# Patient Record
Sex: MALE | Race: BLACK OR AFRICAN AMERICAN | Employment: OTHER | URBAN - METROPOLITAN AREA
[De-identification: names, ages, dates, MRNs, and addresses within clinical notes are randomized per-mention and may not be internally consistent; named-entity substitution may affect disease eponyms.]

---

## 2017-01-19 ENCOUNTER — TELEPHONE (OUTPATIENT)
Dept: ORTHOPEDIC SURGERY | Facility: CLINIC | Age: 77
End: 2017-01-19

## 2017-01-19 NOTE — TELEPHONE ENCOUNTER
Pt last seen june 14,2016: declined to schedule f/u:    Pt requesting letter stating he can not fulfill his duties due to back issues. Pt is a public worker for trash collection.    Please call pt at [phone]

## 2017-07-17 ENCOUNTER — HOSPITAL ENCOUNTER (OUTPATIENT)
Dept: ULTRASOUND IMAGING | Age: 77
Discharge: HOME | End: 2017-07-17
Attending: UROLOGY
Payer: COMMERCIAL

## 2017-07-17 DIAGNOSIS — R31.29 MICROHEMATURIA: ICD-10-CM

## 2017-07-17 DIAGNOSIS — N40.0 BENIGN PROSTATIC HYPERPLASIA, PRESENCE OF LOWER URINARY TRACT SYMPTOMS UNSPECIFIED, UNSPECIFIED MORPHOLOGY: ICD-10-CM

## 2017-07-17 DIAGNOSIS — Z87.442 PERSONAL HISTORY OF KIDNEY STONES: ICD-10-CM

## 2017-07-17 PROCEDURE — 76770 US EXAM ABDO BACK WALL COMP: CPT

## 2017-08-25 ENCOUNTER — HOSPITAL ENCOUNTER (EMERGENCY)
Age: 77
Discharge: HOME | End: 2017-08-25
Attending: EMERGENCY MEDICINE
Payer: COMMERCIAL

## 2017-08-25 ENCOUNTER — APPOINTMENT (OUTPATIENT)
Dept: GENERAL RADIOLOGY | Age: 77
End: 2017-08-25
Attending: PHYSICIAN ASSISTANT
Payer: COMMERCIAL

## 2017-08-25 VITALS
SYSTOLIC BLOOD PRESSURE: 128 MMHG | BODY MASS INDEX: 32.2 KG/M2 | DIASTOLIC BLOOD PRESSURE: 66 MMHG | RESPIRATION RATE: 16 BRPM | TEMPERATURE: 98.2 F | WEIGHT: 230 LBS | HEIGHT: 71 IN | OXYGEN SATURATION: 96 % | HEART RATE: 50 BPM

## 2017-08-25 DIAGNOSIS — S39.012A BACK STRAIN, INITIAL ENCOUNTER: ICD-10-CM
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
## 2017-08-25 PROCEDURE — 72050 X-RAY EXAM NECK SPINE 4/5VWS: CPT

## 2017-08-25 PROCEDURE — 72072 X-RAY EXAM THORAC SPINE 3VWS: CPT

## 2017-08-25 PROCEDURE — 72110 X-RAY EXAM L-2 SPINE 4/>VWS: CPT

## 2017-08-25 PROCEDURE — 74011250637 HC RX REV CODE- 250/637: Performed by: PHYSICIAN ASSISTANT

## 2017-08-25 PROCEDURE — 99282 EMERGENCY DEPT VISIT SF MDM: CPT

## 2017-08-25 RX ORDER — ACETAMINOPHEN AND CODEINE PHOSPHATE 300; 30 MG/1; MG/1
1 TABLET ORAL
Status: COMPLETED | OUTPATIENT
Start: 2017-08-25 | End: 2017-08-25

## 2017-08-25 RX ORDER — GLUCOSAMINE SULFATE 1500 MG
1000 POWDER IN PACKET (EA) ORAL DAILY
COMMUNITY
End: 2018-04-02

## 2017-08-25 RX ADMIN — ACETAMINOPHEN AND CODEINE PHOSPHATE 1 TABLET: 300; 30 TABLET ORAL at 16:09
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
## 2017-08-25 NOTE — ED NOTES
The documentation for this period is being entered following the guidelines as defined in the [facility] policy by UZMA Lino.

## 2017-09-15 ENCOUNTER — HOSPITAL ENCOUNTER (OUTPATIENT)
Dept: PHYSICAL THERAPY | Age: 77
Discharge: HOME | End: 2017-09-15
Payer: COMMERCIAL

## 2017-09-15 PROCEDURE — 97110 THERAPEUTIC EXERCISES: CPT

## 2017-09-15 PROCEDURE — 97162 PT EVAL MOD COMPLEX 30 MIN: CPT

## 2017-09-15 PROCEDURE — 97530 THERAPEUTIC ACTIVITIES: CPT
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
## 2017-09-19 ENCOUNTER — HOSPITAL ENCOUNTER (OUTPATIENT)
Dept: PHYSICAL THERAPY | Age: 77
Discharge: HOME | End: 2017-09-19
Payer: COMMERCIAL

## 2017-09-19 PROCEDURE — 97110 THERAPEUTIC EXERCISES: CPT

## 2017-09-19 PROCEDURE — 97140 MANUAL THERAPY 1/> REGIONS: CPT

## 2017-09-19 NOTE — PROGRESS NOTES
PT DAILY TREATMENT NOTE - [facility]     Patient Name: Ruth Garza Sr.  Date:2017  : 1940  [x]  Patient  Verified  Payor: Jacques Dangelo / Plan: Estefania Ruvalcaba / Product Type: Commerical /    In time:3:33  Out time: 4:13  Total Treatment Time (min): 40  Visit #: 2 of 8    Treatment Area: Sprain of ligaments of cervical spine, initial encounter [S13. 4XXA]  Sprain of ligaments of lumbar spine, initial encounter [S33. 5XXA]    SUBJECTIVE  Pain Level (0-10 scale): 7/10  Any medication changes, allergies to medications, adverse drug reactions, diagnosis change, or new procedure performed?: [x] No    [] Yes (see summary sheet for update)  Subjective functional status/changes:   [] No changes reported  Pt reported get good relief after doing HEP    OBJECTIVE    Modality rationale: decrease pain and increase tissue extensibility to improve the patients ability to tolerate ADLs   Min Type Additional Details    [] Estim:  []Unatt       []IFC  []Premod                        []Other:  []w/ice   []w/heat  Position:  Location:    [] Estim: []Att    []TENS instruct  []NMES                    []Other:  []w/US   []w/ice   []w/heat  Position:  Location:    []  Traction: [] Cervical       []Lumbar                       [] Prone          []Supine                       []Intermittent   []Continuous Lbs:  [] before manual  [] after manual    []  Ultrasound: []Continuous   [] Pulsed                           []1MHz   []3MHz W/cm2:  Location:    []  Iontophoresis with dexamethasone         Location: [] Take home patch   [] In clinic   10 []  Ice     [x]  heat  []  Ice massage  []  Laser   []  Anodyne Position: seated  Location: back    []  Laser with stim  []  Other:  Position:  Location:    []  Vasopneumatic Device Pressure:       [] lo [] med [] hi   Temperature: [] lo [] med [] hi   [] Skin assessment post-treatment:  []intact []redness- no adverse reaction    []redness - adverse reaction:     22 min Therapeutic Exercise:  [x] See flow sheet :   Rationale: increase ROM and increase strength to improve the patients ability to amb with ease    8 min Manual Therapy:  shortgun and KT applied for QL inhibition B t/s   Rationale: decrease pain, increase ROM and increase postural awareness to improve pt's tolerance to ADLs        With   [] TE   [] TA   [] neuro   [] other: Patient Education: [x] Review HEP    [] Progressed/Changed HEP based on:   [] positioning   [] body mechanics   [] transfers   [] heat/ice application    [] other:      Other Objective/Functional Measures: Mod/max challenged with maintaining TA during Jesus   Mod difficulty with all standing Jesus due to B LEs weakness   Mod decreased mobility of t/s and l/s   Cont to be hypersensitive to light touch/palpation along t/s and l/s paraspinal musculature     Pain Level (0-10 scale) post treatment: 6/10    ASSESSMENT/Changes in Function: initiated treatment as POC, pt demonstrated good effort. KT trial for B QL inhibition, please follow up with pt next visit. Edu pt on precaution/skin reaction with KT, he demonstrated good understanding for KT removing as needed. Patient will continue to benefit from skilled PT services to modify and progress therapeutic interventions, address functional mobility deficits, address ROM deficits, address strength deficits, analyze and address soft tissue restrictions, analyze and cue movement patterns, analyze and modify body mechanics/ergonomics, assess and modify postural abnormalities, address imbalance/dizziness and instruct in home and community integration to attain remaining goals. [x]  See Plan of Care  []  See progress note/recertification  []  See Discharge Summary         Progress towards goals / Updated goals:  Short term goals: To be accomplished within 1 week                        1. Pt will be independent with HEP to maintain progression. Met 9-  Long term goals:  To be accomplished within 4 weeks 1. Pt will improve FOTO score by 7 points to  show improvement with functional mobility performance. 2. Pt will report no more than 5/10 pain during with ADLs/amb to improve his QOL. 3. Pt will report being able to drive for more than 15 min with no pain so he can navigate community safely. 4. Pt will be able to perform single knee ext and flex (machine) with at least 15lb resistance for 15x to show improve knees strength for prolonged amb.      PLAN  []  Upgrade activities as tolerated     [x]  Continue plan of care  []  Update interventions per flow sheet       []  Discharge due to:_  []  Other:_      Antoinette Mueller, PT 9/19/2017  8:01 AM    Future Appointments  Date Time Provider Nakia Maunela   9/19/2017 3:30 PM Thienphuc Qiuspe Gone [id number] [facility]   9/22/2017 4:00 PM Zahida Garcia PT [id number] [facility]   9/27/2017 4:30 PM Thienphuc Quispe Gone [id number] [facility]   9/29/2017 3:30 PM Thienphdeacon Milton LGLCYIE [facility]   10/2/2017 2:00 PM Breanna Bales  [address]   10/3/2017 2:00 PM Thienphuc Quispe Gone LLSUAXL [facility]   10/5/2017 3:00 PM Zahida Garcia PT [id number] [facility]   10/10/2017 2:00 PM Thienphuc Quispe Gone MMCPTPB [facility]   10/12/2017 3:00 PM Zahida Garcia PT [id number] [facility]   1/19/2018 1:45 PM Kelly Mane MD [id number] [facility]

## 2017-09-22 ENCOUNTER — HOSPITAL ENCOUNTER (OUTPATIENT)
Dept: PHYSICAL THERAPY | Age: 77
Discharge: HOME | End: 2017-09-22
Payer: COMMERCIAL

## 2017-09-22 PROCEDURE — 97110 THERAPEUTIC EXERCISES: CPT

## 2017-09-22 NOTE — PROGRESS NOTES
PT DAILY TREATMENT NOTE - [facility] 3-16    Patient Name: Tigist Dimas Sr.  Date:2017  : 1940  [x]  Patient  Verified  Payor: Saulo Bangura / Plan: Jemal Ackerman / Product Type: Commerical /    In time: 4:00  Out time: 4:44  Total Treatment Time (min):  44  Total Timed Codes (min):  34     Visit #: 3 of 8    Treatment Area: Sprain of ligaments of cervical spine, initial encounter [S13. 4XXA]  Sprain of ligaments of lumbar spine, initial encounter [S33. 5XXA]    SUBJECTIVE  Pain Level (0-10 scale): 6-7/10  Any medication changes, allergies to medications, adverse drug reactions, diagnosis change, or new procedure performed?: [x] No    [] Yes (see summary sheet for update)  Subjective functional status/changes:   [] No changes reported  Pt. Reports he is doing a little better. He reports the tape may have helped some but he wants to see how he does without it today.      OBJECTIVE    Modality rationale: decrease pain and increase tissue extensibility to improve the patients ability to increase ease of ADLs   Min Type Additional Details    [] Estim:  []Unatt       []IFC  []Premod                        []Other:  []w/ice   []w/heat  Position:  Location:    [] Estim: []Att    []TENS instruct  []NMES                    []Other:  []w/US   []w/ice   []w/heat  Position:  Location:    []  Traction: [] Cervical       []Lumbar                       [] Prone          []Supine                       []Intermittent   []Continuous Lbs:  [] before manual  [] after manual    []  Ultrasound: []Continuous   [] Pulsed                           []1MHz   []3MHz Location:  W/cm2:    []  Iontophoresis with dexamethasone         Location: [] Take home patch   [] In clinic   10 []  Ice     [x]  heat  []  Ice massage  []  Laser   []  Anodyne Position: seated  Location: low back    []  Laser with stim  []  Other: Position:  Location:    []  Vasopneumatic Device Pressure:       [] lo [] med [] hi   Temperature: [] lo [] med [] hi [x] Skin assessment post-treatment:  [x]intact []redness- no adverse reaction    []redness - adverse reaction:     34 min Therapeutic Exercise:  [x] See flow sheet :   Rationale: increase ROM and increase strength to improve the patients ability to increase ease of ADLs          With   [x] TE   [] TA   [] neuro   [] other: Patient Education: [x] Review HEP    [] Progressed/Changed HEP based on:   [] positioning   [] body mechanics   [] transfers   [] heat/ice application    [] other:      Other Objective/Functional Measures:   Pt. Requires multiple cues to perform TA holds correctly  Pt. Had no difficulty with [place]  Pt. Required cues to perform squats correctly      Pain Level (0-10 scale) post treatment: 6/10    ASSESSMENT/Changes in Function:  Pt. Is making slow progress towards goals. He continues to have significant back and hip pain    Patient will continue to benefit from skilled PT services to modify and progress therapeutic interventions, address functional mobility deficits, address ROM deficits, address strength deficits, analyze and address soft tissue restrictions, analyze and cue movement patterns, analyze and modify body mechanics/ergonomics and assess and modify postural abnormalities to attain remaining goals. Progress towards goals / Updated goals:  Short term goals: To be accomplished within 1 week                        1. Pt will be independent with HEP to maintain progression. Met 9-    Long term goals: To be accomplished within 4 weeks                        1. Pt will improve FOTO score by 7 points to  show improvement with functional mobility performance.                        1. Pt will report no more than 5/10 pain during with ADLs/amb to improve his QOL.   Not met: 7/10 (9/22/17)                        3. Pt will report being able to drive for more than 15 min with no pain so he can navigate community safely.                        4. Pt will be able to perform single knee ext and flex (machine) with at least 15lb resistance for 15x to show improve knees strength for prolonged amb.      PLAN  []  Upgrade activities as tolerated     [x]  Continue plan of care  []  Update interventions per flow sheet       []  Discharge due to:_  []  Other:_      Nargis Adorno, PT 9/22/2017  4:03 PM

## 2017-09-27 ENCOUNTER — HOSPITAL ENCOUNTER (OUTPATIENT)
Dept: PHYSICAL THERAPY | Age: 77
Discharge: HOME | End: 2017-09-27
Payer: COMMERCIAL

## 2017-09-27 PROCEDURE — 97110 THERAPEUTIC EXERCISES: CPT

## 2017-09-27 NOTE — PROGRESS NOTES
PT DAILY TREATMENT NOTE     Patient Name: Dena Morley Sr.  Date:2017  : 1940  [x]  Patient  Verified  Payor: Halina Garcia / Plan: Marge Nova / Product Type: Commerical /    In time: 4:05  Out time: 5:20  Total Treatment Time (min): 45  Visit #: 4 of 8    Treatment Area: Sprain of ligaments of cervical spine, initial encounter [S13. 4XXA]  Sprain of ligaments of lumbar spine, initial encounter [S33. 5XXA]    SUBJECTIVE  Pain Level (0-10 scale): 6/10  Any medication changes, allergies to medications, adverse drug reactions, diagnosis change, or new procedure performed?: [x] No    [] Yes (see summary sheet for update)  Subjective functional status/changes:   [] No changes reported  Pt reported feeling better gradually    OBJECTIVE  Modality rationale: decrease pain and increase tissue extensibility to improve the patients ability to increase ease of ADLs   Min Type Additional Details     [] Estim:  []Unatt       []IFC  []Premod                        []Other:  []w/ice   []w/heat  Position:  Location:     [] Estim: []Att    []TENS instruct  []NMES                    []Other:  []w/US   []w/ice   []w/heat  Position:  Location:     []  Traction: [] Cervical       []Lumbar                       [] Prone          []Supine                       []Intermittent   []Continuous Lbs:  [] before manual  [] after manual     []  Ultrasound: []Continuous   [] Pulsed                           []1MHz   []3MHz Location:  W/cm2:     []  Iontophoresis with dexamethasone         Location: [] Take home patch   [] In clinic   10 []  Ice     [x]  heat  []  Ice massage  []  Laser   []  Anodyne Position: seated  Location: low back     []  Laser with stim  []  Other: Position:  Location:     []  Vasopneumatic Device Pressure:       [] lo [] med [] hi   Temperature: [] lo [] med [] hi   [x] Skin assessment post-treatment:  [x]intact []redness- no adverse reaction    []redness - adverse reaction:      35 min Therapeutic Exercise:  [x] See flow sheet :   Rationale: increase ROM and increase strength to improve the patients ability to increase ease of ADLs          With   [] TE   [] TA   [] neuro   [] other: Patient Education: [x] Review HEP    [] Progressed/Changed HEP based on:   [] positioning   [] body mechanics   [] transfers   [] heat/ice application    [] other:      Other Objective/Functional Measures:    Increased rep with Jesus per flow sheet    Mod pain with AROM of trunk    Cont to demonstrate poor posture with ant pelvic tilt and mod difficulty with TA draw    Pain Level (0-10 scale) post treatment: 4/10    ASSESSMENT/Changes in Function: pt making steady progress with PT today, reported less back pain and min improvement with activities. Will progress with Jesus for core and LEs strength. Patient will continue to benefit from skilled PT services to modify and progress therapeutic interventions, address functional mobility deficits, address ROM deficits, address strength deficits, analyze and address soft tissue restrictions, analyze and cue movement patterns, analyze and modify body mechanics/ergonomics and assess and modify postural abnormalities to attain remaining goals.      Progress towards goals / Updated goals:  Short term goals: To be accomplished within 1 week                        1. Pt will be independent with HEP to maintain progression. Met 9-     Long term goals: To be accomplished within 4 weeks                        9. Pt will improve FOTO score by 7 points to  show improvement with functional mobility performance.                        6. Pt will report no more than 5/10 pain during with ADLs/amb to improve his QOL.   Not met: 7/10 (9/22/17) progressing 6-7/10 for the last 2 visits 9-27-17                        3. Pt will report being able to drive for more than 15 min with no pain so he can navigate community safely.                        4. Pt will be able to perform single knee ext and flex (machine) with at least 15lb resistance for 15x to show improve knees strength for prolonged amb.      PLAN  []  Upgrade activities as tolerated     [x]  Continue plan of care  []  Update interventions per flow sheet       []  Discharge due to:_  []  Other:_    Mirza Leija, PT 9/27/2017  8:00 AM    Future Appointments  Date Time Provider Nakia Manuela   9/27/2017 4:30 PM Thienphuc Roxana Scale HFEFEIA [facility]   9/29/2017 3:30 PM Thienphuc Roxana Scale [id number] [facility]   10/2/2017 2:00  [address]  [address]   10/3/2017 2:00 PM Thienphuc Roxana Scale [id number] [facility]   10/5/2017 3:00 PM Lyudmila Mendoza, PT MMCPTPB [facility]   10/10/2017 2:00 PM Thienphuc Roxana Scale OBEZZWF [facility]   10/12/2017 3:00 PM Lyudmila Mendoza PT [id number] [facility]   1/19/2018 1:45 PM Cheri Orona MD [address] [facility]

## 2017-09-29 ENCOUNTER — HOSPITAL ENCOUNTER (OUTPATIENT)
Dept: PHYSICAL THERAPY | Age: 77
Discharge: HOME | End: 2017-09-29
Payer: COMMERCIAL

## 2017-09-29 PROCEDURE — 97110 THERAPEUTIC EXERCISES: CPT

## 2017-09-29 NOTE — PROGRESS NOTES
PT DAILY TREATMENT NOTE     Patient Name: Karen Baer.  Date:2017  : 1940  [x]  Patient  Verified  Payor: Harish Colette / Plan: [id number] / Product Type: PPO /    In time: 3:30  Out time: 4:20  Total Treatment Time (min): 50  Visit #: 5 of 8    Treatment Area: Sprain of ligaments of cervical spine, initial encounter [S13. 4XXA]  Sprain of ligaments of lumbar spine, initial encounter [S33. 5XXA]    SUBJECTIVE  Pain Level (0-10 scale): 610  Any medication changes, allergies to medications, adverse drug reactions, diagnosis change, or new procedure performed?: [x] No    [] Yes (see summary sheet for update)  Subjective functional status/changes:   [] No changes reported  \"It's easing down little bit\"    OBJECTIVE        Modality rationale: decrease pain and increase tissue extensibility to improve the patients ability to increase ease of ADLs   Min Type Additional Details      [] Estim:  []Unatt       []IFC  []Premod                        []Other:  []w/ice   []w/heat  Position:  Location:      [] Estim: []Att    []TENS instruct  []NMES                    []Other:  []w/US   []w/ice   []w/heat  Position:  Location:      []  Traction: [] Cervical       []Lumbar                       [] Prone          []Supine                       []Intermittent   []Continuous Lbs:  [] before manual  [] after manual      []  Ultrasound: []Continuous   [] Pulsed                           []1MHz   []3MHz Location:  W/cm2:      []  Iontophoresis with dexamethasone         Location: [] Take home patch   [] In clinic   10 []  Ice     [x]  heat  []  Ice massage  []  Laser   []  Anodyne Position: seated  Location: low back      []  Laser with stim  []  Other: Position:  Location:      []  Vasopneumatic Device Pressure:       [] lo [] med [] hi   Temperature: [] lo [] med [] hi   [x] Skin assessment post-treatment:  [x]intact []redness- no adverse reaction    []redness - adverse reaction:       40 min Therapeutic Exercise:  [x] See flow sheet :   Rationale: increase ROM and increase strength to improve the patients ability to increase ease of ADLs          With   [] TE   [] TA   [] neuro   [] other: Patient Education: [x] Review HEP    [] Progressed/Changed HEP based on:   [] positioning   [] body mechanics   [] transfers   [] heat/ice application    [] other:      Other Objective/Functional Measures: Added weight for Jesus, pt tolerated well, will increased resistance/weigth next visit   Added new Jesus for core and upper back strength   Mod-max difficulty with PPT in standing    Pain Level (0-10 scale) post treatment: 4/10    ASSESSMENT/Changes in Function: pt progressing slowly towards goals; reported min improvements with all activities. Will cont with POC to maximize his functional mobility level. Patient will continue to benefit from skilled PT services to modify and progress therapeutic interventions, address functional mobility deficits, address ROM deficits, address strength deficits, analyze and address soft tissue restrictions, analyze and cue movement patterns, analyze and modify body mechanics/ergonomics and assess and modify postural abnormalities to attain remaining goals.      Progress towards goals / Updated goals:  Short term goals: To be accomplished within 1 week                        1. Pt will be independent with HEP to maintain progression. Met 9-      Long term goals: To be accomplished within 4 weeks                        1. Pt will improve FOTO score by 7 points to  show improvement with functional mobility performance.                        3. Pt will report no more than 5/10 pain during with ADLs/amb to improve his QOL.   Not met: 7/10 (9/22/17) progressing 6-7/10 for the last 2 visits 9-27-17                        3. Pt will report being able to drive for more than 15 min with no pain so he can navigate community safely.                        4. Pt will be able to perform single knee ext and flex (machine) with at least 15lb resistance for 15x to show improve knees strength for prolonged amb.       PLAN  []  Upgrade activities as tolerated     [x]  Continue plan of care  []  Update interventions per flow sheet       []  Discharge due to:_  []  Other:_    Amari Sung, BLAYNE 9/29/2017  7:57 AM    Future Appointments  Date Time Provider Nakia Roy   9/29/2017 3:30 PM Pedro Yates [id number] [facility]   10/2/2017 2:00 PM Don Villalobos MD [facility]   10/3/2017 2:00 PM Pedro Yates OAMWMWS [facility]   10/5/2017 3:00 PM Amisha Zhou, PT [id number] [facility]   10/10/2017 2:00 PM Pedro Yates [id number] [facility]   10/12/2017 3:00 PM Amisha Zhou, PT MMCPTPB [facility]   1/19/2018 1:45 PM Sabra Hernandez MD [address] [facility]

## 2017-10-02 ENCOUNTER — OFFICE VISIT (OUTPATIENT)
Dept: ORTHOPEDIC SURGERY | Age: 77
End: 2017-10-02

## 2017-10-02 VITALS
HEART RATE: 62 BPM | BODY MASS INDEX: 31.92 KG/M2 | RESPIRATION RATE: 16 BRPM | DIASTOLIC BLOOD PRESSURE: 76 MMHG | SYSTOLIC BLOOD PRESSURE: 165 MMHG | HEIGHT: 71 IN | WEIGHT: 228 LBS | TEMPERATURE: 97.4 F

## 2017-10-02 DIAGNOSIS — M43.16 SPONDYLOLISTHESIS AT L4-L5 LEVEL: ICD-10-CM

## 2017-10-02 DIAGNOSIS — S39.012A STRAIN OF LUMBAR REGION, INITIAL ENCOUNTER: Primary | ICD-10-CM

## 2017-10-02 RX ORDER — RANITIDINE 150 MG/1
150 TABLET, FILM COATED ORAL 2 TIMES DAILY
COMMUNITY
Start: 2017-08-02

## 2017-10-02 RX ORDER — ACETAMINOPHEN AND CODEINE PHOSPHATE 300; 30 MG/1; MG/1
1 TABLET ORAL
COMMUNITY
Start: 2017-08-26 | End: 2018-04-02

## 2017-10-02 RX ORDER — IBUPROFEN 600 MG/1
600 TABLET ORAL
COMMUNITY
Start: 2017-09-07 | End: 2019-09-30

## 2017-10-02 NOTE — MR AVS SNAPSHOT
Visit Information Date & Time Provider Department Dept. Phone Encounter #  
 10/2/2017  2:00  [address],  [facility], [address] [facility] [phone] [id number] Follow-up Instructions Return in about 1 month (around 11/2/2017). Your Appointments 1/19/2018  1:45 PM  
PROCEDURE with Manpreet Santo MD  
[facility] ([address]) Appt Note: 6 mon poss [address], [facility] [address] [facility] [address]  
[address] [facility] [address] [place] [address] Upcoming Health Maintenance Date Due HEMOGLOBIN A1C Q6M 1938 LIPID PANEL Q1 1938 FOOT EXAM Q1 4/3/1948 MICROALBUMIN Q1 4/3/1948 EYE EXAM RETINAL OR DILATED Q1 4/3/1948 DTaP/Tdap/Td series (1 - Tdap) 4/3/1959 ZOSTER VACCINE AGE 60> 2/3/1998 GLAUCOMA SCREENING Q2Y 4/3/2003 Pneumococcal 65+ Low/Medium Risk (1 of 2 - PCV13) 4/3/2003 MEDICARE YEARLY EXAM 4/3/2003 INFLUENZA AGE 9 TO ADULT 8/1/2017 Allergies as of 10/2/2017  Review Complete On: 10/2/2017 By: [address], MD  
 No Known Allergies Current Immunizations  Reviewed on 8/8/2013 Name Date Influenza Vaccine 11/2/2012 Not reviewed this visit You Were Diagnosed With   
  
 Codes Comments Strain of lumbar region, initial encounter    -  Primary ICD-10-CM: B79.045T ICD-9-CM: 847.2 Spondylolisthesis at L4-L5 level     ICD-10-CM: M43.16 
ICD-9-CM: 756.12 Vitals BP Pulse Temp Resp Height(growth percentile) Weight(growth percentile) 165/76 62 97.4 °F (36.3 °C) (Oral) 16 5' 11\" (1.803 m) 228 lb (103.4 kg) BMI Smoking Status 31.8 kg/m2 Never Smoker Vitals History BMI and BSA Data Body Mass Index Body Surface Area  
 31.8 kg/m 2 2.28 m 2 Preferred Pharmacy Pharmacy Name Phone [facility] [address] [phone] Your Updated Medication List  
  
   
This list is accurate as of: 10/2/17  2:36 PM.  Always use your most recent med list.  
  
  
  
  
 acetaminophen-codeine 300-30 mg per tablet Commonly known as:  TYLENOL #3 Take 1 Tab by mouth every six (6) hours as needed. CENTRUM SILVER Tab tablet Generic drug:  multivitamins-minerals-lutein Take 1 Tab by mouth daily. ibuprofen 600 mg tablet Commonly known as:  MOTRIN Take 600 mg by mouth every eight (8) hours as needed. lisinopril 30 mg tablet Commonly known as:  Thersia Kubas Take 30 mg by mouth daily. metFORMIN 500 mg tablet Commonly known as:  GLUCOPHAGE Take 500 mg by mouth two (2) times daily (with meals). metoprolol succinate 100 mg tablet Commonly known as:  TOPROL-XL  
TAKE ONE TABLET BY MOUTH ONCE DAILY  
  
 oxybutynin chloride XL 10 mg CR tablet Commonly known as:  DITROPAN XL Take 1 Tab by mouth daily. pravastatin 40 mg tablet Commonly known as:  PRAVACHOL Take 40 mg by mouth nightly. raNITIdine 150 mg tablet Commonly known as:  ZANTAC Take 150 mg by mouth two (2) times a day. VITAMIN D3 1,000 unit Cap Generic drug:  cholecalciferol Take 1,000 Units by mouth daily. Follow-up Instructions Return in about 1 month (around 11/2/2017). To-Do List   
 10/03/2017 2:00 PM  
  Appointment with Mary Jane Bravo at [address] [place] [address] ([phone]) 10/05/2017 3:00 PM  
  Appointment with Diane Ortiz PT at [facility] [address] [facility] ([phone]) 10/10/2017 2:00 PM  
  Appointment with Mary Jane Bravo at [address] [place] [address] ([phone]) 10/12/2017 3:00 PM  
  Appointment with Diane Ortiz PT at [facility] [address] [facility] ([phone]) Patient Instructions MyChart Activation Thank you for requesting access to [employer].  Please follow the instructions below to securely access and download your online medical record. [employer] allows you to send messages to your doctor, view your test results, renew your prescriptions, schedule appointments, and more. How Do I Sign Up? 1. In your internet browser, go to [email or web address] 
2. Click on the First Time User? Click Here link in the Sign In box. You will be redirect to the New Member Sign Up page. 3. Enter your [employer] Access Code exactly as it appears below. You will not need to use this code after youve completed the sign-up process. If you do not sign up before the expiration date, you must request a new code. [employer] Access Code: [email or web address] Expires: 10/12/2017  6:17 PM (This is the date your [employer] access code will ) 4. Enter the last four digits of your Social Security Number (xxxx) and Date of Birth (mm/dd/yyyy) as indicated and click Submit. You will be taken to the next sign-up page. 5. Create a [employer] ID. This will be your [employer] login ID and cannot be changed, so think of one that is secure and easy to remember. 6. Create a [employer] password. You can change your password at any time. 7. Enter your Password Reset Question and Answer. This can be used at a later time if you forget your password. 8. Enter your e-mail address. You will receive e-mail notification when new information is available in [address] 9. Click Sign Up. You can now view and download portions of your medical record. 10. Click the Download Summary menu link to download a portable copy of your medical information. Additional Information If you have questions, please visit the Frequently Asked Questions section of the [employer] website at [email or web address] Remember, [employer] is NOT to be used for urgent needs. For medical emergencies, dial 911. Back Strain: Care Instructions Your Care Instructions Back strain happens when you overstretch, or pull, a muscle in your back. You may hurt your back in an accident or when you exercise or lift something. Most back pain will get better with rest and time. You can take care of yourself at home to help your back heal. 
Follow-up care is a key part of your treatment and safety. Be sure to make and go to all appointments, and call your doctor if you are having problems. It's also a good idea to know your test results and keep a list of the medicines you take. How can you care for yourself at home? · Try to stay as active as you can, but stop or reduce any activity that causes pain. · Put ice or a cold pack on the sore muscle for 10 to 20 minutes at a time to stop swelling. Try this every 1 to 2 hours for 3 days (when you are awake) or until the swelling goes down. Put a thin cloth between the ice pack and your skin. · After 2 or 3 days, apply a heating pad on low or a warm cloth to your back. Some doctors suggest that you go back and forth between hot and cold treatments. · Take pain medicines exactly as directed. ¨ If the doctor gave you a prescription medicine for pain, take it as prescribed. ¨ If you are not taking a prescription pain medicine, ask your doctor if you can take an over-the-counter medicine. · Try sleeping on your side with a pillow between your legs. Or put a pillow under your knees when you lie on your back. These measures can ease pain in your lower back. · Return to your usual level of activity slowly. When should you call for help? Call 911 anytime you think you may need emergency care. For example, call if: 
· You are unable to move a leg at all. Call your doctor now or seek immediate medical care if: 
· You have new or worse symptoms in your legs, belly, or buttocks. Symptoms may include: ¨ Numbness or tingling. ¨ Weakness. ¨ Pain. · You lose bladder or bowel control. Watch closely for changes in your health, and be sure to contact your doctor if you are not getting better as expected. Where can you learn more? Go to http://anitha-paxton.info/. Enter I921 in the search box to learn more about \"Back Strain: Care Instructions. \" Current as of: March 21, 2017 Content Version: 11.3 © 5121-7729 Sendmebox. Care instructions adapted under license by [employer] (which disclaims liability or warranty for this information). If you have questions about a medical condition or this instruction, always ask your healthcare professional. [place] [address] any warranty or liability for your use of this information. Back Care and Preventing Injuries: Care Instructions Your Care Instructions You can hurt your back doing many everyday activities: lifting a heavy box, bending down to garden, exercising at the gym, and even getting out of bed. But you can keep your back strong and healthy by doing some exercises. You also can follow a few tips for sitting, sleeping, and lifting to avoid hurting your back again. Talk to your doctor before you start an exercise program. Ask for help if you want to learn more about keeping your back healthy. Follow-up care is a key part of your treatment and safety. Be sure to make and go to all appointments, and call your doctor if you are having problems. It's also a good idea to know your test results and keep a list of the medicines you take. How can you care for yourself at home? · Stay at a healthy weight to avoid strain on your lower back. · Do not smoke. Smoking increases the risk of osteoporosis, which weakens the spine. If you need help quitting, talk to your doctor about stop-smoking programs and medicines. These can increase your chances of quitting for good.  
· Make sure you sleep in a position that maintains your back's normal curves and on a mattress that feels comfortable. Sleep on your side with a pillow between your knees, or sleep on your back with a pillow under your knees. These positions can reduce strain on your back. · When you get out of bed, lie on your side and bend both knees. Drop your feet over the edge of the bed as you push up with both arms. Scoot to the edge of the bed. Make sure your feet are in line with your rear end (buttocks), and then stand up. · If you must stand for a long time, put one foot on a stool, ledge, or box. Exercise to strengthen your back and other muscles · Get at least 30 minutes of exercise on most days of the week. Walking is a good choice. You also may want to do other activities, such as running, swimming, cycling, or playing tennis or team sports. · Stretch your back muscles. Here are few exercises to try: ¨ Lie on your back with your knees bent and your feet flat on the floor. Gently pull one bent knee to your chest. Put that foot back on the floor, and then pull the other knee to your chest. Hold for 15 to 30 seconds. Repeat 2 to 4 times. ¨ Do pelvic tilts. Lie on your back with your knees bent. Tighten your stomach muscles. Pull your belly button (navel) in and up toward your ribs. You should feel like your back is pressing to the floor and your hips and pelvis are slightly lifting off the floor. Hold for 6 seconds while breathing smoothly. · Keep your core muscles strong. The muscles of your back, belly (abdomen), and buttocks support your spine. ¨ Pull in your belly, and imagine pulling your navel toward your spine. Hold this for 6 seconds, then relax. Remember to keep breathing normally as you tense your muscles. ¨ Do curl-ups. Always do them with your knees bent. Keep your low back on the floor, and curl your shoulders toward your knees using a smooth, slow motion.  Keep your arms folded across your chest. If this bothers your neck, try putting your hands behind your neck (not your head), with your elbows spread apart. ¨ Lie on your back with your knees bent and your feet flat on the floor. Tighten your belly muscles, and then push with your feet and raise your buttocks up a few inches. Hold this position 6 seconds as you continue to breathe normally, then lower yourself slowly to the floor. Repeat 8 to 12 times. ¨ If you like group exercise, try Pilates or yoga. These classes have poses that strengthen the core muscles. Protect your back when you sit · Place a small pillow, a rolled-up towel, or a lumbar roll in the curve of your back if you need extra support. · Sit in a chair that is low enough to let you place both feet flat on the floor with both knees nearly level with your hips. If your chair or desk is too high, use a foot rest to raise your knees. · When driving, keep your knees nearly level with your hips. Sit straight, and drive with both hands on the steering wheel. Your arms should be in a slightly bent position. · Try a kneeling chair, which helps tilt your hips forward. This takes pressure off your lower back. · Try sitting on an exercise ball. It can rock from side to side, which helps keep your back loose. Lift properly · Squat down, bending at the hips and knees only. If you need to, put one knee to the floor and extend your other knee in front of you, bent at a right angle (half kneeling). · Press your chest straight forward. This helps keep your upper back straight while keeping a slight arch in your low back. · Hold the load as close to your body as possible, at the level of your navel. · Use your feet to change direction, taking small steps. · Lead with your hips as you change direction. Keep your shoulders in line with your hips as you move. Do not twist your body. · Set down your load carefully, squatting with your knees and hips only. When should you call for help? Watch closely for changes in your health, and be sure to contact your doctor if: 
· You want more exercises to make your back and other core muscles stronger. Where can you learn more? Go to http://anitha-paxton.info/. Enter S810 in the search box to learn more about \"Back Care and Preventing Injuries: Care Instructions. \" Current as of: March 21, 2017 Content Version: 11.3 © 9980-9412 IQ Logic. Care instructions adapted under license by [employer] (which disclaims liability or warranty for this information). If you have questions about a medical condition or this instruction, always ask your healthcare professional. [address] any warranty or liability for your use of this information. Introducing [facility]! Uyen Vergara introduces [employer] patient portal. Now you can access parts of your medical record, email your doctor's office, and request medication refills online. 1. In your internet browser, go to [email or web address] 2. Click on the First Time User? Click Here link in the Sign In box. You will see the New Member Sign Up page. 3. Enter your [employer] Access Code exactly as it appears below. You will not need to use this code after youve completed the sign-up process. If you do not sign up before the expiration date, you must request a new code. · [employer] Access Code: [email or web address] Expires: 10/12/2017  6:17 PM 
 
4. Enter the last four digits of your Social Security Number (xxxx) and Date of Birth (mm/dd/yyyy) as indicated and click Submit. You will be taken to the next sign-up page. 5. Create a [employer] ID. This will be your [employer] login ID and cannot be changed, so think of one that is secure and easy to remember. 6. Create a [employer] password. You can change your password at any time. 7. Enter your Password Reset Question and Answer.  This can be used at a later time if you forget your password. 8. Enter your e-mail address. You will receive e-mail notification when new information is available in [address] 9. Click Sign Up. You can now view and download portions of your medical record. 10. Click the Download Summary menu link to download a portable copy of your medical information. If you have questions, please visit the Frequently Asked Questions section of the [employer] website. Remember, [employer] is NOT to be used for urgent needs. For medical emergencies, dial 911. Now available from your iPhone and Android! Please provide this summary of care documentation to your next provider. Your primary care clinician is listed as Nafisa. If you have any questions after today's visit, please call [phone].
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
## 2017-10-03 ENCOUNTER — HOSPITAL ENCOUNTER (OUTPATIENT)
Dept: PHYSICAL THERAPY | Age: 77
Discharge: HOME | End: 2017-10-03
Payer: COMMERCIAL

## 2017-10-03 PROCEDURE — 97110 THERAPEUTIC EXERCISES: CPT

## 2017-10-03 NOTE — PROGRESS NOTES
[facility] - Veronique Rivera  [address] [facility]  [phone] fax    Physical Therapy Progress Note  Patient name: Randal Asencio Start of Care: 9/15/2017   Referral source: Parish Su MD : 1940                         Medical Diagnosis: Sprain of ligaments of cervical spine, initial encounter [S13. 4XXA]  Sprain of ligaments of lumbar spine, initial encounter [S33. 5XXA] Onset Date: 2017                         Treatment Diagnosis: LBP, upper back pain, L>R, L hip pain, knees weakness   Prior Hospitalization: see medical history Provider#: [id number]   Medications: Verified on Patient summary List    Comorbidities: hypertensive cariovascular disease, hyperlipidemia, DM, bradycardia, HTN, L THR ~3/4 years ago   Prior Level of Function: mod I with mobility, amb with SPC since L THR, less back pain with ADLs/walking/physical activities. Visits from Start of Care: 6    Missed Visits: 0    Established Goals:         Excellent           Good         Limited           None  [] Increased ROM   []  []  []  []  [x] Increased Strength  []  [x]  []  []  [x] Increased Mobility  []  []  [x]  []   [x] Decreased Pain   []  []  [x]  []  [] Decreased Swelling  []  []  []  []    Key Functional Changes: improved pain level min, improve B LEs strength, improve posture awareness, improved functional mobility with min improved l/s FOTO score    Updated Goals: to be achieved in 4 weeks:  Long term goals: To be accomplished within 4 weeks   1. Pt will improve FOTO score by 7 points to  show improvement with functional mobility performance. 2. Pt will report being able to drive for more than 15 min with no pain so he can navigate community safely. 3. Pt will report no more than 5/10 pain during with ADLs/amb to improve his QOL.    4. Pt will be able to perform single knee ext and flex (machine) with at least 15lb resistance for 15x to show improve knees strength for prolonged amb. ASSESSMENT/RECOMMENDATIONS:  pt making slow but steady progress with PT; pain level gradually decreased 1-2 levels. But pt cont to demonstrate poor core control/strength, decreased tolerance to physical therapy and decreased B LEs strength. [x]Continue therapy per initial plan/protocol at a frequency of  2 x per week for 4 weeks  []Continue therapy with the following recommended changes:_____________________      _____________________________________________________________________  []Discontinue therapy progressing towards or have reached established goals  []Discontinue therapy due to lack of appreciable progress towards goals  []Discontinue therapy due to lack of attendance or compliance  []Await Physician's recommendations/decisions regarding therapy  []Other:________________________________________________________________    Thank you for this referral.   Lizabeth Hughes, PT 10/3/2017 2:55 PM    NOTE TO PHYSICIAN:  PLEASE COMPLETE THE ORDERS BELOW AND   FAX TO [facility]: [phone]  If you are unable to process this request in 24 hours please contact our office: [phone] I have read the above report and request that my patient continue as recommended. ? I have read the above report and request that my patient continue therapy with the following changes/special instructions:____________________________________  ? I have read the above report and request that my patient be discharged from therapy.     Physicians signature: ______________________________Date: ______Time:______

## 2017-10-03 NOTE — PROGRESS NOTES
PT DAILY TREATMENT NOTE - [facility]     Patient Name: Wilson Hamilton Sr.  Date:10/3/2017  : 1940  [x]  Patient  Verified  Payor: Larisa Zambrano / Plan: Juliette Perez / Product Type: Commerical /    In time: 2:05  Out time:2:48  Total Treatment Time (min): 43  Total Timed Codes (min): 33  1:1 Treatment Time ( [address] only): 33   Visit #: 6 of 8    Treatment Area: Sprain of ligaments of cervical spine, initial encounter [S13. 4XXA]  Sprain of ligaments of lumbar spine, initial encounter [S33. 5XXA]    SUBJECTIVE  Pain Level (0-10 scale): 5-6/10  Any medication changes, allergies to medications, adverse drug reactions, diagnosis change, or new procedure performed?: [x] No    [] Yes (see summary sheet for update)  Subjective functional status/changes:   [] No changes reported  Pt stated \"I'm doing better. \"    OBJECTIVE          Modality rationale: decrease pain and increase tissue extensibility to improve the patients ability to increase ease of ADLs   Min Type Additional Details      [] Estim:  []Unatt       []IFC  []Premod                        []Other:  []w/ice   []w/heat  Position:  Location:      [] Estim: []Att    []TENS instruct  []NMES                    []Other:  []w/US   []w/ice   []w/heat  Position:  Location:      []  Traction: [] Cervical       []Lumbar                       [] Prone          []Supine                       []Intermittent   []Continuous Lbs:  [] before manual  [] after manual      []  Ultrasound: []Continuous   [] Pulsed                           []1MHz   []3MHz Location:  W/cm2:      []  Iontophoresis with dexamethasone         Location: [] Take home patch   [] In clinic   10 []  Ice     [x]  heat  []  Ice massage  []  Laser   []  Anodyne Position: seated  Location: low back      []  Laser with stim  []  Other: Position:  Location:      []  Vasopneumatic Device Pressure:       [] lo [] med [] hi   Temperature: [] lo [] med [] hi   [x] Skin assessment post-treatment:  [x]intact []redness- no adverse reaction    []redness - adverse reaction:       33 min Therapeutic Exercise:  [x] See flow sheet :   Rationale: increase ROM and increase strength to improve the patients ability to increase ease of ADLs                                                                                                                     With   [] TE   [] TA   [] neuro   [] other: Patient Education: [x] Review HEP    [] Progressed/Changed HEP based on:   [] positioning   [] body mechanics   [] transfers   [] heat/ice application    [] other:      Other Objective/Functional Measures:    Cont to demonstrate max challenged with PPT/TA hold   Min fatigued with increased resistance for B LEs     FOTO: 51 neck (decreased 1 point), 50 l/s (increased 3 points)    Pain Level (0-10 scale) post treatment: 5/10    ASSESSMENT/Changes in Function: see progress note    Patient will continue to benefit from skilled PT services to modify and progress therapeutic interventions, address functional mobility deficits, address ROM deficits, address strength deficits, analyze and address soft tissue restrictions, analyze and cue movement patterns, analyze and modify body mechanics/ergonomics and assess and modify postural abnormalities to attain remaining goals.      Progress towards goals / Updated goals:  Short term goals: To be accomplished within 1 week                        1. Pt will be independent with HEP to maintain progression. Met 9-      Long term goals: To be accomplished within 4 weeks                        1. Pt will improve FOTO score by 7 points to  show improvement with functional mobility performance. Progressing with l/s but decreased with c/s 10-3-17                        2. Pt will report no more than 5/10 pain during with ADLs/amb to improve his QOL.   Not met: 7/10 (9/22/17) progressing 6-7/10 for the last 2 visits 9-27-17                        3. Pt will report being able to drive for more than 15 min with no pain so he can navigate community safely.  Not met 10-3-17                        4. Pt will be able to perform single knee ext and flex (machine) with at least 15lb resistance for 15x to show improve knees strength for prolonged amb. progressing, mod difficulty/fatigued with 5 pound weight 10-3-17      PLAN  []  Upgrade activities as tolerated     [x]  Continue plan of care  []  Update interventions per flow sheet       []  Discharge due to:_  []  Other:_    Neisha Mead, PT 10/3/2017  7:58 AM    Future Appointments  Date Time Provider Nakia Roy   10/3/2017 2:00 PM Pedro Alvarez [id number] [facility]   10/5/2017 3:00 PM Moses Jewell, PT [id number] [facility]   10/10/2017 2:00 PM Pedro Alvarez [id number] [facility]   10/12/2017 3:00 PM Moses Jewell PT [id number]   11/2/2017 3:10 PM Pepper Nicole MD [facility]   1/19/2018 1:45 PM Karlie Fatima MD [address] [place] [address]

## 2017-10-05 ENCOUNTER — HOSPITAL ENCOUNTER (OUTPATIENT)
Dept: PHYSICAL THERAPY | Age: 77
Discharge: HOME | End: 2017-10-05
Payer: COMMERCIAL

## 2017-10-05 PROCEDURE — 97110 THERAPEUTIC EXERCISES: CPT

## 2017-10-05 NOTE — PROGRESS NOTES
PT DAILY TREATMENT NOTE - [facility] 3-16    Patient Name: Giovanna Lux Sr.  Date:10/5/2017  : 1940  [x]  Patient  Verified  Payor: Alesia Sears / Plan: [id number] / Product Type: PPO /    In time: 3:00  Out time: 3:44  Total Treatment Time (min):  44  Total Timed Codes (min):  34     Visit #: 7 of 8    Treatment Area: Sprain of ligaments of cervical spine, initial encounter [S13. 4XXA]  Sprain of ligaments of lumbar spine, initial encounter [S33. 5XXA]    SUBJECTIVE  Pain Level (0-10 scale): 5-6/10  Any medication changes, allergies to medications, adverse drug reactions, diagnosis change, or new procedure performed?: [x] No    [] Yes (see summary sheet for update)  Subjective functional status/changes:   [] No changes reported  Pt. Reports he is feeling about the same today. OBJECTIVE    Modality rationale:  To decrease pain to improve the patients ability to increase ease of ADLs   Min Type Additional Details    [] Estim:  []Unatt       []IFC  []Premod                        []Other:  []w/ice   []w/heat  Position:  Location:    [] Estim: []Att    []TENS instruct  []NMES                    []Other:  []w/US   []w/ice   []w/heat  Position:  Location:    []  Traction: [] Cervical       []Lumbar                       [] Prone          []Supine                       []Intermittent   []Continuous Lbs:  [] before manual  [] after manual    []  Ultrasound: []Continuous   [] Pulsed                           []1MHz   []3MHz Location:  W/cm2:    []  Iontophoresis with dexamethasone         Location: [] Take home patch   [] In clinic   10 []  Ice     [x]  heat  []  Ice massage  []  Laser   []  Anodyne Position: seated  Location: low back    []  Laser with stim  []  Other: Position:  Location:    []  Vasopneumatic Device Pressure:       [] lo [] med [] hi   Temperature: [] lo [] med [] hi   [x] Skin assessment post-treatment:  [x]intact []redness- no adverse reaction    []redness - adverse reaction: 34 min Therapeutic Exercise:  [x] See flow sheet :   Rationale: increase ROM and increase strength to improve the patients ability to increase ease of ADLs          With   [x] TE   [] TA   [] neuro   [] other: Patient Education: [x] Review HEP    [] Progressed/Changed HEP based on:   [] positioning   [] body mechanics   [] transfers   [] heat/ice application    [] other:      Other Objective/Functional Measures:   Pt. Tolerated PT well with no reports of increased pain  He performed SL knee extension with 10#  Pt. Has decreased mobility with SBx3     Pain Level (0-10 scale) post treatment: 5/10    ASSESSMENT/Changes in Function:  Pt. Is progressing slowly towards goals. He continues to have decreased core stability and decreased LE strength. Patient will continue to benefit from skilled PT services to modify and progress therapeutic interventions, address functional mobility deficits, address ROM deficits, address strength deficits, analyze and address soft tissue restrictions, analyze and cue movement patterns and analyze and modify body mechanics/ergonomics to attain remaining goals. Progress towards goals / Updated goals:  Long term goals: To be accomplished within 4 weeks                        1. Pt will improve FOTO score by 7 points to  show improvement with functional mobility performance.                         2. Pt will report being able to drive for more than 15 min with no pain so he can navigate community safely. 3. Pt will report no more than 5/10 pain during with ADLs/amb to improve his QOL. 4. Pt will be able to perform single knee ext and flex (machine) with at least 15lb resistance for 15x to show improve knees strength for prolonged amb.   Progressing: 10# (10/5/17)    PLAN  []  Upgrade activities as tolerated     [x]  Continue plan of care  []  Update interventions per flow sheet       []  Discharge due to:_  []  Other:_      Erick Galloway Betsy, BLAYNE 10/5/2017  3:27 PM

## 2017-10-10 ENCOUNTER — HOSPITAL ENCOUNTER (OUTPATIENT)
Dept: PHYSICAL THERAPY | Age: 77
Discharge: HOME | End: 2017-10-10
Payer: COMMERCIAL

## 2017-10-10 PROCEDURE — 97110 THERAPEUTIC EXERCISES: CPT

## 2017-10-10 NOTE — PROGRESS NOTES
PT DAILY TREATMENT NOTE     Patient Name: Shahriar Alvarez Sr.  Date:10/10/2017  : 1940  [x]  Patient  Verified  Payor: HOUSTON MINAYA / Plan: [id number] / Product Type: PPO /    In time:2:58  Out time: 2:43  Total Treatment Time (min): 45  Visit #: 8 of 8    Treatment Area: Sprain of ligaments of cervical spine, initial encounter [S13. 4XXA]  Sprain of ligaments of lumbar spine, initial encounter [S33. 5XXA]    SUBJECTIVE  Pain Level (0-10 scale): 5-6/10  Any medication changes, allergies to medications, adverse drug reactions, diagnosis change, or new procedure performed?: [x] No    [] Yes (see summary sheet for update)  Subjective functional status/changes:   [] No changes reported  Pt reported doing ok    OBJECTIVE           Modality rationale:  To decrease pain to improve the patients ability to increase ease of ADLs   Min Type Additional Details     [] Estim:  []Unatt       []IFC  []Premod                        []Other:  []w/ice   []w/heat  Position:  Location:     [] Estim: []Att    []TENS instruct  []NMES                    []Other:  []w/US   []w/ice   []w/heat  Position:  Location:     []  Traction: [] Cervical       []Lumbar                       [] Prone          []Supine                       []Intermittent   []Continuous Lbs:  [] before manual  [] after manual     []  Ultrasound: []Continuous   [] Pulsed                           []1MHz   []3MHz Location:  W/cm2:     []  Iontophoresis with dexamethasone         Location: [] Take home patch   [] In clinic   10 []  Ice     [x]  heat  []  Ice massage  []  Laser   []  Anodyne Position: seated  Location: low back     []  Laser with stim  []  Other: Position:  Location:     []  Vasopneumatic Device Pressure:       [] lo [] med [] hi   Temperature: [] lo [] med [] hi   [x] Skin assessment post-treatment:  [x]intact []redness- no adverse reaction    []redness - adverse reaction:      35 min Therapeutic Exercise:  [x] See flow sheet : Rationale: increase ROM and increase strength to improve the patients ability to increase ease of ADLs              With   [] TE   [] TA   [] neuro   [] other: Patient Education: [x] Review HEP    [] Progressed/Changed HEP based on:   [] positioning   [] body mechanics   [] transfers   [] heat/ice application    [] other:      Other Objective/Functional Measures:    Cont to demonstrate poor core contraction, especially on SB    Performed SL LAQ and HS with 15# using machine, no difficulty with HS but mod challenged and fatigued with LAQ    Pain Level (0-10 scale) post treatment:  5/10    ASSESSMENT/Changes in Function: pt making slow progress towards goals; pain level gradually decreased. Will cont with POC to maximize his core strength and activity tolerance    Patient will continue to benefit from skilled PT services to modify and progress therapeutic interventions, address functional mobility deficits, address ROM deficits, address strength deficits, analyze and address soft tissue restrictions, analyze and cue movement patterns and analyze and modify body mechanics/ergonomics to attain remaining goals.      Progress towards goals / Updated goals:  Long term goals: To be accomplished within 4 weeks                        1. Pt will improve FOTO score by 7 points to  show improvement with functional mobility performance. Progressing with l/s but decreased with c/s 10-3-17                        2. Pt will report being able to drive for more than 15 min with no pain so he can navigate community safely.                        3. Pt will report no more than 5/10 pain during with ADLs/amb to improve his QOL. Not met: 7/10 (9/22/17) progressing 6-7/10 for the last 2 visits 9-27-17 porgressing, 5-6 for the last 3 consecutive visits 10-10-17                        4. Pt will be able to perform single knee ext and flex (machine) with at least 15lb resistance for 15x to show improve knees strength for prolonged amb. Progressing: 15#, no difficulty with HS but mod challenged and fatigued with LAQ (10/5/17)     PLAN  []  Upgrade activities as tolerated     [x]  Continue plan of care  []  Update interventions per flow sheet       []  Discharge due to:_  []  Other:_      Chelsea Postal, PT 10/10/2017  7:48 AM    Future Appointments  Date Time Provider Nakia Roy   10/10/2017 2:00 PM Pedro Castro [id number] [facility]   10/12/2017 3:00 PM Ashley Finn [id number] [facility]   11/2/2017 3:10 PM Martha Lawrence  [address]   1/19/2018 1:45 PM Zena Armstrong  [facility]

## 2017-10-12 ENCOUNTER — HOSPITAL ENCOUNTER (OUTPATIENT)
Dept: PHYSICAL THERAPY | Age: 77
Discharge: HOME | End: 2017-10-12
Payer: COMMERCIAL

## 2017-10-12 PROCEDURE — 97110 THERAPEUTIC EXERCISES: CPT

## 2017-10-12 NOTE — PROGRESS NOTES
PT DAILY TREATMENT NOTE 3-16    Patient Name: Jt Hylton Sr.  Date:10/12/2017  : 1940  [x]  Patient  Verified  Payor: BLUE CROSS / Plan: [id number] / Product Type: PPO /    In time:3:00  Out time:3:49  Total Treatment Time (min): 49  Visit #: 9 of 8    Treatment Area: Sprain of ligaments of cervical spine, initial encounter [S13. 4XXA]  Sprain of ligaments of lumbar spine, initial encounter [S33. 5XXA]    SUBJECTIVE  Pain Level (0-10 scale): 5  Any medication changes, allergies to medications, adverse drug reactions, diagnosis change, or new procedure performed?: [x] No    [] Yes (see summary sheet for update)  Subjective functional status/changes:   [] No changes reported  \"It's getting a little bit better. \"    OBJECTIVE  Modality rationale: decrease pain and increase tissue extensibility to improve the patients ability to ease ADL tolerance   Min Type Additional Details    [] Estim:  []Unatt       []IFC  []Premod                        []Other:  []w/ice   []w/heat  Position:  Location:    [] Estim: []Att    []TENS instruct  []NMES                    []Other:  []w/US   []w/ice   []w/heat  Position:  Location:    []  Traction: [] Cervical       []Lumbar                       [] Prone          []Supine                       []Intermittent   []Continuous Lbs:  [] before manual  [] after manual    []  Ultrasound: []Continuous   [] Pulsed                           []1MHz   []3MHz Location:  W/cm2:    []  Iontophoresis with dexamethasone         Location: [] Take home patch   [] In clinic   10 []  Ice     [x]  heat  []  Ice massage  []  Laser   []  Anodyne Position: seated  Location: l/s    []  Laser with stim  []  Other: Position:  Location:    []  Vasopneumatic Device Pressure:       [] lo [] med [] hi   Temperature: [] lo [] med [] hi   [x] Skin assessment post-treatment:  [x]intact []redness- no adverse reaction    []redness - adverse reaction:     39 min Therapeutic Exercise:  [x] See flow sheet :   Rationale: increase ROM, increase strength and improve coordination to improve the patients ability to increase ease with ADLs           With   [x] TE   [x] TA   [] neuro   [] other: Patient Education: [x] Review HEP    [] Progressed/Changed HEP based on:   [] positioning   [] body mechanics   [] transfers   [] heat/ice application    [] other:      Other Objective/Functional Measures:   Patient unable to correct bilateral valgus collapse with mini squats despite cueing    Difficulty reaching end range with LAQ machine    Pain Level (0-10 scale) post treatment: 5    ASSESSMENT/Changes in Function:   Patient shows little carryover of cues and has most difficulty with eccentric movements with open chain activities; however, reports that therapy has been helping and that he is able to drive 2-3 hours without pain provocation. Will continue to focus on increasing general B LE strength and increasing activity tolerance. Patient will continue to benefit from skilled PT services to modify and progress therapeutic interventions, address functional mobility deficits, address ROM deficits, address strength deficits, analyze and address soft tissue restrictions, analyze and cue movement patterns, analyze and modify body mechanics/ergonomics and assess and modify postural abnormalities to attain remaining goals. []  See Plan of Care  []  See progress note/recertification  []  See Discharge Summary         Progress towards goals / Updated goals:  Long term goals: To be accomplished within 4 weeks                        1.  Pt will improve FOTO score by 7 points to  show improvement with functional mobility performance. Progressing with l/s but decreased with c/s 10-3-17                        2. Pt will report being able to drive for more than 15 min with no pain so he can navigate community safely.-met, patient reports 2-3 hours (10/12/2017)                        3. Pt will report no more than 5/10 pain during with ADLs/amb to improve his QOL. Not met: 7/10 (9/22/17) progressing 6-7/10 for the last 2 visits 9-27-17 porgressing, 5-6 for the last 3 consecutive visits 10-10-17                        4. Pt will be able to perform single knee ext and flex (machine) with at least 15lb resistance for 15x to show improve knees strength for prolonged amb.  Progressing: 15#, no difficulty with HS but mod challenged and fatigued with LAQ (10/5/17) continues to have difficulty reaching end range with LAQ (10/12/2017)    PLAN  []  Upgrade activities as tolerated     [x]  Continue plan of care  []  Update interventions per flow sheet       []  Discharge due to:_  []  Other:_      Pamila Fleischer 10/12/2017  2:58 PM    Future Appointments  Date Time Provider Nakia Roy   10/12/2017 3:00 PM Pamila Fleischer [id number] [id number]   11/2/2017 3:10 PM Girma Mckay  [address]   1/19/2018 1:45 PM Danielle Mae MD [id number]

## 2017-10-16 ENCOUNTER — HOSPITAL ENCOUNTER (OUTPATIENT)
Dept: PHYSICAL THERAPY | Age: 77
Discharge: HOME | End: 2017-10-16
Payer: COMMERCIAL

## 2017-10-16 PROCEDURE — 97110 THERAPEUTIC EXERCISES: CPT

## 2017-10-16 NOTE — PROGRESS NOTES
PT DAILY TREATMENT NOTE     Patient Name: Cory Monge Sr.  Date:10/16/2017  : 1940  [x]  Patient  Verified  Payor: Harish Colette / Plan: [id number] / Product Type: PPO /    In time:202  Out time:240  Total Treatment Time (min): 38  Visit #: 2 of 8    Treatment Area: Sprain of ligaments of cervical spine, initial encounter [S13. 4XXA]  Sprain of ligaments of lumbar spine, initial encounter [S33. 5XXA]    SUBJECTIVE  Pain Level (0-10 scale): 5-6/10  Any medication changes, allergies to medications, adverse drug reactions, diagnosis change, or new procedure performed?: [x] No    [] Yes (see summary sheet for update)  Subjective functional status/changes:   [] No changes reported  Pt stated that he got a foot pedal at a flea market with a computer on it.     OBJECTIVE    Modality rationale: decrease pain and increase tissue extensibility to improve the patients ability to increase ease with ADLs   Min Type Additional Details    [] Estim:  []Unatt       []IFC  []Premod                        []Other:  []w/ice   []w/heat  Position:  Location:    [] Estim: []Att    []TENS instruct  []NMES                    []Other:  []w/US   []w/ice   []w/heat  Position:  Location:    []  Traction: [] Cervical       []Lumbar                       [] Prone          []Supine                       []Intermittent   []Continuous Lbs:  [] before manual  [] after manual    []  Ultrasound: []Continuous   [] Pulsed                           []1MHz   []3MHz W/cm2:  Location:    []  Iontophoresis with dexamethasone         Location: [] Take home patch   [] In clinic   10 []  Ice     [x]  heat  []  Ice massage  []  Laser   []  Anodyne Position:seated  Location:low back    []  Laser with stim  []  Other:  Position:  Location:    []  Vasopneumatic Device Pressure:       [] lo [] med [] hi   Temperature: [] lo [] med [] hi   [x] Skin assessment post-treatment:  [x]intact []redness- no adverse reaction    []redness - adverse reaction:     28 min Therapeutic Exercise:  [x] See flow sheet :   Rationale: increase ROM and increase strength to improve the patients ability to increase ease with ADLs    With   [x] TE   [] TA   [] neuro   [] other: Patient Education: [x] Review HEP    [] Progressed/Changed HEP based on:   [] positioning   [] body mechanics   [] transfers   [] heat/ice application    [] other:      Other Objective/Functional Measures:   Pt had no complaint of increased pain with exercises  Had no difficulty with sit to supine transfers  LAQ machine was challenging     Pain Level (0-10 scale) post treatment: 5/10    ASSESSMENT/Changes in Function:   Pt cont to slowly progress toward goals. Pt cont with moderate pain that has slightly decreased since eval. Pt continues to need cueing to perform exercises correctly. Patient will continue to benefit from skilled PT services to modify and progress therapeutic interventions, address functional mobility deficits, address ROM deficits and address strength deficits to attain remaining goals. []  See Plan of Care  [x]  See progress note/recertification  []  See Discharge Summary         Progress towards goals / Updated goals:  Long term goals: To be accomplished within 4 weeks                        1. Pt will improve FOTO score by 7 points to  show improvement with functional mobility performance. Progressing with l/s but decreased with c/s 10-3-17                        2. Pt will report being able to drive for more than 15 min with no pain so he can navigate community safely.-met, patient reports 2-3 hours (10/12/2017)                        3. Pt will report no more than 5/10 pain during with ADLs/amb to improve his QOL.  Not met: 7/10 (9/22/17) progressing 6-7/10 for the last 2 visits 9-27-17 porgressing, 5-6 for the last 3 consecutive visits 10-10-17                        4. Pt will be able to perform single knee ext and flex (machine) with at least 15lb resistance for 15x to show improve knees strength for prolonged amb.  Progressing: 15#, no difficulty with HS but mod challenged and fatigued with LAQ (10/5/17) continues to have difficulty reaching end range with LAQ (10/12/2017)    PLAN  []  Upgrade activities as tolerated     [x]  Continue plan of care  []  Update interventions per flow sheet       []  Discharge due to:_  []  Other:_      Jon Ni, PTA 10/16/2017  2:04 PM    Future Appointments  Date Time Provider Nakia Roy   10/19/2017 1:30 PM [facility] PT [address] MMCPTP[facility]   11/2/2017 3:10 PM Don Villalobos  [address]   1/19/2018 1:45 PM Sabra Hernandez  [facility]

## 2017-10-19 ENCOUNTER — HOSPITAL ENCOUNTER (OUTPATIENT)
Dept: PHYSICAL THERAPY | Age: 77
Discharge: HOME | End: 2017-10-19
Payer: COMMERCIAL

## 2017-10-19 PROCEDURE — 97110 THERAPEUTIC EXERCISES: CPT

## 2017-10-19 NOTE — PROGRESS NOTES
PT DAILY TREATMENT NOTE     Patient Name: Karley Helton Sr.  Date:10/19/2017  : 1940  [x]  Patient  Verified  Payor: BLUE CROSS / Plan: [id number] / Product Type: PPO /    In time: 1:31  Out time:2:12  Total Treatment Time (min): 41  Visit #: 3 of 8    Treatment Area: Sprain of ligaments of cervical spine, initial encounter [S13. 4XXA]  Sprain of ligaments of lumbar spine, initial encounter [S33. 5XXA]    SUBJECTIVE  Pain Level (0-10 scale): 5/10 LBP  Any medication changes, allergies to medications, adverse drug reactions, diagnosis change, or new procedure performed?: [x] No    [] Yes (see summary sheet for update)  Subjective functional status/changes:   [] No changes reported  Pt reports that he is still having the most difficulty with walking for a long time. He can currently walk 30 minutes. Pt reports 50% improvement with therapy. His neck has been better, but he's still having problems with his lower back.       OBJECTIVE    Modality rationale: decrease pain and increase tissue extensibility to improve the patients ability to tolerate daily tasks    Min Type Additional Details    [] Estim:  []Unatt       []IFC  []Premod                        []Other:  []w/ice   []w/heat  Position:  Location:    [] Estim: []Att    []TENS instruct  []NMES                    []Other:  []w/US   []w/ice   []w/heat  Position:  Location:    []  Traction: [] Cervical       []Lumbar                       [] Prone          []Supine                       []Intermittent   []Continuous Lbs:  [] before manual  [] after manual    []  Ultrasound: []Continuous   [] Pulsed                           []1MHz   []3MHz W/cm2:  Location:    []  Iontophoresis with dexamethasone         Location: [] Take home patch   [] In clinic   10 []  Ice     [x]  heat  []  Ice massage  []  Laser   []  Anodyne Position: seated  Location: l/s     []  Laser with stim  []  Other:  Position:  Location:    []  Vasopneumatic Device Pressure:       [] lo [] med [] hi   Temperature: [] lo [] med [] hi   [x] Skin assessment post-treatment:  [x]intact []redness- no adverse reaction    []redness - adverse reaction:       31 min Therapeutic Exercise:  [x] See flow sheet :   Rationale: increase ROM and increase strength to improve the patients ability to improve ease of prolonged ambulation and household tasks              With   [] TE   [] TA   [] neuro   [] other: Patient Education: [x] Review HEP    [] Progressed/Changed HEP based on:   [] positioning   [] body mechanics   [] transfers   [] heat/ice application    [] other:      Other Objective/Functional Measures:     Decreased eccentric control noted with hamstring curl machine R > L   Gave and reviewed final HEP   No increased pain with therapy     Lumbar FOTO 62 (improved 15 points)  Neck FOTO 68 (improved 10 points)     Pain Level (0-10 scale) post treatment: 4/10    ASSESSMENT/Changes in Function: See DC Summary          Progress towards goals / Updated goals:  Long term goals: To be accomplished within 4 weeks                        1. Pt will improve FOTO score by 7 points to  show improvement with functional mobility performance. Goal met. 10/19/17                        2. Pt will report being able to drive for more than 15 min with no pain so he can navigate community safely. Goal met, patient reports 2-3 hours (10/12/2017)                        4. Pt will report no more than 5/10 pain during with ADLs/amb to improve his QOL. Progressing. Max pain 6/10, average pain 5/10, least pain 3/10 10/19/17                        4. Pt will be able to perform single knee ext and flex (machine) with at least 15lb resistance for 15x to show improve knees strength for prolonged amb. Goal met: 20# for 25 reps with knee flex/ext machines.   Decreased eccentric control noted as pt  10/19/17  PLAN  []  Upgrade activities as tolerated     []  Continue plan of care  []  Update interventions per flow sheet [x]  Discharge due to:pt has met or is progressing towards goals  []  Other:_      Delmy Perez, PT 10/19/2017  1:44 PM    Future Appointments  Date Time Provider Nakia Roy   11/2/2017 3:10  Freddy Tesfaye  [address]    1/19/2018 1:45 PM Hannah Parker  [facility] [address]

## 2017-11-02 ENCOUNTER — OFFICE VISIT (OUTPATIENT)
Dept: ORTHOPEDIC SURGERY | Age: 77
End: 2017-11-02

## 2017-11-02 VITALS
HEIGHT: 71 IN | OXYGEN SATURATION: 98 % | SYSTOLIC BLOOD PRESSURE: 120 MMHG | BODY MASS INDEX: 31.92 KG/M2 | RESPIRATION RATE: 16 BRPM | WEIGHT: 228 LBS | HEART RATE: 67 BPM | DIASTOLIC BLOOD PRESSURE: 64 MMHG | TEMPERATURE: 98 F

## 2017-11-02 DIAGNOSIS — S39.012D STRAIN OF LUMBAR REGION, SUBSEQUENT ENCOUNTER: Primary | ICD-10-CM

## 2017-11-02 NOTE — MR AVS SNAPSHOT
Visit Information Date & Time Provider Department Dept. Phone Encounter #  
 11/2/2017  3:10  [address],  [address], [address] [facility] [phone] [id number] Follow-up Instructions Return if symptoms worsen or fail to improve. Your Appointments 1/19/2018  1:45 PM  
PROCEDURE with Sujit Vaughn MD  
[facility] ([address]) Appt Note: 6 mon poss [address] [place] [id number]  
[address] [facility] [address] [place] [address] Upcoming Health Maintenance Date Due HEMOGLOBIN A1C Q6M 1940 LIPID PANEL Q1 1940 FOOT EXAM Q1 4/3/1948 MICROALBUMIN Q1 4/3/1948 EYE EXAM RETINAL OR DILATED Q1 4/3/1948 DTaP/Tdap/Td series (1 - Tdap) 4/3/1959 ZOSTER VACCINE AGE 60> 2/3/1998 GLAUCOMA SCREENING Q2Y 4/3/2003 Pneumococcal 65+ Low/Medium Risk (1 of 2 - PCV13) 4/3/2003 MEDICARE YEARLY EXAM 4/3/2003 INFLUENZA AGE 9 TO ADULT 8/1/2017 Allergies as of 11/2/2017  Review Complete On: 11/2/2017 By: [address], MD  
 No Known Allergies Current Immunizations  Reviewed on 8/8/2013 Name Date Influenza Vaccine 11/2/2012 Not reviewed this visit You Were Diagnosed With   
  
 Codes Comments Strain of lumbar region, subsequent encounter    -  Primary ICD-10-CM: S39.012D ICD-9-CM: V58.89, 847.2 improved Vitals BP Pulse Temp Resp Height(growth percentile) Weight(growth percentile) 120/64 67 98 °F (36.7 °C) (Oral) 16 5' 11\" (1.803 m) 228 lb (103.4 kg) SpO2 BMI Smoking Status 98% 31.8 kg/m2 Never Smoker BMI and BSA Data Body Mass Index Body Surface Area  
 31.8 kg/m 2 2.28 m 2 Preferred Pharmacy Pharmacy Name Phone [facility] [address] [phone] Your Updated Medication List  
  
   
 This list is accurate as of: 11/2/17  4:10 PM.  Always use your most recent med list.  
  
  
  
  
 acetaminophen-codeine 300-30 mg per tablet Commonly known as:  TYLENOL #3 Take 1 Tab by mouth every six (6) hours as needed. CENTRUM SILVER Tab tablet Generic drug:  multivitamins-minerals-lutein Take 1 Tab by mouth daily. ibuprofen 600 mg tablet Commonly known as:  MOTRIN Take 600 mg by mouth every eight (8) hours as needed. lisinopril 30 mg tablet Commonly known as:  Jacklynn Pares Take 30 mg by mouth daily. metFORMIN 500 mg tablet Commonly known as:  GLUCOPHAGE Take 500 mg by mouth two (2) times daily (with meals). metoprolol succinate 100 mg tablet Commonly known as:  TOPROL-XL  
TAKE ONE TABLET BY MOUTH ONCE DAILY  
  
 oxybutynin chloride XL 10 mg CR tablet Commonly known as:  DITROPAN XL Take 1 Tab by mouth daily. pravastatin 40 mg tablet Commonly known as:  PRAVACHOL Take 40 mg by mouth nightly. raNITIdine 150 mg tablet Commonly known as:  ZANTAC Take 150 mg by mouth two (2) times a day. VITAMIN D3 1,000 unit Cap Generic drug:  cholecalciferol Take 1,000 Units by mouth daily. Follow-up Instructions Return if symptoms worsen or fail to improve. Patient Instructions Back Spasm: Care Instructions Your Care Instructions A back spasm is sudden tightness and pain in your back muscles. It may happen from overuse or an injury. Things like sleeping in an awkward way, bending, lifting, standing, or sitting can sometimes cause a spasm. But the cause isn't always clear. Home treatment includes using heat or ice, taking over-the-counter (OTC) pain medicines, and avoiding activities that may cause back pain. For a back spasm that doesn't get better with home care, your doctor may prescribe medicine.  Treatments such as massage or manipulation may also help ease a back spasm. Your doctor may also suggest exercise or physical therapy to help improve strength and flexibility in your back muscles. In most cases, getting back to your normal activities is good foryour back. Just make sure to avoid doing things that make your pain worse. Follow-up care is a key part of your treatment and safety. Be sure to make and go to all appointments, and call your doctor if you are having problems. It's also a good idea to know your test results and keep a list of the medicines you take. How can you care for yourself at home? ? Heat, ice, and medicines ? · To relieve pain, use heat or ice (whichever feels better) on the affected area. ¨ Put a warm water bottle, a heating pad set on low, or a warm cloth on your back. Put a thin cloth between the heating pad and your skin. Do not go to sleep with a heating pad on your skin. ¨ Try ice or a cold pack on the area for 10 to 20 minutes at a time. Put a thin cloth between the ice and your skin. ? · Ask your doctor if you can take acetaminophen (such as Tylenol) or nonsteroidal anti-inflammatory drugs, such as ibuprofen or naproxen. Your doctor can prescribe stronger medicines if needed. Be safe with medicines. Read and follow all instructions on the label. ?Body positions and posture ? · Sit or lie in positions that are most comfortable for you and that reduce pain. Try one of these positions when you lie down: ¨ Lie on your back with your knees bent and supported by large pillows. ¨ Lie on the floor with your legs on the seat of a sofa or chair. [place] Left on your side with your knees and hips bent and a pillow between your legs. ¨ Lie on your stomach if it does not make pain worse. ? · Do not sit up in bed. Avoid soft couches and twisted positions. ? · Avoid bed rest after the first day of back pain. Bed rest can help relieve pain at first, but it delays healing.  Continued rest without activity is usually not good for your back. ? · If you must sit for long periods of time, take breaks from sitting. Change positions every 30 minutes. Get up and walk around, or lie in a comfortable position. Activity ? · Take short walks several times a day. You can start with 5 to 10 minutes, 3 or 4 times a day, and work up to longer walks. Walk on level surfaces and avoid hills and stairs until your back starts to feel better. ? · After your back spasm starts to feel better, try to stretch your muscles every day, especially before and after exercise and at bedtime. Regular stretching can help relax your muscles. ? · To prevent future back pain, do exercises to stretch and strengthen your back and stomach. Learn to use good posture, safe lifting techniques, and other ways to move to help you avoid back pain. When should you call for help? Call 911 anytime you think you may need emergency care. For example, call if: 
? · You are unable to move an arm or a leg at all. ?Call your doctor now or seek immediate medical care if: 
? · You have new or worse symptoms in your legs, belly, or buttocks. Symptoms may include: ¨ Numbness or tingling. ¨ Weakness. ¨ Pain. ? · You lose bladder or bowel control. ? Watch closely for changes in your health, and be sure to contact your doctor if: 
? · You do not get better as expected. Where can you learn more? Go to http://anitha-paxton.info/. Enter E232 in the search box to learn more about \"Back Spasm: Care Instructions. \" Current as of: March 21, 2017 Content Version: 11.4 © 9234-1970 [employer]. Care instructions adapted under license by [employer] (which disclaims liability or warranty for this information). If you have questions about a medical condition or this instruction, always ask your healthcare professional. [address] any warranty or liability for your use of this information. Introducing hospitals & HEALTH SERVICES! Giancarlochase Lazaro introduces PingTank patient portal. Now you can access parts of your medical record, email your doctor's office, and request medication refills online. 1. In your internet browser, go to [email or web address] 2. Click on the First Time User? Click Here link in the Sign In box. You will see the New Member Sign Up page. 3. Enter your PingTank Access Code exactly as it appears below. You will not need to use this code after youve completed the sign-up process. If you do not sign up before the expiration date, you must request a new code. · PingTank Access Code: [email or web address] Expires: 1/14/2018  2:02 PM 
 
4. Enter the last four digits of your Social Security Number (xxxx) and Date of Birth (mm/dd/yyyy) as indicated and click Submit. You will be taken to the next sign-up page. 5. Create a PingTank ID. This will be your PingTank login ID and cannot be changed, so think of one that is secure and easy to remember. 6. Create a PingTank password. You can change your password at any time. 7. Enter your Password Reset Question and Answer. This can be used at a later time if you forget your password. 8. Enter your e-mail address. You will receive e-mail notification when new information is available in [address] 9. Click Sign Up. You can now view and download portions of your medical record. 10. Click the Download Summary menu link to download a portable copy of your medical information. If you have questions, please visit the Frequently Asked Questions section of the PingTank website. Remember, PingTank is NOT to be used for urgent needs. For medical emergencies, dial 911. Now available from your iPhone and Android! Please provide this summary of care documentation to your next provider. Your primary care clinician is listed as Verrekha. If you have any questions after today's visit, please call [phone].
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
## 2017-11-02 NOTE — PATIENT INSTRUCTIONS
Back Spasm: Care Instructions  Your Care Instructions  A back spasm is sudden tightness and pain in your back muscles. It may happen from overuse or an injury. Things like sleeping in an awkward way, bending, lifting, standing, or sitting can sometimes cause a spasm. But the cause isn't always clear. Home treatment includes using heat or ice, taking over-the-counter (OTC) pain medicines, and avoiding activities that may cause back pain. For a back spasm that doesn't get better with home care, your doctor may prescribe medicine. Treatments such as massage or manipulation may also help ease a back spasm. Your doctor may also suggest exercise or physical therapy to help improve strength and flexibility in your back muscles. In most cases, getting back to your normal activities is good foryour back. Just make sure to avoid doing things that make your pain worse. Follow-up care is a key part of your treatment and safety. Be sure to make and go to all appointments, and call your doctor if you are having problems. It's also a good idea to know your test results and keep a list of the medicines you take. How can you care for yourself at home? ? Heat, ice, and medicines  ? · To relieve pain, use heat or ice (whichever feels better) on the affected area. ¨ Put a warm water bottle, a heating pad set on low, or a warm cloth on your back. Put a thin cloth between the heating pad and your skin. Do not go to sleep with a heating pad on your skin. ¨ Try ice or a cold pack on the area for 10 to 20 minutes at a time. Put a thin cloth between the ice and your skin. ? · Ask your doctor if you can take acetaminophen (such as Tylenol) or nonsteroidal anti-inflammatory drugs, such as ibuprofen or naproxen. Your doctor can prescribe stronger medicines if needed. Be safe with medicines. Read and follow all instructions on the label. ?Body positions and posture  ?  · Sit or lie in positions that are most comfortable for you and that reduce pain. Try one of these positions when you lie down:  ¨ Lie on your back with your knees bent and supported by large pillows. ¨ Lie on the floor with your legs on the seat of a sofa or chair. Cathy Kelly on your side with your knees and hips bent and a pillow between your legs. ¨ Lie on your stomach if it does not make pain worse. ? · Do not sit up in bed. Avoid soft couches and twisted positions. ? · Avoid bed rest after the first day of back pain. Bed rest can help relieve pain at first, but it delays healing. Continued rest without activity is usually not good for your back. ? · If you must sit for long periods of time, take breaks from sitting. Change positions every 30 minutes. Get up and walk around, or lie in a comfortable position. Activity  ? · Take short walks several times a day. You can start with 5 to 10 minutes, 3 or 4 times a day, and work up to longer walks. Walk on level surfaces and avoid hills and stairs until your back starts to feel better. ? · After your back spasm starts to feel better, try to stretch your muscles every day, especially before and after exercise and at bedtime. Regular stretching can help relax your muscles. ? · To prevent future back pain, do exercises to stretch and strengthen your back and stomach. Learn to use good posture, safe lifting techniques, and other ways to move to help you avoid back pain. When should you call for help? Call 911 anytime you think you may need emergency care. For example, call if:  ? · You are unable to move an arm or a leg at all. ?Call your doctor now or seek immediate medical care if:  ? · You have new or worse symptoms in your legs, belly, or buttocks. Symptoms may include:  ¨ Numbness or tingling. ¨ Weakness. ¨ Pain. ? · You lose bladder or bowel control. ? Watch closely for changes in your health, and be sure to contact your doctor if:  ? · You do not get better as expected. Where can you learn more?   Go to http://anitha-paxton.info/. Enter E232 in the search box to learn more about \"Back Spasm: Care Instructions. \"  Current as of: March 21, 2017  Content Version: 11.4  © 0487-5807 [employer], [employer]. Care instructions adapted under license by [employer] (which disclaims liability or warranty for this information). If you have questions about a medical condition or this instruction, always ask your healthcare professional. [place] [address] any warranty or liability for your use of this information.

## 2017-11-11 NOTE — PROGRESS NOTES
[facility] - [address]  [phone] fax    Physical Therapy Discharge Summary    Patient name: Corina Luna Start of Care: 9/15/17   Referral source: Emelia Rice MD : 1940   Medical/Treatment Diagnosis: Sprain of ligaments of cervical spine, initial encounter [S13. 4XXA]  Sprain of ligaments of lumbar spine, initial encounter [S33. 5XXA] Onset Date:17     Prior Hospitalization: see medical history Provider#: [id number]   Medications: Verified on Patient Summary List     Comorbidities: hypertensive cariovascular disease, hyperlipidemia, DM, bradycardia, HTN, L THR ~3/4 years ago   Prior Level of Function: mod I with mobility, amb with SPC since L THR, less back pain with ADLs/walking/physical activities. Visits from Start of Care: 11    Missed Visits: 0    Reporting Period : 9/15/17 to 10/19/17    Summary of Care:  Goal: Pt will improve FOTO score by 7 points to  show improvement with functional mobility   Status at last note/certification: Goal met. Status at discharge: met    Goal: Pt will report being able to drive for more than 15 min with no pain so he can navigate   Status at last note/certification: Goal met, patient reports 2-3 hours  Status at discharge: met    Goal: Pt will report no more than 5/10 pain during with ADLs/amb to improve his QOL. Status at last note/certification: Progressing. Max pain 6/10, average pain 5/10, least pain 3/10  Status at discharge: not met    Goal: Pt will be able to perform single knee ext and flex (machine) with at least 15lb resistance for 15x to show improve knees strength for prolonged amb  Status at last note/certification: Goal met: 20# for 25 reps with knee flex/ext machines.   Decreased eccentric control noted as pt  Status at discharge: met      ASSESSMENT/RECOMMENDATIONS:  Pt has made steady progress in therapy, meeting 3/4 goals and progressing with pain goal.  He is still having the most difficulty with prolonged walking likely d/t degenerative changes and continued strength deficits; however, walking tolerance has improved to 30 minutes. Pt reports significant improvement with his neck, and his lumbar region continues to be his primary complaint. Pt will benefit from continued strengthening to address postural deficits and improve joint stability with arthritic changes. Pt is compliant with HEP and skilled intervention is no longer required for continued strength progression. DC at this time.      [x]Discontinue therapy: [x]Patient has reached or is progressing toward set goals      []Patient is non-compliant or has abdicated      []Due to lack of appreciable progress towards set goals    Darcy Dietz, PT 11/11/2017 9:25 AM

## 2017-11-16 ENCOUNTER — HOSPITAL ENCOUNTER (EMERGENCY)
Age: 77
Discharge: HOME | End: 2017-11-16
Attending: EMERGENCY MEDICINE
Payer: COMMERCIAL

## 2017-11-16 VITALS
HEIGHT: 71 IN | RESPIRATION RATE: 20 BRPM | BODY MASS INDEX: 32.2 KG/M2 | TEMPERATURE: 97.9 F | OXYGEN SATURATION: 98 % | DIASTOLIC BLOOD PRESSURE: 76 MMHG | SYSTOLIC BLOOD PRESSURE: 140 MMHG | WEIGHT: 230 LBS | HEART RATE: 66 BPM

## 2017-11-16 DIAGNOSIS — N39.0 URINARY TRACT INFECTION WITH HEMATURIA, SITE UNSPECIFIED: Primary | ICD-10-CM

## 2017-11-16 DIAGNOSIS — R31.9 URINARY TRACT INFECTION WITH HEMATURIA, SITE UNSPECIFIED: Primary | ICD-10-CM

## 2017-11-16 LAB
APPEARANCE UR: ABNORMAL
BACTERIA URNS QL MICRO: ABNORMAL /HPF
BILIRUB UR QL: NEGATIVE
COLOR UR: ABNORMAL
EPITH CASTS URNS QL MICRO: ABNORMAL /LPF (ref 0–5)
GLUCOSE UR STRIP.AUTO-MCNC: NEGATIVE MG/DL
HGB UR QL STRIP: ABNORMAL
KETONES UR QL STRIP.AUTO: NEGATIVE MG/DL
LEUKOCYTE ESTERASE UR QL STRIP.AUTO: ABNORMAL
MUCOUS THREADS URNS QL MICRO: ABNORMAL /LPF
NITRITE UR QL STRIP.AUTO: NEGATIVE
PH UR STRIP: 5 [PH] (ref 5–8)
PROT UR STRIP-MCNC: 100 MG/DL
RBC #/AREA URNS HPF: ABNORMAL /HPF (ref 0–5)
SP GR UR REFRACTOMETRY: 1.02 (ref 1–1.03)
UROBILINOGEN UR QL STRIP.AUTO: 1 EU/DL (ref 0.2–1)
WBC URNS QL MICRO: ABNORMAL /HPF (ref 0–4)

## 2017-11-16 PROCEDURE — 81001 URINALYSIS AUTO W/SCOPE: CPT | Performed by: EMERGENCY MEDICINE

## 2017-11-16 PROCEDURE — 99283 EMERGENCY DEPT VISIT LOW MDM: CPT

## 2017-11-16 PROCEDURE — 74011250637 HC RX REV CODE- 250/637: Performed by: EMERGENCY MEDICINE

## 2017-11-16 RX ORDER — CIPROFLOXACIN 500 MG/1
500 TABLET ORAL
Status: COMPLETED | OUTPATIENT
Start: 2017-11-16 | End: 2017-11-16

## 2017-11-16 RX ORDER — CIPROFLOXACIN 500 MG/1
500 TABLET ORAL 2 TIMES DAILY
Qty: 20 TAB | Refills: 0 | Status: SHIPPED | OUTPATIENT
Start: 2017-11-16 | End: 2017-11-26

## 2017-11-16 RX ADMIN — CIPROFLOXACIN HYDROCHLORIDE 500 MG: 500 TABLET, FILM COATED ORAL at 23:17

## 2017-11-17 NOTE — ED PROVIDER NOTES
HPI Comments: 10:23 PM: Wilson Hamilton Sr. is a 76y.o. year old male with hx of BPH and hematuria presenting to the ED c/o constant hematuria onset tonight. No alleviating or worsening factors. Pt tried nothing for this at home. Pt has a Hx of UTI and hematuria in the past and treated with antibiotics. Pt denies fever, chills, N/V/D, abd pain, penile swelling, penile pain, testicular pain, testicular swelling, dysuria, and any other Sx or complaints at this time. The history is provided by the patient. No  was used. Past Medical History:   Diagnosis Date    Abnormal EKG     Bone pain     BPH (benign prostatic hyperplasia)     Bradycardia     Chest pain, unspecified     R/O CAD 3/10 mild fixed inferior defect    Diabetes mellitus ([facility] [address]     Echocardiogram 04/02/2010    [facility]:  EF 56%. No RWMA. Mod conc LVH. DDfx. RVSP 22 mmHg.  HCVD (hypertensive cardiovascular disease)     Hematuria, unspecified     History of silent myocardial infarction     Hypercholesteremia     Hyperlipidemia     Hypertension     Hypertension     Ill-defined condition     UTI    Kidney stones     Leg pain, right     normal exercise JOAQUIN (fall 2014)    Lower extremity arterial testing 10/07/2014    No arterial disease at rest bilaterally. R JOAQUIN 1.27.  L JOAQUIN 1.35. No significant drop in JOAQUIN w/exercise c/w normal perfusion.  Lower urinary tract symptoms (LUTS)     Muscular weakness     Nuclear cardiac imaging test 12/02/2011    Likely normal.  Fixed inferior basal and mid inferior defect likely artifact. No ischemia. EF 60%. No WMA.       Other specified cardiac dysrhythmias(427.89)     Bradycardia and pauses on holter    Swelling of limb     Unspecified sleep apnea     does not use cpap machine       Past Surgical History:   Procedure Laterality Date    BIOPSY  3/25/99    HX HERNIA REPAIR      HX HIP REPLACEMENT  12/6/11    [facility] of prostate, button prostatectomy     HX UROLOGICAL  1/29/2015    [facility], Dr. Hilda Hager, ESWL    [place] COLONOSCOPY FLX DX W/COLLJ SPEC WHEN PFRMD      TOTAL HIP ARTHROPLASTY Left 2014    Dr. Sherry Fish         Family History:   Problem Relation Age of Onset    Coronary Artery Disease Father     Coronary Artery Disease Brother      History of PTCA    Heart Disease Neg Hx        Social History     Social History    Marital status:      Spouse name: N/A    Number of children: N/A    Years of education: N/A     Occupational History    Not on file. Social History Main Topics    Smoking status: Never Smoker    Smokeless tobacco: Never Used    Alcohol use No    Drug use: No    Sexual activity: Not on file     Other Topics Concern    Not on file     Social History Narrative         ALLERGIES: Review of patient's allergies indicates no known allergies. Review of Systems   Constitutional: Negative. Negative for chills and fever. HENT: Negative. Eyes: Negative. Negative for visual disturbance. Respiratory: Negative. Negative for shortness of breath. Cardiovascular: Negative. Negative for chest pain. Gastrointestinal: Negative. Negative for abdominal pain, diarrhea, nausea and vomiting. Endocrine: Negative. Genitourinary: Positive for hematuria. Negative for decreased urine volume, difficulty urinating, discharge, dysuria, frequency, penile pain, penile swelling, scrotal swelling, testicular pain and urgency. Musculoskeletal: Negative. Skin: Negative. Allergic/Immunologic: Negative. Neurological: Negative. Negative for headaches. Hematological: Negative. Psychiatric/Behavioral: Negative. All other systems reviewed and are negative.       Vitals:    11/16/17 2210   BP: 140/76   Pulse: 66   Resp: 20   Temp: 97.9 °F (36.6 °C)   SpO2: 98%   Weight: 104.3 kg (230 lb)   Height: 5' 11\" (1.803 m)            Physical Exam Constitutional: He is oriented to person, place, and time. He appears well-developed and well-nourished. No distress. HENT:   Head: Normocephalic. Mouth/Throat: Oropharynx is clear and moist.   Eyes: Conjunctivae and EOM are normal. Pupils are equal, round, and reactive to light. Neck: Normal range of motion. Neck supple. Cardiovascular: Normal rate, regular rhythm, normal heart sounds and intact distal pulses. No murmur heard. Pulmonary/Chest: Effort normal and breath sounds normal. No respiratory distress. He has no wheezes. He has no rales. He exhibits no tenderness. Abdominal: Soft. Bowel sounds are normal. He exhibits no distension. There is no tenderness. There is no rebound. Genitourinary: Penis normal.   Musculoskeletal: Normal range of motion. He exhibits no edema or tenderness. Neurological: He is alert and oriented to person, place, and time. No cranial nerve deficit. He exhibits normal muscle tone. Coordination normal.   Skin: Skin is warm and dry. No rash noted. Psychiatric: He has a normal mood and affect. His behavior is normal. Judgment and thought content normal.   Nursing note and vitals reviewed.        MDM  Number of Diagnoses or Management Options  Urinary tract infection with hematuria, site unspecified: new and requires workup     Amount and/or Complexity of Data Reviewed  Clinical lab tests: ordered and reviewed    Risk of Complications, Morbidity, and/or Mortality  Presenting problems: moderate  Diagnostic procedures: moderate  Management options: moderate      ED Course       Procedures      Vitals:  Patient Vitals for the past 12 hrs:   Temp Pulse Resp BP SpO2   11/16/17 2210 97.9 °F (36.6 °C) 66 20 140/76 98 %       Medications Ordered:  Medications   ciprofloxacin HCl (CIPRO) tablet 500 mg (not administered)       Lab Findings:  Recent Results (from the past 12 hour(s))   URINALYSIS W/ RFLX MICROSCOPIC    Collection Time: 11/16/17 10:12 PM   Result Value Ref Range Color RED      Appearance TURBID     Specific gravity 1.021 1.005 - 1.030      pH (UA) 5.0 5.0 - 8.0      Protein 100 (A) NEG mg/dL    Glucose NEGATIVE  NEG mg/dL    Ketone NEGATIVE  NEG mg/dL    Bilirubin NEGATIVE  NEG      Blood LARGE (A) NEG      Urobilinogen 1.0 0.2 - 1.0 EU/dL    Nitrites NEGATIVE  NEG      Leukocyte Esterase SMALL (A) NEG     URINE MICROSCOPIC ONLY    Collection Time: 11/16/17 10:12 PM   Result Value Ref Range    WBC 36 to 50 0 - 4 /hpf    RBC TOO NUMEROUS TO COUNT 0 - 5 /hpf    Epithelial cells 2+ 0 - 5 /lpf    Bacteria 3+ (A) NEG /hpf    Mucus 2+ (A) NEG /lpf     Progress notes, consult notes, or additional procedure notes:  11:11 PM I have assessed the patient and discussed their results and diagnosis. Pt will be discharged in stable condition. Patient will f/u with his urologist Dr Greer De León. Patient is to return to emergency department if any new or worsening condition. Patient understands and verbalizes agreement with plan. Diagnosis:   1. Urinary tract infection with hematuria, site unspecified        Disposition: discharge    Follow-up Information     Follow up With Details Comments [address] MD Call in 2 days For Follow Up [place] [id number] [phone] [id number] [facility] EMERGENCY DEPT Go to As needed, If symptoms worsen [place] [id number]  [phone]           Patient's Medications   Start Taking    CIPROFLOXACIN HCL (CIPRO) 500 MG TABLET    Take 1 Tab by mouth two (2) times a day for 10 days. Continue Taking    ACETAMINOPHEN-CODEINE (TYLENOL #3) 300-30 MG PER TABLET    Take 1 Tab by mouth every six (6) hours as needed. CHOLECALCIFEROL (VITAMIN D3) 1,000 UNIT CAP    Take 1,000 Units by mouth daily. IBUPROFEN (MOTRIN) 600 MG TABLET    Take 600 mg by mouth every eight (8) hours as needed. LISINOPRIL (PRINIVIL, ZESTRIL) 30 MG TABLET    Take 30 mg by mouth daily.     METFORMIN (GLUCOPHAGE) 500 MG TABLET    Take 500 mg by mouth two (2) times daily (with meals). METOPROLOL SUCCINATE (TOPROL-XL) 100 MG XL TABLET    TAKE ONE TABLET BY MOUTH ONCE DAILY    MULTIVITAMINS-MINERALS-LUTEIN (CENTRUM SILVER) TAB    Take 1 Tab by mouth daily. OXYBUTYNIN CHLORIDE XL (DITROPAN XL) 10 MG CR TABLET    Take 1 Tab by mouth daily. PRAVASTATIN (PRAVACHOL) 40 MG TABLET    Take 40 mg by mouth nightly. RANITIDINE (ZANTAC) 150 MG TABLET    Take 150 mg by mouth two (2) times a day. These Medications have changed    No medications on file   Stop Taking    No medications on file       Scribe Attestation     Jacinto Heredia acting as a scribe for and in the presence of Madhu Martin MD      November 16, 2017 at 10:23 PM       Provider Attestation:      I personally performed the services described in the documentation, reviewed the documentation, as recorded by the scribe in my presence, and it accurately and completely records my words and actions.  November 16, 2017 at 10:23 PM - Madhu Martin MD

## 2017-11-17 NOTE — ED NOTES
Patient greeted / introduced myself as their primary nurse. Encouraged to voice any concerns, and all questions/concerns addressed. Assessment in progress. Explanation and teaching of all care given,  including any pending orders or procedures. Patient verbalized understanding of 'Plan of Care' and what the goals are (comfort goal reached). Vital signs, history, and home medications reviewed. Call bell with reach. Awaiting further MD orders at this time.

## 2017-11-17 NOTE — DISCHARGE INSTRUCTIONS

## 2017-11-17 NOTE — ED NOTES
I have reviewed discharge instructions with the patient and caregiver. The patient and caregiver verbalized understanding. Ambulatory from ED. Patient armband removed and shredded.

## 2017-12-11 ENCOUNTER — HOSPITAL ENCOUNTER (OUTPATIENT)
Dept: LAB | Age: 77
Discharge: HOME | End: 2017-12-11
Payer: COMMERCIAL

## 2017-12-11 DIAGNOSIS — E11.9 DIABETES MELLITUS (HCC): ICD-10-CM

## 2017-12-11 DIAGNOSIS — I10 ESSENTIAL (PRIMARY) HYPERTENSION: ICD-10-CM

## 2017-12-11 DIAGNOSIS — E78.00 PURE HYPERCHOLESTEROLEMIA: ICD-10-CM

## 2017-12-11 DIAGNOSIS — K21.9 ESOPHAGEAL REFLUX: ICD-10-CM

## 2017-12-11 DIAGNOSIS — N39.41 URGE INCONTINENCE: ICD-10-CM

## 2017-12-11 DIAGNOSIS — N30.00 ACUTE CYSTITIS: ICD-10-CM

## 2017-12-11 LAB
ALBUMIN SERPL-MCNC: 3.6 G/DL (ref 3.4–5)
ALBUMIN/GLOB SERPL: 1.1 {RATIO} (ref 0.8–1.7)
ALP SERPL-CCNC: 59 U/L (ref 45–117)
ALT SERPL-CCNC: 18 U/L (ref 16–61)
ANION GAP SERPL CALC-SCNC: 6 MMOL/L (ref 3–18)
AST SERPL-CCNC: 11 U/L (ref 15–37)
BASOPHILS # BLD: 0 K/UL (ref 0–0.06)
BASOPHILS NFR BLD: 1 % (ref 0–2)
BILIRUB SERPL-MCNC: 0.9 MG/DL (ref 0.2–1)
BUN SERPL-MCNC: 16 MG/DL (ref 7–18)
BUN/CREAT SERPL: 14 (ref 12–20)
CALCIUM SERPL-MCNC: 8.2 MG/DL (ref 8.5–10.1)
CHLORIDE SERPL-SCNC: 106 MMOL/L (ref 100–108)
CHOLEST SERPL-MCNC: 163 MG/DL
CO2 SERPL-SCNC: 29 MMOL/L (ref 21–32)
CREAT SERPL-MCNC: 1.12 MG/DL (ref 0.6–1.3)
DIFFERENTIAL METHOD BLD: ABNORMAL
EOSINOPHIL # BLD: 0.3 K/UL (ref 0–0.4)
EOSINOPHIL NFR BLD: 5 % (ref 0–5)
ERYTHROCYTE [DISTWIDTH] IN BLOOD BY AUTOMATED COUNT: 12.9 % (ref 11.6–14.5)
GLOBULIN SER CALC-MCNC: 3.2 G/DL (ref 2–4)
GLUCOSE SERPL-MCNC: 78 MG/DL (ref 74–99)
HBA1C MFR BLD: 5.7 % (ref 4.2–5.6)
HCT VFR BLD AUTO: 41.9 % (ref 36–48)
HDLC SERPL-MCNC: 52 MG/DL (ref 40–60)
HDLC SERPL: 3.1 {RATIO} (ref 0–5)
HGB BLD-MCNC: 13.9 G/DL (ref 13–16)
LDLC SERPL CALC-MCNC: 85.6 MG/DL (ref 0–100)
LIPID PROFILE,FLP: NORMAL
LYMPHOCYTES # BLD: 1.9 K/UL (ref 0.9–3.6)
LYMPHOCYTES NFR BLD: 36 % (ref 21–52)
MCH RBC QN AUTO: 29.4 PG (ref 24–34)
MCHC RBC AUTO-ENTMCNC: 33.2 G/DL (ref 31–37)
MCV RBC AUTO: 88.6 FL (ref 74–97)
MONOCYTES # BLD: 0.7 K/UL (ref 0.05–1.2)
MONOCYTES NFR BLD: 13 % (ref 3–10)
NEUTS SEG # BLD: 2.4 K/UL (ref 1.8–8)
NEUTS SEG NFR BLD: 45 % (ref 40–73)
PLATELET # BLD AUTO: 153 K/UL (ref 135–420)
PMV BLD AUTO: 9.9 FL (ref 9.2–11.8)
POTASSIUM SERPL-SCNC: 4.3 MMOL/L (ref 3.5–5.5)
PROT SERPL-MCNC: 6.8 G/DL (ref 6.4–8.2)
RBC # BLD AUTO: 4.73 M/UL (ref 4.7–5.5)
SODIUM SERPL-SCNC: 141 MMOL/L (ref 136–145)
T4 SERPL-MCNC: 7.7 UG/DL (ref 4.5–10.9)
TRIGL SERPL-MCNC: 127 MG/DL (ref ?–150)
TSH SERPL DL<=0.05 MIU/L-ACNC: 3.17 UIU/ML (ref 0.36–3.74)
VLDLC SERPL CALC-MCNC: 25.4 MG/DL
WBC # BLD AUTO: 5.2 K/UL (ref 4.6–13.2)

## 2017-12-11 PROCEDURE — 80061 LIPID PANEL: CPT | Performed by: INTERNAL MEDICINE

## 2017-12-11 PROCEDURE — 84436 ASSAY OF TOTAL THYROXINE: CPT | Performed by: INTERNAL MEDICINE

## 2017-12-11 PROCEDURE — 80053 COMPREHEN METABOLIC PANEL: CPT | Performed by: INTERNAL MEDICINE

## 2017-12-11 PROCEDURE — 83036 HEMOGLOBIN GLYCOSYLATED A1C: CPT | Performed by: INTERNAL MEDICINE

## 2017-12-11 PROCEDURE — 36415 COLL VENOUS BLD VENIPUNCTURE: CPT | Performed by: INTERNAL MEDICINE

## 2017-12-11 PROCEDURE — 84443 ASSAY THYROID STIM HORMONE: CPT | Performed by: INTERNAL MEDICINE

## 2017-12-11 PROCEDURE — 85025 COMPLETE CBC W/AUTO DIFF WBC: CPT | Performed by: INTERNAL MEDICINE

## 2018-01-19 PROBLEM — N32.0 BLADDER NECK CONTRACTURE: Status: ACTIVE | Noted: 2018-01-19

## 2018-01-19 PROBLEM — R31.29 MICROSCOPIC HEMATURIA: Status: ACTIVE | Noted: 2018-01-19

## 2018-01-19 PROBLEM — Z87.442 HISTORY OF NEPHROLITHIASIS: Status: ACTIVE | Noted: 2018-01-19

## 2018-01-19 PROBLEM — E66.9 OBESITY (BMI 30.0-34.9): Status: ACTIVE | Noted: 2018-01-19

## 2018-03-06 NOTE — ED TRIAGE NOTES
Patient states noting blood in his urine tonight. Patient noted to have hx of BPH and hematuria. English

## 2018-04-02 ENCOUNTER — HOSPITAL ENCOUNTER (EMERGENCY)
Age: 78
Discharge: HOME | End: 2018-04-02
Attending: EMERGENCY MEDICINE
Payer: COMMERCIAL

## 2018-04-02 VITALS
BODY MASS INDEX: 32.2 KG/M2 | WEIGHT: 230 LBS | DIASTOLIC BLOOD PRESSURE: 74 MMHG | TEMPERATURE: 97.7 F | HEART RATE: 51 BPM | SYSTOLIC BLOOD PRESSURE: 138 MMHG | HEIGHT: 71 IN | OXYGEN SATURATION: 98 % | RESPIRATION RATE: 20 BRPM

## 2018-04-02 DIAGNOSIS — S40.862A INSECT BITE OF LEFT UPPER EXTREMITY, INITIAL ENCOUNTER: Primary | ICD-10-CM
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
## 2018-04-02 PROCEDURE — 99282 EMERGENCY DEPT VISIT SF MDM: CPT

## 2018-04-02 RX ORDER — LORATADINE 10 MG/1
10 TABLET ORAL DAILY
Qty: 10 TAB | Refills: 0 | Status: SHIPPED | OUTPATIENT
Start: 2018-04-02 | End: 2018-04-12

## 2018-04-02 NOTE — DISCHARGE INSTRUCTIONS
Insect Stings and Bites: Care Instructions  Your Care Instructions  Stings and bites from bees, wasps, ants, and other insects often cause pain, swelling, redness, and itching. In some people, especially children, the redness and swelling may be worse. It may extend several inches beyond the affected area. But in most cases, stings and bites don't cause reactions all over the body. If you have had a reaction to an insect sting or bite, you are at risk for a reaction if you get stung or bitten again. Follow-up care is a key part of your treatment and safety. Be sure to make and go to all appointments, and call your doctor if you are having problems. It's also a good idea to know your test results and keep a list of the medicines you take. How can you care for yourself at home? · Do not scratch or rub the skin where the sting or bite occurred. · Put a cold pack or ice cube on the area. Put a thin cloth between the ice and your skin. For some people, a paste of baking soda mixed with a little water helps relieve pain and decrease the reaction. · Take an over-the-counter antihistamine, such as diphenhydramine (Benadryl) or loratadine (Claritin), to relieve swelling, redness, and itching. Calamine lotion or hydrocortisone cream may also help. Do not give antihistamines to your child unless you have checked with the doctor first.  · Be safe with medicines. If your doctor prescribed medicine for your allergy, take it exactly as prescribed. Call your doctor if you think you are having a problem with your medicine. You will get more details on the specific medicines your doctor prescribes. · Your doctor may prescribe a shot of epinephrine to carry with you in case you have a severe reaction. Learn how and when to give yourself the shot, and keep it with you at all times. Make sure it has not . · Go to the emergency room anytime you have a severe reaction.  Go even if you have given yourself epinephrine and are feeling better. Symptoms can come back. When should you call for help? Call 911 anytime you think you may need emergency care. For example, call if:  ? · You have symptoms of a severe allergic reaction. These may include:  ¨ Sudden raised, red areas (hives) all over your body. ¨ Swelling of the throat, mouth, lips, or tongue. ¨ Trouble breathing. ¨ Passing out (losing consciousness). Or you may feel very lightheaded or suddenly feel weak, confused, or restless. ?Call your doctor now or seek immediate medical care if:  ? · You have symptoms of an allergic reaction not right at the sting or bite, such as:  ¨ A rash or small area of hives (raised, red areas on the skin). ¨ Itching. ¨ Swelling. ¨ Belly pain, nausea, or vomiting. ? · You have a lot of swelling around the site (such as your entire arm or leg is swollen). ? · You have signs of infection, such as:  ¨ Increased pain, swelling, redness, or warmth around the sting. ¨ Red streaks leading from the area. ¨ Pus draining from the sting. ¨ A fever. ? Watch closely for changes in your health, and be sure to contact your doctor if:  ? · You do not get better as expected. Where can you learn more? Go to http://anitha-paxton.info/. Enter P390 in the search box to learn more about \"Insect Stings and Bites: Care Instructions. \"  Current as of: March 20, 2017  Content Version: 11.4  © 2944-1046 [employer]. Care instructions adapted under license by [employer] (which disclaims liability or warranty for this information). If you have questions about a medical condition or this instruction, always ask your healthcare professional. [place] [address] any warranty or liability for your use of this information.

## 2018-04-02 NOTE — ED PROVIDER NOTES
HPI Comments: 11:08 AM   76 y.o. male presents to ED C/O skin problem, insect bites. Patient has a HX of HCVD, DM, BPH, HTN, kidney stone, hip replacement, hernia repair. Patient reports he felt like he was stung by an unknown insect last night. Reports left arm stinging sensation, noted small red area, when he woke up this morning red area inner left arm much larger. patient denies pain or itching at left upper arm. Patient denies fever, CP, SOB, myalgia. Patient is a nonsmoker. Patient denies any other symptoms or complaints. The history is provided by the patient. History limited by: No language barrier. Past Medical History:   Diagnosis Date    Abnormal EKG     Bone pain     BPH (benign prostatic hyperplasia)     Bradycardia     Chest pain, unspecified     R/O CAD 3/10 mild fixed inferior defect    [facility] mellitus ([facility] [address]     Echocardiogram 04/02/2010    Cardiology Assoc:  EF 56%. No RWMA. Mod conc LVH. DDfx. RVSP 22 mmHg.  HCVD (hypertensive cardiovascular disease)     Hematuria, unspecified     History of silent myocardial infarction     Hypercholesteremia     Hyperlipidemia     Hypertension     Hypertension     Ill-defined condition     UTI    Kidney stones     Leg pain, right     normal exercise JOAQUIN (fall 2014)    Lower extremity arterial testing 10/07/2014    No arterial disease at rest bilaterally. R JOAQUIN 1.27.  L JOAQUIN 1.35. No significant drop in JOAQUIN w/exercise c/w normal perfusion.  Lower urinary tract symptoms (LUTS)     Muscular weakness     Nuclear cardiac imaging test 12/02/2011    Likely normal.  Fixed inferior basal and mid inferior defect likely artifact. No ischemia. EF 60%. No WMA.       Other specified cardiac dysrhythmias(427.89)     Bradycardia and pauses on holter    Swelling of limb     Unspecified sleep apnea     does not use cpap machine       Past Surgical History:   Procedure Laterality Date    BIOPSY  3/25/99    HX HERNIA REPAIR  HX HIP REPLACEMENT  12/6/11    HX UROLOGICAL  01/06/2012    [facility] Dr. Radha Hong, Transurethral resection of prostate, button prostatectomy     HX UROLOGICAL  1/29/2015    [facility], Dr. Azar Espinoza, ESWL    [place] COLONOSCOPY FLX DX W/COLLJ SPEC WHEN PFRMD      TOTAL HIP ARTHROPLASTY Left 2014    Dr. Rajinder Bazzi         Family History:   Problem Relation Age of Onset    Coronary Artery Disease Father     Coronary Artery Disease Brother      History of PTCA    Heart Disease Neg Hx        Social History     Social History    Marital status:      Spouse name: N/A    Number of children: N/A    Years of education: N/A     Occupational History    Not on file. Social History Main Topics    Smoking status: Never Smoker    Smokeless tobacco: Never Used    Alcohol use No    Drug use: No    Sexual activity: Not on file     Other Topics Concern    Not on file     Social History Narrative         ALLERGIES: Review of patient's allergies indicates no known allergies. Review of Systems   Constitutional: Negative for activity change, appetite change, chills, fatigue and fever. HENT: Negative for congestion, ear pain, rhinorrhea and sore throat. Eyes: Negative for pain, discharge, redness and itching. Respiratory: Negative for cough, chest tightness, shortness of breath and wheezing. Cardiovascular: Negative for chest pain and palpitations. Gastrointestinal: Negative for abdominal pain, blood in stool, constipation, diarrhea, nausea and vomiting. Endocrine: Negative for polyuria. Genitourinary: Negative for discharge, dysuria, flank pain, hematuria, penile pain and testicular pain. Musculoskeletal: Negative for arthralgias, back pain, joint swelling, myalgias and neck pain. Skin: Positive for color change and wound. Negative for rash. Allergic/Immunologic: Negative for immunocompromised state.    Neurological: Negative for dizziness, weakness, light-headedness, numbness and headaches. Hematological: Negative for adenopathy. Psychiatric/Behavioral: Negative for agitation and confusion. The patient is not nervous/anxious. All other systems reviewed and are negative. Vitals:    04/02/18 1101   BP: 138/74   Pulse: (!) 51   Resp: 20   Temp: 97.7 °F (36.5 °C)   SpO2: 98%   Weight: 104.3 kg (230 lb)   Height: 5' 11\" (1.803 m)            Physical Exam   Constitutional: He is oriented to person, place, and time. He appears well-developed and well-nourished. No distress. HENT:   Head: Atraumatic. Cardiovascular: Regular rhythm and intact distal pulses. Bradycardia present. Murmur heard. Pulmonary/Chest: Effort normal and breath sounds normal. No respiratory distress. He has no wheezes. He has no rales. Musculoskeletal: Normal range of motion. Neurological: He is alert and oriented to person, place, and time. He exhibits normal muscle tone. Skin: He is not diaphoretic. Nursing note and vitals reviewed. MDM  Number of Diagnoses or Management Options  Insect bite of left upper extremity, initial encounter:   Diagnosis management comments: MDM:  Symptoms are inconsistent with cellulitis, no significant erythema or warmth to palpation, no pain, no itching. Symptoms consistent with local reaction to insect bite. Area marked with skin marker, patient educated to return to the ED for any new or worsening symptoms, referral to PCP for wound check in 3 days. Discharge with Claritin for symptoms relief. Patient denies questions. ED Course       Procedures             RESULTS:    No orders to display       Labs Reviewed - No data to display    No results found for this or any previous visit (from the past 12 hour(s)). PROGRESS NOTE:   11:08 AM   Initial assessment completed. Written by Suzette Denise NP-C    DISCHARGE NOTE:    Gail Nuñez Sr.'s  results have been reviewed with him.   He has been counseled regarding his diagnosis, treatment, and plan.  He verbally conveys understanding and agreement of the signs, symptoms, diagnosis, treatment and prognosis and additionally agrees to follow up as discussed. He also agrees with the care-plan and conveys that all of his questions have been answered. I have also provided discharge instructions for him that include: educational information regarding their diagnosis and treatment, and list of reasons why they would want to return to the ED prior to their follow-up appointment, should his condition change. CLINICAL IMPRESSION:    1. Insect bite of left upper extremity, initial encounter        AFTER VISIT PLAN:    Current Discharge Medication List      START taking these medications    Details   loratadine (CLARITIN) 10 mg tablet Take 1 Tab by mouth daily for 10 days.   Qty: 10 Tab, Refills: 0              Follow-up Information     Follow up With Details Comments Contact Info    [facility] EMERGENCY DEPT Go to As needed [place] [address] [place] [address]    Ivanna iWnkler MD Schedule an appointment as soon as possible for a visit in 3 days For wound re-check [address] [place] [address] 61             Written by Earle Collado NP-C

## 2018-04-02 NOTE — ED TRIAGE NOTES
Pt states felt a sting on his upper left arm last night. This morning noticed area was red and had gotten bigger. Thinks something bit him.  Denies pain

## 2018-06-19 ENCOUNTER — HOSPITAL ENCOUNTER (OUTPATIENT)
Dept: LAB | Age: 78
Discharge: HOME | End: 2018-06-19
Payer: MEDICARE

## 2018-06-19 DIAGNOSIS — N39.41 URGE INCONTINENCE: ICD-10-CM

## 2018-06-19 DIAGNOSIS — I10 ESSENTIAL HYPERTENSION, BENIGN: ICD-10-CM

## 2018-06-19 DIAGNOSIS — K21.9 ESOPHAGEAL REFLUX: ICD-10-CM

## 2018-06-19 DIAGNOSIS — E11.9 DIABETES MELLITUS (HCC): ICD-10-CM

## 2018-06-19 DIAGNOSIS — E78.00 PURE HYPERCHOLESTEROLEMIA: ICD-10-CM

## 2018-06-19 LAB
ALBUMIN SERPL-MCNC: 4 G/DL (ref 3.4–5)
ALBUMIN/GLOB SERPL: 1.2 {RATIO} (ref 0.8–1.7)
ALP SERPL-CCNC: 47 U/L (ref 45–117)
ALT SERPL-CCNC: 16 U/L (ref 16–61)
ANION GAP SERPL CALC-SCNC: 7 MMOL/L (ref 3–18)
AST SERPL-CCNC: 12 U/L (ref 15–37)
BASOPHILS # BLD: 0 K/UL (ref 0–0.1)
BASOPHILS NFR BLD: 1 % (ref 0–2)
BILIRUB SERPL-MCNC: 1.1 MG/DL (ref 0.2–1)
BUN SERPL-MCNC: 15 MG/DL (ref 7–18)
BUN/CREAT SERPL: 11 (ref 12–20)
CALCIUM SERPL-MCNC: 8.3 MG/DL (ref 8.5–10.1)
CHLORIDE SERPL-SCNC: 109 MMOL/L (ref 100–108)
CHOLEST SERPL-MCNC: 166 MG/DL
CO2 SERPL-SCNC: 27 MMOL/L (ref 21–32)
CREAT SERPL-MCNC: 1.32 MG/DL (ref 0.6–1.3)
CREAT UR-MCNC: 184 MG/DL (ref 30–125)
DIFFERENTIAL METHOD BLD: ABNORMAL
EOSINOPHIL # BLD: 0.1 K/UL (ref 0–0.4)
EOSINOPHIL NFR BLD: 4 % (ref 0–5)
ERYTHROCYTE [DISTWIDTH] IN BLOOD BY AUTOMATED COUNT: 13.2 % (ref 11.6–14.5)
GLOBULIN SER CALC-MCNC: 3.4 G/DL (ref 2–4)
GLUCOSE SERPL-MCNC: 76 MG/DL (ref 74–99)
HBA1C MFR BLD: 6.1 % (ref 4.2–5.6)
HCT VFR BLD AUTO: 44.7 % (ref 36–48)
HDLC SERPL-MCNC: 43 MG/DL (ref 40–60)
HDLC SERPL: 3.9 {RATIO} (ref 0–5)
HGB BLD-MCNC: 15 G/DL (ref 13–16)
LDLC SERPL CALC-MCNC: 96.2 MG/DL (ref 0–100)
LIPID PROFILE,FLP: NORMAL
LYMPHOCYTES # BLD: 1.8 K/UL (ref 0.9–3.6)
LYMPHOCYTES NFR BLD: 45 % (ref 21–52)
MCH RBC QN AUTO: 29.4 PG (ref 24–34)
MCHC RBC AUTO-ENTMCNC: 33.6 G/DL (ref 31–37)
MCV RBC AUTO: 87.6 FL (ref 74–97)
MICROALBUMIN UR-MCNC: 10.5 MG/DL (ref 0–3)
MICROALBUMIN/CREAT UR-RTO: 57 MG/G (ref 0–30)
MONOCYTES # BLD: 0.4 K/UL (ref 0.05–1.2)
MONOCYTES NFR BLD: 10 % (ref 3–10)
NEUTS SEG # BLD: 1.6 K/UL (ref 1.8–8)
NEUTS SEG NFR BLD: 40 % (ref 40–73)
PLATELET # BLD AUTO: 144 K/UL (ref 135–420)
PMV BLD AUTO: 9.4 FL (ref 9.2–11.8)
POTASSIUM SERPL-SCNC: 4.2 MMOL/L (ref 3.5–5.5)
PROT SERPL-MCNC: 7.4 G/DL (ref 6.4–8.2)
RBC # BLD AUTO: 5.1 M/UL (ref 4.7–5.5)
SODIUM SERPL-SCNC: 143 MMOL/L (ref 136–145)
T4 SERPL-MCNC: 8.5 UG/DL (ref 4.7–13.3)
TRIGL SERPL-MCNC: 134 MG/DL (ref ?–150)
TSH SERPL DL<=0.05 MIU/L-ACNC: 1.83 UIU/ML (ref 0.36–3.74)
VLDLC SERPL CALC-MCNC: 26.8 MG/DL
WBC # BLD AUTO: 4 K/UL (ref 4.6–13.2)

## 2018-06-19 PROCEDURE — 84436 ASSAY OF TOTAL THYROXINE: CPT | Performed by: INTERNAL MEDICINE

## 2018-06-19 PROCEDURE — 80061 LIPID PANEL: CPT | Performed by: INTERNAL MEDICINE

## 2018-06-19 PROCEDURE — 36415 COLL VENOUS BLD VENIPUNCTURE: CPT | Performed by: INTERNAL MEDICINE

## 2018-06-19 PROCEDURE — 83036 HEMOGLOBIN GLYCOSYLATED A1C: CPT | Performed by: INTERNAL MEDICINE

## 2018-06-19 PROCEDURE — 80053 COMPREHEN METABOLIC PANEL: CPT | Performed by: INTERNAL MEDICINE

## 2018-06-19 PROCEDURE — 85025 COMPLETE CBC W/AUTO DIFF WBC: CPT | Performed by: INTERNAL MEDICINE

## 2018-06-19 PROCEDURE — 84443 ASSAY THYROID STIM HORMONE: CPT | Performed by: INTERNAL MEDICINE

## 2018-06-19 PROCEDURE — 82043 UR ALBUMIN QUANTITATIVE: CPT | Performed by: INTERNAL MEDICINE

## 2019-03-23 ENCOUNTER — HOSPITAL ENCOUNTER (OUTPATIENT)
Dept: LAB | Age: 79
Discharge: HOME | End: 2019-03-23
Payer: MEDICARE

## 2019-03-23 DIAGNOSIS — E11.9 DIABETES MELLITUS (HCC): ICD-10-CM

## 2019-03-23 DIAGNOSIS — N39.41 URGE INCONTINENCE: ICD-10-CM

## 2019-03-23 DIAGNOSIS — E78.00 PURE HYPERCHOLESTEROLEMIA: ICD-10-CM

## 2019-03-23 DIAGNOSIS — K21.9 ESOPHAGEAL REFLUX: ICD-10-CM

## 2019-03-23 DIAGNOSIS — I10 ESSENTIAL HYPERTENSION, MALIGNANT: ICD-10-CM

## 2019-03-23 LAB
ALBUMIN SERPL-MCNC: 4 G/DL (ref 3.4–5)
ALBUMIN/GLOB SERPL: 1.1 {RATIO} (ref 0.8–1.7)
ALP SERPL-CCNC: 54 U/L (ref 45–117)
ALT SERPL-CCNC: 16 U/L (ref 16–61)
ANION GAP SERPL CALC-SCNC: 7 MMOL/L (ref 3–18)
AST SERPL-CCNC: 9 U/L (ref 15–37)
BASOPHILS # BLD: 0 K/UL (ref 0–0.1)
BASOPHILS NFR BLD: 0 % (ref 0–2)
BILIRUB SERPL-MCNC: 1 MG/DL (ref 0.2–1)
BUN SERPL-MCNC: 19 MG/DL (ref 7–18)
BUN/CREAT SERPL: 13 (ref 12–20)
CALCIUM SERPL-MCNC: 8.8 MG/DL (ref 8.5–10.1)
CHLORIDE SERPL-SCNC: 108 MMOL/L (ref 100–108)
CHOLEST SERPL-MCNC: 181 MG/DL
CO2 SERPL-SCNC: 26 MMOL/L (ref 21–32)
CREAT SERPL-MCNC: 1.45 MG/DL (ref 0.6–1.3)
CREAT UR-MCNC: 188 MG/DL (ref 30–125)
DIFFERENTIAL METHOD BLD: ABNORMAL
EOSINOPHIL # BLD: 0.1 K/UL (ref 0–0.4)
EOSINOPHIL NFR BLD: 4 % (ref 0–5)
ERYTHROCYTE [DISTWIDTH] IN BLOOD BY AUTOMATED COUNT: 13 % (ref 11.6–14.5)
GLOBULIN SER CALC-MCNC: 3.5 G/DL (ref 2–4)
GLUCOSE SERPL-MCNC: 87 MG/DL (ref 74–99)
HBA1C MFR BLD: 5.7 % (ref 4.2–5.6)
HCT VFR BLD AUTO: 44.7 % (ref 36–48)
HDLC SERPL-MCNC: 42 MG/DL (ref 40–60)
HDLC SERPL: 4.3 {RATIO} (ref 0–5)
HGB BLD-MCNC: 15.2 G/DL (ref 13–16)
LDLC SERPL CALC-MCNC: 123.4 MG/DL (ref 0–100)
LIPID PROFILE,FLP: ABNORMAL
LYMPHOCYTES # BLD: 1.4 K/UL (ref 0.9–3.6)
LYMPHOCYTES NFR BLD: 39 % (ref 21–52)
MCH RBC QN AUTO: 29.9 PG (ref 24–34)
MCHC RBC AUTO-ENTMCNC: 34 G/DL (ref 31–37)
MCV RBC AUTO: 88 FL (ref 74–97)
MICROALBUMIN UR-MCNC: 4.59 MG/DL (ref 0–3)
MICROALBUMIN/CREAT UR-RTO: 24 MG/G (ref 0–30)
MONOCYTES # BLD: 0.4 K/UL (ref 0.05–1.2)
MONOCYTES NFR BLD: 12 % (ref 3–10)
NEUTS SEG # BLD: 1.6 K/UL (ref 1.8–8)
NEUTS SEG NFR BLD: 45 % (ref 40–73)
PLATELET # BLD AUTO: 141 K/UL (ref 135–420)
PMV BLD AUTO: 10.3 FL (ref 9.2–11.8)
POTASSIUM SERPL-SCNC: 4.2 MMOL/L (ref 3.5–5.5)
PROT SERPL-MCNC: 7.5 G/DL (ref 6.4–8.2)
RBC # BLD AUTO: 5.08 M/UL (ref 4.7–5.5)
SODIUM SERPL-SCNC: 141 MMOL/L (ref 136–145)
T4 SERPL-MCNC: 8.8 UG/DL (ref 4.7–13.3)
TRIGL SERPL-MCNC: 78 MG/DL (ref ?–150)
TSH SERPL DL<=0.05 MIU/L-ACNC: 2.63 UIU/ML (ref 0.36–3.74)
VLDLC SERPL CALC-MCNC: 15.6 MG/DL
WBC # BLD AUTO: 3.5 K/UL (ref 4.6–13.2)

## 2019-03-23 PROCEDURE — 80053 COMPREHEN METABOLIC PANEL: CPT

## 2019-03-23 PROCEDURE — 83036 HEMOGLOBIN GLYCOSYLATED A1C: CPT

## 2019-03-23 PROCEDURE — 85025 COMPLETE CBC W/AUTO DIFF WBC: CPT

## 2019-03-23 PROCEDURE — 84443 ASSAY THYROID STIM HORMONE: CPT

## 2019-03-23 PROCEDURE — 36415 COLL VENOUS BLD VENIPUNCTURE: CPT

## 2019-03-23 PROCEDURE — 84436 ASSAY OF TOTAL THYROXINE: CPT

## 2019-03-23 PROCEDURE — 80061 LIPID PANEL: CPT

## 2019-03-23 PROCEDURE — 82043 UR ALBUMIN QUANTITATIVE: CPT

## 2019-09-19 ENCOUNTER — HOSPITAL ENCOUNTER (OUTPATIENT)
Dept: LAB | Age: 79
Discharge: HOME | End: 2019-09-19
Payer: COMMERCIAL

## 2019-09-19 DIAGNOSIS — R19.4 FREQUENT BOWEL MOVEMENTS: ICD-10-CM

## 2019-09-19 DIAGNOSIS — K92.1 BLOOD IN STOOL: ICD-10-CM

## 2019-09-19 DIAGNOSIS — K57.30 DIVERTICULOSIS OF LARGE INTESTINE WITHOUT DIVERTICULITIS: ICD-10-CM

## 2019-09-19 DIAGNOSIS — E66.9 OBESITY, UNSPECIFIED: ICD-10-CM

## 2019-09-19 LAB
ALBUMIN SERPL-MCNC: 3.8 G/DL (ref 3.4–5)
ALBUMIN/GLOB SERPL: 1.2 {RATIO} (ref 0.8–1.7)
ALP SERPL-CCNC: 58 U/L (ref 45–117)
ALT SERPL-CCNC: 16 U/L (ref 16–61)
ANION GAP SERPL CALC-SCNC: 6 MMOL/L (ref 3–18)
AST SERPL-CCNC: 10 U/L (ref 10–38)
BASOPHILS # BLD: 0 K/UL (ref 0–0.1)
BASOPHILS NFR BLD: 1 % (ref 0–2)
BILIRUB SERPL-MCNC: 1.4 MG/DL (ref 0.2–1)
BUN SERPL-MCNC: 22 MG/DL (ref 7–18)
BUN/CREAT SERPL: 16 (ref 12–20)
CALCIUM SERPL-MCNC: 8.3 MG/DL (ref 8.5–10.1)
CHLORIDE SERPL-SCNC: 110 MMOL/L (ref 100–111)
CO2 SERPL-SCNC: 25 MMOL/L (ref 21–32)
CREAT SERPL-MCNC: 1.4 MG/DL (ref 0.6–1.3)
DIFFERENTIAL METHOD BLD: ABNORMAL
EOSINOPHIL # BLD: 0.2 K/UL (ref 0–0.4)
EOSINOPHIL NFR BLD: 5 % (ref 0–5)
ERYTHROCYTE [DISTWIDTH] IN BLOOD BY AUTOMATED COUNT: 13.6 % (ref 11.6–14.5)
FERRITIN SERPL-MCNC: 40 NG/ML (ref 8–388)
GLOBULIN SER CALC-MCNC: 3.3 G/DL (ref 2–4)
GLUCOSE SERPL-MCNC: 81 MG/DL (ref 74–99)
HCT VFR BLD AUTO: 41.5 % (ref 36–48)
HGB BLD-MCNC: 13.9 G/DL (ref 13–16)
IRON SERPL-MCNC: 46 UG/DL (ref 50–175)
LYMPHOCYTES # BLD: 1.4 K/UL (ref 0.9–3.6)
LYMPHOCYTES NFR BLD: 34 % (ref 21–52)
MCH RBC QN AUTO: 29.8 PG (ref 24–34)
MCHC RBC AUTO-ENTMCNC: 33.5 G/DL (ref 31–37)
MCV RBC AUTO: 88.9 FL (ref 74–97)
MONOCYTES # BLD: 0.6 K/UL (ref 0.05–1.2)
MONOCYTES NFR BLD: 13 % (ref 3–10)
NEUTS SEG # BLD: 2 K/UL (ref 1.8–8)
NEUTS SEG NFR BLD: 47 % (ref 40–73)
PLATELET # BLD AUTO: 151 K/UL (ref 135–420)
PMV BLD AUTO: 9.9 FL (ref 9.2–11.8)
POTASSIUM SERPL-SCNC: 4.1 MMOL/L (ref 3.5–5.5)
PROT SERPL-MCNC: 7.1 G/DL (ref 6.4–8.2)
RBC # BLD AUTO: 4.67 M/UL (ref 4.7–5.5)
SODIUM SERPL-SCNC: 141 MMOL/L (ref 136–145)
WBC # BLD AUTO: 4.3 K/UL (ref 4.6–13.2)

## 2019-09-19 PROCEDURE — 80053 COMPREHEN METABOLIC PANEL: CPT

## 2019-09-19 PROCEDURE — 82728 ASSAY OF FERRITIN: CPT

## 2019-09-19 PROCEDURE — 85025 COMPLETE CBC W/AUTO DIFF WBC: CPT

## 2019-09-19 PROCEDURE — 83540 ASSAY OF IRON: CPT

## 2019-09-19 PROCEDURE — 36415 COLL VENOUS BLD VENIPUNCTURE: CPT

## 2019-09-20 LAB
IRON SATN MFR SERPL: 16 %
IRON SERPL-MCNC: 44 UG/DL (ref 50–175)
TIBC SERPL-MCNC: 271 UG/DL (ref 250–450)

## 2019-10-05 ENCOUNTER — ANESTHESIA EVENT (OUTPATIENT)
Dept: ENDOSCOPY | Age: 79
End: 2019-10-05
Payer: MEDICARE

## 2019-10-07 ENCOUNTER — HOSPITAL ENCOUNTER (OUTPATIENT)
Age: 79
Setting detail: OUTPATIENT SURGERY
Discharge: HOME | End: 2019-10-07
Attending: INTERNAL MEDICINE | Admitting: INTERNAL MEDICINE
Payer: MEDICARE

## 2019-10-07 ENCOUNTER — ANESTHESIA (OUTPATIENT)
Dept: ENDOSCOPY | Age: 79
End: 2019-10-07
Payer: MEDICARE

## 2019-10-07 VITALS
OXYGEN SATURATION: 99 % | BODY MASS INDEX: 32.34 KG/M2 | SYSTOLIC BLOOD PRESSURE: 147 MMHG | RESPIRATION RATE: 16 BRPM | DIASTOLIC BLOOD PRESSURE: 73 MMHG | WEIGHT: 231 LBS | HEART RATE: 61 BPM | HEIGHT: 71 IN | TEMPERATURE: 96.9 F

## 2019-10-07 LAB
ATRIAL RATE: 58 BPM
CALCULATED P AXIS, ECG09: 99 DEGREES
CALCULATED R AXIS, ECG10: -18 DEGREES
CALCULATED T AXIS, ECG11: 13 DEGREES
DIAGNOSIS, 93000: NORMAL
GLUCOSE BLD STRIP.AUTO-MCNC: 74 MG/DL (ref 70–110)
GLUCOSE BLD STRIP.AUTO-MCNC: 74 MG/DL (ref 70–110)
P-R INTERVAL, ECG05: 236 MS
Q-T INTERVAL, ECG07: 456 MS
QRS DURATION, ECG06: 102 MS
QTC CALCULATION (BEZET), ECG08: 447 MS
VENTRICULAR RATE, ECG03: 58 BPM

## 2019-10-07 PROCEDURE — 77030021593 HC FCPS BIOP ENDOSC BSC -A: Performed by: INTERNAL MEDICINE

## 2019-10-07 PROCEDURE — 76040000019: Performed by: INTERNAL MEDICINE

## 2019-10-07 PROCEDURE — 74011250636 HC RX REV CODE- 250/636: Performed by: NURSE ANESTHETIST, CERTIFIED REGISTERED

## 2019-10-07 PROCEDURE — 88305 TISSUE EXAM BY PATHOLOGIST: CPT

## 2019-10-07 PROCEDURE — 77030018846 HC SOL IRR STRL H20 ICUM -A: Performed by: INTERNAL MEDICINE

## 2019-10-07 PROCEDURE — 76060000031 HC ANESTHESIA FIRST 0.5 HR: Performed by: INTERNAL MEDICINE

## 2019-10-07 PROCEDURE — 82962 GLUCOSE BLOOD TEST: CPT

## 2019-10-07 PROCEDURE — 74011250637 HC RX REV CODE- 250/637: Performed by: NURSE ANESTHETIST, CERTIFIED REGISTERED

## 2019-10-07 PROCEDURE — 74011000250 HC RX REV CODE- 250

## 2019-10-07 PROCEDURE — 77030008565 HC TBNG SUC IRR ERBE -B: Performed by: INTERNAL MEDICINE

## 2019-10-07 PROCEDURE — 93005 ELECTROCARDIOGRAM TRACING: CPT

## 2019-10-07 PROCEDURE — 74011250636 HC RX REV CODE- 250/636

## 2019-10-07 RX ORDER — SODIUM CHLORIDE 0.9 % (FLUSH) 0.9 %
5-40 SYRINGE (ML) INJECTION AS NEEDED
Status: DISCONTINUED | OUTPATIENT
Start: 2019-10-07 | End: 2019-10-07 | Stop reason: HOSPADM

## 2019-10-07 RX ORDER — FLUMAZENIL 0.1 MG/ML
0.2 INJECTION INTRAVENOUS
Status: CANCELLED | OUTPATIENT
Start: 2019-10-07 | End: 2019-10-07

## 2019-10-07 RX ORDER — SODIUM CHLORIDE, SODIUM LACTATE, POTASSIUM CHLORIDE, CALCIUM CHLORIDE 600; 310; 30; 20 MG/100ML; MG/100ML; MG/100ML; MG/100ML
75 INJECTION, SOLUTION INTRAVENOUS CONTINUOUS
Status: DISCONTINUED | OUTPATIENT
Start: 2019-10-07 | End: 2019-10-07 | Stop reason: HOSPADM

## 2019-10-07 RX ORDER — DEXTROSE 50 % IN WATER (D50W) INTRAVENOUS SYRINGE
25-50 AS NEEDED
Status: DISCONTINUED | OUTPATIENT
Start: 2019-10-07 | End: 2019-10-07 | Stop reason: HOSPADM

## 2019-10-07 RX ORDER — SODIUM CHLORIDE 0.9 % (FLUSH) 0.9 %
5-40 SYRINGE (ML) INJECTION EVERY 8 HOURS
Status: CANCELLED | OUTPATIENT
Start: 2019-10-07

## 2019-10-07 RX ORDER — SODIUM CHLORIDE 0.9 % (FLUSH) 0.9 %
5-40 SYRINGE (ML) INJECTION EVERY 8 HOURS
Status: DISCONTINUED | OUTPATIENT
Start: 2019-10-07 | End: 2019-10-07 | Stop reason: HOSPADM

## 2019-10-07 RX ORDER — EPINEPHRINE 0.1 MG/ML
1 INJECTION INTRACARDIAC; INTRAVENOUS
Status: CANCELLED | OUTPATIENT
Start: 2019-10-07 | End: 2019-10-08

## 2019-10-07 RX ORDER — LIDOCAINE HYDROCHLORIDE 20 MG/ML
INJECTION, SOLUTION EPIDURAL; INFILTRATION; INTRACAUDAL; PERINEURAL AS NEEDED
Status: DISCONTINUED | OUTPATIENT
Start: 2019-10-07 | End: 2019-10-07 | Stop reason: HOSPADM

## 2019-10-07 RX ORDER — MAGNESIUM SULFATE 100 %
4 CRYSTALS MISCELLANEOUS AS NEEDED
Status: DISCONTINUED | OUTPATIENT
Start: 2019-10-07 | End: 2019-10-07 | Stop reason: HOSPADM

## 2019-10-07 RX ORDER — INSULIN LISPRO 100 [IU]/ML
INJECTION, SOLUTION INTRAVENOUS; SUBCUTANEOUS ONCE
Status: DISCONTINUED | OUTPATIENT
Start: 2019-10-07 | End: 2019-10-07 | Stop reason: HOSPADM

## 2019-10-07 RX ORDER — ATROPINE SULFATE 0.1 MG/ML
0.5 INJECTION INTRAVENOUS
Status: CANCELLED | OUTPATIENT
Start: 2019-10-07 | End: 2019-10-08

## 2019-10-07 RX ORDER — SODIUM CHLORIDE 0.9 % (FLUSH) 0.9 %
5-40 SYRINGE (ML) INJECTION AS NEEDED
Status: CANCELLED | OUTPATIENT
Start: 2019-10-07

## 2019-10-07 RX ORDER — PROPOFOL 10 MG/ML
INJECTION, EMULSION INTRAVENOUS AS NEEDED
Status: DISCONTINUED | OUTPATIENT
Start: 2019-10-07 | End: 2019-10-07 | Stop reason: HOSPADM

## 2019-10-07 RX ORDER — DEXTROMETHORPHAN/PSEUDOEPHED 2.5-7.5/.8
1.2 DROPS ORAL
Status: CANCELLED | OUTPATIENT
Start: 2019-10-07

## 2019-10-07 RX ORDER — FAMOTIDINE 20 MG/1
20 TABLET, FILM COATED ORAL ONCE
Status: COMPLETED | OUTPATIENT
Start: 2019-10-07 | End: 2019-10-07

## 2019-10-07 RX ORDER — SODIUM CHLORIDE, SODIUM LACTATE, POTASSIUM CHLORIDE, CALCIUM CHLORIDE 600; 310; 30; 20 MG/100ML; MG/100ML; MG/100ML; MG/100ML
50 INJECTION, SOLUTION INTRAVENOUS CONTINUOUS
Status: DISCONTINUED | OUTPATIENT
Start: 2019-10-07 | End: 2019-10-07 | Stop reason: HOSPADM

## 2019-10-07 RX ORDER — EPHEDRINE SULFATE 50 MG/ML
INJECTION, SOLUTION INTRAVENOUS AS NEEDED
Status: DISCONTINUED | OUTPATIENT
Start: 2019-10-07 | End: 2019-10-07 | Stop reason: HOSPADM

## 2019-10-07 RX ORDER — NALOXONE HYDROCHLORIDE 0.4 MG/ML
0.4 INJECTION, SOLUTION INTRAMUSCULAR; INTRAVENOUS; SUBCUTANEOUS
Status: CANCELLED | OUTPATIENT
Start: 2019-10-07 | End: 2019-10-07

## 2019-10-07 RX ORDER — LIDOCAINE HYDROCHLORIDE 10 MG/ML
0.1 INJECTION, SOLUTION EPIDURAL; INFILTRATION; INTRACAUDAL; PERINEURAL AS NEEDED
Status: DISCONTINUED | OUTPATIENT
Start: 2019-10-07 | End: 2019-10-07 | Stop reason: HOSPADM

## 2019-10-07 RX ADMIN — EPHEDRINE SULFATE 10 MG: 50 INJECTION, SOLUTION INTRAVENOUS at 09:09

## 2019-10-07 RX ADMIN — LIDOCAINE HYDROCHLORIDE 60 MG: 20 INJECTION, SOLUTION EPIDURAL; INFILTRATION; INTRACAUDAL; PERINEURAL at 08:52

## 2019-10-07 RX ADMIN — PROPOFOL 20 MG: 10 INJECTION, EMULSION INTRAVENOUS at 08:58

## 2019-10-07 RX ADMIN — PROPOFOL 50 MG: 10 INJECTION, EMULSION INTRAVENOUS at 08:54

## 2019-10-07 RX ADMIN — SODIUM CHLORIDE, SODIUM LACTATE, POTASSIUM CHLORIDE, AND CALCIUM CHLORIDE 75 ML/HR: 600; 310; 30; 20 INJECTION, SOLUTION INTRAVENOUS at 08:29

## 2019-10-07 RX ADMIN — PROPOFOL 50 MG: 10 INJECTION, EMULSION INTRAVENOUS at 08:52

## 2019-10-07 RX ADMIN — FAMOTIDINE 20 MG: 20 TABLET ORAL at 08:29

## 2019-10-07 RX ADMIN — EPHEDRINE SULFATE 10 MG: 50 INJECTION, SOLUTION INTRAVENOUS at 09:06

## 2019-10-07 NOTE — ANESTHESIA POSTPROCEDURE EVALUATION
Procedure(s): 
COLONOSCOPY with bx's. MAC Anesthesia Post Evaluation Multimodal analgesia: multimodal analgesia used between 6 hours prior to anesthesia start to PACU discharge Patient location during evaluation: bedside Patient participation: complete - patient participated Level of consciousness: awake Pain score: 0 Pain management: adequate Airway patency: patent Anesthetic complications: no 
Cardiovascular status: stable Respiratory status: acceptable Hydration status: acceptable Post anesthesia nausea and vomiting:  none Vitals Value Taken Time /51 10/7/2019  9:25 AM  
Temp 36.4 °C (97.6 °F) 10/7/2019  9:18 AM  
Pulse 59 10/7/2019  9:28 AM  
Resp 20 10/7/2019  9:28 AM  
SpO2 100 % 10/7/2019  9:28 AM  
Vitals shown include unvalidated device data.

## 2019-10-07 NOTE — DISCHARGE INSTRUCTIONS
Patient Education        Colonoscopy: What to Expect at Home  Your Recovery  After you have a colonoscopy, you will stay at the clinic for 1 to 2 hours until the medicines wear off. Then you can go home. But you will need to arrange for a ride. Your doctor will tell you when you can eat and do your other usual activities. Your doctor will talk to you about when you will need your next colonoscopy. Your doctor can help you decide how often you need to be checked. This will depend on the results of your test and your risk for colorectal cancer. After the test, you may be bloated or have gas pains. You may need to pass gas. If a biopsy was done or a polyp was removed, you may have streaks of blood in your stool (feces) for a few days. Problems such as heavy rectal bleeding may not occur until several weeks after the test. This isn't common. But it can happen after polyps are removed. This care sheet gives you a general idea about how long it will take for you to recover. But each person recovers at a different pace. Follow the steps below to get better as quickly as possible. How can you care for yourself at home? Activity    · Rest when you feel tired.     · You can do your normal activities when it feels okay to do so. Diet    · Follow your doctor's directions for eating.     · Unless your doctor has told you not to, drink plenty of fluids. This helps to replace the fluids that were lost during the colon prep.     · Do not drink alcohol. Medicines    · Your doctor will tell you if and when you can restart your medicines. He or she will also give you instructions about taking any new medicines.     · If you take blood thinners, such as warfarin (Coumadin), clopidogrel (Plavix), or aspirin, be sure to talk to your doctor. He or she will tell you if and when to start taking those medicines again.  Make sure that you understand exactly what your doctor wants you to do.     · If polyps were removed or a biopsy was done during the test, your doctor may tell you not to take aspirin or other anti-inflammatory medicines for a few days. These include ibuprofen (Advil, Motrin) and naproxen (Aleve). Other instructions    · For your safety, do not drive or operate machinery until the medicine wears off and you can think clearly. Your doctor may tell you not to drive or operate machinery until the day after your test.     · Do not sign legal documents or make major decisions until the medicine wears off and you can think clearly. The anesthesia can make it hard for you to fully understand what you are agreeing to. Follow-up care is a key part of your treatment and safety. Be sure to make and go to all appointments, and call your doctor if you are having problems. It's also a good idea to know your test results and keep a list of the medicines you take. When should you call for help? Call 911 anytime you think you may need emergency care. For example, call if:    · You passed out (lost consciousness).     · You pass maroon or bloody stools.     · You have trouble breathing.    Call your doctor now or seek immediate medical care if:    · You have pain that does not get better after you take pain medicine.     · You are sick to your stomach or cannot drink fluids.     · You have new or worse belly pain.     · You have blood in your stools.     · You have a fever.     · You cannot pass stools or gas.    Watch closely for changes in your health, and be sure to contact your doctor if you have any problems. Where can you learn more? Go to http://anitha-paxton.info/. Enter E264 in the search box to learn more about \"Colonoscopy: What to Expect at Home. \"  Current as of: December 19, 2018  Content Version: 12.2  © 6336-7374 [employer]. Care instructions adapted under license by [employer] (which disclaims liability or warranty for this information).  If you have questions about a medical condition or this instruction, always ask your healthcare professional. [address] any warranty or liability for your use of this information. DISCHARGE SUMMARY from Nurse    PATIENT INSTRUCTIONS:    After general anesthesia or intravenous sedation, for 24 hours or while taking prescription Narcotics:  · Limit your activities  · Do not drive and operate hazardous machinery  · Do not make important personal or business decisions  · Do  not drink alcoholic beverages  · If you have not urinated within 8 hours after discharge, please contact your surgeon on call. Report the following to your surgeon:  · Excessive pain, swelling, redness or odor of or around the surgical area  · Temperature over 100.5  · Nausea and vomiting lasting longer than 4 hours or if unable to take medications  · Any signs of decreased circulation or nerve impairment to extremity: change in color, persistent  numbness, tingling, coldness or increase pain  · Any questions    What to do at Home:  Recommended activity: Activity as tolerated and no driving for today. *  Please give a list of your current medications to your Primary Care Provider. *  Please update this list whenever your medications are discontinued, doses are      changed, or new medications (including over-the-counter products) are added. *  Please carry medication information at all times in case of emergency situations. These are general instructions for a healthy lifestyle:    No smoking/ No tobacco products/ Avoid exposure to second hand smoke  Surgeon General's Warning:  Quitting smoking now greatly reduces serious risk to your health.     Obesity, smoking, and sedentary lifestyle greatly increases your risk for illness    A healthy diet, regular physical exercise & weight monitoring are important for maintaining a healthy lifestyle    You may be retaining fluid if you have a history of heart failure or if you experience any of the following symptoms:  Weight gain of 3 pounds or more overnight or 5 pounds in a week, increased swelling in our hands or feet or shortness of breath while lying flat in bed. Please call your doctor as soon as you notice any of these symptoms; do not wait until your next office visit. The discharge information has been reviewed with the patient and son. The patient and son verbalized understanding. Discharge medications reviewed with the patient and son and appropriate educational materials and side effects teaching were provided.   ___________________________________________________________________________________________________________________________________

## 2019-10-07 NOTE — PROCEDURES
[email or web address]  [place], [address]      Brief Procedure Note    Moses Gregory  1940  [id number]    Date of Procedure: 10/7/2019    Preoperative diagnosis: 278.00 - E66.9,  Body mass index  30+  -  obesity  562.10 - K57.30,   Diverticular disease of colon  787.99 - R19.4,   Change in bowel habits  578.1 - K92.1,  Blood in stool / hematochezia    Postoperative diagnosis: diverticulosis, random colon bx's r/o microscopic colitis, hemorrhoids    Type of Anesthesia: MAC (Monitored anesthesia care)    Description of findings: same as post op dx    Procedure: Procedure(s):  COLONOSCOPY with bx's    :  Dr. Tino Castañeda MD    Assistant(s): Endoscopy Technician-1: Juan Arshad  Endoscopy RN-1: Stanislaw Ortega RN; Mina Meyer    EBL:None    Specimens:   ID Type Source Tests Collected by Time Destination   1 : random colon bx's Preservative Colon  Chrisys Zuniga MD 10/7/2019 9283 Pathology       Findings: See printed and scanned procedure note    Complications: None    Dr. Tino Castañeda MD  10/7/2019  9:15 AM

## 2019-10-07 NOTE — ANESTHESIA PREPROCEDURE EVALUATION
Relevant Problems No relevant active problems Anesthetic History No history of anesthetic complications Review of Systems / Medical History Patient summary reviewed and pertinent labs reviewed Pulmonary Sleep apnea: No treatment Neuro/Psych Within defined limits Cardiovascular Within defined limits Hypertension CAD Exercise tolerance: >4 METS 
  
GI/Hepatic/Renal 
Within defined limits Endo/Other Diabetes: type 2 Other Findings Physical Exam 
 
Airway Mallampati: II 
TM Distance: 4 - 6 cm Neck ROM: normal range of motion Mouth opening: Normal 
 
 Cardiovascular Regular rate and rhythm,  S1 and S2 normal,  no murmur, click, rub, or gallop Rhythm: regular Rate: normal 
 
 
 
 Dental 
 
Dentition: Edentulous, Full upper dentures and Full lower dentures Pulmonary Breath sounds clear to auscultation Abdominal 
GI exam deferred Other Findings Anesthetic Plan ASA: 3 Anesthesia type: MAC Induction: Intravenous Anesthetic plan and risks discussed with: Patient

## 2019-10-07 NOTE — H&P
History and Physical reviewed; I have examined the patient and there are no pertinent changes. Aileen Harding MD, MD   8:16 AM 10/7/2019  [facility], [address] [place] [address]  [email or web address]

## 2020-02-04 ENCOUNTER — HOSPITAL ENCOUNTER (OUTPATIENT)
Dept: LAB | Age: 80
Discharge: HOME | End: 2020-02-04
Payer: MEDICARE

## 2020-02-04 DIAGNOSIS — K21.9 ESOPHAGEAL REFLUX: ICD-10-CM

## 2020-02-04 DIAGNOSIS — Z01.818 PREOP EXAMINATION: ICD-10-CM

## 2020-02-04 DIAGNOSIS — N39.41 URGE INCONTINENCE: ICD-10-CM

## 2020-02-04 DIAGNOSIS — H25.012 CORTICAL AGE-RELATED CATARACT OF LEFT EYE: ICD-10-CM

## 2020-02-04 DIAGNOSIS — I10 ESSENTIAL HYPERTENSION, MALIGNANT: ICD-10-CM

## 2020-02-04 DIAGNOSIS — E11.9 DIABETES MELLITUS (HCC): ICD-10-CM

## 2020-02-04 DIAGNOSIS — E78.00 PURE HYPERCHOLESTEROLEMIA: ICD-10-CM

## 2020-02-04 LAB
BASOPHILS # BLD: 0 K/UL (ref 0–0.1)
BASOPHILS NFR BLD: 1 % (ref 0–2)
CHOLEST SERPL-MCNC: 72 MG/DL
CREAT UR-MCNC: 161 MG/DL (ref 30–125)
DIFFERENTIAL METHOD BLD: ABNORMAL
EOSINOPHIL # BLD: 0.2 K/UL (ref 0–0.4)
EOSINOPHIL NFR BLD: 4 % (ref 0–5)
ERYTHROCYTE [DISTWIDTH] IN BLOOD BY AUTOMATED COUNT: 12.9 % (ref 11.6–14.5)
EST. AVERAGE GLUCOSE BLD GHB EST-MCNC: 114 MG/DL
HBA1C MFR BLD: 5.6 % (ref 4.2–5.6)
HCT VFR BLD AUTO: 45 % (ref 36–48)
HDLC SERPL-MCNC: 42 MG/DL (ref 40–60)
HDLC SERPL: 1.7 {RATIO} (ref 0–5)
HGB BLD-MCNC: 14.9 G/DL (ref 13–16)
LDLC SERPL CALC-MCNC: ABNORMAL MG/DL (ref 0–100)
LIPID PROFILE,FLP: ABNORMAL
LYMPHOCYTES # BLD: 1.8 K/UL (ref 0.9–3.6)
LYMPHOCYTES NFR BLD: 38 % (ref 21–52)
MCH RBC QN AUTO: 29.2 PG (ref 24–34)
MCHC RBC AUTO-ENTMCNC: 33.1 G/DL (ref 31–37)
MCV RBC AUTO: 88.2 FL (ref 74–97)
MICROALBUMIN UR-MCNC: 3.12 MG/DL (ref 0–3)
MICROALBUMIN/CREAT UR-RTO: 19 MG/G (ref 0–30)
MONOCYTES # BLD: 0.7 K/UL (ref 0.05–1.2)
MONOCYTES NFR BLD: 15 % (ref 3–10)
NEUTS SEG # BLD: 2 K/UL (ref 1.8–8)
NEUTS SEG NFR BLD: 42 % (ref 40–73)
PLATELET # BLD AUTO: 137 K/UL (ref 135–420)
PMV BLD AUTO: 9.9 FL (ref 9.2–11.8)
RBC # BLD AUTO: 5.1 M/UL (ref 4.7–5.5)
TRIGL SERPL-MCNC: 381 MG/DL (ref ?–150)
TSH SERPL DL<=0.05 MIU/L-ACNC: 0.41 UIU/ML (ref 0.36–3.74)
VLDLC SERPL CALC-MCNC: ABNORMAL MG/DL
WBC # BLD AUTO: 4.7 K/UL (ref 4.6–13.2)

## 2020-02-04 PROCEDURE — 84436 ASSAY OF TOTAL THYROXINE: CPT

## 2020-02-04 PROCEDURE — 80053 COMPREHEN METABOLIC PANEL: CPT

## 2020-02-04 PROCEDURE — 84443 ASSAY THYROID STIM HORMONE: CPT

## 2020-02-04 PROCEDURE — 82043 UR ALBUMIN QUANTITATIVE: CPT

## 2020-02-04 PROCEDURE — 83036 HEMOGLOBIN GLYCOSYLATED A1C: CPT

## 2020-02-04 PROCEDURE — 36415 COLL VENOUS BLD VENIPUNCTURE: CPT

## 2020-02-04 PROCEDURE — 85025 COMPLETE CBC W/AUTO DIFF WBC: CPT

## 2020-02-04 PROCEDURE — 80061 LIPID PANEL: CPT

## 2020-02-05 LAB
ALBUMIN SERPL-MCNC: 3.8 G/DL (ref 3.4–5)
ALBUMIN/GLOB SERPL: 1.2 {RATIO} (ref 0.8–1.7)
ALP SERPL-CCNC: 59 U/L (ref 45–117)
ALT SERPL-CCNC: 14 U/L (ref 16–61)
ANION GAP SERPL CALC-SCNC: 10 MMOL/L (ref 3–18)
AST SERPL-CCNC: 11 U/L (ref 10–38)
BILIRUB SERPL-MCNC: 0.8 MG/DL (ref 0.2–1)
BUN SERPL-MCNC: 18 MG/DL (ref 7–18)
BUN/CREAT SERPL: 14 (ref 12–20)
CALCIUM SERPL-MCNC: 8.9 MG/DL (ref 8.5–10.1)
CHLORIDE SERPL-SCNC: 113 MMOL/L (ref 100–111)
CO2 SERPL-SCNC: 23 MMOL/L (ref 21–32)
CREAT SERPL-MCNC: 1.27 MG/DL (ref 0.6–1.3)
GLOBULIN SER CALC-MCNC: 3.3 G/DL (ref 2–4)
GLUCOSE SERPL-MCNC: 69 MG/DL (ref 74–99)
POTASSIUM SERPL-SCNC: 4.3 MMOL/L (ref 3.5–5.5)
PROT SERPL-MCNC: 7.1 G/DL (ref 6.4–8.2)
SODIUM SERPL-SCNC: 146 MMOL/L (ref 136–145)

## 2020-02-07 LAB — T4 SERPL-MCNC: 8.3 UG/DL (ref 4.5–12.1)

## 2020-06-05 NOTE — PATIENT INSTRUCTIONS
Attune Live Activation    Thank you for requesting access to Attune Live. Please follow the instructions below to securely access and download your online medical record. Attune Live allows you to send messages to your doctor, view your test results, renew your prescriptions, schedule appointments, and more. How Do I Sign Up? 1. In your internet browser, go to [email or web address]  2. Click on the First Time User? Click Here link in the Sign In box. You will be redirect to the New Member Sign Up page. 3. Enter your Attune Live Access Code exactly as it appears below. You will not need to use this code after youve completed the sign-up process. If you do not sign up before the expiration date, you must request a new code. Attune Live Access Code: [email or web address]  Expires: 10/12/2017  6:17 PM (This is the date your Attune Live access code will )    4. Enter the last four digits of your Social Security Number (xxxx) and Date of Birth (mm/dd/yyyy) as indicated and click Submit. You will be taken to the next sign-up page. 5. Create a Attune Live ID. This will be your Attune Live login ID and cannot be changed, so think of one that is secure and easy to remember. 6. Create a Attune Live password. You can change your password at any time. 7. Enter your Password Reset Question and Answer. This can be used at a later time if you forget your password. 8. Enter your e-mail address. You will receive e-mail notification when new information is available in [address]. 9. Click Sign Up. You can now view and download portions of your medical record. 10. Click the Download Summary menu link to download a portable copy of your medical information. Additional Information    If you have questions, please visit the Frequently Asked Questions section of the Attune Live website at [email or web address] Remember, Attune Live is NOT to be used for urgent needs. For medical emergencies, dial 911.          Back Strain: Care Pt standing at bedside no c/o at this time   Instructions  Your Care Instructions    Back strain happens when you overstretch, or pull, a muscle in your back. You may hurt your back in an accident or when you exercise or lift something. Most back pain will get better with rest and time. You can take care of yourself at home to help your back heal.  Follow-up care is a key part of your treatment and safety. Be sure to make and go to all appointments, and call your doctor if you are having problems. It's also a good idea to know your test results and keep a list of the medicines you take. How can you care for yourself at home? · Try to stay as active as you can, but stop or reduce any activity that causes pain. · Put ice or a cold pack on the sore muscle for 10 to 20 minutes at a time to stop swelling. Try this every 1 to 2 hours for 3 days (when you are awake) or until the swelling goes down. Put a thin cloth between the ice pack and your skin. · After 2 or 3 days, apply a heating pad on low or a warm cloth to your back. Some doctors suggest that you go back and forth between hot and cold treatments. · Take pain medicines exactly as directed. ¨ If the doctor gave you a prescription medicine for pain, take it as prescribed. ¨ If you are not taking a prescription pain medicine, ask your doctor if you can take an over-the-counter medicine. · Try sleeping on your side with a pillow between your legs. Or put a pillow under your knees when you lie on your back. These measures can ease pain in your lower back. · Return to your usual level of activity slowly. When should you call for help? Call 911 anytime you think you may need emergency care. For example, call if:  · You are unable to move a leg at all. Call your doctor now or seek immediate medical care if:  · You have new or worse symptoms in your legs, belly, or buttocks. Symptoms may include:  ¨ Numbness or tingling. ¨ Weakness. ¨ Pain. · You lose bladder or bowel control.   Watch closely for changes in your health, and be sure to contact your doctor if you are not getting better as expected. Where can you learn more? Go to http://anitha-paxton.info/. Enter Z654 in the search box to learn more about \"Back Strain: Care Instructions. \"  Current as of: March 21, 2017  Content Version: 11.3  © 1682-4589 [employer]. Care instructions adapted under license by [employer] (which disclaims liability or warranty for this information). If you have questions about a medical condition or this instruction, always ask your healthcare professional. [address] any warranty or liability for your use of this information. Back Care and Preventing Injuries: Care Instructions  Your Care Instructions  You can hurt your back doing many everyday activities: lifting a heavy box, bending down to garden, exercising at the gym, and even getting out of bed. But you can keep your back strong and healthy by doing some exercises. You also can follow a few tips for sitting, sleeping, and lifting to avoid hurting your back again. Talk to your doctor before you start an exercise program. Ask for help if you want to learn more about keeping your back healthy. Follow-up care is a key part of your treatment and safety. Be sure to make and go to all appointments, and call your doctor if you are having problems. It's also a good idea to know your test results and keep a list of the medicines you take. How can you care for yourself at home? · Stay at a healthy weight to avoid strain on your lower back. · Do not smoke. Smoking increases the risk of osteoporosis, which weakens the spine. If you need help quitting, talk to your doctor about stop-smoking programs and medicines. These can increase your chances of quitting for good. · Make sure you sleep in a position that maintains your back's normal curves and on a mattress that feels comfortable.  Sleep on your side with a pillow between your knees, or sleep on your back with a pillow under your knees. These positions can reduce strain on your back. · When you get out of bed, lie on your side and bend both knees. Drop your feet over the edge of the bed as you push up with both arms. Scoot to the edge of the bed. Make sure your feet are in line with your rear end (buttocks), and then stand up. · If you must stand for a long time, put one foot on a stool, ledge, or box. Exercise to strengthen your back and other muscles  · Get at least 30 minutes of exercise on most days of the week. Walking is a good choice. You also may want to do other activities, such as running, swimming, cycling, or playing tennis or team sports. · Stretch your back muscles. Here are few exercises to try:  Dallas Madhu on your back with your knees bent and your feet flat on the floor. Gently pull one bent knee to your chest. Put that foot back on the floor, and then pull the other knee to your chest. Hold for 15 to 30 seconds. Repeat 2 to 4 times. ¨ Do pelvic tilts. Lie on your back with your knees bent. Tighten your stomach muscles. Pull your belly button (navel) in and up toward your ribs. You should feel like your back is pressing to the floor and your hips and pelvis are slightly lifting off the floor. Hold for 6 seconds while breathing smoothly. · Keep your core muscles strong. The muscles of your back, belly (abdomen), and buttocks support your spine. ¨ Pull in your belly, and imagine pulling your navel toward your spine. Hold this for 6 seconds, then relax. Remember to keep breathing normally as you tense your muscles. ¨ Do curl-ups. Always do them with your knees bent. Keep your low back on the floor, and curl your shoulders toward your knees using a smooth, slow motion. Keep your arms folded across your chest. If this bothers your neck, try putting your hands behind your neck (not your head), with your elbows spread apart.   Dallas Bethel on your back with your knees bent and your feet flat on the floor. Tighten your belly muscles, and then push with your feet and raise your buttocks up a few inches. Hold this position 6 seconds as you continue to breathe normally, then lower yourself slowly to the floor. Repeat 8 to 12 times. ¨ If you like group exercise, try Pilates or yoga. These classes have poses that strengthen the core muscles. Protect your back when you sit  · Place a small pillow, a rolled-up towel, or a lumbar roll in the curve of your back if you need extra support. · Sit in a chair that is low enough to let you place both feet flat on the floor with both knees nearly level with your hips. If your chair or desk is too high, use a foot rest to raise your knees. · When driving, keep your knees nearly level with your hips. Sit straight, and drive with both hands on the steering wheel. Your arms should be in a slightly bent position. · Try a kneeling chair, which helps tilt your hips forward. This takes pressure off your lower back. · Try sitting on an exercise ball. It can rock from side to side, which helps keep your back loose. Lift properly  · Squat down, bending at the hips and knees only. If you need to, put one knee to the floor and extend your other knee in front of you, bent at a right angle (half kneeling). · Press your chest straight forward. This helps keep your upper back straight while keeping a slight arch in your low back. · Hold the load as close to your body as possible, at the level of your navel. · Use your feet to change direction, taking small steps. · Lead with your hips as you change direction. Keep your shoulders in line with your hips as you move. Do not twist your body. · Set down your load carefully, squatting with your knees and hips only. When should you call for help?   Watch closely for changes in your health, and be sure to contact your doctor if:  · You want more exercises to make your back and other core muscles stronger. Where can you learn more? Go to http://anitha-paxton.info/. Enter S810 in the search box to learn more about \"Back Care and Preventing Injuries: Care Instructions. \"  Current as of: March 21, 2017  Content Version: 11.3  © 7195-5032 [employer]. Care instructions adapted under license by [employer] (which disclaims liability or warranty for this information). If you have questions about a medical condition or this instruction, always ask your healthcare professional. [address] any warranty or liability for your use of this information.

## 2020-08-10 ENCOUNTER — APPOINTMENT (OUTPATIENT)
Dept: GENERAL RADIOLOGY | Age: 80
End: 2020-08-10
Attending: EMERGENCY MEDICINE
Payer: COMMERCIAL

## 2020-08-10 ENCOUNTER — HOSPITAL ENCOUNTER (EMERGENCY)
Age: 80
Discharge: HOME | End: 2020-08-10
Attending: EMERGENCY MEDICINE
Payer: COMMERCIAL

## 2020-08-10 VITALS
RESPIRATION RATE: 20 BRPM | DIASTOLIC BLOOD PRESSURE: 61 MMHG | OXYGEN SATURATION: 96 % | SYSTOLIC BLOOD PRESSURE: 114 MMHG | TEMPERATURE: 98.6 F | HEART RATE: 74 BPM

## 2020-08-10 DIAGNOSIS — Z20.822 SUSPECTED COVID-19 VIRUS INFECTION: Primary | ICD-10-CM

## 2020-08-10 LAB
ALBUMIN SERPL-MCNC: 3.7 G/DL (ref 3.4–5)
ALBUMIN/GLOB SERPL: 0.8 {RATIO} (ref 0.8–1.7)
ALP SERPL-CCNC: 39 U/L (ref 45–117)
ALT SERPL-CCNC: 18 U/L (ref 16–61)
ANION GAP SERPL CALC-SCNC: 8 MMOL/L (ref 3–18)
AST SERPL-CCNC: 25 U/L (ref 10–38)
BASOPHILS # BLD: 0 K/UL (ref 0–0.1)
BASOPHILS NFR BLD: 0 % (ref 0–2)
BILIRUB SERPL-MCNC: 1.7 MG/DL (ref 0.2–1)
BUN SERPL-MCNC: 24 MG/DL (ref 7–18)
BUN/CREAT SERPL: 16 (ref 12–20)
CALCIUM SERPL-MCNC: 8.4 MG/DL (ref 8.5–10.1)
CHLORIDE SERPL-SCNC: 104 MMOL/L (ref 100–111)
CO2 SERPL-SCNC: 26 MMOL/L (ref 21–32)
CREAT SERPL-MCNC: 1.54 MG/DL (ref 0.6–1.3)
DIFFERENTIAL METHOD BLD: ABNORMAL
EOSINOPHIL # BLD: 0 K/UL (ref 0–0.4)
EOSINOPHIL NFR BLD: 0 % (ref 0–5)
ERYTHROCYTE [DISTWIDTH] IN BLOOD BY AUTOMATED COUNT: 13.7 % (ref 11.6–14.5)
GLOBULIN SER CALC-MCNC: 4.4 G/DL (ref 2–4)
GLUCOSE SERPL-MCNC: 79 MG/DL (ref 74–99)
HCT VFR BLD AUTO: 45.2 % (ref 36–48)
HGB BLD-MCNC: 14.9 G/DL (ref 13–16)
LACTATE BLD-SCNC: 1.5 MMOL/L (ref 0.4–2)
LYMPHOCYTES # BLD: 0.7 K/UL (ref 0.9–3.6)
LYMPHOCYTES NFR BLD: 16 % (ref 21–52)
MCH RBC QN AUTO: 29.5 PG (ref 24–34)
MCHC RBC AUTO-ENTMCNC: 33 G/DL (ref 31–37)
MCV RBC AUTO: 89.5 FL (ref 74–97)
MONOCYTES # BLD: 0.8 K/UL (ref 0.05–1.2)
MONOCYTES NFR BLD: 19 % (ref 3–10)
NEUTS SEG # BLD: 2.9 K/UL (ref 1.8–8)
NEUTS SEG NFR BLD: 65 % (ref 40–73)
PLATELET # BLD AUTO: 126 K/UL (ref 135–420)
PMV BLD AUTO: 10.3 FL (ref 9.2–11.8)
POTASSIUM SERPL-SCNC: 3.8 MMOL/L (ref 3.5–5.5)
PROT SERPL-MCNC: 8.1 G/DL (ref 6.4–8.2)
RBC # BLD AUTO: 5.05 M/UL (ref 4.7–5.5)
SODIUM SERPL-SCNC: 138 MMOL/L (ref 136–145)
WBC # BLD AUTO: 4.4 K/UL (ref 4.6–13.2)

## 2020-08-10 PROCEDURE — 87635 SARS-COV-2 COVID-19 AMP PRB: CPT

## 2020-08-10 PROCEDURE — 99284 EMERGENCY DEPT VISIT MOD MDM: CPT

## 2020-08-10 PROCEDURE — 71046 X-RAY EXAM CHEST 2 VIEWS: CPT

## 2020-08-10 PROCEDURE — 74011250637 HC RX REV CODE- 250/637: Performed by: NURSE PRACTITIONER

## 2020-08-10 PROCEDURE — 85025 COMPLETE CBC W/AUTO DIFF WBC: CPT

## 2020-08-10 PROCEDURE — 74011250636 HC RX REV CODE- 250/636: Performed by: NURSE PRACTITIONER

## 2020-08-10 PROCEDURE — 80053 COMPREHEN METABOLIC PANEL: CPT

## 2020-08-10 PROCEDURE — 87040 BLOOD CULTURE FOR BACTERIA: CPT

## 2020-08-10 PROCEDURE — 93005 ELECTROCARDIOGRAM TRACING: CPT

## 2020-08-10 PROCEDURE — 83605 ASSAY OF LACTIC ACID: CPT

## 2020-08-10 RX ORDER — ACETAMINOPHEN 325 MG/1
975 TABLET ORAL
Status: COMPLETED | OUTPATIENT
Start: 2020-08-10 | End: 2020-08-10

## 2020-08-10 RX ORDER — DOXYCYCLINE HYCLATE 100 MG
100 TABLET ORAL 2 TIMES DAILY
Qty: 14 TAB | Refills: 0 | Status: SHIPPED | OUTPATIENT
Start: 2020-08-10 | End: 2020-08-10

## 2020-08-10 RX ORDER — PREDNISONE 20 MG/1
20 TABLET ORAL DAILY
Qty: 10 TAB | Refills: 0 | Status: SHIPPED | OUTPATIENT
Start: 2020-08-10 | End: 2020-08-10

## 2020-08-10 RX ORDER — SODIUM CHLORIDE 9 MG/ML
150 INJECTION, SOLUTION INTRAVENOUS ONCE
Status: DISCONTINUED | OUTPATIENT
Start: 2020-08-10 | End: 2020-08-10

## 2020-08-10 RX ADMIN — ACETAMINOPHEN 975 MG: 325 TABLET ORAL at 15:26

## 2020-08-10 RX ADMIN — SODIUM CHLORIDE 1000 ML: 900 INJECTION, SOLUTION INTRAVENOUS at 15:53

## 2020-08-11 ENCOUNTER — PATIENT OUTREACH (OUTPATIENT)
Dept: CASE MANAGEMENT | Age: 80
End: 2020-08-11

## 2020-08-11 LAB
ATRIAL RATE: 71 BPM
CALCULATED P AXIS, ECG09: -5 DEGREES
CALCULATED R AXIS, ECG10: -47 DEGREES
CALCULATED T AXIS, ECG11: 43 DEGREES
DIAGNOSIS, 93000: NORMAL
P-R INTERVAL, ECG05: 194 MS
Q-T INTERVAL, ECG07: 402 MS
QRS DURATION, ECG06: 100 MS
QTC CALCULATION (BEZET), ECG08: 436 MS
VENTRICULAR RATE, ECG03: 71 BPM

## 2020-08-11 NOTE — PROGRESS NOTES
Patient contacted regarding COVID-19 diagnosis. Discussed COVID-19 related testing which was pending at this time. Test results were pending. Patient informed of results, if available? n/a Care Transition Nurse/ Ambulatory Care Manager contacted the patientby telephone to perform post discharge assessment. Verified name and  with patient as identifiers. Provided introduction to self, and explanation of the CTN/ACM role, and reason for call due to risk factors for infection and/or exposure to COVID-19. Symptoms reviewed with patient who verbalized the following symptoms: fever, fatigue, cough and sore throat. Due to no new or worsening symptoms encounter was not routed to provider for escalation. Patient has following risk factors of: diabetes. CTN/ACM reviewed discharge instructions, medical action plan and red flags such as increased shortness of breath, increasing fever and signs of decompensation with patient who verbalized understanding. Discussed exposure protocols and quarantine with CDC Guidelines What to do if you are sick with coronavirus disease 2019.  patient was given an opportunity for questions and concerns. The patient agrees to contact the [facility] exposure line [phone], [facility]  ([phone]) and PCP office for questions related to their healthcare. CTN/ACM provided contact information for future needs. Reviewed and educated patient on any new and changed medications related to discharge diagnosis. Patient/family/caregiver given information for [employer] and agrees to enroll no Patient's preferred e-mail:  declined Patient's preferred phone number: [phone] Based on Loop alert triggers, patient will be contacted by nurse care manager for worsening symptoms. Plan for follow-up call in 5-7 days based on severity of symptoms and risk factors.

## 2020-08-12 LAB — SARS-COV-2, COV2NT: DETECTED

## 2020-08-13 NOTE — PROGRESS NOTES
ID verified. Patient states he is doing well, states he feels better than when he initially presented to the ED  On 8/10/2020. States he is eating / drinking well. No chest pain/ chest discomfort. No SOB. No n/v/d/c. No abd pain. Results discussed with patient. Extensive review of return precautions discussed with patient prior to discharge. Discussed importance of PCP follow up / reassessment and need to return to ED immediately should symptoms worsen.

## 2020-08-16 LAB
BACTERIA SPEC CULT: NORMAL
BACTERIA SPEC CULT: NORMAL
SERVICE CMNT-IMP: NORMAL
SERVICE CMNT-IMP: NORMAL

## 2020-08-18 ENCOUNTER — APPOINTMENT (OUTPATIENT)
Dept: GENERAL RADIOLOGY | Age: 80
DRG: 177 | End: 2020-08-18
Attending: STUDENT IN AN ORGANIZED HEALTH CARE EDUCATION/TRAINING PROGRAM
Payer: MEDICARE

## 2020-08-18 ENCOUNTER — HOSPITAL ENCOUNTER (INPATIENT)
Age: 80
LOS: 8 days | Discharge: NURSING, REHAB OR LONG-TERM CARE | DRG: 177 | End: 2020-08-27
Attending: EMERGENCY MEDICINE | Admitting: INTERNAL MEDICINE
Payer: MEDICARE

## 2020-08-18 DIAGNOSIS — R09.02 HYPOXIA: ICD-10-CM

## 2020-08-18 DIAGNOSIS — U07.1 COVID-19: Primary | ICD-10-CM

## 2020-08-18 PROCEDURE — 74011000250 HC RX REV CODE- 250: Performed by: STUDENT IN AN ORGANIZED HEALTH CARE EDUCATION/TRAINING PROGRAM

## 2020-08-18 PROCEDURE — 85025 COMPLETE CBC W/AUTO DIFF WBC: CPT

## 2020-08-18 PROCEDURE — 85379 FIBRIN DEGRADATION QUANT: CPT

## 2020-08-18 PROCEDURE — 85610 PROTHROMBIN TIME: CPT

## 2020-08-18 PROCEDURE — 96376 TX/PRO/DX INJ SAME DRUG ADON: CPT

## 2020-08-18 PROCEDURE — 99285 EMERGENCY DEPT VISIT HI MDM: CPT

## 2020-08-18 PROCEDURE — 82728 ASSAY OF FERRITIN: CPT

## 2020-08-18 PROCEDURE — 96365 THER/PROPH/DIAG IV INF INIT: CPT

## 2020-08-18 PROCEDURE — 85730 THROMBOPLASTIN TIME PARTIAL: CPT

## 2020-08-18 PROCEDURE — 80053 COMPREHEN METABOLIC PANEL: CPT

## 2020-08-18 PROCEDURE — 71045 X-RAY EXAM CHEST 1 VIEW: CPT

## 2020-08-18 PROCEDURE — 74011250636 HC RX REV CODE- 250/636: Performed by: STUDENT IN AN ORGANIZED HEALTH CARE EDUCATION/TRAINING PROGRAM

## 2020-08-18 PROCEDURE — 83615 LACTATE (LD) (LDH) ENZYME: CPT

## 2020-08-18 PROCEDURE — 77010033678 HC OXYGEN DAILY

## 2020-08-18 RX ORDER — VANCOMYCIN/0.9 % SOD CHLORIDE 1.5G/250ML
1500 PLASTIC BAG, INJECTION (ML) INTRAVENOUS ONCE
Status: DISCONTINUED | OUTPATIENT
Start: 2020-08-18 | End: 2020-08-18

## 2020-08-18 RX ORDER — VANCOMYCIN HYDROCHLORIDE
1250
Status: COMPLETED | OUTPATIENT
Start: 2020-08-19 | End: 2020-08-19

## 2020-08-18 RX ADMIN — WATER 1 G: 1 INJECTION INTRAMUSCULAR; INTRAVENOUS; SUBCUTANEOUS at 00:55

## 2020-08-19 PROBLEM — R06.02 SHORTNESS OF BREATH: Status: ACTIVE | Noted: 2020-08-19

## 2020-08-19 PROBLEM — D69.6 THROMBOCYTOPENIA (HCC): Status: ACTIVE | Noted: 2020-08-19

## 2020-08-19 PROBLEM — J12.82 PNEUMONIA DUE TO COVID-19 VIRUS: Status: ACTIVE | Noted: 2020-08-19

## 2020-08-19 PROBLEM — U07.1 COVID-19: Status: ACTIVE | Noted: 2020-08-19

## 2020-08-19 LAB
ABO + RH BLD: NORMAL
ALBUMIN SERPL-MCNC: 2.7 G/DL (ref 3.4–5)
ALBUMIN/GLOB SERPL: 0.6 {RATIO} (ref 0.8–1.7)
ALP SERPL-CCNC: 39 U/L (ref 45–117)
ALT SERPL-CCNC: 23 U/L (ref 16–61)
ANION GAP SERPL CALC-SCNC: 5 MMOL/L (ref 3–18)
ANION GAP SERPL CALC-SCNC: 8 MMOL/L (ref 3–18)
APPEARANCE UR: CLEAR
APTT PPP: 22 SEC (ref 23–36.4)
ARTERIAL PATENCY WRIST A: NO
AST SERPL-CCNC: 39 U/L (ref 10–38)
ATRIAL RATE: 90 BPM
BACTERIA URNS QL MICRO: ABNORMAL /HPF
BASE DEFICIT BLD-SCNC: 2 MMOL/L
BASOPHILS # BLD: 0 K/UL (ref 0–0.06)
BASOPHILS NFR BLD: 0 % (ref 0–3)
BDY SITE: ABNORMAL
BILIRUB SERPL-MCNC: 1.5 MG/DL (ref 0.2–1)
BILIRUB UR QL: NEGATIVE
BLOOD GROUP ANTIBODIES SERPL: NORMAL
BUN SERPL-MCNC: 23 MG/DL (ref 7–18)
BUN SERPL-MCNC: 24 MG/DL (ref 7–18)
BUN/CREAT SERPL: 18 (ref 12–20)
BUN/CREAT SERPL: 20 (ref 12–20)
CALCIUM SERPL-MCNC: 8.3 MG/DL (ref 8.5–10.1)
CALCIUM SERPL-MCNC: 8.3 MG/DL (ref 8.5–10.1)
CALCULATED P AXIS, ECG09: 21 DEGREES
CALCULATED R AXIS, ECG10: -28 DEGREES
CALCULATED T AXIS, ECG11: 15 DEGREES
CHLORIDE SERPL-SCNC: 110 MMOL/L (ref 100–111)
CHLORIDE SERPL-SCNC: 112 MMOL/L (ref 100–111)
CO2 SERPL-SCNC: 23 MMOL/L (ref 21–32)
CO2 SERPL-SCNC: 25 MMOL/L (ref 21–32)
COLOR UR: ABNORMAL
CREAT SERPL-MCNC: 1.23 MG/DL (ref 0.6–1.3)
CREAT SERPL-MCNC: 1.29 MG/DL (ref 0.6–1.3)
D DIMER PPP FEU-MCNC: 7.37 UG/ML(FEU)
DIAGNOSIS, 93000: NORMAL
DIFFERENTIAL METHOD BLD: ABNORMAL
EOSINOPHIL # BLD: 0.3 K/UL (ref 0–0.4)
EOSINOPHIL NFR BLD: 3 % (ref 0–5)
EPITH CASTS URNS QL MICRO: ABNORMAL /LPF (ref 0–5)
ERYTHROCYTE [DISTWIDTH] IN BLOOD BY AUTOMATED COUNT: 13.6 % (ref 11.6–14.5)
FERRITIN SERPL-MCNC: 1084 NG/ML (ref 8–388)
GAS FLOW.O2 O2 DELIVERY SYS: ABNORMAL L/MIN
GAS FLOW.O2 SETTING OXYMISER: 3 L/M
GLOBULIN SER CALC-MCNC: 4.8 G/DL (ref 2–4)
GLUCOSE BLD STRIP.AUTO-MCNC: 75 MG/DL (ref 70–110)
GLUCOSE SERPL-MCNC: 87 MG/DL (ref 74–99)
GLUCOSE SERPL-MCNC: 89 MG/DL (ref 74–99)
GLUCOSE UR STRIP.AUTO-MCNC: NEGATIVE MG/DL
HBA1C MFR BLD: 5.9 % (ref 4.2–5.6)
HCO3 BLD-SCNC: 22.1 MMOL/L (ref 22–26)
HCT VFR BLD AUTO: 43.8 % (ref 36–48)
HGB BLD-MCNC: 14.7 G/DL (ref 13–16)
HGB UR QL STRIP: ABNORMAL
INR PPP: 1.2 (ref 0.8–1.2)
KETONES UR QL STRIP.AUTO: ABNORMAL MG/DL
LACTATE BLD-SCNC: 1.7 MMOL/L (ref 0.4–2)
LDH SERPL L TO P-CCNC: 405 U/L (ref 81–234)
LEUKOCYTE ESTERASE UR QL STRIP.AUTO: ABNORMAL
LYMPHOCYTES # BLD: 1.7 K/UL (ref 0.8–3.5)
LYMPHOCYTES NFR BLD: 17 % (ref 20–51)
MCH RBC QN AUTO: 30 PG (ref 24–34)
MCHC RBC AUTO-ENTMCNC: 33.6 G/DL (ref 31–37)
MCV RBC AUTO: 89.4 FL (ref 74–97)
MONOCYTES # BLD: 0.9 K/UL (ref 0–1)
MONOCYTES NFR BLD: 9 % (ref 2–9)
NEUTS SEG # BLD: 7.2 K/UL (ref 1.8–8)
NEUTS SEG NFR BLD: 71 % (ref 42–75)
NITRITE UR QL STRIP.AUTO: NEGATIVE
P-R INTERVAL, ECG05: 178 MS
PCO2 BLD: 33.6 MMHG (ref 35–45)
PH BLD: 7.43 [PH] (ref 7.35–7.45)
PH UR STRIP: 5 [PH] (ref 5–8)
PLATELET # BLD AUTO: 134 K/UL (ref 135–420)
PLATELET COMMENTS,PCOM: ABNORMAL
PMV BLD AUTO: 10.4 FL (ref 9.2–11.8)
PO2 BLD: 108 MMHG (ref 80–100)
POTASSIUM SERPL-SCNC: 4.1 MMOL/L (ref 3.5–5.5)
POTASSIUM SERPL-SCNC: 4.4 MMOL/L (ref 3.5–5.5)
PROCALCITONIN SERPL-MCNC: <0.05 NG/ML
PROT SERPL-MCNC: 7.5 G/DL (ref 6.4–8.2)
PROT UR STRIP-MCNC: 300 MG/DL
PROTHROMBIN TIME: 15.1 SEC (ref 11.5–15.2)
Q-T INTERVAL, ECG07: 358 MS
QRS DURATION, ECG06: 100 MS
QTC CALCULATION (BEZET), ECG08: 437 MS
RBC # BLD AUTO: 4.9 M/UL (ref 4.7–5.5)
RBC #/AREA URNS HPF: ABNORMAL /HPF (ref 0–5)
RBC MORPH BLD: ABNORMAL
SAO2 % BLD: 98 % (ref 92–97)
SERVICE CMNT-IMP: ABNORMAL
SODIUM SERPL-SCNC: 141 MMOL/L (ref 136–145)
SODIUM SERPL-SCNC: 142 MMOL/L (ref 136–145)
SP GR UR REFRACTOMETRY: 1.03 (ref 1–1.03)
SPECIMEN EXP DATE BLD: NORMAL
SPECIMEN TYPE: ABNORMAL
TOTAL RESP. RATE, ITRR: 48
UROBILINOGEN UR QL STRIP.AUTO: 1 EU/DL (ref 0.2–1)
VENTRICULAR RATE, ECG03: 90 BPM
WBC # BLD AUTO: 10.1 K/UL (ref 4.6–13.2)
WBC URNS QL MICRO: ABNORMAL /HPF (ref 0–4)

## 2020-08-19 PROCEDURE — 87086 URINE CULTURE/COLONY COUNT: CPT

## 2020-08-19 PROCEDURE — 82803 BLOOD GASES ANY COMBINATION: CPT

## 2020-08-19 PROCEDURE — 84145 PROCALCITONIN (PCT): CPT

## 2020-08-19 PROCEDURE — 87040 BLOOD CULTURE FOR BACTERIA: CPT

## 2020-08-19 PROCEDURE — 74011250636 HC RX REV CODE- 250/636: Performed by: STUDENT IN AN ORGANIZED HEALTH CARE EDUCATION/TRAINING PROGRAM

## 2020-08-19 PROCEDURE — 36415 COLL VENOUS BLD VENIPUNCTURE: CPT

## 2020-08-19 PROCEDURE — 86900 BLOOD TYPING SEROLOGIC ABO: CPT

## 2020-08-19 PROCEDURE — 77030027138 HC INCENT SPIROMETER -A

## 2020-08-19 PROCEDURE — XW033E5 INTRODUCTION OF REMDESIVIR ANTI-INFECTIVE INTO PERIPHERAL VEIN, PERCUTANEOUS APPROACH, NEW TECHNOLOGY GROUP 5: ICD-10-PCS | Performed by: INTERNAL MEDICINE

## 2020-08-19 PROCEDURE — 65270000029 HC RM PRIVATE

## 2020-08-19 PROCEDURE — 74011000250 HC RX REV CODE- 250

## 2020-08-19 PROCEDURE — 83036 HEMOGLOBIN GLYCOSYLATED A1C: CPT

## 2020-08-19 PROCEDURE — 93005 ELECTROCARDIOGRAM TRACING: CPT

## 2020-08-19 PROCEDURE — 36600 WITHDRAWAL OF ARTERIAL BLOOD: CPT

## 2020-08-19 PROCEDURE — 81001 URINALYSIS AUTO W/SCOPE: CPT

## 2020-08-19 PROCEDURE — 74011000258 HC RX REV CODE- 258: Performed by: INTERNAL MEDICINE

## 2020-08-19 PROCEDURE — 83605 ASSAY OF LACTIC ACID: CPT

## 2020-08-19 PROCEDURE — 77030040831 HC BAG URINE DRNG MDII -A

## 2020-08-19 PROCEDURE — 74011250637 HC RX REV CODE- 250/637: Performed by: INTERNAL MEDICINE

## 2020-08-19 PROCEDURE — 74011000258 HC RX REV CODE- 258

## 2020-08-19 PROCEDURE — 77010033678 HC OXYGEN DAILY

## 2020-08-19 PROCEDURE — 82962 GLUCOSE BLOOD TEST: CPT

## 2020-08-19 PROCEDURE — 86141 C-REACTIVE PROTEIN HS: CPT

## 2020-08-19 RX ORDER — DEXTROSE 50 % IN WATER (D50W) INTRAVENOUS SYRINGE
25-50 AS NEEDED
Status: DISCONTINUED | OUTPATIENT
Start: 2020-08-19 | End: 2020-08-19

## 2020-08-19 RX ORDER — POLYETHYLENE GLYCOL 3350 17 G/17G
17 POWDER, FOR SOLUTION ORAL DAILY PRN
Status: DISCONTINUED | OUTPATIENT
Start: 2020-08-19 | End: 2020-08-27 | Stop reason: HOSPADM

## 2020-08-19 RX ORDER — SODIUM CHLORIDE 9 MG/ML
250 INJECTION, SOLUTION INTRAVENOUS AS NEEDED
Status: DISCONTINUED | OUTPATIENT
Start: 2020-08-19 | End: 2020-08-27 | Stop reason: HOSPADM

## 2020-08-19 RX ORDER — ASCORBIC ACID 250 MG
500 TABLET ORAL DAILY
Status: DISCONTINUED | OUTPATIENT
Start: 2020-08-20 | End: 2020-08-27 | Stop reason: HOSPADM

## 2020-08-19 RX ORDER — ACETAMINOPHEN 650 MG/1
650 SUPPOSITORY RECTAL
Status: DISCONTINUED | OUTPATIENT
Start: 2020-08-19 | End: 2020-08-27 | Stop reason: HOSPADM

## 2020-08-19 RX ORDER — ACETAMINOPHEN 325 MG/1
650 TABLET ORAL
Status: DISCONTINUED | OUTPATIENT
Start: 2020-08-19 | End: 2020-08-27 | Stop reason: HOSPADM

## 2020-08-19 RX ORDER — ENOXAPARIN SODIUM 100 MG/ML
40 INJECTION SUBCUTANEOUS DAILY
Status: DISCONTINUED | OUTPATIENT
Start: 2020-08-20 | End: 2020-08-20

## 2020-08-19 RX ORDER — PRAVASTATIN SODIUM 20 MG/1
40 TABLET ORAL
Status: DISCONTINUED | OUTPATIENT
Start: 2020-08-19 | End: 2020-08-27 | Stop reason: HOSPADM

## 2020-08-19 RX ORDER — FAMOTIDINE 20 MG/1
10 TABLET, FILM COATED ORAL EVERY EVENING
Status: DISCONTINUED | OUTPATIENT
Start: 2020-08-19 | End: 2020-08-27 | Stop reason: HOSPADM

## 2020-08-19 RX ORDER — ONDANSETRON 2 MG/ML
4 INJECTION INTRAMUSCULAR; INTRAVENOUS
Status: DISCONTINUED | OUTPATIENT
Start: 2020-08-19 | End: 2020-08-27 | Stop reason: HOSPADM

## 2020-08-19 RX ORDER — ZINC SULFATE 50(220)MG
1 CAPSULE ORAL DAILY
Status: DISCONTINUED | OUTPATIENT
Start: 2020-08-20 | End: 2020-08-27 | Stop reason: HOSPADM

## 2020-08-19 RX ORDER — SODIUM CHLORIDE 0.9 % (FLUSH) 0.9 %
5-40 SYRINGE (ML) INJECTION AS NEEDED
Status: DISCONTINUED | OUTPATIENT
Start: 2020-08-19 | End: 2020-08-27 | Stop reason: HOSPADM

## 2020-08-19 RX ORDER — DEXTROSE MONOHYDRATE 100 MG/ML
125-250 INJECTION, SOLUTION INTRAVENOUS AS NEEDED
Status: DISCONTINUED | OUTPATIENT
Start: 2020-08-19 | End: 2020-08-25

## 2020-08-19 RX ORDER — PROMETHAZINE HYDROCHLORIDE 12.5 MG/1
12.5 TABLET ORAL
Status: DISCONTINUED | OUTPATIENT
Start: 2020-08-19 | End: 2020-08-27 | Stop reason: HOSPADM

## 2020-08-19 RX ORDER — SODIUM CHLORIDE 0.9 % (FLUSH) 0.9 %
5-40 SYRINGE (ML) INJECTION EVERY 8 HOURS
Status: DISCONTINUED | OUTPATIENT
Start: 2020-08-19 | End: 2020-08-27 | Stop reason: HOSPADM

## 2020-08-19 RX ORDER — MAGNESIUM SULFATE 100 %
16 CRYSTALS MISCELLANEOUS AS NEEDED
Status: DISCONTINUED | OUTPATIENT
Start: 2020-08-19 | End: 2020-08-27 | Stop reason: HOSPADM

## 2020-08-19 RX ORDER — INSULIN LISPRO 100 [IU]/ML
INJECTION, SOLUTION INTRAVENOUS; SUBCUTANEOUS
Status: DISCONTINUED | OUTPATIENT
Start: 2020-08-19 | End: 2020-08-21

## 2020-08-19 RX ORDER — VANCOMYCIN HYDROCHLORIDE
1250 EVERY 24 HOURS
Status: DISCONTINUED | OUTPATIENT
Start: 2020-08-20 | End: 2020-08-20

## 2020-08-19 RX ADMIN — VANCOMYCIN HYDROCHLORIDE 1250 MG: 10 INJECTION, POWDER, LYOPHILIZED, FOR SOLUTION INTRAVENOUS at 02:30

## 2020-08-19 RX ADMIN — FAMOTIDINE 10 MG: 20 TABLET, FILM COATED ORAL at 18:00

## 2020-08-19 RX ADMIN — Medication 10 ML: at 18:01

## 2020-08-19 RX ADMIN — PRAVASTATIN SODIUM 40 MG: 20 TABLET ORAL at 22:56

## 2020-08-19 RX ADMIN — SODIUM CHLORIDE 200 MG: 900 INJECTION, SOLUTION INTRAVENOUS at 10:57

## 2020-08-19 RX ADMIN — VANCOMYCIN HYDROCHLORIDE 1250 MG: 10 INJECTION, POWDER, LYOPHILIZED, FOR SOLUTION INTRAVENOUS at 00:55

## 2020-08-19 NOTE — PROGRESS NOTES
Called by ED re: 77 y/o male w/ DM, HTN, HLD presenting w/ shortness of breath. Recent fever, chills, sore throat, + Covid 8/10. Increasing dyspnea last three days. CXR bilateral interstitial infiltrates c/w covid. SpO2 95% on 3 lpm NC. Qualifies for remdesivir, convalescent plasma. Will request pharmacy to start remdesivir and order plasma in am. 
 
Dayday Friedman MD, [facility] Infectious Diseases 
[phone]

## 2020-08-19 NOTE — H&P
GENERAL GENERIC H&P/CONSULT 
 
CC: Shortness of Breath Subjective: 
77-year-old -American male presenting for shortness of breath. Medical history of coronary artery disease hypertension diabetes. Patient is notable of having weakness and fatigue. Also tested COVID +8 days prior to admission. Reports associated fevers chills and sore throat. Was improving 3 days prior to admission until he became weaker and more short of breath. Afternoon of admission patient could not even get up from his chair. Denies any neurological deficits. Currently struggling to maintain O2 sats over 92% while on 3 L of oxygen. Infectious disease has been notified and plans to administer Remdesivir and convalescent plasma today. Past Medical History:  
Diagnosis Date  Abnormal EKG  Bone pain  BPH (benign prostatic hyperplasia)  Bradycardia  Chest pain, unspecified R/O CAD 3/10 mild fixed inferior defect  Diabetes mellitus ([facility] [address] denies 9/30/19  Echocardiogram 04/02/2010 [facility]:  EF 56%. No RWMA. Mod conc LVH. DDfx. RVSP 22 mmHg.  HCVD (hypertensive cardiovascular disease)  Hematuria, unspecified  History of silent myocardial infarction  Hypercholesteremia  Hyperlipidemia  Hypertension  Hypertension  Ill-defined condition UTI  Kidney stones  Leg pain, right   
 normal exercise JOAQUIN (fall 2014)  Lower extremity arterial testing 10/07/2014 No arterial disease at rest bilaterally. R JOAQUIN 1.27.  L JOAQUIN 1.35. No significant drop in JOAQUIN w/exercise c/w normal perfusion.  Lower urinary tract symptoms (LUTS)  Muscular weakness  Nuclear cardiac imaging test 12/02/2011 Likely normal.  Fixed inferior basal and mid inferior defect likely artifact. No ischemia. EF 60%. No WMA.  Other specified cardiac dysrhythmias(427.89) Bradycardia and pauses on holter  Swelling of limb  Unspecified sleep apnea does not use cpap machine Past Surgical History:  
Procedure Laterality Date  BIOPSY  3/25/99  
 COLONOSCOPY N/A 10/7/2019 COLONOSCOPY with bx's performed by Ping Dhillon MD at [facility] HX HIP REPLACEMENT  12/6/11  HX UROLOGICAL  01/06/2012 [facility], Dr. Patience Cabrera, Transurethral resection of prostate, button prostatectomy  HX UROLOGICAL  1/29/2015 [facility], Dr. Laura Michael, ESWL  
 [place] COLONOSCOPY FLX DX W/COLLJ [address] [facility] [address] WHEN PFRMD    
 TOTAL HIP ARTHROPLASTY Left 2014 Dr. Virginia Salazar Prior to Admission medications Medication Sig Start Date End Date Taking? Authorizing Provider  
tamsulosin (FLOMAX) 0.4 mg capsule Take 0.4 mg by mouth. 12/10/14   Provider, Historical  
polyethylene glycol (MIRALAX) 17 gram packet Take 17 g by mouth. 2/12/12   Provider, Historical  
oxyCODONE-acetaminophen (PERCOCET) 5-325 mg per tablet Take 1 Tab by mouth. 12/10/14   Provider, Historical  
tolterodine ER (DETROL-LA) 2 mg ER capsule Take 1 Cap by mouth daily. 12/19/19   Kenji Zhou MD  
raNITIdine (ZANTAC) 150 mg tablet Take 150 mg by mouth two (2) times a day. 8/2/17   Provider, Historical  
metoprolol succinate (TOPROL-XL) 100 mg XL tablet TAKE ONE TABLET BY MOUTH ONCE DAILY 9/16/15   Aliec Bennett P, NP  
lisinopril (PRINIVIL, ZESTRIL) 30 mg tablet Take 30 mg by mouth daily. 5/14/15   Provider, Historical  
pravastatin (PRAVACHOL) 40 mg tablet Take 40 mg by mouth nightly. Provider, Historical  
metFORMIN (GLUCOPHAGE) 500 mg tablet Take 500 mg by mouth two (2) times daily (with meals). Provider, Historical  
 
No Known Allergies Social History Tobacco Use  Smoking status: Never Smoker  Smokeless tobacco: Never Used Substance Use Topics  Alcohol use: No  
  
Family History Problem Relation Age of Onset  Coronary Artery Disease Father  Coronary Artery Disease Brother History of PTCA  Heart Disease Neg Hx Review of Systems Constitutional: Positive for activity change. HENT: Positive for congestion. Eyes: Negative for discharge. Respiratory: Positive for cough and shortness of breath. Cardiovascular: Negative for leg swelling. Gastrointestinal: Negative for abdominal distention. Endocrine: Negative for cold intolerance. Genitourinary: Negative for difficulty urinating. Musculoskeletal: Positive for arthralgias. Allergic/Immunologic: Negative for environmental allergies. Neurological: Negative for dizziness. Hematological: Negative for adenopathy. Psychiatric/Behavioral: Negative for agitation. Objective: No intake/output data recorded. No intake/output data recorded. No data found. Physical Exam 
Constitutional:   
   General: He is not in acute distress. HENT:  
   Head: Normocephalic. Nose: Nose normal.  
   Mouth/Throat:  
   Mouth: Mucous membranes are moist.  
Eyes:  
   Pupils: Pupils are equal, round, and reactive to light. Neck: Musculoskeletal: No neck rigidity. Cardiovascular:  
   Rate and Rhythm: Normal rate. Pulses: Normal pulses. Comments: tachypneic Pulmonary:  
   Breath sounds: Rhonchi present. Abdominal:  
   General: There is no distension. Genitourinary: 
   Comments: deferred Musculoskeletal: Normal range of motion. Skin: 
   General: Skin is warm. Neurological:  
   General: No focal deficit present. Mental Status: He is alert. He is disoriented. Psychiatric:     
   Mood and Affect: Mood normal.  
 
  
 
Labs:   
Recent Results (from the past 24 hour(s)) EKG, 12 LEAD, INITIAL Collection Time: 08/19/20 12:03 AM  
Result Value Ref Range Ventricular Rate 90 BPM  
 Atrial Rate 90 BPM  
 P-R Interval 178 ms QRS Duration 100 ms Q-T Interval 358 ms QTC Calculation (Bezet) 437 ms Calculated P Axis 21 degrees Calculated R Axis -28 degrees Calculated T Axis 15 degrees Diagnosis Normal sinus rhythm Possible Left atrial enlargement Left ventricular hypertrophy Abnormal ECG When compared with ECG of 10-AUG-2020 14:57, 
Nonspecific T wave abnormality now evident in Inferior leads URINALYSIS W/ RFLX MICROSCOPIC Collection Time: 08/19/20  1:50 AM  
Result Value Ref Range Color DARK YELLOW Appearance CLEAR Specific gravity 1.027 1.005 - 1.030    
 pH (UA) 5.0 5.0 - 8.0 Protein 300 (A) NEG mg/dL Glucose Negative NEG mg/dL Ketone TRACE (A) NEG mg/dL Bilirubin Negative NEG Blood SMALL (A) NEG Urobilinogen 1.0 0.2 - 1.0 EU/dL Nitrites Negative NEG Leukocyte Esterase TRACE (A) NEG URINE MICROSCOPIC ONLY Collection Time: 08/19/20  1:50 AM  
Result Value Ref Range WBC 1 to 4 0 - 4 /hpf  
 RBC 1 to 4 0 - 5 /hpf Epithelial cells 1+ 0 - 5 /lpf Bacteria 1+ (A) NEG /hpf POC LACTIC ACID Collection Time: 08/19/20  1:53 AM  
Result Value Ref Range Lactic Acid (POC) 1.70 0.40 - 2.00 mmol/L  
POC G3 Collection Time: 08/19/20  4:15 AM  
Result Value Ref Range Device: NASAL CANNULA Flow rate (POC) 3 L/M  
 pH (POC) 7.43 7.35 - 7.45    
 pCO2 (POC) 33.6 (L) 35.0 - 45.0 MMHG  
 pO2 (POC) 108 (H) 80 - 100 MMHG  
 HCO3 (POC) 22.1 22 - 26 MMOL/L  
 sO2 (POC) 98 (H) 92 - 97 % Base deficit (POC) 2 mmol/L Allens test (POC) NO Total resp. rate 48 Site LEFT RADIAL Specimen type (POC) ARTERIAL Performed by Marcella Caro ECG:  
Normal sinus rhythm Possible Left atrial enlargement Left ventricular hypertrophy Abnormal ECG When compared with ECG of 10-AUG-2020 14:57,  
Nonspecific T wave abnormality now evident in Inferior leads Chest X-Ray: pending Assessment: 
Principal Problem: 
  Pneumonia due to COVID-19 virus (8/19/2020) Overview: Notably elevated ferritin, D Dimer, LDH Active Problems: 
  Diabetes mellitus (HCC) () 
 
  BPH (benign prostatic hyperplasia) (6/15/2012) Thrombocytopenia ([facility] [address] (8/19/2020) Shortness of breath (8/19/2020) Plan: IVFs--strict ins and outs Empiric Abx: Vanc and Cefepime Procalcitonin and Lactic Acid, ESR, CRP Start Vitamin C and Zinc 
Respiratory, Urine and Blood Cultures F/U Chest Xray Provide O2 Delivery as needed ID following with plans for Remdesivir and convalescent plasma in the AM 
 
 
GLOBAL: 
Admit to: Medical floor with telemetry Cardiac Diet DVT PPX:lovenox 40mg qd Full Code PT/OT Tylenol/NSAID for pain Signed: 
Jessee Dhillon MD 8/19/2020 no

## 2020-08-19 NOTE — CONSULTS
[facility] 
(A Division of [address] [place] [address]) Consultation Note Date of Admission: 8/18/2020    Date of Consultation: 8/19/2020 Referred by: ED 
 
Reason for Referral: Covid Current Antimicrobials:    Prior Antimicrobials: 
remdesivir 8/19 - #1     Cefempime, vanc 8/18 - 1 Assessment: Rec / Plan:  
Covid 19 pneumonia 
- SOB, fever, chills, sore throat, Increasing dyspnea last three days. - + Covid 8/10.   
- CXR bilateral interstitial infiltrates c/w covid (at least central venous /central pulmonary edema) - SpO2 95% on 3 lpm NC 
- Qualifies for remdesivir, convalescent plasma.   -> continue remdesivir started this am 
-> convalescent plasma requested (pt ID # [id number]) 
-> dc vancomycin Acute Hypoxic Respiratory Failure -> O2 supplementation as required DM HTN   
HLD Nephrolithiasis BPH Microbiology: 
 
8/19 blcx NGTD x 2 Lines / Catheters: 
 
piv HPI: 
78year-old -American male with DM, HTN, HLD, Nephrolithiasis, BPH admitted to [facility] 8/18/2020 due to shortness of breath. NHR diagnosed with covid on 8/10, he became weak, with shortness of breath and dry cough in past few days leading to presentation at the [facility] ED. Afebrile and WBC normal but RR consistently > 27. On 2lpm NC SpO2 is 98%. CXR 8/18 shows at least central venous congestion if not mild central pulmonary edema (CXR 8/10 showed no acute cardiopulmonary process). Active Hospital Problems Diagnosis Date Noted  Pneumonia due to COVID-19 virus 08/19/2020 Notably elevated ferritin, D Dimer, LDH  Thrombocytopenia ([facility] [address] 08/19/2020  Shortness of breath 08/19/2020  BPH (benign prostatic hyperplasia) 06/15/2012  Diabetes mellitus ([facility] [address] Past Medical History:  
Diagnosis Date  Abnormal EKG  Bone pain  BPH (benign prostatic hyperplasia)  Bradycardia  Chest pain, unspecified R/O CAD 3/10 mild fixed inferior defect  Diabetes mellitus ([facility] [address] denies 9/30/19  Echocardiogram 04/02/2010 [facility]:  EF 56%. No RWMA. Mod conc LVH. DDfx. RVSP 22 mmHg.  HCVD (hypertensive cardiovascular disease)  Hematuria, unspecified  History of silent myocardial infarction  Hypercholesteremia  Hyperlipidemia  Hypertension  Hypertension  Ill-defined condition UTI  Kidney stones  Leg pain, right   
 normal exercise JOAQUIN (fall 2014)  Lower extremity arterial testing 10/07/2014 No arterial disease at rest bilaterally. R JOAQUIN 1.27.  L JOAQUIN 1.35. No significant drop in JOAQUIN w/exercise c/w normal perfusion.  Lower urinary tract symptoms (LUTS)  Muscular weakness  Nuclear cardiac imaging test 12/02/2011 Likely normal.  Fixed inferior basal and mid inferior defect likely artifact. No ischemia. EF 60%. No WMA.  Other specified cardiac dysrhythmias(427.89) Bradycardia and pauses on holter  Swelling of limb  Unspecified sleep apnea   
 does not use cpap machine Past Surgical History:  
Procedure Laterality Date  BIOPSY  3/25/99  
 COLONOSCOPY N/A 10/7/2019 COLONOSCOPY with bx's performed by Wali Pena MD at [facility] HX HIP REPLACEMENT  12/6/11  HX UROLOGICAL  01/06/2012 [facility], Dr. Nuvia Brown, Transurethral resection of prostate, button prostatectomy  HX UROLOGICAL  1/29/2015 [facility], Dr. Yeison Mares, ESWL  
 [place] COLONOSCOPY FLX DX W/COLLJ [address] WHEN PFRMD    
 TOTAL HIP ARTHROPLASTY Left 2014 Dr. Jarrett Hoffman Family History Problem Relation Age of Onset  Coronary Artery Disease Father  Coronary Artery Disease Brother History of PTCA  Heart Disease Neg Hx Social History Socioeconomic History  Marital status:  Spouse name: Not on file  Number of children: Not on file  Years of education: Not on file  Highest education level: Not on file Occupational History  Not on file Social Needs  Financial resource strain: Not on file  Food insecurity Worry: Not on file Inability: Not on file  Transportation needs Medical: Not on file Non-medical: Not on file Tobacco Use  Smoking status: Never Smoker  Smokeless tobacco: Never Used Substance and Sexual Activity  Alcohol use: No  
 Drug use: No  
 Sexual activity: Not on file Lifestyle  Physical activity Days per week: Not on file Minutes per session: Not on file  Stress: Not on file Relationships  Social connections Talks on phone: Not on file Gets together: Not on file Attends Zoroastrian service: Not on file Active member of club or organization: Not on file Attends meetings of clubs or organizations: Not on file Relationship status: Not on file  Intimate partner violence Fear of current or ex partner: Not on file Emotionally abused: Not on file Physically abused: Not on file Forced sexual activity: Not on file Other Topics Concern  Not on file Social History Narrative  Not on file Allergies: 
Patient has no known allergies. Medications: 
Current Facility-Administered Medications Medication Dose Route Frequency  [START ON 8/20/2020] vancomycin (VANCOCIN) 1250 mg in  ml infusion  1,250 mg IntraVENous Q24H  
 [START ON 8/20/2020] remdesivir 100 mg in 0.9% sodium chloride 250 mL IVPB  100 mg IntraVENous Q24H  
 0.9% sodium chloride infusion 250 mL  250 mL IntraVENous PRN Current Outpatient Medications Medication Sig Dispense  tamsulosin (FLOMAX) 0.4 mg capsule Take 0.4 mg by mouth.  polyethylene glycol (MIRALAX) 17 gram packet Take 17 g by mouth.  oxyCODONE-acetaminophen (PERCOCET) 5-325 mg per tablet Take 1 Tab by mouth.  tolterodine ER (DETROL-LA) 2 mg ER capsule Take 1 Cap by mouth daily. 30 Cap  raNITIdine (ZANTAC) 150 mg tablet Take 150 mg by mouth two (2) times a day.  metoprolol succinate (TOPROL-XL) 100 mg XL tablet TAKE ONE TABLET BY MOUTH ONCE DAILY 30 Tab  lisinopril (PRINIVIL, ZESTRIL) 30 mg tablet Take 30 mg by mouth daily.  pravastatin (PRAVACHOL) 40 mg tablet Take 40 mg by mouth nightly.  metFORMIN (GLUCOPHAGE) 500 mg tablet Take 500 mg by mouth two (2) times daily (with meals). ROS: 
A comprehensive review of systems was negative except for that written in the History of Present Illness. Physical Exam: HPI: 
Temp (24hrs), Av.8 °F (37.1 °C), Min:98.4 °F (36.9 °C), Max:99.1 °F (37.3 °C) Visit Vitals /81 Pulse 82 Temp 99.1 °F (37.3 °C) Resp 29 Ht 6' 2\" (1.88 m) Wt 99.8 kg (220 lb) SpO2 98% BMI 28.25 kg/m² General: Well developed, well nourished 78 y.o. BLACK OR  male in no acute distress. ENT: ENT exam normal, no neck nodes or sinus tenderness Head: normocephalic, without obvious abnormality Mouth:  mucous membranes moist, pharynx normal without lesions Neck: supple, symmetrical, trachea midline Cardio:  regular rhythm, no murmurs, rubs or gallops Chest: inspection normal - no chest wall deformities or tenderness, respiratory effort normal 
Lungs: clear to auscultation, no wheezes or rales and unlabored breathing Abdomen: soft, non-tender. Bowel sounds normal. No masses, no organomegaly. Extremities:  no redness or tenderness in the calves or thighs, no edema Neuro: Grossly normal  
 
 
Lab results: 
 
Chemistry Recent Labs 20 
2361 GLU 89   
K 4.1 * CO2 25 BUN 23* CREA 1.29  
CA 8.3* AGAP 5  
BUCR 18  
AP 39* TP 7.5 ALB 2.7*  
GLOB 4.8* AGRAT 0.6* CBC w/ Diff Recent Labs 20 
4911 WBC 10.1 RBC 4.90 HGB 14.7 HCT 43.8 * GRANS 71 LYMPH 17* EOS 3 Microbiology All Micro Results Procedure Component Value Units Date/Time CULTURE, BLOOD [[id number]] Collected:  08/19/20 0050 Order Status:  Completed Specimen:  Blood Updated:  08/19/20 0751 Special Requests: NO SPECIAL REQUESTS Culture result: NO GROWTH AFTER 4 HOURS     
 CULTURE, BLOOD [[id number]] Collected:  08/19/20 0035 Order Status:  Completed Specimen:  Blood Updated:  08/19/20 0751 Special Requests: NO SPECIAL REQUESTS Culture result: NO GROWTH AFTER 4 HOURS Solo Osorio MD, [facility] 
[phone] 8/19/2020  
4:17 PM

## 2020-08-19 NOTE — ROUTINE PROCESS
TRANSFER - IN REPORT: 
 
Verbal report received from  Sunny gann on [employer]  being received from ED (unit) for routine progression of care Report consisted of patients Situation, Background, Assessment and  
Recommendations(SBAR). Information from the following report(s) SBAR, Kardex, Intake/Output, MAR and Recent Results was reviewed with the receiving nurse. Opportunity for questions and clarification was provided. Assessment completed upon patients arrival to unit and care assumed. Bedside shift change report given to Kathryn Ortiz (oncoming nurse) by Mary Adams (offgoing nurse). Report included the following information SBAR, Kardex, Intake/Output, MAR and Recent Results.

## 2020-08-19 NOTE — ED TRIAGE NOTES
Pt came in by EMS from a nursing home. Pt was diagnosed with COVID a week ago. Pt says he is weak, SOB, and has a cough.

## 2020-08-19 NOTE — PROGRESS NOTES
Problem: Patient Education: Go to Patient Education Activity Goal: Patient/Family Education Outcome: Progressing Towards Goal 
  
Problem: Pneumonia: Day 1 Goal: Activity/Safety Outcome: Progressing Towards Goal 
Goal: Consults, if ordered Outcome: Progressing Towards Goal 
Goal: Diagnostic Test/Procedures Outcome: Progressing Towards Goal 
Goal: Nutrition/Diet Outcome: Progressing Towards Goal 
Goal: Medications Outcome: Progressing Towards Goal 
Goal: Respiratory Outcome: Progressing Towards Goal 
Goal: Treatments/Interventions/Procedures Outcome: Progressing Towards Goal 
Goal: Psychosocial 
Outcome: Progressing Towards Goal 
Goal: *Oxygen saturation within defined limits Outcome: Progressing Towards Goal 
Goal: *Hemodynamically stable Outcome: Progressing Towards Goal 
Goal: *Demonstrates progressive activity Outcome: Progressing Towards Goal 
Goal: *Tolerating diet Outcome: Progressing Towards Goal

## 2020-08-19 NOTE — ED PROVIDER NOTES
EMERGENCY DEPARTMENT HISTORY AND PHYSICAL EXAM 
 
10:28 PM 
 
 
Date: 8/18/2020 Patient Name: Lynn Givens Sr. 
 
History of Presenting Illness Chief Complaint Patient presents with  Concern For COVID-19 (Coronavirus)  Lethargy History Provided By: Patient HPI Patient is an 78year old male with a pmhx of CAD, HTN, DM, BPH who presents with generalized weakness and SOB. Patient states that he was in the ER last week for fevers/chills/sore throat and was tested for COVID 19. His test came back positive. He was doing okay until 3 days ago when he started to feel very weak and more and more short of breath. Patient states that this afternoon, he could not even get up from his chair. Patient denies any focal neurological deficits. He continues to have fevers/chills/cough. PCP: Tierra Dowling MD 
 
Current Facility-Administered Medications Medication Dose Route Frequency Provider Last Rate Last Dose  VANCOMYCIN INFORMATION NOTE   Other Rx Dosing/Monitoring Mark Patricio MD      
 
Current Outpatient Medications Medication Sig Dispense Refill  tamsulosin (FLOMAX) 0.4 mg capsule Take 0.4 mg by mouth.  polyethylene glycol (MIRALAX) 17 gram packet Take 17 g by mouth.  oxyCODONE-acetaminophen (PERCOCET) 5-325 mg per tablet Take 1 Tab by mouth.  tolterodine ER (DETROL-LA) 2 mg ER capsule Take 1 Cap by mouth daily. 30 Cap 12  
 raNITIdine (ZANTAC) 150 mg tablet Take 150 mg by mouth two (2) times a day.  metoprolol succinate (TOPROL-XL) 100 mg XL tablet TAKE ONE TABLET BY MOUTH ONCE DAILY 30 Tab 5  
 lisinopril (PRINIVIL, ZESTRIL) 30 mg tablet Take 30 mg by mouth daily.  pravastatin (PRAVACHOL) 40 mg tablet Take 40 mg by mouth nightly.  metFORMIN (GLUCOPHAGE) 500 mg tablet Take 500 mg by mouth two (2) times daily (with meals). Past History Past Medical History: 
Past Medical History:  
Diagnosis Date  Abnormal EKG  Bone pain  BPH (benign prostatic hyperplasia)  Bradycardia  Chest pain, unspecified R/O CAD 3/10 mild fixed inferior defect  Diabetes mellitus ([facility] [address] denies 9/30/19  Echocardiogram [facility]/02/[facility]:  EF 56%. No RWMA. Mod conc LVH. DDfx. RVSP 22 mmHg.  HCVD (hypertensive cardiovascular disease)  Hematuria, unspecified  History of silent myocardial infarction  Hypercholesteremia  Hyperlipidemia  Hypertension  Hypertension  Ill-defined condition UTI  Kidney stones  Leg pain, right   
 normal exercise JOAQUIN (fall 2014)  Lower extremity arterial testing 10/07/2014 No arterial disease at rest bilaterally. R JOAQUIN 1.27.  L JOAQUIN 1.35. No significant drop in JOAQUIN w/exercise c/w normal perfusion.  Lower urinary tract symptoms (LUTS)  Muscular weakness  Nuclear cardiac imaging test 12/02/2011 Likely normal.  Fixed inferior basal and mid inferior defect likely artifact. No ischemia. EF 60%. No WMA.  Other specified cardiac dysrhythmias(427.89) Bradycardia and pauses on holter  Swelling of limb  Unspecified sleep apnea   
 does not use cpap machine Past Surgical History: 
Past Surgical History:  
Procedure Laterality Date  BIOPSY  3/25/99  
 COLONOSCOPY N/A 10/7/2019 COLONOSCOPY with bx's performed by Mathieu Rae MD at [facility] HX HIP REPLACEMENT  12/6/11  HX UROLOGICAL  01/06/2012 [facility], Dr. Veronika Bowers, Transurethral resection of prostate, button prostatectomy  HX UROLOGICAL  1/29/2015 [facility], Dr. William Kim, ESWL  
 ME COLONOSCOPY FLX DX W/[facility] INPATIENT [facility] WHEN PFRMD    
 TOTAL HIP ARTHROPLASTY Left 2014 Dr. Ramo Costello Family History: 
Family History Problem Relation Age of Onset  Coronary Artery Disease Father  Coronary Artery Disease Brother History of PTCA  Heart Disease Neg Hx Social History: 
Social History Tobacco Use  Smoking status: Never Smoker  Smokeless tobacco: Never Used Substance Use Topics  Alcohol use: No  
 Drug use: No  
 
 
Allergies: 
No Known Allergies Review of Systems Review of Systems Constitutional: Positive for appetite change, chills, fatigue and fever. HENT: Positive for congestion and sore throat. Eyes: Negative for visual disturbance. Respiratory: Positive for cough and shortness of breath. Cardiovascular: Negative for chest pain, palpitations and leg swelling. Gastrointestinal: Negative for abdominal pain, diarrhea, nausea and vomiting. Endocrine: Negative for polyuria. Genitourinary: Negative for dysuria and hematuria. Musculoskeletal: Positive for arthralgias and gait problem. Skin: Negative for rash and wound. Neurological: Positive for dizziness, weakness and headaches. Negative for speech difficulty. Physical Exam  
 
Visit Vitals /73 (BP 1 Location: Right arm) Pulse 90 Temp 98.4 °F (36.9 °C) Resp 18 Ht 6' 2\" (1.88 m) Wt 99.8 kg (220 lb) SpO2 95% BMI 28.25 kg/m² Physical Exam 
Constitutional:   
   General: He is not in acute distress. Appearance: He is not toxic-appearing. HENT:  
   Mouth/Throat:  
   Mouth: Mucous membranes are moist.  
Eyes:  
   General: No scleral icterus. Extraocular Movements: Extraocular movements intact. Neck: Musculoskeletal: Normal range of motion and neck supple. No neck rigidity or muscular tenderness. Cardiovascular:  
   Pulses: Normal pulses. Pulmonary:  
   Effort: Pulmonary effort is normal. No tachypnea. Comments: 3L O2 NC Abdominal:  
   General: There is distension. Palpations: Abdomen is soft. Tenderness: There is no abdominal tenderness. Musculoskeletal:     
   General: No swelling or tenderness. Skin: 
   General: Skin is warm and dry. Capillary Refill: Capillary refill takes less than 2 seconds. Neurological: Mental Status: He is alert. He is disoriented. Cranial Nerves: Cranial nerves are intact. No cranial nerve deficit, dysarthria or facial asymmetry. Sensory: Sensation is intact. Motor: Motor function is intact. No weakness, tremor or pronator drift. Comments: A&Ox1 (self), able to follow commands 4/5 strength, equal bilaterally in all extremities Diagnostic Study Results Labs - Recent Results (from the past 12 hour(s)) EKG, 12 LEAD, INITIAL Collection Time: 08/19/20 12:03 AM  
Result Value Ref Range Ventricular Rate 90 BPM  
 Atrial Rate 90 BPM  
 P-R Interval 178 ms QRS Duration 100 ms Q-T Interval 358 ms QTC Calculation (Bezet) 437 ms Calculated P Axis 21 degrees Calculated R Axis -28 degrees Calculated T Axis 15 degrees Diagnosis Normal sinus rhythm Possible Left atrial enlargement Left ventricular hypertrophy Abnormal ECG When compared with ECG of 10-AUG-2020 14:57, 
Nonspecific T wave abnormality now evident in Inferior leads URINALYSIS W/ RFLX MICROSCOPIC Collection Time: 08/19/20  1:50 AM  
Result Value Ref Range Color DARK YELLOW Appearance CLEAR Specific gravity 1.027 1.005 - 1.030    
 pH (UA) 5.0 5.0 - 8.0 Protein 300 (A) NEG mg/dL Glucose Negative NEG mg/dL Ketone TRACE (A) NEG mg/dL Bilirubin Negative NEG Blood SMALL (A) NEG Urobilinogen 1.0 0.2 - 1.0 EU/dL Nitrites Negative NEG Leukocyte Esterase TRACE (A) NEG URINE MICROSCOPIC ONLY Collection Time: 08/19/20  1:50 AM  
Result Value Ref Range WBC 1 to 4 0 - 4 /hpf  
 RBC 1 to 4 0 - 5 /hpf Epithelial cells 1+ 0 - 5 /lpf Bacteria 1+ (A) NEG /hpf POC LACTIC ACID Collection Time: 08/19/20  1:53 AM  
Result Value Ref Range Lactic Acid (POC) 1.70 0.40 - 2.00 mmol/L Radiologic Studies -  
XR CHEST PORT    (Results Pending) Medical Decision Making I am the first provider for this patient. I reviewed the vital signs, available nursing notes, past medical history, past surgical history, family history and social history. Vital Signs-Reviewed the patient's vital signs. EKG: NSR, rate of 90bpm 
 
Records Reviewed: Nursing Notes, Previous Radiology Studies and Previous Laboratory Studies (Time of Review: 10:28 PM) ED Course: Progress Notes, Reevaluation, and Consults: 
 
 Patient is an 78year old male with complex medical history who presents to the ER with worsening SOB, generalized weakness/lethargy. Patient is known COVID-19, he tested + for the virus 8 days ago. Patient requiring 3LO2 NC to maintain SPO2 >92%. CXR looks significantly worse with bilateral infiltrates. [facility] ... Patient does qualify for Remdesivir and Convalescent plasma. Will add Dr. Haley Warren to the treatment team and he will follow. Will order complete set of COVID labs and septic workup. Although unlikely given his COVID + result. ... cannot exclude bacterial pneumonia given his hypoxia and CXR findings. Have given Vanc/Cefepime. Spoke with Dr. Britta Spain, hospitalist. Will get ABG and pro-calcitonin and admit. Diagnosis Clinical Impression: 1. COVID-19   
2. Hypoxia Disposition: Admit Richa Viera, PGY-2  
[facility] Intern Pager: [phone] August 19, 2020, 1:41 AM

## 2020-08-20 ENCOUNTER — APPOINTMENT (OUTPATIENT)
Dept: GENERAL RADIOLOGY | Age: 80
DRG: 177 | End: 2020-08-20
Attending: INTERNAL MEDICINE
Payer: MEDICARE

## 2020-08-20 LAB
ALBUMIN SERPL-MCNC: 2.4 G/DL (ref 3.4–5)
ALBUMIN/GLOB SERPL: 0.5 {RATIO} (ref 0.8–1.7)
ALP SERPL-CCNC: 38 U/L (ref 45–117)
ALT SERPL-CCNC: 25 U/L (ref 16–61)
ANION GAP SERPL CALC-SCNC: 5 MMOL/L (ref 3–18)
AST SERPL-CCNC: 39 U/L (ref 10–38)
BASOPHILS # BLD: 0 K/UL (ref 0–0.06)
BASOPHILS NFR BLD: 0 % (ref 0–3)
BILIRUB SERPL-MCNC: 1.7 MG/DL (ref 0.2–1)
BUN SERPL-MCNC: 23 MG/DL (ref 7–18)
BUN/CREAT SERPL: 20 (ref 12–20)
CALCIUM SERPL-MCNC: 8.2 MG/DL (ref 8.5–10.1)
CHLORIDE SERPL-SCNC: 108 MMOL/L (ref 100–111)
CO2 SERPL-SCNC: 23 MMOL/L (ref 21–32)
CREAT SERPL-MCNC: 1.15 MG/DL (ref 0.6–1.3)
CRP SERPL HS-MCNC: >9.5 MG/L
DIFFERENTIAL METHOD BLD: ABNORMAL
EOSINOPHIL # BLD: 0.1 K/UL (ref 0–0.4)
EOSINOPHIL NFR BLD: 1 % (ref 0–5)
ERYTHROCYTE [DISTWIDTH] IN BLOOD BY AUTOMATED COUNT: 13.7 % (ref 11.6–14.5)
ERYTHROCYTE [SEDIMENTATION RATE] IN BLOOD: 44 MM/HR (ref 0–20)
FOLATE SERPL-MCNC: 10.5 NG/ML (ref 3.1–17.5)
GLOBULIN SER CALC-MCNC: 4.5 G/DL (ref 2–4)
GLUCOSE BLD STRIP.AUTO-MCNC: 92 MG/DL (ref 70–110)
GLUCOSE BLD STRIP.AUTO-MCNC: 94 MG/DL (ref 70–110)
GLUCOSE SERPL-MCNC: 88 MG/DL (ref 74–99)
HCT VFR BLD AUTO: 43.1 % (ref 36–48)
HGB BLD-MCNC: 14.2 G/DL (ref 13–16)
LYMPHOCYTES # BLD: 0.7 K/UL (ref 0.8–3.5)
LYMPHOCYTES NFR BLD: 9 % (ref 20–51)
MAGNESIUM SERPL-MCNC: 2 MG/DL (ref 1.6–2.6)
MCH RBC QN AUTO: 29.3 PG (ref 24–34)
MCHC RBC AUTO-ENTMCNC: 32.9 G/DL (ref 31–37)
MCV RBC AUTO: 88.9 FL (ref 74–97)
MONOCYTES # BLD: 1 K/UL (ref 0–1)
MONOCYTES NFR BLD: 12 % (ref 2–9)
NEUTS SEG # BLD: 6.4 K/UL (ref 1.8–8)
NEUTS SEG NFR BLD: 78 % (ref 42–75)
PLATELET # BLD AUTO: 107 K/UL (ref 135–420)
PLATELET COMMENTS,PCOM: ABNORMAL
PMV BLD AUTO: 10.4 FL (ref 9.2–11.8)
POTASSIUM SERPL-SCNC: 3.8 MMOL/L (ref 3.5–5.5)
PROT SERPL-MCNC: 6.9 G/DL (ref 6.4–8.2)
RBC # BLD AUTO: 4.85 M/UL (ref 4.7–5.5)
RBC MORPH BLD: ABNORMAL
SODIUM SERPL-SCNC: 136 MMOL/L (ref 136–145)
TSH SERPL DL<=0.05 MIU/L-ACNC: 0.68 UIU/ML (ref 0.36–3.74)
URATE SERPL-MCNC: 5.6 MG/DL (ref 2.6–7.2)
VIT B12 SERPL-MCNC: 641 PG/ML (ref 211–911)
WBC # BLD AUTO: 8.2 K/UL (ref 4.6–13.2)
WBC MORPH BLD: ABNORMAL

## 2020-08-20 PROCEDURE — 77030040831 HC BAG URINE DRNG MDII -A

## 2020-08-20 PROCEDURE — 73120 X-RAY EXAM OF HAND: CPT

## 2020-08-20 PROCEDURE — 74011250636 HC RX REV CODE- 250/636: Performed by: INTERNAL MEDICINE

## 2020-08-20 PROCEDURE — 77010033678 HC OXYGEN DAILY

## 2020-08-20 PROCEDURE — 74011000258 HC RX REV CODE- 258

## 2020-08-20 PROCEDURE — 84443 ASSAY THYROID STIM HORMONE: CPT

## 2020-08-20 PROCEDURE — 82962 GLUCOSE BLOOD TEST: CPT

## 2020-08-20 PROCEDURE — 85652 RBC SED RATE AUTOMATED: CPT

## 2020-08-20 PROCEDURE — 80053 COMPREHEN METABOLIC PANEL: CPT

## 2020-08-20 PROCEDURE — 36430 TRANSFUSION BLD/BLD COMPNT: CPT

## 2020-08-20 PROCEDURE — 74011250637 HC RX REV CODE- 250/637: Performed by: INTERNAL MEDICINE

## 2020-08-20 PROCEDURE — XW13325 TRANSFUSION OF CONVALESCENT PLASMA (NONAUTOLOGOUS) INTO PERIPHERAL VEIN, PERCUTANEOUS APPROACH, NEW TECHNOLOGY GROUP 5: ICD-10-PCS | Performed by: INTERNAL MEDICINE

## 2020-08-20 PROCEDURE — 84550 ASSAY OF BLOOD/URIC ACID: CPT

## 2020-08-20 PROCEDURE — 82607 VITAMIN B-12: CPT

## 2020-08-20 PROCEDURE — 83735 ASSAY OF MAGNESIUM: CPT

## 2020-08-20 PROCEDURE — 85025 COMPLETE CBC W/AUTO DIFF WBC: CPT

## 2020-08-20 PROCEDURE — 74011000250 HC RX REV CODE- 250

## 2020-08-20 PROCEDURE — 65270000029 HC RM PRIVATE

## 2020-08-20 PROCEDURE — 74011250636 HC RX REV CODE- 250/636: Performed by: STUDENT IN AN ORGANIZED HEALTH CARE EDUCATION/TRAINING PROGRAM

## 2020-08-20 PROCEDURE — 74011000258 HC RX REV CODE- 258: Performed by: INTERNAL MEDICINE

## 2020-08-20 PROCEDURE — 74011000250 HC RX REV CODE- 250: Performed by: INTERNAL MEDICINE

## 2020-08-20 PROCEDURE — 36415 COLL VENOUS BLD VENIPUNCTURE: CPT

## 2020-08-20 RX ORDER — ENOXAPARIN SODIUM 100 MG/ML
40 INJECTION SUBCUTANEOUS EVERY 12 HOURS
Status: DISCONTINUED | OUTPATIENT
Start: 2020-08-20 | End: 2020-08-21

## 2020-08-20 RX ORDER — DICLOFENAC SODIUM 10 MG/G
2 GEL TOPICAL 4 TIMES DAILY
Status: DISCONTINUED | OUTPATIENT
Start: 2020-08-20 | End: 2020-08-27 | Stop reason: HOSPADM

## 2020-08-20 RX ORDER — MELATONIN
2000 DAILY
Status: DISCONTINUED | OUTPATIENT
Start: 2020-08-21 | End: 2020-08-27 | Stop reason: HOSPADM

## 2020-08-20 RX ADMIN — REMDESIVIR 100 MG: 100 INJECTION, POWDER, LYOPHILIZED, FOR SOLUTION INTRAVENOUS at 10:27

## 2020-08-20 RX ADMIN — ZINC SULFATE 220 MG (50 MG) CAPSULE 1 CAPSULE: CAPSULE at 10:10

## 2020-08-20 RX ADMIN — PRAVASTATIN SODIUM 40 MG: 20 TABLET ORAL at 22:41

## 2020-08-20 RX ADMIN — ACETAMINOPHEN 650 MG: 325 TABLET ORAL at 14:53

## 2020-08-20 RX ADMIN — Medication 10 ML: at 22:27

## 2020-08-20 RX ADMIN — ACETAMINOPHEN 650 MG: 325 TABLET ORAL at 02:20

## 2020-08-20 RX ADMIN — ENOXAPARIN SODIUM 40 MG: 40 INJECTION SUBCUTANEOUS at 22:25

## 2020-08-20 RX ADMIN — DICLOFENAC 2 G: 10 GEL TOPICAL at 22:26

## 2020-08-20 RX ADMIN — Medication 500 MG: at 10:08

## 2020-08-20 RX ADMIN — FAMOTIDINE 10 MG: 20 TABLET, FILM COATED ORAL at 22:42

## 2020-08-20 RX ADMIN — ENOXAPARIN SODIUM 40 MG: 40 INJECTION SUBCUTANEOUS at 10:09

## 2020-08-20 RX ADMIN — VANCOMYCIN HYDROCHLORIDE 1250 MG: 10 INJECTION, POWDER, LYOPHILIZED, FOR SOLUTION INTRAVENOUS at 02:20

## 2020-08-20 RX ADMIN — ACETAMINOPHEN 650 MG: 325 TABLET ORAL at 21:00

## 2020-08-20 NOTE — PROGRESS NOTES
[facility] 
(A Division of [address] [place] [address]) Follow-up Note Date of Admission: 8/18/2020       Date of Note:  8/20/2020 Summary:   
77 y/o AAM w/ DM, HTN, HLD, Nephrolithiasis, BPH adm 8/18 w/ SOB. NHR diagnosed w/ covid 8/10, became weak, w/ SOB, dry cough in past few days leading to presentation at the [facility] ED. Afebrile and WBC normal but RR consistently > 27. On 2 lpm NC SpO2 is 98%. CXR 8/18: central venous congestion if not mild central pulmonary edema (CXR 8/10 showed no acute cardiopulmonary process). Interval History: 
Lying in bed, appears relatively comfortable, not in distress. Knows he's in the hospital. Knows he's in [place]. RRs better today 18-20 (yesterday >27-35 consistently). Febrile to 101.2 overnight but afebrile since Current Antimicrobials:    Prior Antimicrobials: 
remdesivir 8/19 - # 2 Assessment: Rec / Plan:  
Covid 19 pneumonia 
- SOB, fever, chills, sore throat, Increasing dyspnea last three days. - + Covid 8/10.   
- CXR bilateral interstitial infiltrates c/w covid (at least central venous /central pulmonary edema) - SpO2 95% on 3 lpm NC 
- Qualifies for remdesivir, convalescent plasma.   -> continue remdesivir 
-> convalescent plasma when available 
-> dexamethasone 6 mg daily x 10 days 
-> monitor off antibiotics Acute Hypoxic Respiratory Failure -> O2 as needed DM HTN   
HLD Nephrolithiasis BPH Microbiology: 
 
8/19 bcx NGTD Lines / Catheters: 
  
piv Patient Active Problem List  
Diagnosis Code  HCVD (hypertensive cardiovascular disease) I11.9  Hyperlipidemia E78.5  Diabetes mellitus ([facility] [address] E11.9  Bradycardia R00.1  Hypertension I10  
 BPH (benign prostatic hyperplasia) N40.0  Bone pain M89.8X9  Muscular weakness M62.81  Leg pain, right M79.604  Bladder neck contracture N32.0  Microscopic hematuria R31.29  
 History of nephrolithiasis Z87.442  Obesity (BMI 30.0-34. 9) E66.9  
 Pneumonia due to COVID-19 virus U07.1, J12.89  Thrombocytopenia ([address] D69.6  Shortness of breath R06.02  
 
 
Current Facility-Administered Medications Medication Dose Route Frequency  enoxaparin (LOVENOX) injection 40 mg  40 mg SubCUTAneous Q12H  
 [START ON 2020] cholecalciferol (VITAMIN D3) (1000 Units /25 mcg) tablet 2 Tab  2,000 Units Oral DAILY  diclofenac (VOLTAREN) 1 % topical gel 2 g  2 g Topical QID  pravastatin (PRAVACHOL) tablet 40 mg  40 mg Oral QHS  famotidine (PEPCID) tablet 10 mg  10 mg Oral QPM  
 sodium chloride (NS) flush 5-40 mL  5-40 mL IntraVENous Q8H  
 sodium chloride (NS) flush 5-40 mL  5-40 mL IntraVENous PRN  
 acetaminophen (TYLENOL) tablet 650 mg  650 mg Oral Q6H PRN Or  
 acetaminophen (TYLENOL) suppository 650 mg  650 mg Rectal Q6H PRN  polyethylene glycol (MIRALAX) packet 17 g  17 g Oral DAILY PRN  promethazine (PHENERGAN) tablet 12.5 mg  12.5 mg Oral Q6H PRN Or  
 ondansetron (ZOFRAN) injection 4 mg  4 mg IntraVENous Q6H PRN  
 insulin lispro (HUMALOG) injection   SubCUTAneous AC&HS  
 glucose chewable tablet 16 g  16 g Oral PRN  
 glucagon (GLUCAGEN) injection 1 mg  1 mg IntraMUSCular PRN  
 ascorbic acid (vitamin C) (VITAMIN C) tablet 500 mg  500 mg Oral DAILY  zinc sulfate (ZINCATE) 220 (50) mg capsule 1 Cap  1 Cap Oral DAILY  remdesivir 100 mg in 0.9% sodium chloride 250 mL IVPB  100 mg IntraVENous Q24H  
 0.9% sodium chloride infusion 250 mL  250 mL IntraVENous PRN  
 dextrose 10% infusion 125-250 mL  125-250 mL IntraVENous PRN Review of Systems - Negative except as in interval history Objective: 
 
Visit Vitals /75 (BP 1 Location: Right arm, BP Patient Position: At rest) Pulse 71 Temp 97.4 °F (36.3 °C) Resp 20 Ht 6' 2\" (1.88 m) Wt 99.8 kg (220 lb) SpO2 96% BMI 28.25 kg/m² Temp (24hrs), Av.4 °F (36.9 °C), Min:96.8 °F (36 °C), Max:101.2 °F (38.4 °C) General: Well developed, well nourished 78 y.o. BLACK male in no acute distress. ENT: ENT exam normal, no neck nodes or sinus tenderness Head: normocephalic, without obvious abnormality Mouth:  mucous membranes moist, pharynx normal without lesions Neck: supple, symmetrical, trachea midline Cardio:  regular rhythm, no murmurs, rubs or gallops Chest: inspection normal - no chest wall deformities or tenderness, respiratory effort normal 
Lungs: clear to auscultation, no wheezes or rales and unlabored breathing Abdomen: soft, non-tender. Bowel sounds normal. No masses, no organomegaly. Extremities:  no redness or tenderness in the calves or thighs, no edema Lab results: 
 
Chemistry Recent Labs  
  08/20/20 
0517 08/19/20 
1700 08/18/20 
0035 GLU 88 87 89  141 142  
K 3.8 4.4 4.1  110 112* CO2 23 23 25 BUN 23* 24* 23* CREA 1.15 1.23 1.29  
CA 8.2* 8.3* 8.3* AGAP 5 8 5 BUCR 20 20 18 AP 38*  --  39* TP 6.9  --  7.5 ALB 2.4*  --  2.7*  
GLOB 4.5*  --  4.8* AGRAT 0.5*  --  0.6* CBC w/ Diff Recent Labs  
  08/20/20 
0517 08/18/20 
0035 WBC 8.2 10.1 RBC 4.85 4.90 HGB 14.2 14.7 HCT 43.1 43.8 * 134* GRANS 78* 71 LYMPH 9* 17* EOS 1 3 Microbiology All Micro Results Procedure Component Value Units Date/Time CULTURE, URINE [[id number]] Collected:  08/19/20 1350 Order Status:  Completed Specimen:  Clean catch Updated:  08/20/20 1378 CULTURE, BLOOD [[id number]] Collected:  08/19/20 0035 Order Status:  Completed Specimen:  Blood Updated:  08/20/20 8218 Special Requests: NO SPECIAL REQUESTS Culture result: NO GROWTH 1 DAY     
 CULTURE, BLOOD [[id number]] Collected:  08/19/20 0050 Order Status:  Completed Specimen:  Blood Updated:  08/20/20 5309 Special Requests: NO SPECIAL REQUESTS Culture result: NO GROWTH 1 DAY     
 CULTURE, RESPIRATORY/SPUTUM/BRONCH Verlie Nam STAIN [[id number]] Order Status:  Sent Specimen:  Sputum Chema Dykes MD, [facility] 
[phone] 
8/20/2020  
6:31 PM

## 2020-08-20 NOTE — PROGRESS NOTES
SUBJECTIVE: 
 
Patient complains of bilateral hand pain/arm pain. He says he started this pain 2 days ago. Denies any neck pain or back pain. No headaches or dizziness. Denies any chest pain or abdominal pain. Cough present. Denies having any shortness of breath. No nausea vomiting. No tingling or numbness in upper extremities or lower extremities. He says he got COVID-19 from his friend. OBJECTIVE: 
 
/75 (BP 1 Location: Right arm, BP Patient Position: At rest)   Pulse 71   Temp 97.4 °F (36.3 °C)   Resp 20   Ht 6' 2\" (1.88 m)   Wt 99.8 kg (220 lb)   SpO2 96%   BMI 28.25 kg/m² General appearance -awake, ill-appearing, not in acute distress, follows commands appropriately Eyes - sclera anicteric, no pallor Nose - no obvious nasal discharge. Neck - supple, no JVD, trachea is midline Chest -clear air entry noted in bases, no wheezes currently Heart - S1 and S2 normal 
Abdomen - soft, nontender, nondistended, Bowel sounds present Neurological - alert, oriented but slow in response, normal speech, no focal findings noted except limited range of motion of both wrists. Musculoskeletal -bilateral hand tenderness present, limited range of motion of both wrists, left hand mild edema present. Extremities - no pedal edema noted ASSESSMENT: 
 
1. Fever due to #2, improving 2. COVID 19 infection and pneumonia 3. Bilateral hand/wrist pain 4. Acute hypoxic respiratory failure due to #2, improving 5. Diabetes mellitus 6. Hypertension 7. Dyslipidemia 8. BPH 9. Reportedly disorientation, not clinically significant PLAN: 
 
Continue current management per ID team 
Chest x-ray report reviewed Increase the dose of Lovenox X-ray of hands and bilateral upper extremity venous Doppler have been ordered Add Voltaren as needed for bilateral hand pain ID to follow Discussed with son over the phone CT head will be ordered due to disorientation Disclaimer: Sections of this note are dictated using utilizing voice recognition software, which may have resulted in some phonetic based errors in grammar and contents. Even though attempts were made to correct all the mistakes, some may have been missed, and remained in the body of the document. If questions arise, please contact our department. Recent Results (from the past 24 hour(s)) HEMOGLOBIN A1C W/O EAG Collection Time: 08/19/20  7:47 PM  
Result Value Ref Range Hemoglobin A1c 5.9 (H) 4.2 - 5.6 % CRP, HIGH SENSITIVITY Collection Time: 08/19/20  7:47 PM  
Result Value Ref Range CRP, High sensitivity >9.5 mg/L  
GLUCOSE, POC Collection Time: 08/19/20 10:58 PM  
Result Value Ref Range Glucose (POC) 75 70 - 110 mg/dL TSH 3RD GENERATION Collection Time: 08/20/20  5:17 AM  
Result Value Ref Range TSH 0.68 0.36 - 3.74 uIU/mL SED RATE (ESR) Collection Time: 08/20/20  5:17 AM  
Result Value Ref Range Sed rate, automated 44 (H) 0 - 20 mm/hr VITAMIN B12 & FOLATE Collection Time: 08/20/20  5:17 AM  
Result Value Ref Range Vitamin B12 641 211 - 911 pg/mL Folate 10.5 3.10 - 17.50 ng/mL METABOLIC PANEL, COMPREHENSIVE Collection Time: 08/20/20  5:17 AM  
Result Value Ref Range Sodium 136 136 - 145 mmol/L Potassium 3.8 3.5 - 5.5 mmol/L Chloride 108 100 - 111 mmol/L  
 CO2 23 21 - 32 mmol/L Anion gap 5 3.0 - 18 mmol/L Glucose 88 74 - 99 mg/dL BUN 23 (H) 7.0 - 18 MG/DL Creatinine 1.15 0.6 - 1.3 MG/DL  
 BUN/Creatinine ratio 20 12 - 20 GFR est AA >60 >60 ml/min/1.73m2 GFR est non-AA >60 >60 ml/min/1.73m2 Calcium 8.2 (L) 8.5 - 10.1 MG/DL Bilirubin, total 1.7 (H) 0.2 - 1.0 MG/DL  
 ALT (SGPT) 25 16 - 61 U/L  
 AST (SGOT) 39 (H) 10 - 38 U/L Alk. phosphatase 38 (L) 45 - 117 U/L Protein, total 6.9 6.4 - 8.2 g/dL Albumin 2.4 (L) 3.4 - 5.0 g/dL Globulin 4.5 (H) 2.0 - 4.0 g/dL A-G Ratio 0.5 (L) 0.8 - 1.7 MAGNESIUM  
 Collection Time: 08/20/20  5:17 AM  
Result Value Ref Range Magnesium 2.0 1.6 - 2.6 mg/dL CBC WITH AUTOMATED DIFF Collection Time: 08/20/20  5:17 AM  
Result Value Ref Range WBC 8.2 4.6 - 13.2 K/uL  
 RBC 4.85 4.70 - 5.50 M/uL  
 HGB 14.2 13.0 - 16.0 g/dL HCT 43.1 36.0 - 48.0 % MCV 88.9 74.0 - 97.0 FL  
 MCH 29.3 24.0 - 34.0 PG  
 MCHC 32.9 31.0 - 37.0 g/dL  
 RDW 13.7 11.6 - 14.5 % PLATELET 642 (L) 573 - 420 K/uL MPV 10.4 9.2 - 11.8 FL  
 NEUTROPHILS 78 (H) 42 - 75 % LYMPHOCYTES 9 (L) 20 - 51 % MONOCYTES 12 (H) 2 - 9 % EOSINOPHILS 1 0 - 5 % BASOPHILS 0 0 - 3 %  
 ABS. NEUTROPHILS 6.4 1.8 - 8.0 K/UL  
 ABS. LYMPHOCYTES 0.7 (L) 0.8 - 3.5 K/UL  
 ABS. MONOCYTES 1.0 0 - 1.0 K/UL  
 ABS. EOSINOPHILS 0.1 0.0 - 0.4 K/UL  
 ABS. BASOPHILS 0.0 0.0 - 0.06 K/UL  
 DF MANUAL PLATELET COMMENTS ADEQUATE PLATELETS    
 RBC COMMENTS ANISOCYTOSIS 
1+ WBC COMMENTS ATYPICAL LYMPHOCYTES PRESENT    
GLUCOSE, POC Collection Time: 08/20/20  6:40 AM  
Result Value Ref Range Glucose (POC) 94 70 - 110 mg/dL GLUCOSE, POC Collection Time: 08/20/20  2:59 PM  
Result Value Ref Range Glucose (POC) 94 70 - 110 mg/dL GLUCOSE, POC Collection Time: 08/20/20  4:38 PM  
Result Value Ref Range Glucose (POC) 92 70 - 110 mg/dL

## 2020-08-20 NOTE — PROGRESS NOTES
Comprehensive Nutrition Assessment Type and Reason for Visit: Initial, Positive nutrition screen Nutrition Recommendations/Plan: - Recommend change diet consistency to soft solids at this time. Anticipate son to bring dentures in, if this does not occur diet texture may need to be further downgraded, discussed with nursing.  
- Add oral nutrition supplement 2/2 inadequate meal intake at this time: Glucerna Shake once daily Nutrition Assessment:  Admitted with covid 19 pneumonia. Good appetite, but difficulty chewing 2/2 endentulism and missing dentures - son to bring today. Malnutrition Assessment: 
Malnutrition Status:  No malnutrition Nutrition History and Allergies: NKFA. Reports good appetite PTA. Estimated Daily Nutrient Needs: 
Energy (kcal):  3432-8823 Protein (g):   Fluid (ml/day):  0468-0787 Nutrition Related Findings:  Last BM unknown. Edentulous, wears dentures - currently not on-site. Wounds:   
None Current Nutrition Therapies: DIET DIABETIC CONSISTENT CARB Regular Anthropometric Measures: 
· Height:  6' 2\" (188 cm) · Current Body Wt:  99.8 kg (220 lb 0.3 oz) · Admission Body Wt:  220 lb 0.3 oz · Usual Body Wt:  231 lb(12/2019) · Ideal Body Wt:  190 lbs:  115.8 % · BMI Category: Overweight (BMI 25.0-29. 9) Nutrition Diagnosis: · Biting/chewing (masticatory) difficulty related to partial or complete edentulism as evidenced by intake 0-25%(2/2 being unable to chew foods on current diet.) Nutrition Interventions:  
Food and/or Nutrient Delivery: Continue current diet, Start oral nutrition supplement Nutrition Education and Counseling: No recommendations at this time Coordination of Nutrition Care: Continued inpatient monitoring Goals: 
PO nutrition intake will meet >75% of patient estimated nutritional needs within the next 7 days. Nutrition Monitoring and Evaluation: Food/Nutrient Intake Outcomes: Food and nutrient intake, Supplement intake Physical Signs/Symptoms Outcomes: Biochemical data, Chewing or swallowing, Meal time behavior, Nutrition focused physical findings Discharge Planning:   
No discharge needs at this time Electronically signed by Armando Jacobson RD on 8/20/2020 at 11:56 AM 
 
Contact: [phone]

## 2020-08-20 NOTE — PROGRESS NOTES
Physician Progress Note Anthony Arredondo 
CSN #:                  [id number] :                       1940 ADMIT DATE:       2020 9:14 PM 
[address] [place] DATE: 
RESPONDING 
PROVIDER #:        Agnieszka MAGALLANES MD 
 
 
 
 
QUERY TEXT: 
 
Dear  Dr Krystle Morgan Pt admitted with   COVID 19 pneumonia. Noted documentation of disorientation in H&P dated  . If possible, please document in progress notes and discharge summary: 
 
Clinical Indicators- H&P  says Alert  , disoriented 
per ER  MD- A&Ox1 (self), able to follow commands Risk Factors   no PMH  of   dementia; lives in NH; COVID pneumonia infection Treatment- none just treatment for pneumonia, COVID; 02 Thank you,   Keren Sow  RN   CCDS  [phone] Options provided: 
-- disorientation not clinically significant 
-- disorientation  is clinically significant 
-- Other - I will add my own diagnosis -- Disagree - Not applicable / Not valid -- Disagree - Clinically unable to determine / Unknown 
-- Refer to Clinical Documentation Reviewer PROVIDER RESPONSE TEXT: 
 
disorientation is not clinically significant Query created by: Maxi Velez on 2020 12:52 PM 
 
 
Electronically signed by:  Cristy Pugh MD 2020 5:49 PM

## 2020-08-20 NOTE — PROGRESS NOTES
Shift Progress Summary: 
Assumed care of patient in bed awake and quiet. No s/s of acute distress. Remains on nasal cannula @ 3 lpm. Febrile X 1 with tylenol given Patient incontinent of urine. Condom catheter applied. Continue to monitor. Patient Vitals for the past 12 hrs: 
 Temp Pulse Resp BP SpO2  
08/20/20 0505 98.3 °F (36.8 °C) 88 20 135/77 97 % 08/20/20 0116 (!) 101.2 °F (38.4 °C) 82 20 110/67 100 %

## 2020-08-20 NOTE — PROGRESS NOTES
Reason for Admission:  COVID-19 [U07.1] Pneumonia due to COVID-19 virus [U07.1, J12.89] RUR Score:    14% Plan for utilizing home health:    TBD, pt's son is unsure if pt would agree Likelihood of Readmission:   LOW Transition of Care Plan:         
 
 
Initial assessment completed with son. Cognitive status of patient: confused. Face sheet information confirmed:  yes. The patient only has one son, he is willing to participate in his discharge plan and to receive any needed information. This patient lives in a single family home alone. Patient is not able to navigate steps as needed. Prior to hospitalization, patient was considered to be independent with ADLs/IADLS : no . If not independent,  patient needs assist with : food preparation and cooking Patient has a current ACP document on file: no The pt will need BLS transport if still COVID + the son cannot transport patient home upon discharge. The patient already has a rolling walker medical equipment available in the home. Patient is not currently active with home health. Patient has not stayed in a skilled nursing facility or rehab. This patient is on dialysis :no 
 
 
Currently, the discharge plan is Home. CM spoke with the son. The ED note is incorrect, this pt came from home. The patient lives alone. His son stated that he was bringing him meals and taking out his trash every few days. He ambulates using a rolling walker. The son is unsure if pt would agree to home health. He knows that he wants to go back home if he can. Pt would benefit from PT/OT orders when appropriate to assist with disposition. Pt will also need to be monitored for oxygen needs, was not on any prior to admission. The patient states that he can obtain his medications from the pharmacy, and take his medications as directed. Patient's current insurance is Medicare and [employer]. Care Management Interventions PCP Verified by CM: Yes Mode of Transport at Discharge: BLS(Pt's son cannot duy him home if testing +) Transition of Care Consult (CM Consult): Discharge Planning Physical Therapy Consult: No 
Occupational Therapy Consult: No 
Current Support Network: Lives Alone, Family Lives [place] Confirm Follow Up Transport: Other (see comment)(will need stretcher transport if still testing +) Discharge Location Discharge Placement: Home with family assistance(Pt's son does not know if pt would agree to SNF or  at this time) Pau Mccrary RN BSN Care Manager [phone]

## 2020-08-21 ENCOUNTER — HOSPITAL ENCOUNTER (OUTPATIENT)
Dept: CT IMAGING | Age: 80
Discharge: HOME | DRG: 177 | End: 2020-08-21
Attending: HOSPITALIST
Payer: MEDICARE

## 2020-08-21 ENCOUNTER — APPOINTMENT (OUTPATIENT)
Dept: VASCULAR SURGERY | Age: 80
DRG: 177 | End: 2020-08-21
Attending: INTERNAL MEDICINE
Payer: MEDICARE

## 2020-08-21 LAB
ALBUMIN SERPL-MCNC: 2.4 G/DL (ref 3.4–5)
ALBUMIN/GLOB SERPL: 0.5 {RATIO} (ref 0.8–1.7)
ALP SERPL-CCNC: 42 U/L (ref 45–117)
ALT SERPL-CCNC: 23 U/L (ref 16–61)
ANION GAP SERPL CALC-SCNC: 7 MMOL/L (ref 3–18)
AST SERPL-CCNC: 32 U/L (ref 10–38)
BACTERIA SPEC CULT: NORMAL
BASOPHILS # BLD: 0 K/UL (ref 0–0.06)
BASOPHILS NFR BLD: 0 % (ref 0–3)
BILIRUB SERPL-MCNC: 1.3 MG/DL (ref 0.2–1)
BLD PROD TYP BPU: NORMAL
BPU ID: NORMAL
BUN SERPL-MCNC: 23 MG/DL (ref 7–18)
BUN/CREAT SERPL: 23 (ref 12–20)
CALCIUM SERPL-MCNC: 8 MG/DL (ref 8.5–10.1)
CALLED TO:,BCALL1: NORMAL
CALLED TO:,BCALL2: NORMAL
CHLORIDE SERPL-SCNC: 108 MMOL/L (ref 100–111)
CO2 SERPL-SCNC: 22 MMOL/L (ref 21–32)
CREAT SERPL-MCNC: 0.99 MG/DL (ref 0.6–1.3)
CRP SERPL-MCNC: 10.4 MG/DL (ref 0–0.3)
D DIMER PPP FEU-MCNC: >20 UG/ML(FEU)
DIFFERENTIAL METHOD BLD: ABNORMAL
EOSINOPHIL # BLD: 0 K/UL (ref 0–0.4)
EOSINOPHIL NFR BLD: 0 % (ref 0–5)
ERYTHROCYTE [DISTWIDTH] IN BLOOD BY AUTOMATED COUNT: 13.7 % (ref 11.6–14.5)
FERRITIN SERPL-MCNC: 1159 NG/ML (ref 8–388)
GLOBULIN SER CALC-MCNC: 4.6 G/DL (ref 2–4)
GLUCOSE BLD STRIP.AUTO-MCNC: 104 MG/DL (ref 70–110)
GLUCOSE BLD STRIP.AUTO-MCNC: 202 MG/DL (ref 70–110)
GLUCOSE BLD STRIP.AUTO-MCNC: 216 MG/DL (ref 70–110)
GLUCOSE BLD STRIP.AUTO-MCNC: 452 MG/DL (ref 70–110)
GLUCOSE BLD STRIP.AUTO-MCNC: 80 MG/DL (ref 70–110)
GLUCOSE BLD STRIP.AUTO-MCNC: 98 MG/DL (ref 70–110)
GLUCOSE SERPL-MCNC: 84 MG/DL (ref 74–99)
HCT VFR BLD AUTO: 42.8 % (ref 36–48)
HGB BLD-MCNC: 14.2 G/DL (ref 13–16)
LDH SERPL L TO P-CCNC: 338 U/L (ref 81–234)
LYMPHOCYTES # BLD: 0.7 K/UL (ref 0.8–3.5)
LYMPHOCYTES NFR BLD: 10 % (ref 20–51)
MCH RBC QN AUTO: 29.4 PG (ref 24–34)
MCHC RBC AUTO-ENTMCNC: 33.2 G/DL (ref 31–37)
MCV RBC AUTO: 88.6 FL (ref 74–97)
MONOCYTES # BLD: 0.3 K/UL (ref 0–1)
MONOCYTES NFR BLD: 4 % (ref 2–9)
NEUTS BAND NFR BLD MANUAL: 1 % (ref 0–5)
NEUTS SEG # BLD: 6 K/UL (ref 1.8–8)
NEUTS SEG NFR BLD: 85 % (ref 42–75)
PLATELET # BLD AUTO: 97 K/UL (ref 135–420)
PLATELET COMMENTS,PCOM: ABNORMAL
PMV BLD AUTO: 10.8 FL (ref 9.2–11.8)
POTASSIUM SERPL-SCNC: 3.7 MMOL/L (ref 3.5–5.5)
PROT SERPL-MCNC: 7 G/DL (ref 6.4–8.2)
RBC # BLD AUTO: 4.83 M/UL (ref 4.7–5.5)
RBC MORPH BLD: ABNORMAL
SERVICE CMNT-IMP: NORMAL
SODIUM SERPL-SCNC: 137 MMOL/L (ref 136–145)
STATUS OF UNIT,%ST: NORMAL
UNIT DIVISION, %UDIV: 0
WBC # BLD AUTO: 7 K/UL (ref 4.6–13.2)

## 2020-08-21 PROCEDURE — 80053 COMPREHEN METABOLIC PANEL: CPT

## 2020-08-21 PROCEDURE — 74011250636 HC RX REV CODE- 250/636: Performed by: INTERNAL MEDICINE

## 2020-08-21 PROCEDURE — 85379 FIBRIN DEGRADATION QUANT: CPT

## 2020-08-21 PROCEDURE — 82962 GLUCOSE BLOOD TEST: CPT

## 2020-08-21 PROCEDURE — 86140 C-REACTIVE PROTEIN: CPT

## 2020-08-21 PROCEDURE — 65270000029 HC RM PRIVATE

## 2020-08-21 PROCEDURE — 36415 COLL VENOUS BLD VENIPUNCTURE: CPT

## 2020-08-21 PROCEDURE — 77010033678 HC OXYGEN DAILY

## 2020-08-21 PROCEDURE — 74011000250 HC RX REV CODE- 250

## 2020-08-21 PROCEDURE — 93970 EXTREMITY STUDY: CPT

## 2020-08-21 PROCEDURE — 74011000258 HC RX REV CODE- 258: Performed by: INTERNAL MEDICINE

## 2020-08-21 PROCEDURE — 74011250636 HC RX REV CODE- 250/636: Performed by: HOSPITALIST

## 2020-08-21 PROCEDURE — 74011000258 HC RX REV CODE- 258

## 2020-08-21 PROCEDURE — 83615 LACTATE (LD) (LDH) ENZYME: CPT

## 2020-08-21 PROCEDURE — 74011250637 HC RX REV CODE- 250/637: Performed by: INTERNAL MEDICINE

## 2020-08-21 PROCEDURE — 70450 CT HEAD/BRAIN W/O DYE: CPT

## 2020-08-21 PROCEDURE — 74011636637 HC RX REV CODE- 636/637: Performed by: HOSPITALIST

## 2020-08-21 PROCEDURE — 85025 COMPLETE CBC W/AUTO DIFF WBC: CPT

## 2020-08-21 PROCEDURE — 82728 ASSAY OF FERRITIN: CPT

## 2020-08-21 RX ORDER — ENOXAPARIN SODIUM 100 MG/ML
60 INJECTION SUBCUTANEOUS ONCE
Status: COMPLETED | OUTPATIENT
Start: 2020-08-21 | End: 2020-08-21

## 2020-08-21 RX ORDER — INSULIN LISPRO 100 [IU]/ML
INJECTION, SOLUTION INTRAVENOUS; SUBCUTANEOUS
Status: DISCONTINUED | OUTPATIENT
Start: 2020-08-22 | End: 2020-08-21

## 2020-08-21 RX ORDER — INSULIN LISPRO 100 [IU]/ML
INJECTION, SOLUTION INTRAVENOUS; SUBCUTANEOUS
Status: DISCONTINUED | OUTPATIENT
Start: 2020-08-21 | End: 2020-08-27 | Stop reason: HOSPADM

## 2020-08-21 RX ORDER — DEXTROSE MONOHYDRATE 100 MG/ML
125-250 INJECTION, SOLUTION INTRAVENOUS AS NEEDED
Status: DISCONTINUED | OUTPATIENT
Start: 2020-08-21 | End: 2020-08-23

## 2020-08-21 RX ORDER — ENOXAPARIN SODIUM 100 MG/ML
1 INJECTION SUBCUTANEOUS EVERY 12 HOURS
Status: DISCONTINUED | OUTPATIENT
Start: 2020-08-21 | End: 2020-08-26

## 2020-08-21 RX ORDER — MAGNESIUM SULFATE 100 %
16 CRYSTALS MISCELLANEOUS AS NEEDED
Status: DISCONTINUED | OUTPATIENT
Start: 2020-08-21 | End: 2020-08-23

## 2020-08-21 RX ADMIN — Medication 500 MG: at 09:48

## 2020-08-21 RX ADMIN — INSULIN LISPRO 6 UNITS: 100 INJECTION, SOLUTION INTRAVENOUS; SUBCUTANEOUS at 22:46

## 2020-08-21 RX ADMIN — ACETAMINOPHEN 650 MG: 325 TABLET ORAL at 11:53

## 2020-08-21 RX ADMIN — DICLOFENAC 2 G: 10 GEL TOPICAL at 06:19

## 2020-08-21 RX ADMIN — ENOXAPARIN SODIUM 40 MG: 40 INJECTION SUBCUTANEOUS at 11:20

## 2020-08-21 RX ADMIN — DICLOFENAC 2 G: 10 GEL TOPICAL at 22:00

## 2020-08-21 RX ADMIN — DEXAMETHASONE 6 MG: 2 TABLET ORAL at 09:49

## 2020-08-21 RX ADMIN — Medication 10 ML: at 22:00

## 2020-08-21 RX ADMIN — FAMOTIDINE 10 MG: 20 TABLET, FILM COATED ORAL at 21:29

## 2020-08-21 RX ADMIN — DICLOFENAC 2 G: 10 GEL TOPICAL at 09:00

## 2020-08-21 RX ADMIN — DICLOFENAC 2 G: 10 GEL TOPICAL at 13:00

## 2020-08-21 RX ADMIN — ENOXAPARIN SODIUM 60 MG: 60 INJECTION SUBCUTANEOUS at 13:41

## 2020-08-21 RX ADMIN — ENOXAPARIN SODIUM 100 MG: 100 INJECTION SUBCUTANEOUS at 21:25

## 2020-08-21 RX ADMIN — CHOLECALCIFEROL TAB 25 MCG (1000 UNIT) 2 TABLET: 25 TAB at 09:49

## 2020-08-21 RX ADMIN — PRAVASTATIN SODIUM 40 MG: 20 TABLET ORAL at 21:26

## 2020-08-21 RX ADMIN — REMDESIVIR 100 MG: 100 INJECTION, POWDER, LYOPHILIZED, FOR SOLUTION INTRAVENOUS at 09:51

## 2020-08-21 RX ADMIN — ZINC SULFATE 220 MG (50 MG) CAPSULE 1 CAPSULE: CAPSULE at 09:50

## 2020-08-21 RX ADMIN — Medication 10 ML: at 14:00

## 2020-08-21 NOTE — PROGRESS NOTES
Problem: Patient Education: Go to Patient Education Activity Goal: Patient/Family Education 8/20/2020 2119 by Cornelius Bennett Outcome: Progressing Towards Goal 
8/20/2020 2117 by Cornelius Bennett Outcome: Progressing Towards Goal

## 2020-08-21 NOTE — PROGRESS NOTES
[facility] 
(A Division of [address] [place] [address]) Follow-up Note Date of Admission: 8/18/2020       Date of Note:  8/21/2020 Summary:   
77 y/o AAM w/ DM, HTN, HLD, Nephrolithiasis, BPH adm 8/18 w/ SOB. NHR diagnosed w/ covid 8/10, became weak, w/ SOB, dry cough in past few days leading to presentation at the [facility] ED. Afebrile and WBC normal but RR consistently > 27. On 2 lpm NC SpO2 is 98%. CXR 8/18: central venous congestion if not mild central pulmonary edema (CXR 8/10 showed no acute cardiopulmonary process). Interval History: 
Has lingering low grade fever 100.4 this am.  DVTs found in Right arm, supefircial thrombus in LUE. Current Antimicrobials:    Prior Antimicrobials: 
remdesivir 8/19 - # 3 Assessment: Rec / Plan:  
Confusion - ? Metabolic encephalopathy 
- CT Head  ordered -> per primary team  
NEW RUE DVT 
- seen on Duplex 8/21: acute occlusive DVT 1 of 2 brachial veins RUE, acute non-occlusive thromb basilic vein LUE 
- likely covid related -> anticoagulation per Dr. Maikel Morrell Low grade fever 
- from covid pneumonia +/- DVTs -> monitor Covid 19 pneumonia 
- SOB, fever, chills, sore throat, Increasing dyspnea last three days. - + Covid 8/10.   
- CXR bilateral interstitial infiltrates c/w covid (at least central venous /central pulmonary edema) - SpO2 95% on 3 lpm NC 
- Convalescent plasma given 8/20   -> continue remdesivir, dexamethasone 
-> monitor off antibiotics Acute Hypoxic Respiratory Failure -> O2 as needed DM HTN   
HLD Nephrolithiasis BPH Microbiology: 
 
8/19 bcx NGTD Lines / Catheters: 
  
piv Patient Active Problem List  
Diagnosis Code  HCVD (hypertensive cardiovascular disease) I11.9  Hyperlipidemia E78.5  Diabetes mellitus ([facility] [address] E11.9  Bradycardia R00.1  Hypertension I10  
 BPH (benign prostatic hyperplasia) N40.0  Bone pain M89.8X9  Muscular weakness M62.81  
  Leg pain, right M79.604  Bladder neck contracture N32.0  Microscopic hematuria R31.29  
 History of nephrolithiasis Z87.442  Obesity (BMI 30.0-34. 9) E66.9  
 Pneumonia due to COVID-19 virus U07.1, J12.89  Thrombocytopenia ([facility] [address] D69.6  Shortness of breath R06.02  
 
 
Current Facility-Administered Medications Medication Dose Route Frequency  enoxaparin (LOVENOX) injection 100 mg  1 mg/kg SubCUTAneous Q12H  
 enoxaparin (LOVENOX) injection 60 mg  60 mg SubCUTAneous ONCE  cholecalciferol (VITAMIN D3) (1000 Units /25 mcg) tablet 2 Tab  2,000 Units Oral DAILY  diclofenac (VOLTAREN) 1 % topical gel 2 g  2 g Topical QID  dexAMETHasone (DECADRON) tablet 6 mg  6 mg Oral DAILY  pravastatin (PRAVACHOL) tablet 40 mg  40 mg Oral QHS  famotidine (PEPCID) tablet 10 mg  10 mg Oral QPM  
 sodium chloride (NS) flush 5-40 mL  5-40 mL IntraVENous Q8H  
 sodium chloride (NS) flush 5-40 mL  5-40 mL IntraVENous PRN  
 acetaminophen (TYLENOL) tablet 650 mg  650 mg Oral Q6H PRN Or  
 acetaminophen (TYLENOL) suppository 650 mg  650 mg Rectal Q6H PRN  polyethylene glycol (MIRALAX) packet 17 g  17 g Oral DAILY PRN  promethazine (PHENERGAN) tablet 12.5 mg  12.5 mg Oral Q6H PRN Or  
 ondansetron (ZOFRAN) injection 4 mg  4 mg IntraVENous Q6H PRN  
 insulin lispro (HUMALOG) injection   SubCUTAneous AC&HS  
 glucose chewable tablet 16 g  16 g Oral PRN  
 glucagon (GLUCAGEN) injection 1 mg  1 mg IntraMUSCular PRN  
 ascorbic acid (vitamin C) (VITAMIN C) tablet 500 mg  500 mg Oral DAILY  zinc sulfate (ZINCATE) 220 (50) mg capsule 1 Cap  1 Cap Oral DAILY  remdesivir 100 mg in 0.9% sodium chloride 250 mL IVPB  100 mg IntraVENous Q24H  
 0.9% sodium chloride infusion 250 mL  250 mL IntraVENous PRN  
 dextrose 10% infusion 125-250 mL  125-250 mL IntraVENous PRN Review of Systems - Negative except as in interval history Objective: 
 
Visit Vitals /78 (BP 1 Location: Right leg, BP Patient Position: At rest) Pulse 81 Temp 99.8 °F (37.7 °C) Resp 16 Ht 6' 2\" (1.88 m) Wt 99.8 kg (220 lb) SpO2 100% BMI 28.25 kg/m² Temp (24hrs), Av.6 °F (37.6 °C), Min:97.4 °F (36.3 °C), Max:100.5 °F (38.1 °C) General: Well developed, well nourished 78 y.o. BLACK male in no acute distress. ENT: ENT exam normal, no neck nodes or sinus tenderness Head: normocephalic, without obvious abnormality Mouth:  mucous membranes moist, pharynx normal without lesions Neck: supple, symmetrical, trachea midline Cardio:  regular rhythm, no murmurs, rubs or gallops Chest: inspection normal - no chest wall deformities or tenderness, respiratory effort normal 
Lungs: clear to auscultation, no wheezes or rales and unlabored breathing Abdomen: soft, non-tender. Bowel sounds normal. No masses, no organomegaly. Extremities:  no redness or tenderness in the calves or thighs, no edema, tenderness RUE> LUE Lab results: 
 
Chemistry Recent Labs  
  20 
03020 
0517 20 
1700 GLU 84 88 87  136 141  
K 3.7 3.8 4.4  
 108 110 CO2 22 23 23 BUN 23* 23* 24* CREA 0.99 1.15 1.23  
CA 8.0* 8.2* 8.3* AGAP 7 5 8 BUCR 23* 20 20 AP 42* 38*  --   
TP 7.0 6.9  --   
ALB 2.4* 2.4*  --   
GLOB 4.6* 4.5*  --   
AGRAT 0.5* 0.5*  --   
 
 
CBC w/ Diff Recent Labs  
  20 
03020 
1295 WBC 7.0 8.2 RBC 4.83 4.85  
HGB 14.2 14.2 HCT 42.8 43.1 PLT 97* 107* GRANS 85* 78* LYMPH 10* 9* EOS 0 1 Microbiology All Micro Results Procedure Component Value Units Date/Time CULTURE, URINE [[id number]] Collected:  20 1350 Order Status:  Completed Specimen:  Clean catch Updated:  20 9935 Special Requests: NO SPECIAL REQUESTS Culture result: No growth (<1,000 CFU/ML) CULTURE, BLOOD [[id number]] Collected:  20 0050 Order Status:  Completed Specimen:  Blood Updated:  08/21/20 2270 Special Requests: NO SPECIAL REQUESTS Culture result: NO GROWTH 2 DAYS     
 CULTURE, BLOOD [[id number]] Collected:  08/19/20 0035 Order Status:  Completed Specimen:  Blood Updated:  08/21/20 1957 Special Requests: NO SPECIAL REQUESTS Culture result: NO GROWTH 2 DAYS     
 CULTURE, RESPIRATORY/SPUTUM/BRONCH Hindu Bárbara STAIN [[id number]] Order Status:  Sent Specimen:  Sputum Susy Morrell MD, [facility] 
[phone] 
8/21/2020  
6:31 PM

## 2020-08-21 NOTE — PROGRESS NOTES
Problem: Patient Education: Go to Patient Education Activity Goal: Patient/Family Education Outcome: Progressing Towards Goal 
  
Problem: Pneumonia: Day 1 Goal: Off Pathway (Use only if patient is Off Pathway) Outcome: Progressing Towards Goal 
Goal: Activity/Safety Outcome: Progressing Towards Goal 
Goal: Consults, if ordered Outcome: Progressing Towards Goal 
Goal: Diagnostic Test/Procedures Outcome: Progressing Towards Goal 
Goal: Nutrition/Diet Outcome: Progressing Towards Goal 
Goal: Medications Outcome: Progressing Towards Goal 
Goal: Respiratory Outcome: Progressing Towards Goal 
Goal: Treatments/Interventions/Procedures Outcome: Progressing Towards Goal 
Goal: Psychosocial 
Outcome: Progressing Towards Goal 
Goal: *Oxygen saturation within defined limits Outcome: Progressing Towards Goal 
Goal: *Influenza vaccine administered (October-March) Outcome: Progressing Towards Goal 
Goal: *Pneumoccocal vaccine administered Outcome: Progressing Towards Goal 
Goal: *Hemodynamically stable Outcome: Progressing Towards Goal 
Goal: *Demonstrates progressive activity Outcome: Progressing Towards Goal 
Goal: *Tolerating diet Outcome: Progressing Towards Goal 
  
Problem: Pneumonia: Day 2 Goal: Off Pathway (Use only if patient is Off Pathway) Outcome: Progressing Towards Goal 
Goal: Activity/Safety Outcome: Progressing Towards Goal 
Goal: Consults, if ordered Outcome: Progressing Towards Goal 
Goal: Diagnostic Test/Procedures Outcome: Progressing Towards Goal 
Goal: Nutrition/Diet Outcome: Progressing Towards Goal 
Goal: Discharge Planning Outcome: Progressing Towards Goal 
Goal: Medications Outcome: Progressing Towards Goal 
Goal: Respiratory Outcome: Progressing Towards Goal 
Goal: Treatments/Interventions/Procedures Outcome: Progressing Towards Goal 
Goal: Psychosocial 
Outcome: Progressing Towards Goal 
Goal: *Oxygen saturation within defined limits Outcome: Progressing Towards Goal 
 Goal: *Hemodynamically stable Outcome: Progressing Towards Goal 
Goal: *Demonstrates progressive activity Outcome: Progressing Towards Goal 
Goal: *Tolerating diet Outcome: Progressing Towards Goal 
Goal: *Optimal pain control at patient's stated goal 
Outcome: Progressing Towards Goal 
  
Problem: Pneumonia: Day 3 Goal: Off Pathway (Use only if patient is Off Pathway) Outcome: Progressing Towards Goal 
Goal: Activity/Safety Outcome: Progressing Towards Goal 
Goal: Consults, if ordered Outcome: Progressing Towards Goal 
Goal: Diagnostic Test/Procedures Outcome: Progressing Towards Goal 
Goal: Nutrition/Diet Outcome: Progressing Towards Goal 
Goal: Discharge Planning Outcome: Progressing Towards Goal 
Goal: Medications Outcome: Progressing Towards Goal 
Goal: Respiratory Outcome: Progressing Towards Goal 
Goal: Treatments/Interventions/Procedures Outcome: Progressing Towards Goal 
Goal: Psychosocial 
Outcome: Progressing Towards Goal 
Goal: *Oxygen saturation within defined limits Outcome: Progressing Towards Goal 
Goal: *Hemodynamically stable Outcome: Progressing Towards Goal 
Goal: *Demonstrates progressive activity Outcome: Progressing Towards Goal 
Goal: *Tolerating diet Outcome: Progressing Towards Goal 
Goal: *Describes available resources and support systems Outcome: Progressing Towards Goal 
Goal: *Optimal pain control at patient's stated goal 
Outcome: Progressing Towards Goal 
  
Problem: Pneumonia: Day 4 Goal: Off Pathway (Use only if patient is Off Pathway) Outcome: Progressing Towards Goal 
Goal: Activity/Safety Outcome: Progressing Towards Goal 
Goal: Nutrition/Diet Outcome: Progressing Towards Goal 
Goal: Discharge Planning Outcome: Progressing Towards Goal 
Goal: Medications Outcome: Progressing Towards Goal 
Goal: Respiratory Outcome: Progressing Towards Goal 
Goal: Treatments/Interventions/Procedures Outcome: Progressing Towards Goal 
Goal: Psychosocial 
 Outcome: Progressing Towards Goal 
  
Problem: Pneumonia: Discharge Outcomes Goal: *Demonstrates progressive activity Outcome: Progressing Towards Goal 
Goal: *Describes follow-up/return visits to physicians Outcome: Progressing Towards Goal 
Goal: *Tolerating diet Outcome: Progressing Towards Goal 
Goal: *Verbalizes name, dosage, time, side effects, and number of days to continue medications Outcome: Progressing Towards Goal 
Goal: *Influenza immunization Outcome: Progressing Towards Goal 
Goal: *Pneumococcal immunization Outcome: Progressing Towards Goal 
Goal: *Respiratory status at baseline Outcome: Progressing Towards Goal 
Goal: *Vital signs within defined limits Outcome: Progressing Towards Goal 
Goal: *Describes available resources and support systems Outcome: Progressing Towards Goal 
Goal: *Optimal pain control at patient's stated goal 
Outcome: Progressing Towards Goal 
  
Problem: Falls - Risk of 
Goal: *Absence of Falls Description: Document Alder Jaysonkathleen Fall Risk and appropriate interventions in the flowsheet. Outcome: Progressing Towards Goal 
Note: Fall Risk Interventions: 
  
 
Mentation Interventions: More frequent rounding Medication Interventions: Evaluate medications/consider consulting pharmacy Elimination Interventions: Call light in reach Problem: Patient Education: Go to Patient Education Activity Goal: Patient/Family Education Outcome: Progressing Towards Goal

## 2020-08-21 NOTE — PROGRESS NOTES
Physician Progress Note Eitan Gonsales 
CSN #:                  [id number] :                       1940 ADMIT DATE:       2020 9:14 PM 
[address] [place] DATE: 
RESPONDING 
PROVIDER #:        Khoa Wu MD 
 
 
 
 
QUERY TEXT: 
 
Patient admitted with COVID pneumonia. Noted documentation of acute respiratory failure in attending progress notes  from . Please indicate one of the following and document in the medical record: The medical record reflects the following: 
Risk Factors: age,  not on home 18 ; pneumonia Clinical Indicators: per  H&P- deficits. Currently struggling to maintain O2 sats over 92% while on 3 L of oxygen. no mention of sat lower than 95  %  in record so far Pulmonary: in ER  MD notes Effort: Pulmonary effort is normal. No tachypnea. Comments: 3L O2 NC 
RR  18 on admit   up to 30's during stay   ;(  due to pneumonia ? ) 
no mention of  accessory muscle use , inability to speak in full sentences ABG's  7.43   PCO2   33.6;   PO2   108  sat 98%  on 3L 
pt stayed on 3L  most of stay after admitted Treatment: - 02    2-3L Acute Respiratory Failure Clinical Indicators per 3[facility] MS-DR[facility] Training Guide and Quick Reference Guide: 
pO2 < 60 mmHg or SpO2 (pulse oximetry) < 91% breathing room air 
pCO2 > 50 and pH < 7.35 
P/F ratio (pO2 / FIO2) < 300 
pO2 decrease or pCO2 increase by 10 mmHg from baseline (if known) Supplemental oxygen of 40% or more Presence of respiratory distress, tachypnea, dyspnea, shortness of breath, wheezing Unable to speak in complete sentences Use of accessory muscles to breathe Extreme anxiety and feeling of impending doom Tripod position Confusion/altered mental status/obtunded Thank you,    Pierre Duane RN  CCDS  [phone] Options provided: 
-- Acute Respiratory Failure currently as evidenced by, Please document evidence.  
-- Currently resolved Acute Respiratory Failure was evidenced by, Please document evidence 
-- Acute Respiratory Failure ruled out after study -- Other - I will add my own diagnosis -- Disagree - Not applicable / Not valid -- Disagree - Clinically unable to determine / Unknown 
-- Refer to Clinical Documentation Reviewer PROVIDER RESPONSE TEXT: 
 
This patient?s now resolved acute respiratory failure was evidenced by no resp distress. hypoxia needs o2 supplement Query created by: Maxi Velez on 8/21/2020 3:13 PM 
 
 
Electronically signed by:  Natasha Hill MD 8/21/2020 4:41 PM

## 2020-08-21 NOTE — PROGRESS NOTES
Chart reviewed. Pt may need SNF at discharge but if not, will definitely need home health if pt will agree. Pt currently on 3Lnc. Will continue to monitor for discharge needs. Violetta Vazquez RN - Outcomes Manager  [phone]

## 2020-08-21 NOTE — PROGRESS NOTES
Shift Progress Summary Note: Assumed care of patient in bed resting and quiet, febrile receiving tylenol by mouth. Tolerated plasma infusion. Patient stated voltaren helped with pain in hands and arms. No s/s of distress or discomfort. Remains in isolation. Patient Vitals for the past 12 hrs: 
 Temp Pulse Resp BP SpO2  
08/21/20 0341 99.1 °F (37.3 °C) 76 24 117/70 98 % 08/20/20 2341 98.3 °F (36.8 °C) 75 26 117/69 99 % 08/20/20 2220 100.2 °F (37.9 °C) 79 (!) 34 131/78 99 % 08/20/20 2140 (!) 100.5 °F (38.1 °C) 84 (!) 34 137/81 99 % 08/20/20 2110 99.4 °F (37.4 °C) 81 26 158/77 99 % 08/20/20 2056 100.3 °F (37.9 °C) 89 28 138/85 99 % 08/20/20 2047 100.3 °F (37.9 °C) 78 28 149/87 98 %

## 2020-08-22 LAB
ALBUMIN SERPL-MCNC: 2.1 G/DL (ref 3.4–5)
ALBUMIN/GLOB SERPL: 0.5 {RATIO} (ref 0.8–1.7)
ALP SERPL-CCNC: 40 U/L (ref 45–117)
ALT SERPL-CCNC: 16 U/L (ref 16–61)
ANION GAP SERPL CALC-SCNC: 7 MMOL/L (ref 3–18)
AST SERPL-CCNC: 20 U/L (ref 10–38)
BASOPHILS # BLD: 0 K/UL (ref 0–0.06)
BASOPHILS NFR BLD: 0 % (ref 0–3)
BILIRUB SERPL-MCNC: 0.9 MG/DL (ref 0.2–1)
BUN SERPL-MCNC: 29 MG/DL (ref 7–18)
BUN/CREAT SERPL: 29 (ref 12–20)
CALCIUM SERPL-MCNC: 8.1 MG/DL (ref 8.5–10.1)
CHLORIDE SERPL-SCNC: 106 MMOL/L (ref 100–111)
CO2 SERPL-SCNC: 22 MMOL/L (ref 21–32)
CREAT SERPL-MCNC: 1 MG/DL (ref 0.6–1.3)
CRP SERPL-MCNC: 10.8 MG/DL (ref 0–0.3)
D DIMER PPP FEU-MCNC: 10.25 UG/ML(FEU)
DIFFERENTIAL METHOD BLD: ABNORMAL
EOSINOPHIL # BLD: 0 K/UL (ref 0–0.4)
EOSINOPHIL NFR BLD: 0 % (ref 0–5)
ERYTHROCYTE [DISTWIDTH] IN BLOOD BY AUTOMATED COUNT: 13.5 % (ref 11.6–14.5)
FERRITIN SERPL-MCNC: 1239 NG/ML (ref 8–388)
GLOBULIN SER CALC-MCNC: 4.5 G/DL (ref 2–4)
GLUCOSE BLD STRIP.AUTO-MCNC: 109 MG/DL (ref 70–110)
GLUCOSE BLD STRIP.AUTO-MCNC: 132 MG/DL (ref 70–110)
GLUCOSE BLD STRIP.AUTO-MCNC: 146 MG/DL (ref 70–110)
GLUCOSE BLD STRIP.AUTO-MCNC: 177 MG/DL (ref 70–110)
GLUCOSE SERPL-MCNC: 116 MG/DL (ref 74–99)
HCT VFR BLD AUTO: 38.5 % (ref 36–48)
HGB BLD-MCNC: 13 G/DL (ref 13–16)
LDH SERPL L TO P-CCNC: 300 U/L (ref 81–234)
LYMPHOCYTES # BLD: 0.5 K/UL (ref 0.8–3.5)
LYMPHOCYTES NFR BLD: 6 % (ref 20–51)
MAGNESIUM SERPL-MCNC: 2.4 MG/DL (ref 1.6–2.6)
MCH RBC QN AUTO: 29.4 PG (ref 24–34)
MCHC RBC AUTO-ENTMCNC: 33.8 G/DL (ref 31–37)
MCV RBC AUTO: 87.1 FL (ref 74–97)
MONOCYTES # BLD: 0.7 K/UL (ref 0–1)
MONOCYTES NFR BLD: 8 % (ref 2–9)
NEUTS BAND NFR BLD MANUAL: 1 % (ref 0–5)
NEUTS SEG # BLD: 7.8 K/UL (ref 1.8–8)
NEUTS SEG NFR BLD: 85 % (ref 42–75)
PHOSPHATE SERPL-MCNC: 3.3 MG/DL (ref 2.5–4.9)
PLATELET # BLD AUTO: 111 K/UL (ref 135–420)
PLATELET COMMENTS,PCOM: ABNORMAL
PMV BLD AUTO: 10.5 FL (ref 9.2–11.8)
POTASSIUM SERPL-SCNC: 3.9 MMOL/L (ref 3.5–5.5)
PROT SERPL-MCNC: 6.6 G/DL (ref 6.4–8.2)
RBC # BLD AUTO: 4.42 M/UL (ref 4.7–5.5)
RBC MORPH BLD: ABNORMAL
SODIUM SERPL-SCNC: 135 MMOL/L (ref 136–145)
WBC # BLD AUTO: 9 K/UL (ref 4.6–13.2)

## 2020-08-22 PROCEDURE — 86140 C-REACTIVE PROTEIN: CPT

## 2020-08-22 PROCEDURE — 74011000258 HC RX REV CODE- 258: Performed by: INTERNAL MEDICINE

## 2020-08-22 PROCEDURE — 74011000250 HC RX REV CODE- 250

## 2020-08-22 PROCEDURE — 83735 ASSAY OF MAGNESIUM: CPT

## 2020-08-22 PROCEDURE — 74011250636 HC RX REV CODE- 250/636: Performed by: INTERNAL MEDICINE

## 2020-08-22 PROCEDURE — 82728 ASSAY OF FERRITIN: CPT

## 2020-08-22 PROCEDURE — 77010033678 HC OXYGEN DAILY

## 2020-08-22 PROCEDURE — 74011250637 HC RX REV CODE- 250/637: Performed by: INTERNAL MEDICINE

## 2020-08-22 PROCEDURE — 65270000029 HC RM PRIVATE

## 2020-08-22 PROCEDURE — 85379 FIBRIN DEGRADATION QUANT: CPT

## 2020-08-22 PROCEDURE — 36415 COLL VENOUS BLD VENIPUNCTURE: CPT

## 2020-08-22 PROCEDURE — 85025 COMPLETE CBC W/AUTO DIFF WBC: CPT

## 2020-08-22 PROCEDURE — 74011250636 HC RX REV CODE- 250/636: Performed by: HOSPITALIST

## 2020-08-22 PROCEDURE — 74011636637 HC RX REV CODE- 636/637: Performed by: HOSPITALIST

## 2020-08-22 PROCEDURE — 80053 COMPREHEN METABOLIC PANEL: CPT

## 2020-08-22 PROCEDURE — 74011000258 HC RX REV CODE- 258

## 2020-08-22 PROCEDURE — 84100 ASSAY OF PHOSPHORUS: CPT

## 2020-08-22 PROCEDURE — 83615 LACTATE (LD) (LDH) ENZYME: CPT

## 2020-08-22 PROCEDURE — 82962 GLUCOSE BLOOD TEST: CPT

## 2020-08-22 RX ADMIN — REMDESIVIR 100 MG: 100 INJECTION, POWDER, LYOPHILIZED, FOR SOLUTION INTRAVENOUS at 09:07

## 2020-08-22 RX ADMIN — DEXAMETHASONE 6 MG: 2 TABLET ORAL at 09:07

## 2020-08-22 RX ADMIN — INSULIN LISPRO 3 UNITS: 100 INJECTION, SOLUTION INTRAVENOUS; SUBCUTANEOUS at 21:58

## 2020-08-22 RX ADMIN — CHOLECALCIFEROL TAB 25 MCG (1000 UNIT) 2 TABLET: 25 TAB at 09:07

## 2020-08-22 RX ADMIN — DICLOFENAC 2 G: 10 GEL TOPICAL at 18:00

## 2020-08-22 RX ADMIN — DICLOFENAC 2 G: 10 GEL TOPICAL at 13:00

## 2020-08-22 RX ADMIN — DICLOFENAC 2 G: 10 GEL TOPICAL at 09:00

## 2020-08-22 RX ADMIN — FAMOTIDINE 10 MG: 20 TABLET, FILM COATED ORAL at 18:38

## 2020-08-22 RX ADMIN — PRAVASTATIN SODIUM 40 MG: 20 TABLET ORAL at 21:57

## 2020-08-22 RX ADMIN — Medication 10 ML: at 16:50

## 2020-08-22 RX ADMIN — ENOXAPARIN SODIUM 100 MG: 100 INJECTION SUBCUTANEOUS at 09:07

## 2020-08-22 RX ADMIN — DICLOFENAC 2 G: 10 GEL TOPICAL at 21:59

## 2020-08-22 RX ADMIN — Medication 10 ML: at 21:59

## 2020-08-22 RX ADMIN — ENOXAPARIN SODIUM 100 MG: 100 INJECTION SUBCUTANEOUS at 21:04

## 2020-08-22 RX ADMIN — ZINC SULFATE 220 MG (50 MG) CAPSULE 1 CAPSULE: CAPSULE at 09:07

## 2020-08-22 RX ADMIN — Medication 500 MG: at 09:07

## 2020-08-22 NOTE — PROGRESS NOTES
[facility] 
(A Division of [address] [place] [address]) Follow-up Note Date of Admission: 8/18/2020       Date of Note:  8/22/2020 Will plan to check back on Monday 8/24 but will review EMR remotely and be available by phone this weekend. I can be reached at [phone] if needed. Summary:   
77 y/o AAM w/ DM, HTN, HLD, Nephrolithiasis, BPH adm 8/18 w/ SOB. NHR diagnosed w/ covid 8/10, became weak, w/ SOB, dry cough in past few days leading to presentation at the [facility] ED. Afebrile and WBC normal but RR consistently > 27. On 2 lpm NC SpO2 is 98%. CXR 8/18: central venous congestion if not mild central pulmonary edema (CXR 8/10 showed no acute cardiopulmonary process). Interval History: 
Fever appears to be resolving. Denies shortness of breath at rest.  Arm pain better today. Appears to be defervescing Current Antimicrobials:    Prior Antimicrobials: 
remdesivir 8/19 - # 4 Assessment: Rec / Plan:  
Confusion - ? Metabolic encephalopathy 
- CT Head 8/21: no acute intracranial pathology -> per primary team  
NEW RUE DVT 
- seen on Duplex 8/21: acute occlusive DVT 1 of 2 brachial veins RUE, acute non-occlusive thromb basilic vein LUE 
- likely covid related -> anticoagulation per primary team  
Low grade fever 
- from covid pneumonia +/- DVTs -> monitor Covid 19 pneumonia 
- SOB, fever, chills, sore throat, Increasing dyspnea last three days. - + Covid 8/10.   
- CXR bilateral interstitial infiltrates c/w covid (at least central venous /central pulmonary edema) - SpO2 95% on 3 lpm NC 
- Convalescent plasma given 8/20   
- LFT's OK on remdesivir -> continue remdesivir, dexamethasone 
-> monitor off antibiotics Acute Hypoxic Respiratory Failure -> O2 as needed DM HTN   
HLD Nephrolithiasis BPH Microbiology: 
 
8/19 bcx NGTD Lines / Catheters: 
  
piv Patient Active Problem List  
Diagnosis Code  HCVD (hypertensive cardiovascular disease) I11.9  Hyperlipidemia E78.5  Diabetes mellitus ([facility] E11.9  Bradycardia R00.1  Hypertension I10  
 BPH (benign prostatic hyperplasia) N40.0  Bone pain M89.8X9  Muscular weakness M62.81  Leg pain, right M79.604  Bladder neck contracture N32.0  Microscopic hematuria R31.29  
 History of nephrolithiasis Z87.442  Obesity (BMI 30.0-34. 9) E66.9  
 Pneumonia due to COVID-19 virus U07.1, J12.89  Thrombocytopenia ([facility] D69.6  Shortness of breath R06.02  
 
 
Current Facility-Administered Medications Medication Dose Route Frequency  enoxaparin (LOVENOX) injection 100 mg  1 mg/kg SubCUTAneous Q12H  
 glucose chewable tablet 16 g  16 g Oral PRN  
 glucagon (GLUCAGEN) injection 1 mg  1 mg IntraMUSCular PRN  
 dextrose 10% infusion 125-250 mL  125-250 mL IntraVENous PRN  
 insulin lispro (HUMALOG) injection   SubCUTAneous AC&HS  cholecalciferol (VITAMIN D3) (1000 Units /25 mcg) tablet 2 Tab  2,000 Units Oral DAILY  diclofenac (VOLTAREN) 1 % topical gel 2 g  2 g Topical QID  dexAMETHasone (DECADRON) tablet 6 mg  6 mg Oral DAILY  pravastatin (PRAVACHOL) tablet 40 mg  40 mg Oral QHS  famotidine (PEPCID) tablet 10 mg  10 mg Oral QPM  
 sodium chloride (NS) flush 5-40 mL  5-40 mL IntraVENous Q8H  
 sodium chloride (NS) flush 5-40 mL  5-40 mL IntraVENous PRN  
 acetaminophen (TYLENOL) tablet 650 mg  650 mg Oral Q6H PRN Or  
 acetaminophen (TYLENOL) suppository 650 mg  650 mg Rectal Q6H PRN  polyethylene glycol (MIRALAX) packet 17 g  17 g Oral DAILY PRN  promethazine (PHENERGAN) tablet 12.5 mg  12.5 mg Oral Q6H PRN Or  
 ondansetron (ZOFRAN) injection 4 mg  4 mg IntraVENous Q6H PRN  
 glucose chewable tablet 16 g  16 g Oral PRN  
 glucagon (GLUCAGEN) injection 1 mg  1 mg IntraMUSCular PRN  
 ascorbic acid (vitamin C) (VITAMIN C) tablet 500 mg  500 mg Oral DAILY  zinc sulfate (ZINCATE) 220 (50) mg capsule 1 Cap  1 Cap Oral DAILY  remdesivir 100 mg in 0.9% sodium chloride 250 mL IVPB  100 mg IntraVENous Q24H  
 0.9% sodium chloride infusion 250 mL  250 mL IntraVENous PRN  
 dextrose 10% infusion 125-250 mL  125-250 mL IntraVENous PRN Review of Systems - Negative except as in interval history Objective: 
 
Visit Vitals /84 (BP 1 Location: Left leg, BP Patient Position: At rest;Supine) Pulse 62 Temp 97 °F (36.1 °C) Resp 18 Ht 6' 2\" (1.88 m) Wt 99.8 kg (220 lb) SpO2 95% BMI 28.25 kg/m² Temp (24hrs), Av.2 °F (36.2 °C), Min:96.9 °F (36.1 °C), Max:97.8 °F (36.6 °C) General: Well developed, well nourished 78 y.o. BLACK male in no acute distress. ENT: ENT exam normal, no neck nodes or sinus tenderness Head: normocephalic, without obvious abnormality Mouth:  mucous membranes moist, pharynx normal without lesions Neck: supple, symmetrical, trachea midline Cardio:  regular rhythm, no murmurs, rubs or gallops Chest: inspection normal - no chest wall deformities or tenderness, respiratory effort normal 
Lungs: clear to auscultation, no wheezes or rales and unlabored breathing Abdomen: soft, non-tender. Bowel sounds normal. No masses, no organomegaly. Extremities:  no redness or tenderness in the calves or thighs, no edema, tenderness RUE> LUE Lab results: 
 
Chemistry Recent Labs  
  20 
0524 20 
0302 20 
1493 * 84 88 * 137 136  
K 3.9 3.7 3.8  108 108 CO2 22 22 23 BUN 29* 23* 23* CREA 1.00 0.99 1.15  
CA 8.1* 8.0* 8.2* AGAP 7 7 5 BUCR 29* 23* 20 AP 40* 42* 38*  
TP 6.6 7.0 6.9 ALB 2.1* 2.4* 2.4*  
GLOB 4.5* 4.6* 4.5* AGRAT 0.5* 0.5* 0.5* CBC w/ Diff Recent Labs  
  20 
0524 20 
0302 20 
5494 WBC 9.0 7.0 8.2 RBC 4.42* 4.83 4.85  
HGB 13.0 14.2 14.2 HCT 38.5 42.8 43.1 * 97* 107* GRANS 85* 85* 78* LYMPH 6* 10* 9*  
 EOS 0 0 1 Microbiology All Micro Results Procedure Component Value Units Date/Time CULTURE, BLOOD [[id number]] Collected:  08/19/20 0050 Order Status:  Completed Specimen:  Blood Updated:  08/22/20 9809 Special Requests: NO SPECIAL REQUESTS Culture result: NO GROWTH 3 DAYS     
 CULTURE, BLOOD [[id number]] Collected:  08/19/20 0035 Order Status:  Completed Specimen:  Blood Updated:  08/22/20 4706 Special Requests: NO SPECIAL REQUESTS Culture result: NO GROWTH 3 DAYS     
 CULTURE, URINE [[id number]] Collected:  08/19/20 1350 Order Status:  Completed Specimen:  Clean catch Updated:  08/21/20 5563 Special Requests: NO SPECIAL REQUESTS Culture result: No growth (<1,000 CFU/ML) CULTURE, RESPIRATORY/SPUTUM/BRONCH Cassidyarnoldo Puentesjordan [[id number]] Collected:  08/19/20 1800 Order Status:  Canceled Specimen:  Sputum Rangel Mcclendon MD, [facility] Infectious Diseases 
[phone] 
8/22/2020  
6:31 PM

## 2020-08-22 NOTE — PROGRESS NOTES
[facility] to inform that patient's EKG monitor is showing pauses and the longest one was 2.47 seconds. Dr. Herbert Gonzalez was notified and received verbal order to request for a hard copy of the patient's EKG strips containing the pauses. Print out will be picked up when ready.

## 2020-08-22 NOTE — PROGRESS NOTES
SUBJECTIVE: 
 
Patient still complains of bilateral hand pain/arm pain. Denies any chest pain or abdominal pain. Cough present. Denies having any shortness of breath. No nausea vomiting. Patient seemed slightly confused OBJECTIVE: 
 
/84 (BP 1 Location: Left leg, BP Patient Position: At rest;Supine)   Pulse 64   Temp 97 °F (36.1 °C)   Resp 18   Ht 6' 2\" (1.88 m)   Wt 99.8 kg (220 lb)   SpO2 95%   BMI 28.25 kg/m² General appearance -awake, ill-appearing, not in acute distress, follows commands appropriately Nose - no obvious nasal discharge. Chest -clear air entry noted in bases, no wheezes currently Heart - S1 and S2 normal 
Abdomen - soft, nontender, nondistended, Bowel sounds present Neurological -AAO x2, normal speech, no focal findings noted except limited range of motion of both wrists. Musculoskeletal -bilateral hand tenderness present, limited range of motion of both wrists, left hand mild edema present. Extremities - no pedal edema noted ASSESSMENT: 
 
1. Acute metabolic encephalopathy due to #3a 2. acute DVT R UE 
3a.  COVID 19 infection and pneumonia 3. Bilateral hand/wrist pain 4. Acute hypoxic respiratory failure due to #2, resolved 5. Diabetes mellitus 6. Hypertension 7. Dyslipidemia 8. BPH 
9. Osteoarthritis 10 High D Dimer - due to COVID infection + Acute DVT PLAN: 
 
On anticoagulation for acute DVT - Acute appearing thrombus noted in the right brachial vein(s). Continue current management per ID team with steroids and remdesivir. We will continue O2 supplement, zinc, vitamin C and vitamin D3. We will continue COVID-19 markers - at same level Given confusion,CT Head : unremarkable , global vol loss Continue Voltaren as needed for bilateral hand pain- X ray reported OA  
HTN - one reading is high - continue to monitor with current dose of antihypertensive Addendum Discussed with patient son Mr Mimi Mathis - wanted to know test results - explained -  over the phone in detail about the patient's current condition, test results and treatment options. Also explained about need of full dose Lovenox and risk and benefits using Lovenox. patient's son completely understood and agree with my plan. Disclaimer: Sections of this note are dictated using utilizing voice recognition software, which may have resulted in some phonetic based errors in grammar and contents. Even though attempts were made to correct all the mistakes, some may have been missed, and remained in the body of the document. If questions arise, please contact our department. Recent Results (from the past 24 hour(s)) GLUCOSE, POC Collection Time: 08/21/20  5:50 PM  
Result Value Ref Range Glucose (POC) 202 (H) 70 - 110 mg/dL GLUCOSE, POC Collection Time: 08/21/20  9:46 PM  
Result Value Ref Range Glucose (POC) 216 (H) 70 - 110 mg/dL METABOLIC PANEL, COMPREHENSIVE Collection Time: 08/22/20  5:24 AM  
Result Value Ref Range Sodium 135 (L) 136 - 145 mmol/L Potassium 3.9 3.5 - 5.5 mmol/L Chloride 106 100 - 111 mmol/L  
 CO2 22 21 - 32 mmol/L Anion gap 7 3.0 - 18 mmol/L Glucose 116 (H) 74 - 99 mg/dL BUN 29 (H) 7.0 - 18 MG/DL Creatinine 1.00 0.6 - 1.3 MG/DL  
 BUN/Creatinine ratio 29 (H) 12 - 20 GFR est AA >60 >60 ml/min/1.73m2 GFR est non-AA >60 >60 ml/min/1.73m2 Calcium 8.1 (L) 8.5 - 10.1 MG/DL Bilirubin, total 0.9 0.2 - 1.0 MG/DL  
 ALT (SGPT) 16 16 - 61 U/L  
 AST (SGOT) 20 10 - 38 U/L Alk. phosphatase 40 (L) 45 - 117 U/L Protein, total 6.6 6.4 - 8.2 g/dL Albumin 2.1 (L) 3.4 - 5.0 g/dL Globulin 4.5 (H) 2.0 - 4.0 g/dL A-G Ratio 0.5 (L) 0.8 - 1.7 D DIMER Collection Time: 08/22/20  5:24 AM  
Result Value Ref Range D DIMER 10.25 (H) <0.46 ug/ml(FEU) FERRITIN Collection Time: 08/22/20  5:24 AM  
Result Value Ref Range Ferritin 1,239 (H) 8 - 388 NG/ML  
C REACTIVE PROTEIN, QT Collection Time: 08/22/20  5:24 AM  
Result Value Ref Range C-Reactive protein 10.8 (H) 0 - 0.3 mg/dL LD Collection Time: 08/22/20  5:24 AM  
Result Value Ref Range  (H) 81 - 234 U/L  
CBC WITH AUTOMATED DIFF Collection Time: 08/22/20  5:24 AM  
Result Value Ref Range WBC 9.0 4.6 - 13.2 K/uL  
 RBC 4.42 (L) 4.70 - 5.50 M/uL  
 HGB 13.0 13.0 - 16.0 g/dL HCT 38.5 36.0 - 48.0 % MCV 87.1 74.0 - 97.0 FL  
 MCH 29.4 24.0 - 34.0 PG  
 MCHC 33.8 31.0 - 37.0 g/dL  
 RDW 13.5 11.6 - 14.5 % PLATELET 541 (L) 500 - 420 K/uL MPV 10.5 9.2 - 11.8 FL  
 NEUTROPHILS 85 (H) 42 - 75 % BAND NEUTROPHILS 1 0 - 5 % LYMPHOCYTES 6 (L) 20 - 51 % MONOCYTES 8 2 - 9 % EOSINOPHILS 0 0 - 5 % BASOPHILS 0 0 - 3 %  
 ABS. NEUTROPHILS 7.8 1.8 - 8.0 K/UL  
 ABS. LYMPHOCYTES 0.5 (L) 0.8 - 3.5 K/UL  
 ABS. MONOCYTES 0.7 0 - 1.0 K/UL  
 ABS. EOSINOPHILS 0.0 0.0 - 0.4 K/UL  
 ABS. BASOPHILS 0.0 0.0 - 0.06 K/UL  
 DF MANUAL PLATELET COMMENTS DECREASED PLATELETS    
 RBC COMMENTS SONIA CELLS 1+ PHOSPHORUS Collection Time: 08/22/20  5:24 AM  
Result Value Ref Range Phosphorus 3.3 2.5 - 4.9 MG/DL MAGNESIUM Collection Time: 08/22/20  5:24 AM  
Result Value Ref Range Magnesium 2.4 1.6 - 2.6 mg/dL GLUCOSE, POC Collection Time: 08/22/20  6:24 AM  
Result Value Ref Range Glucose (POC) 146 (H) 70 - 110 mg/dL GLUCOSE, POC Collection Time: 08/22/20 11:51 AM  
Result Value Ref Range Glucose (POC) 109 70 - 110 mg/dL

## 2020-08-22 NOTE — PROGRESS NOTES
Problem: Patient Education: Go to Patient Education Activity Goal: Patient/Family Education Outcome: Progressing Towards Goal 
  
Problem: Pneumonia: Day 1 Goal: Off Pathway (Use only if patient is Off Pathway) Outcome: Progressing Towards Goal 
Goal: Activity/Safety Outcome: Progressing Towards Goal 
Goal: Consults, if ordered Outcome: Progressing Towards Goal 
Goal: Diagnostic Test/Procedures Outcome: Progressing Towards Goal 
Goal: Nutrition/Diet Outcome: Progressing Towards Goal 
Goal: Medications Outcome: Progressing Towards Goal 
Goal: Respiratory Outcome: Progressing Towards Goal 
Goal: Treatments/Interventions/Procedures Outcome: Progressing Towards Goal 
Goal: Psychosocial 
Outcome: Progressing Towards Goal 
Goal: *Oxygen saturation within defined limits Outcome: Progressing Towards Goal 
Goal: *Influenza vaccine administered (October-March) Outcome: Progressing Towards Goal 
Goal: *Pneumoccocal vaccine administered Outcome: Progressing Towards Goal 
Goal: *Hemodynamically stable Outcome: Progressing Towards Goal 
Goal: *Demonstrates progressive activity Outcome: Progressing Towards Goal 
Goal: *Tolerating diet Outcome: Progressing Towards Goal 
  
Problem: Pneumonia: Day 2 Goal: Off Pathway (Use only if patient is Off Pathway) Outcome: Progressing Towards Goal 
Goal: Activity/Safety Outcome: Progressing Towards Goal 
Goal: Consults, if ordered Outcome: Progressing Towards Goal 
Goal: Diagnostic Test/Procedures Outcome: Progressing Towards Goal 
Goal: Nutrition/Diet Outcome: Progressing Towards Goal 
Goal: Discharge Planning Outcome: Progressing Towards Goal 
Goal: Medications Outcome: Progressing Towards Goal 
Goal: Respiratory Outcome: Progressing Towards Goal 
Goal: Treatments/Interventions/Procedures Outcome: Progressing Towards Goal 
Goal: Psychosocial 
Outcome: Progressing Towards Goal 
Goal: *Oxygen saturation within defined limits Outcome: Progressing Towards Goal 
 Goal: *Hemodynamically stable Outcome: Progressing Towards Goal 
Goal: *Demonstrates progressive activity Outcome: Progressing Towards Goal 
Goal: *Tolerating diet Outcome: Progressing Towards Goal 
Goal: *Optimal pain control at patient's stated goal 
Outcome: Progressing Towards Goal 
  
Problem: Pneumonia: Day 3 Goal: Off Pathway (Use only if patient is Off Pathway) Outcome: Progressing Towards Goal 
Goal: Activity/Safety Outcome: Progressing Towards Goal 
Goal: Consults, if ordered Outcome: Progressing Towards Goal 
Goal: Diagnostic Test/Procedures Outcome: Progressing Towards Goal 
Goal: Nutrition/Diet Outcome: Progressing Towards Goal 
Goal: Discharge Planning Outcome: Progressing Towards Goal 
Goal: Medications Outcome: Progressing Towards Goal 
Goal: Respiratory Outcome: Progressing Towards Goal 
Goal: Treatments/Interventions/Procedures Outcome: Progressing Towards Goal 
Goal: Psychosocial 
Outcome: Progressing Towards Goal 
Goal: *Oxygen saturation within defined limits Outcome: Progressing Towards Goal 
Goal: *Hemodynamically stable Outcome: Progressing Towards Goal 
Goal: *Demonstrates progressive activity Outcome: Progressing Towards Goal 
Goal: *Tolerating diet Outcome: Progressing Towards Goal 
Goal: *Describes available resources and support systems Outcome: Progressing Towards Goal 
Goal: *Optimal pain control at patient's stated goal 
Outcome: Progressing Towards Goal 
  
Problem: Pneumonia: Day 4 Goal: Off Pathway (Use only if patient is Off Pathway) Outcome: Progressing Towards Goal 
Goal: Activity/Safety Outcome: Progressing Towards Goal 
Goal: Nutrition/Diet Outcome: Progressing Towards Goal 
Goal: Discharge Planning Outcome: Progressing Towards Goal 
Goal: Medications Outcome: Progressing Towards Goal 
Goal: Respiratory Outcome: Progressing Towards Goal 
Goal: Treatments/Interventions/Procedures Outcome: Progressing Towards Goal 
Goal: Psychosocial 
 Outcome: Progressing Towards Goal 
  
Problem: Pneumonia: Discharge Outcomes Goal: *Demonstrates progressive activity Outcome: Progressing Towards Goal 
Goal: *Describes follow-up/return visits to physicians Outcome: Progressing Towards Goal 
Goal: *Tolerating diet Outcome: Progressing Towards Goal 
Goal: *Verbalizes name, dosage, time, side effects, and number of days to continue medications Outcome: Progressing Towards Goal 
Goal: *Influenza immunization Outcome: Progressing Towards Goal 
Goal: *Pneumococcal immunization Outcome: Progressing Towards Goal 
Goal: *Respiratory status at baseline Outcome: Progressing Towards Goal 
Goal: *Vital signs within defined limits Outcome: Progressing Towards Goal 
Goal: *Describes available resources and support systems Outcome: Progressing Towards Goal 
Goal: *Optimal pain control at patient's stated goal 
Outcome: Progressing Towards Goal 
  
Problem: Falls - Risk of 
Goal: *Absence of Falls Description: Document Sarah Rodriguez Fall Risk and appropriate interventions in the flowsheet. Outcome: Progressing Towards Goal 
Note: Fall Risk Interventions: 
  
 
Mentation Interventions: Adequate sleep, hydration, pain control, Update white board Medication Interventions: Bed/chair exit alarm, Patient to call before getting OOB, Teach patient to arise slowly Elimination Interventions: Call light in reach, Toileting schedule/hourly rounds Problem: Patient Education: Go to Patient Education Activity Goal: Patient/Family Education Outcome: Progressing Towards Goal

## 2020-08-22 NOTE — PROGRESS NOTES
Progress Note: 
Telemetry tech called about heart rate in 40's & 50's, Patient without dentures mouth breathing asleep and quiet. Remains on 2 lpm by nasal cannula. saturations 94 - 96%, No s/s of acute distress, continue to monitor. Patient Vitals for the past 8 hrs: 
 Temp Pulse Resp BP SpO2  
08/22/20 0310 96.9 °F (36.1 °C) (!) 56 18 154/79 96 % 08/21/20 2352 97 °F (36.1 °C) 65 17 158/87 96 %

## 2020-08-22 NOTE — ROUTINE PROCESS
End of Shift Note Bedside and verbal shift change report given to Hakeem Arellano RN (On coming nurse) by Tobin Engel RN (Off going nurse). Report included the following information:  
   --Procedure Summary 
   --MAR, 
   --Recent Results --Med Rec Status

## 2020-08-22 NOTE — ROUTINE PROCESS
Bedside and Verbal shift change report given to [address] [place] [address] [address] (oncoming nurse) by Emma Amador RN (offgoing nurse). Report included the following information SBAR, Kardex, Intake/Output, MAR and Recent Results.

## 2020-08-23 LAB
ALBUMIN SERPL-MCNC: 2.1 G/DL (ref 3.4–5)
ALBUMIN/GLOB SERPL: 0.5 {RATIO} (ref 0.8–1.7)
ALP SERPL-CCNC: 43 U/L (ref 45–117)
ALT SERPL-CCNC: 18 U/L (ref 16–61)
ANION GAP SERPL CALC-SCNC: 6 MMOL/L (ref 3–18)
AST SERPL-CCNC: 22 U/L (ref 10–38)
BILIRUB SERPL-MCNC: 1 MG/DL (ref 0.2–1)
BUN SERPL-MCNC: 28 MG/DL (ref 7–18)
BUN/CREAT SERPL: 30 (ref 12–20)
CALCIUM SERPL-MCNC: 7.8 MG/DL (ref 8.5–10.1)
CHLORIDE SERPL-SCNC: 109 MMOL/L (ref 100–111)
CO2 SERPL-SCNC: 24 MMOL/L (ref 21–32)
CREAT SERPL-MCNC: 0.94 MG/DL (ref 0.6–1.3)
CRP SERPL-MCNC: 5.4 MG/DL (ref 0–0.3)
D DIMER PPP FEU-MCNC: 11.91 UG/ML(FEU)
FERRITIN SERPL-MCNC: 1259 NG/ML (ref 8–388)
GLOBULIN SER CALC-MCNC: 4.5 G/DL (ref 2–4)
GLUCOSE BLD STRIP.AUTO-MCNC: 112 MG/DL (ref 70–110)
GLUCOSE BLD STRIP.AUTO-MCNC: 120 MG/DL (ref 70–110)
GLUCOSE BLD STRIP.AUTO-MCNC: 138 MG/DL (ref 70–110)
GLUCOSE BLD STRIP.AUTO-MCNC: 191 MG/DL (ref 70–110)
GLUCOSE SERPL-MCNC: 106 MG/DL (ref 74–99)
LDH SERPL L TO P-CCNC: 300 U/L (ref 81–234)
POTASSIUM SERPL-SCNC: 3.9 MMOL/L (ref 3.5–5.5)
PROT SERPL-MCNC: 6.6 G/DL (ref 6.4–8.2)
SODIUM SERPL-SCNC: 139 MMOL/L (ref 136–145)

## 2020-08-23 PROCEDURE — 74011000258 HC RX REV CODE- 258

## 2020-08-23 PROCEDURE — 77010033678 HC OXYGEN DAILY

## 2020-08-23 PROCEDURE — 87635 SARS-COV-2 COVID-19 AMP PRB: CPT

## 2020-08-23 PROCEDURE — 74011636637 HC RX REV CODE- 636/637: Performed by: HOSPITALIST

## 2020-08-23 PROCEDURE — 80053 COMPREHEN METABOLIC PANEL: CPT

## 2020-08-23 PROCEDURE — 82728 ASSAY OF FERRITIN: CPT

## 2020-08-23 PROCEDURE — 74011250636 HC RX REV CODE- 250/636: Performed by: INTERNAL MEDICINE

## 2020-08-23 PROCEDURE — 82962 GLUCOSE BLOOD TEST: CPT

## 2020-08-23 PROCEDURE — 65270000029 HC RM PRIVATE

## 2020-08-23 PROCEDURE — 85379 FIBRIN DEGRADATION QUANT: CPT

## 2020-08-23 PROCEDURE — 74011250637 HC RX REV CODE- 250/637: Performed by: INTERNAL MEDICINE

## 2020-08-23 PROCEDURE — 83615 LACTATE (LD) (LDH) ENZYME: CPT

## 2020-08-23 PROCEDURE — 36415 COLL VENOUS BLD VENIPUNCTURE: CPT

## 2020-08-23 PROCEDURE — 74011000250 HC RX REV CODE- 250

## 2020-08-23 PROCEDURE — 86140 C-REACTIVE PROTEIN: CPT

## 2020-08-23 PROCEDURE — 74011000258 HC RX REV CODE- 258: Performed by: INTERNAL MEDICINE

## 2020-08-23 PROCEDURE — 74011250636 HC RX REV CODE- 250/636: Performed by: HOSPITALIST

## 2020-08-23 RX ADMIN — Medication 500 MG: at 09:02

## 2020-08-23 RX ADMIN — DICLOFENAC 2 G: 10 GEL TOPICAL at 18:18

## 2020-08-23 RX ADMIN — DICLOFENAC 2 G: 10 GEL TOPICAL at 09:00

## 2020-08-23 RX ADMIN — PRAVASTATIN SODIUM 40 MG: 20 TABLET ORAL at 21:37

## 2020-08-23 RX ADMIN — ENOXAPARIN SODIUM 100 MG: 100 INJECTION SUBCUTANEOUS at 09:02

## 2020-08-23 RX ADMIN — Medication 10 ML: at 13:00

## 2020-08-23 RX ADMIN — CHOLECALCIFEROL TAB 25 MCG (1000 UNIT) 2 TABLET: 25 TAB at 09:02

## 2020-08-23 RX ADMIN — Medication 10 ML: at 06:00

## 2020-08-23 RX ADMIN — REMDESIVIR 100 MG: 100 INJECTION, POWDER, LYOPHILIZED, FOR SOLUTION INTRAVENOUS at 09:05

## 2020-08-23 RX ADMIN — ZINC SULFATE 220 MG (50 MG) CAPSULE 1 CAPSULE: CAPSULE at 09:02

## 2020-08-23 RX ADMIN — FAMOTIDINE 10 MG: 20 TABLET, FILM COATED ORAL at 18:18

## 2020-08-23 RX ADMIN — INSULIN LISPRO 3 UNITS: 100 INJECTION, SOLUTION INTRAVENOUS; SUBCUTANEOUS at 21:45

## 2020-08-23 RX ADMIN — DEXAMETHASONE 6 MG: 2 TABLET ORAL at 09:02

## 2020-08-23 RX ADMIN — ENOXAPARIN SODIUM 100 MG: 100 INJECTION SUBCUTANEOUS at 21:36

## 2020-08-23 RX ADMIN — DICLOFENAC 2 G: 10 GEL TOPICAL at 13:00

## 2020-08-23 RX ADMIN — DICLOFENAC 2 G: 10 GEL TOPICAL at 22:00

## 2020-08-23 RX ADMIN — Medication 10 ML: at 21:37

## 2020-08-23 NOTE — PROGRESS NOTES
Problem: Patient Education: Go to Patient Education Activity Goal: Patient/Family Education Outcome: Progressing Towards Goal 
  
Problem: Pneumonia: Day 1 Goal: Off Pathway (Use only if patient is Off Pathway) Outcome: Progressing Towards Goal 
Goal: Activity/Safety Outcome: Progressing Towards Goal 
Goal: Consults, if ordered Outcome: Progressing Towards Goal 
Goal: Diagnostic Test/Procedures Outcome: Progressing Towards Goal 
Goal: Nutrition/Diet Outcome: Progressing Towards Goal 
Goal: Medications Outcome: Progressing Towards Goal 
Goal: Respiratory Outcome: Progressing Towards Goal 
Goal: Treatments/Interventions/Procedures Outcome: Progressing Towards Goal 
Goal: Psychosocial 
Outcome: Progressing Towards Goal 
Goal: *Oxygen saturation within defined limits Outcome: Progressing Towards Goal 
Goal: *Influenza vaccine administered (October-March) Outcome: Progressing Towards Goal 
Goal: *Pneumoccocal vaccine administered Outcome: Progressing Towards Goal 
Goal: *Hemodynamically stable Outcome: Progressing Towards Goal 
Goal: *Demonstrates progressive activity Outcome: Progressing Towards Goal 
Goal: *Tolerating diet Outcome: Progressing Towards Goal 
  
Problem: Pneumonia: Day 2 Goal: Off Pathway (Use only if patient is Off Pathway) Outcome: Progressing Towards Goal 
Goal: Activity/Safety Outcome: Progressing Towards Goal 
Goal: Consults, if ordered Outcome: Progressing Towards Goal 
Goal: Diagnostic Test/Procedures Outcome: Progressing Towards Goal 
Goal: Nutrition/Diet Outcome: Progressing Towards Goal 
Goal: Discharge Planning Outcome: Progressing Towards Goal 
Goal: Medications Outcome: Progressing Towards Goal 
Goal: Respiratory Outcome: Progressing Towards Goal 
Goal: Treatments/Interventions/Procedures Outcome: Progressing Towards Goal 
Goal: Psychosocial 
Outcome: Progressing Towards Goal 
Goal: *Oxygen saturation within defined limits Outcome: Progressing Towards Goal 
 Goal: *Hemodynamically stable Outcome: Progressing Towards Goal 
Goal: *Demonstrates progressive activity Outcome: Progressing Towards Goal 
Goal: *Tolerating diet Outcome: Progressing Towards Goal 
Goal: *Optimal pain control at patient's stated goal 
Outcome: Progressing Towards Goal 
  
Problem: Pneumonia: Day 3 Goal: Off Pathway (Use only if patient is Off Pathway) Outcome: Progressing Towards Goal 
Goal: Activity/Safety Outcome: Progressing Towards Goal 
Goal: Consults, if ordered Outcome: Progressing Towards Goal 
Goal: Diagnostic Test/Procedures Outcome: Progressing Towards Goal 
Goal: Nutrition/Diet Outcome: Progressing Towards Goal 
Goal: Discharge Planning Outcome: Progressing Towards Goal 
Goal: Medications Outcome: Progressing Towards Goal 
Goal: Respiratory Outcome: Progressing Towards Goal 
Goal: Treatments/Interventions/Procedures Outcome: Progressing Towards Goal 
Goal: Psychosocial 
Outcome: Progressing Towards Goal 
Goal: *Oxygen saturation within defined limits Outcome: Progressing Towards Goal 
Goal: *Hemodynamically stable Outcome: Progressing Towards Goal 
Goal: *Demonstrates progressive activity Outcome: Progressing Towards Goal 
Goal: *Tolerating diet Outcome: Progressing Towards Goal 
Goal: *Describes available resources and support systems Outcome: Progressing Towards Goal 
Goal: *Optimal pain control at patient's stated goal 
Outcome: Progressing Towards Goal 
  
Problem: Pneumonia: Day 4 Goal: Off Pathway (Use only if patient is Off Pathway) Outcome: Progressing Towards Goal 
Goal: Activity/Safety Outcome: Progressing Towards Goal 
Goal: Nutrition/Diet Outcome: Progressing Towards Goal 
Goal: Discharge Planning Outcome: Progressing Towards Goal 
Goal: Medications Outcome: Progressing Towards Goal 
Goal: Respiratory Outcome: Progressing Towards Goal 
Goal: Treatments/Interventions/Procedures Outcome: Progressing Towards Goal 
Goal: Psychosocial 
 Outcome: Progressing Towards Goal 
  
Problem: Pneumonia: Discharge Outcomes Goal: *Demonstrates progressive activity Outcome: Progressing Towards Goal 
Goal: *Describes follow-up/return visits to physicians Outcome: Progressing Towards Goal 
Goal: *Tolerating diet Outcome: Progressing Towards Goal 
Goal: *Verbalizes name, dosage, time, side effects, and number of days to continue medications Outcome: Progressing Towards Goal 
Goal: *Influenza immunization Outcome: Progressing Towards Goal 
Goal: *Pneumococcal immunization Outcome: Progressing Towards Goal 
Goal: *Respiratory status at baseline Outcome: Progressing Towards Goal 
Goal: *Vital signs within defined limits Outcome: Progressing Towards Goal 
Goal: *Describes available resources and support systems Outcome: Progressing Towards Goal 
Goal: *Optimal pain control at patient's stated goal 
Outcome: Progressing Towards Goal 
  
Problem: Falls - Risk of 
Goal: *Absence of Falls Description: Document Adriana Retana Fall Risk and appropriate interventions in the flowsheet. Outcome: Progressing Towards Goal 
Note: Fall Risk Interventions: 
  
 
Mentation Interventions: Adequate sleep, hydration, pain control, Update white board Medication Interventions: Bed/chair exit alarm, Patient to call before getting OOB, Teach patient to arise slowly Elimination Interventions: Call light in reach, Toileting schedule/hourly rounds Problem: Patient Education: Go to Patient Education Activity Goal: Patient/Family Education Outcome: Progressing Towards Goal

## 2020-08-23 NOTE — PROGRESS NOTES
Problem: Patient Education: Go to Patient Education Activity Goal: Patient/Family Education Outcome: Progressing Towards Goal 
  
Problem: Pneumonia: Day 1 Goal: Off Pathway (Use only if patient is Off Pathway) Outcome: Progressing Towards Goal 
Goal: Activity/Safety Outcome: Progressing Towards Goal 
Goal: Consults, if ordered Outcome: Progressing Towards Goal 
Goal: Diagnostic Test/Procedures Outcome: Progressing Towards Goal 
Goal: Nutrition/Diet Outcome: Progressing Towards Goal 
Goal: Medications Outcome: Progressing Towards Goal 
Goal: Respiratory Outcome: Progressing Towards Goal 
Goal: Treatments/Interventions/Procedures Outcome: Progressing Towards Goal 
Goal: Psychosocial 
Outcome: Progressing Towards Goal 
Goal: *Oxygen saturation within defined limits Outcome: Progressing Towards Goal 
Goal: *Influenza vaccine administered (October-March) Outcome: Progressing Towards Goal 
Goal: *Pneumoccocal vaccine administered Outcome: Progressing Towards Goal 
Goal: *Hemodynamically stable Outcome: Progressing Towards Goal 
Goal: *Demonstrates progressive activity Outcome: Progressing Towards Goal 
Goal: *Tolerating diet Outcome: Progressing Towards Goal 
  
Problem: Pneumonia: Day 2 Goal: Off Pathway (Use only if patient is Off Pathway) Outcome: Progressing Towards Goal 
Goal: Activity/Safety Outcome: Progressing Towards Goal 
Goal: Consults, if ordered Outcome: Progressing Towards Goal 
Goal: Diagnostic Test/Procedures Outcome: Progressing Towards Goal 
Goal: Nutrition/Diet Outcome: Progressing Towards Goal 
Goal: Discharge Planning Outcome: Progressing Towards Goal 
Goal: Medications Outcome: Progressing Towards Goal 
Goal: Respiratory Outcome: Progressing Towards Goal 
Goal: Treatments/Interventions/Procedures Outcome: Progressing Towards Goal 
Goal: Psychosocial 
Outcome: Progressing Towards Goal 
Goal: *Oxygen saturation within defined limits Outcome: Progressing Towards Goal 
 Goal: *Hemodynamically stable Outcome: Progressing Towards Goal 
Goal: *Demonstrates progressive activity Outcome: Progressing Towards Goal 
Goal: *Tolerating diet Outcome: Progressing Towards Goal 
Goal: *Optimal pain control at patient's stated goal 
Outcome: Progressing Towards Goal 
  
Problem: Pneumonia: Day 3 Goal: Off Pathway (Use only if patient is Off Pathway) Outcome: Progressing Towards Goal 
Goal: Activity/Safety Outcome: Progressing Towards Goal 
Goal: Consults, if ordered Outcome: Progressing Towards Goal 
Goal: Diagnostic Test/Procedures Outcome: Progressing Towards Goal 
Goal: Nutrition/Diet Outcome: Progressing Towards Goal 
Goal: Discharge Planning Outcome: Progressing Towards Goal 
Goal: Medications Outcome: Progressing Towards Goal 
Goal: Respiratory Outcome: Progressing Towards Goal 
Goal: Treatments/Interventions/Procedures Outcome: Progressing Towards Goal 
Goal: Psychosocial 
Outcome: Progressing Towards Goal 
Goal: *Oxygen saturation within defined limits Outcome: Progressing Towards Goal 
Goal: *Hemodynamically stable Outcome: Progressing Towards Goal 
Goal: *Demonstrates progressive activity Outcome: Progressing Towards Goal 
Goal: *Tolerating diet Outcome: Progressing Towards Goal 
Goal: *Describes available resources and support systems Outcome: Progressing Towards Goal 
Goal: *Optimal pain control at patient's stated goal 
Outcome: Progressing Towards Goal 
  
Problem: Pneumonia: Day 4 Goal: Off Pathway (Use only if patient is Off Pathway) Outcome: Progressing Towards Goal 
Goal: Activity/Safety Outcome: Progressing Towards Goal 
Goal: Nutrition/Diet Outcome: Progressing Towards Goal 
Goal: Discharge Planning Outcome: Progressing Towards Goal 
Goal: Medications Outcome: Progressing Towards Goal 
Goal: Respiratory Outcome: Progressing Towards Goal 
Goal: Treatments/Interventions/Procedures Outcome: Progressing Towards Goal 
Goal: Psychosocial 
 Outcome: Progressing Towards Goal 
  
Problem: Pneumonia: Discharge Outcomes Goal: *Demonstrates progressive activity Outcome: Progressing Towards Goal 
Goal: *Describes follow-up/return visits to physicians Outcome: Progressing Towards Goal 
Goal: *Tolerating diet Outcome: Progressing Towards Goal 
Goal: *Verbalizes name, dosage, time, side effects, and number of days to continue medications Outcome: Progressing Towards Goal 
Goal: *Influenza immunization Outcome: Progressing Towards Goal 
Goal: *Pneumococcal immunization Outcome: Progressing Towards Goal 
Goal: *Respiratory status at baseline Outcome: Progressing Towards Goal 
Goal: *Vital signs within defined limits Outcome: Progressing Towards Goal 
Goal: *Describes available resources and support systems Outcome: Progressing Towards Goal 
Goal: *Optimal pain control at patient's stated goal 
Outcome: Progressing Towards Goal 
  
Problem: Falls - Risk of 
Goal: *Absence of Falls Description: Document Nadia Tylerkathleen Fall Risk and appropriate interventions in the flowsheet. Outcome: Progressing Towards Goal 
Note: Fall Risk Interventions: 
  
 
Mentation Interventions: Bed/chair exit alarm, More frequent rounding, Toileting rounds Medication Interventions: Patient to call before getting OOB, Teach patient to arise slowly Elimination Interventions: Call light in reach, Toileting schedule/hourly rounds Problem: Patient Education: Go to Patient Education Activity Goal: Patient/Family Education Outcome: Progressing Towards Goal

## 2020-08-23 NOTE — ROUTINE PROCESS
Bedside shift change report given to Tulio (oncoming nurse) by Bentley Harvey (offgoing nurse). Report included the following information SBAR.

## 2020-08-23 NOTE — PROGRESS NOTES
SUBJECTIVE: 
 
Patient still complains of bilateral hand pain/arm pain. Denies any chest pain or abdominal pain. Cough present. Denies having any shortness of breath. No nausea vomiting. Patient seemed slightly confused OBJECTIVE: 
 
BP (!) 157/91 (BP 1 Location: Right arm, BP Patient Position: At rest)   Pulse 66   Temp 97.4 °F (36.3 °C)   Resp 18   Ht 6' 2\" (1.88 m)   Wt 99.8 kg (220 lb)   SpO2 95%   BMI 28.25 kg/m² General appearance -awake, ill-appearing, not in acute distress, follows commands appropriately Nose - no obvious nasal discharge. Chest -clear air entry noted in bases, no wheezes currently Heart - S1 and S2 normal 
Abdomen - soft, nontender, nondistended, Bowel sounds present Neurological -AAO x2, normal speech, no focal findings noted except limited range of motion of both wrists. Musculoskeletal -bilateral hand tenderness present, limited range of motion of both wrists, left hand mild edema present. Extremities - no pedal edema noted ASSESSMENT: 
 
1. Acute metabolic encephalopathy due to #3a 2. acute DVT R UE 
3a.  COVID 19 infection and pneumonia -status post treatment with Remdesivir / steroids / convalescent plasma - id following 3. Bilateral hand/wrist pain 4. Acute hypoxic respiratory failure due to #2, resolved 5. Diabetes mellitus 6. Hypertension 7. Dyslipidemia 8. BPH 
9. Osteoarthritis 10 High D Dimer - due to COVID infection + Acute DVT PLAN: 
 
On anticoagulation for acute DVT - Acute appearing thrombus noted in the right brachial vein(s). Continue current management per ID team with steroids and remdesivir. We will continue O2 supplement, zinc, vitamin C and vitamin D3. We will continue COVID-19 markers - at same level Given confusion,CT Head : unremarkable , global vol loss Continue Voltaren as needed for bilateral hand pain- X ray reported OA  
HTN - one reading is high - continue to monitor with current dose of antihypertensive Addendum Discussed with patient son Mr Halima Brown - wanted to know test results - explained -  over the phone in detail about the patient's current condition, test results and treatment options. Also explained about need of full dose Lovenox and risk and benefits using Lovenox. patient's son completely understood and agree with my plan. Disclaimer: Sections of this note are dictated using utilizing voice recognition software, which may have resulted in some phonetic based errors in grammar and contents. Even though attempts were made to correct all the mistakes, some may have been missed, and remained in the body of the document. If questions arise, please contact our department. Recent Results (from the past 24 hour(s)) GLUCOSE, POC Collection Time: 08/22/20  4:43 PM  
Result Value Ref Range Glucose (POC) 132 (H) 70 - 110 mg/dL GLUCOSE, POC Collection Time: 08/22/20  9:38 PM  
Result Value Ref Range Glucose (POC) 177 (H) 70 - 110 mg/dL METABOLIC PANEL, COMPREHENSIVE Collection Time: 08/23/20  2:58 AM  
Result Value Ref Range Sodium 139 136 - 145 mmol/L Potassium 3.9 3.5 - 5.5 mmol/L Chloride 109 100 - 111 mmol/L  
 CO2 24 21 - 32 mmol/L Anion gap 6 3.0 - 18 mmol/L Glucose 106 (H) 74 - 99 mg/dL BUN 28 (H) 7.0 - 18 MG/DL Creatinine 0.94 0.6 - 1.3 MG/DL  
 BUN/Creatinine ratio 30 (H) 12 - 20 GFR est AA >60 >60 ml/min/1.73m2 GFR est non-AA >60 >60 ml/min/1.73m2 Calcium 7.8 (L) 8.5 - 10.1 MG/DL Bilirubin, total 1.0 0.2 - 1.0 MG/DL  
 ALT (SGPT) 18 16 - 61 U/L  
 AST (SGOT) 22 10 - 38 U/L Alk. phosphatase 43 (L) 45 - 117 U/L Protein, total 6.6 6.4 - 8.2 g/dL Albumin 2.1 (L) 3.4 - 5.0 g/dL Globulin 4.5 (H) 2.0 - 4.0 g/dL A-G Ratio 0.5 (L) 0.8 - 1.7 D DIMER Collection Time: 08/23/20  2:58 AM  
Result Value Ref Range D DIMER 11.91 (H) <0.46 ug/ml(FEU) FERRITIN  Collection Time: 08/23/20  2:58 AM  
 Result Value Ref Range Ferritin 1,259 (H) 8 - 388 NG/ML  
C REACTIVE PROTEIN, QT Collection Time: 08/23/20  2:58 AM  
Result Value Ref Range C-Reactive protein 5.4 (H) 0 - 0.3 mg/dL LD Collection Time: 08/23/20  2:58 AM  
Result Value Ref Range  (H) 81 - 234 U/L  
GLUCOSE, POC Collection Time: 08/23/20  5:42 AM  
Result Value Ref Range Glucose (POC) 112 (H) 70 - 110 mg/dL GLUCOSE, POC Collection Time: 08/23/20 11:58 AM  
Result Value Ref Range Glucose (POC) 120 (H) 70 - 110 mg/dL

## 2020-08-23 NOTE — ROUTINE PROCESS
End of Shift Note Bedside and verbal shift change report given to Kami Delaney RN (On coming nurse) by Anju Sal RN (Off going nurse). Report included the following information:  
   --Procedure Summary 
   --MAR, 
   --Recent Results --Med Rec Status

## 2020-08-24 LAB
ANION GAP SERPL CALC-SCNC: 4 MMOL/L (ref 3–18)
BUN SERPL-MCNC: 27 MG/DL (ref 7–18)
BUN/CREAT SERPL: 30 (ref 12–20)
CALCIUM SERPL-MCNC: 8 MG/DL (ref 8.5–10.1)
CHLORIDE SERPL-SCNC: 108 MMOL/L (ref 100–111)
CO2 SERPL-SCNC: 25 MMOL/L (ref 21–32)
CREAT SERPL-MCNC: 0.9 MG/DL (ref 0.6–1.3)
CRP SERPL-MCNC: 3 MG/DL (ref 0–0.3)
D DIMER PPP FEU-MCNC: 10.01 UG/ML(FEU)
FERRITIN SERPL-MCNC: 965 NG/ML (ref 8–388)
GLUCOSE BLD STRIP.AUTO-MCNC: 115 MG/DL (ref 70–110)
GLUCOSE BLD STRIP.AUTO-MCNC: 115 MG/DL (ref 70–110)
GLUCOSE BLD STRIP.AUTO-MCNC: 92 MG/DL (ref 70–110)
GLUCOSE BLD STRIP.AUTO-MCNC: 94 MG/DL (ref 70–110)
GLUCOSE SERPL-MCNC: 86 MG/DL (ref 74–99)
LDH SERPL L TO P-CCNC: 315 U/L (ref 81–234)
POTASSIUM SERPL-SCNC: 4.1 MMOL/L (ref 3.5–5.5)
SODIUM SERPL-SCNC: 137 MMOL/L (ref 136–145)

## 2020-08-24 PROCEDURE — 83615 LACTATE (LD) (LDH) ENZYME: CPT

## 2020-08-24 PROCEDURE — 86140 C-REACTIVE PROTEIN: CPT

## 2020-08-24 PROCEDURE — 36415 COLL VENOUS BLD VENIPUNCTURE: CPT

## 2020-08-24 PROCEDURE — 74011250636 HC RX REV CODE- 250/636: Performed by: HOSPITALIST

## 2020-08-24 PROCEDURE — 77030040831 HC BAG URINE DRNG MDII -A

## 2020-08-24 PROCEDURE — 80048 BASIC METABOLIC PNL TOTAL CA: CPT

## 2020-08-24 PROCEDURE — 82728 ASSAY OF FERRITIN: CPT

## 2020-08-24 PROCEDURE — 85379 FIBRIN DEGRADATION QUANT: CPT

## 2020-08-24 PROCEDURE — 74011250636 HC RX REV CODE- 250/636: Performed by: INTERNAL MEDICINE

## 2020-08-24 PROCEDURE — 65270000029 HC RM PRIVATE

## 2020-08-24 PROCEDURE — 82962 GLUCOSE BLOOD TEST: CPT

## 2020-08-24 PROCEDURE — 74011250637 HC RX REV CODE- 250/637: Performed by: INTERNAL MEDICINE

## 2020-08-24 PROCEDURE — 77010033678 HC OXYGEN DAILY

## 2020-08-24 PROCEDURE — 74011250637 HC RX REV CODE- 250/637: Performed by: FAMILY MEDICINE

## 2020-08-24 RX ORDER — METOPROLOL SUCCINATE 100 MG/1
100 TABLET, EXTENDED RELEASE ORAL DAILY
Status: DISCONTINUED | OUTPATIENT
Start: 2020-08-24 | End: 2020-08-24

## 2020-08-24 RX ORDER — METOPROLOL SUCCINATE 50 MG/1
50 TABLET, EXTENDED RELEASE ORAL DAILY
Status: DISCONTINUED | OUTPATIENT
Start: 2020-08-25 | End: 2020-08-27 | Stop reason: HOSPADM

## 2020-08-24 RX ADMIN — ENOXAPARIN SODIUM 100 MG: 100 INJECTION SUBCUTANEOUS at 09:49

## 2020-08-24 RX ADMIN — CHOLECALCIFEROL TAB 25 MCG (1000 UNIT) 2 TABLET: 25 TAB at 09:49

## 2020-08-24 RX ADMIN — DICLOFENAC 2 G: 10 GEL TOPICAL at 09:49

## 2020-08-24 RX ADMIN — FAMOTIDINE 10 MG: 20 TABLET, FILM COATED ORAL at 20:34

## 2020-08-24 RX ADMIN — Medication 10 ML: at 05:08

## 2020-08-24 RX ADMIN — Medication 20 ML: at 13:45

## 2020-08-24 RX ADMIN — ZINC SULFATE 220 MG (50 MG) CAPSULE 1 CAPSULE: CAPSULE at 09:49

## 2020-08-24 RX ADMIN — PRAVASTATIN SODIUM 40 MG: 20 TABLET ORAL at 21:35

## 2020-08-24 RX ADMIN — METOPROLOL SUCCINATE 100 MG: 100 TABLET, EXTENDED RELEASE ORAL at 12:29

## 2020-08-24 RX ADMIN — DICLOFENAC 2 G: 10 GEL TOPICAL at 17:50

## 2020-08-24 RX ADMIN — LISINOPRIL 30 MG: 20 TABLET ORAL at 12:29

## 2020-08-24 RX ADMIN — Medication 10 ML: at 21:36

## 2020-08-24 RX ADMIN — Medication 500 MG: at 09:49

## 2020-08-24 RX ADMIN — DEXAMETHASONE 6 MG: 2 TABLET ORAL at 09:49

## 2020-08-24 RX ADMIN — DICLOFENAC 2 G: 10 GEL TOPICAL at 12:30

## 2020-08-24 RX ADMIN — DICLOFENAC 2 G: 10 GEL TOPICAL at 21:37

## 2020-08-24 RX ADMIN — ENOXAPARIN SODIUM 100 MG: 100 INJECTION SUBCUTANEOUS at 20:33

## 2020-08-24 NOTE — PROGRESS NOTES
Problem: Patient Education: Go to Patient Education Activity Goal: Patient/Family Education Outcome: Resolved/Met Problem: Pneumonia: Day 1 Goal: Off Pathway (Use only if patient is Off Pathway) Outcome: Resolved/Met Goal: Activity/Safety Outcome: Resolved/Met Goal: Consults, if ordered Outcome: Resolved/Met Goal: Diagnostic Test/Procedures Outcome: Resolved/Met Goal: Nutrition/Diet Outcome: Resolved/Met Goal: Medications Outcome: Resolved/Met Goal: Respiratory Outcome: Resolved/Met Goal: Treatments/Interventions/Procedures Outcome: Resolved/Met Goal: Psychosocial 
Outcome: Resolved/Met Goal: *Oxygen saturation within defined limits Outcome: Resolved/Met Goal: *Influenza vaccine administered (October-March) Outcome: Resolved/Met Goal: *Pneumoccocal vaccine administered Outcome: Resolved/Met Goal: *Hemodynamically stable Outcome: Resolved/Met Goal: *Demonstrates progressive activity Outcome: Resolved/Met Goal: *Tolerating diet Outcome: Resolved/Met Problem: Pneumonia: Day 2 Goal: Off Pathway (Use only if patient is Off Pathway) Outcome: Resolved/Met Goal: Activity/Safety Outcome: Resolved/Met Goal: Consults, if ordered Outcome: Resolved/Met Goal: Diagnostic Test/Procedures Outcome: Resolved/Met Goal: Nutrition/Diet Outcome: Resolved/Met Goal: Discharge Planning Outcome: Resolved/Met Goal: Medications Outcome: Resolved/Met Goal: Respiratory Outcome: Resolved/Met Goal: Treatments/Interventions/Procedures Outcome: Resolved/Met Goal: Psychosocial 
Outcome: Resolved/Met Goal: *Oxygen saturation within defined limits Outcome: Resolved/Met Goal: *Hemodynamically stable Outcome: Resolved/Met Goal: *Demonstrates progressive activity Outcome: Resolved/Met Goal: *Tolerating diet Outcome: Resolved/Met Goal: *Optimal pain control at patient's stated goal 
Outcome: Resolved/Met Problem: Pneumonia: Day 3 Goal: Off Pathway (Use only if patient is Off Pathway) Outcome: Resolved/Met Goal: Activity/Safety Outcome: Resolved/Met Goal: Consults, if ordered Outcome: Resolved/Met Goal: Diagnostic Test/Procedures Outcome: Resolved/Met Goal: Nutrition/Diet Outcome: Resolved/Met Goal: Discharge Planning Outcome: Resolved/Met Goal: Medications Outcome: Resolved/Met Goal: Respiratory Outcome: Resolved/Met Goal: Treatments/Interventions/Procedures Outcome: Resolved/Met Goal: Psychosocial 
Outcome: Resolved/Met Goal: *Oxygen saturation within defined limits Outcome: Resolved/Met Goal: *Hemodynamically stable Outcome: Resolved/Met Goal: *Demonstrates progressive activity Outcome: Resolved/Met Goal: *Tolerating diet Outcome: Resolved/Met Goal: *Describes available resources and support systems Outcome: Resolved/Met Goal: *Optimal pain control at patient's stated goal 
Outcome: Resolved/Met Problem: Pneumonia: Day 4 Goal: Off Pathway (Use only if patient is Off Pathway) Outcome: Resolved/Met Goal: Activity/Safety Outcome: Resolved/Met Goal: Nutrition/Diet Outcome: Resolved/Met Goal: Discharge Planning Outcome: Resolved/Met Goal: Medications Outcome: Resolved/Met Goal: Respiratory Outcome: Resolved/Met Goal: Treatments/Interventions/Procedures Outcome: Resolved/Met Goal: Psychosocial 
Outcome: Resolved/Met Problem: Pneumonia: Discharge Outcomes Goal: *Demonstrates progressive activity Outcome: Resolved/Met Goal: *Describes follow-up/return visits to physicians Outcome: Resolved/Met Goal: *Tolerating diet Outcome: Resolved/Met Goal: *Verbalizes name, dosage, time, side effects, and number of days to continue medications Outcome: Resolved/Met Goal: *Influenza immunization Outcome: Resolved/Met Goal: *Pneumococcal immunization Outcome: Resolved/Met Goal: *Respiratory status at baseline Outcome: Resolved/Met Goal: *Vital signs within defined limits Outcome: Resolved/Met Goal: *Describes available resources and support systems Outcome: Resolved/Met Goal: *Optimal pain control at patient's stated goal 
Outcome: Resolved/Met Problem: Falls - Risk of 
Goal: *Absence of Falls Description: Document Tilford Pickles Fall Risk and appropriate interventions in the flowsheet. Outcome: Resolved/Met Problem: Patient Education: Go to Patient Education Activity Goal: Patient/Family Education Outcome: Resolved/Met Problem: Pressure Injury - Risk of 
Goal: *Prevention of pressure injury Description: Document Luca Scale and appropriate interventions in the flowsheet. Outcome: Resolved/Met Problem: Patient Education: Go to Patient Education Activity Goal: Patient/Family Education Outcome: Resolved/Met Problem: Nutrition Deficit Goal: *Optimize nutritional status Outcome: Resolved/Met

## 2020-08-24 NOTE — PROGRESS NOTES
[facility] 
(A Division of [address] [place] [address]) Follow-up Note Date of Admission: 8/18/2020       Date of Note:  8/24/2020 Summary:   
79 y/o AAM w/ DM, HTN, HLD, Nephrolithiasis, BPH adm 8/18 w/ SOB. NHR diagnosed w/ covid 8/10, became weak, w/ SOB, dry cough in past few days leading to presentation at the [facility] ED. Afebrile and WBC normal but RR consistently > 27. On 2 lpm NC SpO2 is 98%. CXR 8/18: central venous congestion if not mild central pulmonary edema (CXR 8/10 showed no acute cardiopulmonary process). Interval History: More alert and speaking in complete sentences. SpO2 % on 3 lpm.  Says he get short of breath when he coughs but not otherwise. Afebrile x 3 days now. Current Antimicrobials:    Prior Antimicrobials: 
None  remdesivir 8/19 - # 5 Assessment: Rec / Plan:  
Confusion - ? Metabolic encephalopathy 
- CT Head 8/21: no acute intracranial pathology -> per primary team  
RUE DVT 
- seen on Duplex 8/21: acute occlusive DVT 1 of 2 brachial veins RUE, acute non-occlusive thromb basilic vein LUE 
- likely covid related -> anticoagulation per primary team  
Low grade fever 
- from covid pneumonia +/- DVTs -> monitor Covid 19 pneumonia 
- SOB, fever, chills, sore throat, Increasing dyspnea last three days. - + Covid 8/10.   
- CXR bilateral interstitial infiltrates c/w covid (at least central venous /central pulmonary edema) - SpO2 95% on 3 lpm NC 
- Convalescent plasma given 8/20   
- LFT's OK on remdesivir -> continue dexamethasone 6 more days 
-> monitor off antibiotics Acute Hypoxic Respiratory Failure -> O2 as needed DM HTN   
HLD Nephrolithiasis BPH Microbiology: 
 
8/19 bcx NGTD Lines / Catheters: 
  
piv Patient Active Problem List  
Diagnosis Code  HCVD (hypertensive cardiovascular disease) I11.9  Hyperlipidemia E78.5  Diabetes mellitus ([facility] [address] E11.9  Bradycardia R00.1  Hypertension I10  
 BPH (benign prostatic hyperplasia) N40.0  Bone pain M89.8X9  Muscular weakness M62.81  Leg pain, right M79.604  Bladder neck contracture N32.0  Microscopic hematuria R31.29  
 History of nephrolithiasis Z87.442  Obesity (BMI 30.0-34. 9) E66.9  
 Pneumonia due to COVID-19 virus U07.1, J12.89  Thrombocytopenia ([address] D69.6  Shortness of breath R06.02  
 
 
Current Facility-Administered Medications Medication Dose Route Frequency  metoprolol succinate (TOPROL-XL) tablet 100 mg  100 mg Oral DAILY  lisinopriL (PRINIVIL, ZESTRIL) tablet 30 mg  30 mg Oral DAILY  enoxaparin (LOVENOX) injection 100 mg  1 mg/kg SubCUTAneous Q12H  
 glucagon (GLUCAGEN) injection 1 mg  1 mg IntraMUSCular PRN  
 insulin lispro (HUMALOG) injection   SubCUTAneous AC&HS  cholecalciferol (VITAMIN D3) (1000 Units /25 mcg) tablet 2 Tab  2,000 Units Oral DAILY  diclofenac (VOLTAREN) 1 % topical gel 2 g  2 g Topical QID  dexAMETHasone (DECADRON) tablet 6 mg  6 mg Oral DAILY  pravastatin (PRAVACHOL) tablet 40 mg  40 mg Oral QHS  famotidine (PEPCID) tablet 10 mg  10 mg Oral QPM  
 sodium chloride (NS) flush 5-40 mL  5-40 mL IntraVENous Q8H  
 sodium chloride (NS) flush 5-40 mL  5-40 mL IntraVENous PRN  
 acetaminophen (TYLENOL) tablet 650 mg  650 mg Oral Q6H PRN Or  
 acetaminophen (TYLENOL) suppository 650 mg  650 mg Rectal Q6H PRN  polyethylene glycol (MIRALAX) packet 17 g  17 g Oral DAILY PRN  promethazine (PHENERGAN) tablet 12.5 mg  12.5 mg Oral Q6H PRN Or  
 ondansetron (ZOFRAN) injection 4 mg  4 mg IntraVENous Q6H PRN  
 glucose chewable tablet 16 g  16 g Oral PRN  
 ascorbic acid (vitamin C) (VITAMIN C) tablet 500 mg  500 mg Oral DAILY  zinc sulfate (ZINCATE) 220 (50) mg capsule 1 Cap  1 Cap Oral DAILY  0.9% sodium chloride infusion 250 mL  250 mL IntraVENous PRN  
 dextrose 10% infusion 125-250 mL  125-250 mL IntraVENous PRN  
 
 
 
 Review of Systems - Negative except as in interval history Objective: 
 
Visit Vitals /85 (BP 1 Location: Left leg, BP Patient Position: At rest) Pulse 73 Temp 96.9 °F (36.1 °C) Resp 18 Ht 6' 2\" (1.88 m) Wt 99.8 kg (220 lb) SpO2 100% BMI 28.25 kg/m² Temp (24hrs), Av.3 °F (36.3 °C), Min:96.9 °F (36.1 °C), Max:98.1 °F (36.7 °C) General: Well developed, well nourished 78 y.o. BLACK male in no acute distress. ENT: ENT exam normal, no neck nodes or sinus tenderness Head: normocephalic, without obvious abnormality Mouth:  mucous membranes moist, pharynx normal without lesions Neck: supple, symmetrical, trachea midline Cardio:  regular rhythm, no murmurs, rubs or gallops Chest: inspection normal - no chest wall deformities or tenderness, respiratory effort normal 
Lungs: clear to auscultation, no wheezes or rales and unlabored breathing Abdomen: soft, non-tender. Bowel sounds normal. No masses, no organomegaly. Extremities:  no redness or tenderness in the calves or thighs, no edema, tenderness RUE> LUE resolved Lab results: 
 
Chemistry Recent Labs  
  20 
0324 20 
0258 20 
2872 GLU 86 106* 116*  139 135* K 4.1 3.9 3.9  109 106 CO2 25 24 22 BUN 27* 28* 29* CREA 0.90 0.94 1.00  
CA 8.0* 7.8* 8.1* AGAP 4 6 7 BUCR 30* 30* 29* AP  --  43* 40* TP  --  6.6 6.6 ALB  --  2.1* 2.1*  
GLOB  --  4.5* 4.5* AGRAT  --  0.5* 0.5* CBC w/ Diff Recent Labs  
  20 
3597 WBC 9.0  
RBC 4.42* HGB 13.0 HCT 38.5 * GRANS 85* LYMPH 6*  
EOS 0 Microbiology All Micro Results Procedure Component Value Units Date/Time CULTURE, BLOOD [[id number]] Collected:  20 0035 Order Status:  Completed Specimen:  Blood Updated:  20 0306 Special Requests: NO SPECIAL REQUESTS Culture result: NO GROWTH 5 DAYS     
 CULTURE, BLOOD [[id number]] Collected:  20 0050 Order Status:  Completed Specimen:  Blood Updated:  08/24/20 2334 Special Requests: NO SPECIAL REQUESTS Culture result: NO GROWTH 5 DAYS     
 CULTURE, URINE [[id number]] Collected:  08/19/20 1350 Order Status:  Completed Specimen:  Clean catch Updated:  08/21/20 4121 Special Requests: NO SPECIAL REQUESTS Culture result: No growth (<1,000 CFU/ML) CULTURE, RESPIRATORY/SPUTUM/BRONCH Osei Gates [[id number]] Collected:  08/19/20 1800 Order Status:  Canceled Specimen:  Sputum Dayday Friedman MD, [facility] 
[phone] 
8/24/2020  
6:31 PM

## 2020-08-24 NOTE — ROUTINE PROCESS
End of Shift Note Bedside and verbal shift change report given to Anaya Gifford RN (On coming nurse) by Miquel Alejo RN (Off going nurse). Report included the following information:  
   --Procedure Summary 
   --MAR, 
   --Recent Results --Med Rec Status

## 2020-08-24 NOTE — PROGRESS NOTES
Chart reviewed. Pt remains on 3Lnc. [facility] Will continue to monitor for discharge needs. Amber Brock RN - Outcomes Manager  [phone]

## 2020-08-24 NOTE — PROGRESS NOTES
Spoked with Dr. Shaye Rivera regarding patient's condition specifically his HR, given Metoprolol 100 mg today at 1229. Patient's HR in the afternoon vitals was 56. Notified MD EDSON reduced Metoprolol dosage to 50 mg to be taken tomorrow AM.

## 2020-08-24 NOTE — PROGRESS NOTES
[facility] Hospitalist Group Progress Note Patient: Ben Donovan Sr. Age: 78 y.o. : 1940 MR#: [id number] SSN: [id number] Date: 2020 Subjective:  
 
Patient states he is still coughing and having SOB, but it is improving. He denies CP, n/v/d. Assessment/Plan:  
  
1. Acute metabolic encephalopathy due to #3a 2. Acute DVT R UE 
3a.  COVID 19 infection and pneumonia -status post treatment with Remdesivir / steroids / convalescent plasma - id following 3. Bilateral hand/wrist pain 4. Acute hypoxic respiratory failure due to #2, resolved 5. Diabetes mellitus 6. Hypertension 7. Dyslipidemia 8. BPH 
9. Osteoarthritis 10 High D Dimer - due to COVID infection + Acute DVT  
  
  
PLAN: 
  
On anticoagulation for acute DVT - Acute appearing thrombus noted in the right brachial vein(s). Continue current management per ID team with steroids and remdesivir. We will continue O2 supplement, zinc, vitamin C and vitamin D3. We will continue COVID-19 markers - slight improvement Given confusion,CT Head : unremarkable , global vol loss Continue Voltaren as needed for bilateral hand pain- X ray reported OA  
HTN - one reading is high - continue to monitor with current dose of antihypertensive I left a message for patient's son. I had called to update him on patient's condition. I did have a brief conversation with him earlier today when I was in the patient's room. Goals of care:  
Disposition:  [x]PT/OT ordered   [x] Case management referral 
 
Case discussed with:  [x]Patient  []Family  []Nursing  []Case Management DVT Prophylaxis:  [x]Lovenox  []Hep SQ  []SCDs  []Coumadin   []On Heparin gtt Objective:  
VS:  
Visit Vitals /85 (BP 1 Location: Left leg, BP Patient Position: At rest) Pulse 73 Temp 96.9 °F (36.1 °C) Resp 18 Ht 6' 2\" (1.88 m) Wt 99.8 kg (220 lb) SpO2 100% BMI 28.25 kg/m² Tmax/24hrs: Temp (24hrs), Av.2 °F (36.2 °C), Min:96.4 °F (35.8 °C), Max:98.1 °F (36.7 °C) Intake/Output Summary (Last 24 hours) at 2020 1234 Last data filed at 2020 9860 Gross per 24 hour Intake 1560 ml Output 950 ml Net 610 ml General:  Awake, alert, NAD Cardiovascular:  RRR, no murmurs, clicks, gallops or rubs Pulmonary: CTA bilaterally, normal respiratory effort GI: soft, NT, normal BS Extremities:  No edema or cyanosis Neuro: AAOx2 Current Facility-Administered Medications Medication Dose Route Frequency  metoprolol succinate (TOPROL-XL) tablet 100 mg  100 mg Oral DAILY  lisinopriL (PRINIVIL, ZESTRIL) tablet 30 mg  30 mg Oral DAILY  enoxaparin (LOVENOX) injection 100 mg  1 mg/kg SubCUTAneous Q12H  
 glucagon (GLUCAGEN) injection 1 mg  1 mg IntraMUSCular PRN  
 insulin lispro (HUMALOG) injection   SubCUTAneous AC&HS  cholecalciferol (VITAMIN D3) (1000 Units /25 mcg) tablet 2 Tab  2,000 Units Oral DAILY  diclofenac (VOLTAREN) 1 % topical gel 2 g  2 g Topical QID  dexAMETHasone (DECADRON) tablet 6 mg  6 mg Oral DAILY  pravastatin (PRAVACHOL) tablet 40 mg  40 mg Oral QHS  famotidine (PEPCID) tablet 10 mg  10 mg Oral QPM  
 sodium chloride (NS) flush 5-40 mL  5-40 mL IntraVENous Q8H  
 sodium chloride (NS) flush 5-40 mL  5-40 mL IntraVENous PRN  
 acetaminophen (TYLENOL) tablet 650 mg  650 mg Oral Q6H PRN Or  
 acetaminophen (TYLENOL) suppository 650 mg  650 mg Rectal Q6H PRN  polyethylene glycol (MIRALAX) packet 17 g  17 g Oral DAILY PRN  promethazine (PHENERGAN) tablet 12.5 mg  12.5 mg Oral Q6H PRN Or  
 ondansetron (ZOFRAN) injection 4 mg  4 mg IntraVENous Q6H PRN  
 glucose chewable tablet 16 g  16 g Oral PRN  
 ascorbic acid (vitamin C) (VITAMIN C) tablet 500 mg  500 mg Oral DAILY  zinc sulfate (ZINCATE) 220 (50) mg capsule 1 Cap  1 Cap Oral DAILY  0.9% sodium chloride infusion 250 mL  250 mL IntraVENous PRN  
  dextrose 10% infusion 125-250 mL  125-250 mL IntraVENous PRN Labs:   
Recent Results (from the past 24 hour(s)) GLUCOSE, POC Collection Time: 08/23/20  3:46 PM  
Result Value Ref Range Glucose (POC) 138 (H) 70 - 110 mg/dL SARS-COV-2 Collection Time: 08/23/20  4:07 PM  
Result Value Ref Range SARS-CoV-2 PENDING Specimen source Nasopharyngeal    
 Specimen type NP Swab GLUCOSE, POC Collection Time: 08/23/20  9:44 PM  
Result Value Ref Range Glucose (POC) 191 (H) 70 - 110 mg/dL D DIMER Collection Time: 08/24/20  3:24 AM  
Result Value Ref Range D DIMER 10.01 (H) <0.46 ug/ml(FEU) FERRITIN Collection Time: 08/24/20  3:24 AM  
Result Value Ref Range Ferritin 965 (H) 8 - 388 NG/ML  
C REACTIVE PROTEIN, QT Collection Time: 08/24/20  3:24 AM  
Result Value Ref Range C-Reactive protein 3.0 (H) 0 - 0.3 mg/dL LD Collection Time: 08/24/20  3:24 AM  
Result Value Ref Range  (H) 81 - 474 U/L  
METABOLIC PANEL, BASIC Collection Time: 08/24/20  3:24 AM  
Result Value Ref Range Sodium 137 136 - 145 mmol/L Potassium 4.1 3.5 - 5.5 mmol/L Chloride 108 100 - 111 mmol/L  
 CO2 25 21 - 32 mmol/L Anion gap 4 3.0 - 18 mmol/L Glucose 86 74 - 99 mg/dL BUN 27 (H) 7.0 - 18 MG/DL Creatinine 0.90 0.6 - 1.3 MG/DL  
 BUN/Creatinine ratio 30 (H) 12 - 20 GFR est AA >60 >60 ml/min/1.73m2 GFR est non-AA >60 >60 ml/min/1.73m2 Calcium 8.0 (L) 8.5 - 10.1 MG/DL  
GLUCOSE, POC Collection Time: 08/24/20  6:04 AM  
Result Value Ref Range Glucose (POC) 94 70 - 110 mg/dL GLUCOSE, POC Collection Time: 08/24/20 11:50 AM  
Result Value Ref Range Glucose (POC) 92 70 - 110 mg/dL Signed By: VANESSA Canseco  August 24, 2020 12:34 PM

## 2020-08-24 NOTE — DIABETES MGMT
Glycemic Control Plan of Care 
 
T2DM with A1c of 5.9% COVID 19 infection and pneumonia status post treatment with Remdesivir/steroids/convalescent plasma Home diabetes medications:  
Metformin 500 mg BID with meals. Recommendation(s): cont on sliding scale lispro insulin Glycemic assessment: 
 
POC BG 8/23: 191 POC BG 8/24: 94 Most recent blood glucose values: 
 
 
 
Current A1C: 5.9% (8/19/2020) which is equivalent to estimated average blood glucose of 117 mg/dL during the past 2-3 months Current hospital diabetes medications:  
Correctional lispro insulin ACHS. Very resistant dose Total daily dose insulin requirement previous day: 8/23: 
Lispro: 3 units Diet: Diabetic consistent carb soft solids Goals:  Blood glucose will be within target range of  mg/dL by 8/27/2020 Education:  ___  Refer to Diabetes Education Record _X__  Education not indicated at this time Tino Paredes RN [facility] Pager: [phone]

## 2020-08-24 NOTE — PROGRESS NOTES
Spoked with Dr. Yanick Montes De Oca regarding patient's elevated BP. MD ordered blood pressure medications for the patient.

## 2020-08-24 NOTE — PROGRESS NOTES
Problem: Falls - Risk of 
Goal: *Absence of Falls Description: Document Tomy Fredrick Fall Risk and appropriate interventions in the flowsheet. Outcome: Progressing Towards Goal 
Note: Fall Risk Interventions: 
  
 
Mentation Interventions: Adequate sleep, hydration, pain control, Bed/chair exit alarm, More frequent rounding, Toileting rounds Medication Interventions: Assess postural VS orthostatic hypotension Elimination Interventions: Call light in reach, Toileting schedule/hourly rounds Problem: Patient Education: Go to Patient Education Activity Goal: Patient/Family Education Outcome: Progressing Towards Goal 
  
Problem: Pressure Injury - Risk of 
Goal: *Prevention of pressure injury Description: Document Luca Scale and appropriate interventions in the flowsheet. Outcome: Progressing Towards Goal 
Note: Pressure Injury Interventions: 
Sensory Interventions: Assess changes in LOC, Check visual cues for pain, Discuss PT/OT consult with provider, Float heels, Keep linens dry and wrinkle-free, Maintain/enhance activity level, Minimize linen layers, Pressure redistribution bed/mattress (bed type) Moisture Interventions: Absorbent underpads, Apply protective barrier, creams and emollients, Internal/External urinary devices, Maintain skin hydration (lotion/cream), Minimize layers, Moisture barrier Activity Interventions: Pressure redistribution bed/mattress(bed type) Mobility Interventions: Assess need for specialty bed, HOB 30 degrees or less, PT/OT evaluation, Pressure redistribution bed/mattress (bed type) Nutrition Interventions: Document food/fluid/supplement intake Friction and Shear Interventions: Apply protective barrier, creams and emollients, HOB 30 degrees or less, Lift sheet Problem: Patient Education: Go to Patient Education Activity Goal: Patient/Family Education Outcome: Progressing Towards Goal

## 2020-08-25 LAB
ANION GAP SERPL CALC-SCNC: 5 MMOL/L (ref 3–18)
BACTERIA SPEC CULT: NORMAL
BACTERIA SPEC CULT: NORMAL
BASOPHILS # BLD: 0 K/UL (ref 0–0.1)
BASOPHILS NFR BLD: 0 % (ref 0–2)
BUN SERPL-MCNC: 21 MG/DL (ref 7–18)
BUN/CREAT SERPL: 23 (ref 12–20)
CALCIUM SERPL-MCNC: 7.9 MG/DL (ref 8.5–10.1)
CHLORIDE SERPL-SCNC: 108 MMOL/L (ref 100–111)
CO2 SERPL-SCNC: 25 MMOL/L (ref 21–32)
CREAT SERPL-MCNC: 0.9 MG/DL (ref 0.6–1.3)
CRP SERPL-MCNC: 1.9 MG/DL (ref 0–0.3)
D DIMER PPP FEU-MCNC: 9.11 UG/ML(FEU)
DIFFERENTIAL METHOD BLD: ABNORMAL
EOSINOPHIL # BLD: 0 K/UL (ref 0–0.4)
EOSINOPHIL NFR BLD: 1 % (ref 0–5)
ERYTHROCYTE [DISTWIDTH] IN BLOOD BY AUTOMATED COUNT: 13.4 % (ref 11.6–14.5)
FERRITIN SERPL-MCNC: 838 NG/ML (ref 8–388)
GLUCOSE BLD STRIP.AUTO-MCNC: 109 MG/DL (ref 70–110)
GLUCOSE BLD STRIP.AUTO-MCNC: 116 MG/DL (ref 70–110)
GLUCOSE BLD STRIP.AUTO-MCNC: 126 MG/DL (ref 70–110)
GLUCOSE BLD STRIP.AUTO-MCNC: 80 MG/DL (ref 70–110)
GLUCOSE SERPL-MCNC: 93 MG/DL (ref 74–99)
HCT VFR BLD AUTO: 39.6 % (ref 36–48)
HGB BLD-MCNC: 13 G/DL (ref 13–16)
LDH SERPL L TO P-CCNC: 298 U/L (ref 81–234)
LYMPHOCYTES # BLD: 1.8 K/UL (ref 0.9–3.6)
LYMPHOCYTES NFR BLD: 28 % (ref 21–52)
MCH RBC QN AUTO: 29 PG (ref 24–34)
MCHC RBC AUTO-ENTMCNC: 32.8 G/DL (ref 31–37)
MCV RBC AUTO: 88.2 FL (ref 74–97)
MONOCYTES # BLD: 1.1 K/UL (ref 0.05–1.2)
MONOCYTES NFR BLD: 17 % (ref 3–10)
NEUTS SEG # BLD: 3.6 K/UL (ref 1.8–8)
NEUTS SEG NFR BLD: 54 % (ref 40–73)
PLATELET # BLD AUTO: 119 K/UL (ref 135–420)
PMV BLD AUTO: 10.3 FL (ref 9.2–11.8)
POTASSIUM SERPL-SCNC: 3.9 MMOL/L (ref 3.5–5.5)
RBC # BLD AUTO: 4.49 M/UL (ref 4.7–5.5)
SERVICE CMNT-IMP: NORMAL
SERVICE CMNT-IMP: NORMAL
SODIUM SERPL-SCNC: 138 MMOL/L (ref 136–145)
WBC # BLD AUTO: 6.4 K/UL (ref 4.6–13.2)

## 2020-08-25 PROCEDURE — 77010033678 HC OXYGEN DAILY

## 2020-08-25 PROCEDURE — 97530 THERAPEUTIC ACTIVITIES: CPT

## 2020-08-25 PROCEDURE — 74011250637 HC RX REV CODE- 250/637: Performed by: FAMILY MEDICINE

## 2020-08-25 PROCEDURE — 97162 PT EVAL MOD COMPLEX 30 MIN: CPT

## 2020-08-25 PROCEDURE — 83615 LACTATE (LD) (LDH) ENZYME: CPT

## 2020-08-25 PROCEDURE — 74011250636 HC RX REV CODE- 250/636: Performed by: INTERNAL MEDICINE

## 2020-08-25 PROCEDURE — 85379 FIBRIN DEGRADATION QUANT: CPT

## 2020-08-25 PROCEDURE — 82728 ASSAY OF FERRITIN: CPT

## 2020-08-25 PROCEDURE — 65270000029 HC RM PRIVATE

## 2020-08-25 PROCEDURE — 74011250637 HC RX REV CODE- 250/637: Performed by: INTERNAL MEDICINE

## 2020-08-25 PROCEDURE — 86140 C-REACTIVE PROTEIN: CPT

## 2020-08-25 PROCEDURE — 80048 BASIC METABOLIC PNL TOTAL CA: CPT

## 2020-08-25 PROCEDURE — 85025 COMPLETE CBC W/AUTO DIFF WBC: CPT

## 2020-08-25 PROCEDURE — 74011250636 HC RX REV CODE- 250/636: Performed by: HOSPITALIST

## 2020-08-25 PROCEDURE — 36415 COLL VENOUS BLD VENIPUNCTURE: CPT

## 2020-08-25 PROCEDURE — 82962 GLUCOSE BLOOD TEST: CPT

## 2020-08-25 RX ORDER — DEXTROSE 50 % IN WATER (D50W) INTRAVENOUS SYRINGE
25 AS NEEDED
Status: DISCONTINUED | OUTPATIENT
Start: 2020-08-25 | End: 2020-08-27 | Stop reason: HOSPADM

## 2020-08-25 RX ADMIN — Medication 500 MG: at 09:28

## 2020-08-25 RX ADMIN — DICLOFENAC 2 G: 10 GEL TOPICAL at 18:00

## 2020-08-25 RX ADMIN — ENOXAPARIN SODIUM 100 MG: 100 INJECTION SUBCUTANEOUS at 21:49

## 2020-08-25 RX ADMIN — FAMOTIDINE 10 MG: 20 TABLET, FILM COATED ORAL at 21:48

## 2020-08-25 RX ADMIN — Medication 10 ML: at 21:50

## 2020-08-25 RX ADMIN — Medication 10 ML: at 05:06

## 2020-08-25 RX ADMIN — PRAVASTATIN SODIUM 40 MG: 20 TABLET ORAL at 21:47

## 2020-08-25 RX ADMIN — DICLOFENAC 2 G: 10 GEL TOPICAL at 09:00

## 2020-08-25 RX ADMIN — DICLOFENAC 2 G: 10 GEL TOPICAL at 13:00

## 2020-08-25 RX ADMIN — Medication 10 ML: at 14:00

## 2020-08-25 RX ADMIN — ZINC SULFATE 220 MG (50 MG) CAPSULE 1 CAPSULE: CAPSULE at 09:28

## 2020-08-25 RX ADMIN — ENOXAPARIN SODIUM 100 MG: 100 INJECTION SUBCUTANEOUS at 11:46

## 2020-08-25 RX ADMIN — LISINOPRIL 30 MG: 20 TABLET ORAL at 09:28

## 2020-08-25 RX ADMIN — DEXAMETHASONE 6 MG: 2 TABLET ORAL at 09:29

## 2020-08-25 RX ADMIN — CHOLECALCIFEROL TAB 25 MCG (1000 UNIT) 2 TABLET: 25 TAB at 09:29

## 2020-08-25 RX ADMIN — DICLOFENAC 2 G: 10 GEL TOPICAL at 22:00

## 2020-08-25 NOTE — PROGRESS NOTES
Notified Dr. Vini Acosta that morning dose of Metoprolol was held due to a HR of 45. Also notified MD that patient's platelets are 813. Ok to continue administration of Lovenox.

## 2020-08-25 NOTE — PROGRESS NOTES
Nutrition Assessment Type and Reason for Visit: Reassess, Positive nutrition screen Nutrition Recommendations/Plan: Continue current diet and Glucerna Shake once daily. Nutrition Assessment:  Tolerating diet. Son brought in dentures, pt denies chewing difficulty on current diet with dentures. Adequate intake of meals and supplements. 4 days since last BM per chart, pt denies constipation or abdominal pain. Malnutrition Assessment: 
Malnutrition Status: No malnutrition Estimated Daily Nutrient Needs: 
Energy (kcal):  7056-7685 Protein (g):   Fluid (ml/day):  4321-9169 Nutrition Related Findings:  BM 8/21, loose. Denies n/v or abdominal pain. No chewing/swallowing difficulty. Receiving vitamin C, vitamin D, zinc sufate, and prn miralax ordered. Current Nutrition Therapies: DIET DIABETIC CONSISTENT CARB Soft Solids DIET NUTRITIONAL SUPPLEMENTS Dinner; Glucerna Shake Anthropometric Measures: 
· Height:  6' 2\" (188 cm) · Current Body Wt:  99.8 kg (220 lb 0.3 oz) · BMI: 28.2 Nutrition Diagnosis: · Biting/chewing (masticatory) difficulty related to partial or complete edentulism as evidenced by poor dentition(requiring soft diet and dentures) Nutrition Intervention: 
Food and/or Nutrient Delivery: Continue current diet, Continue oral nutrition supplement Nutrition Education and Counseling: No recommendations at this time Coordination of Nutrition Care: Continued inpatient monitoring Goals: 
PO nutrition intake will meet >75% of patient estimated nutritional needs within the next 7 days. Nutrition Monitoring and Evaluation:  
Food/Nutrient Intake Outcomes: Food and nutrient intake, Supplement intake, Vitamin/mineral intake Physical Signs/Symptoms Outcomes: Biochemical data, Meal time behavior, Nutrition focused physical findings Discharge Planning:   
No discharge needs at this time Electronically signed by Helen Hilario RD on 8/25/2020 at 10:45 AM 
 
Contact Number: [phone]

## 2020-08-25 NOTE — PROGRESS NOTES
Problem: Falls - Risk of 
Goal: *Absence of Falls Description: Document Wayne Rahman Fall Risk and appropriate interventions in the flowsheet. Outcome: Progressing Towards Goal 
Note: Fall Risk Interventions: 
  
 
Mentation Interventions: Bed/chair exit alarm, More frequent rounding, Reorient patient, Update white board Medication Interventions: Assess postural VS orthostatic hypotension, Patient to call before getting OOB, Teach patient to arise slowly Elimination Interventions: Call light in reach, Patient to call for help with toileting needs, Toileting schedule/hourly rounds Problem: Patient Education: Go to Patient Education Activity Goal: Patient/Family Education Outcome: Progressing Towards Goal 
  
Problem: Pressure Injury - Risk of 
Goal: *Prevention of pressure injury Description: Document Luca Scale and appropriate interventions in the flowsheet. Outcome: Progressing Towards Goal 
Note: Pressure Injury Interventions: 
Sensory Interventions: Assess changes in LOC, Keep linens dry and wrinkle-free, Minimize linen layers Moisture Interventions: Absorbent underpads, Check for incontinence Q2 hours and as needed, Minimize layers Activity Interventions: Assess need for specialty bed, Pressure redistribution bed/mattress(bed type), PT/OT evaluation Mobility Interventions: Assess need for specialty bed, PT/OT evaluation, Turn and reposition approx. every two hours(pillow and wedges) Nutrition Interventions: Document food/fluid/supplement intake, Offer support with meals,snacks and hydration Friction and Shear Interventions: Apply protective barrier, creams and emollients, Lift team/patient mobility team, Minimize layers Problem: Patient Education: Go to Patient Education Activity Goal: Patient/Family Education Outcome: Progressing Towards Goal

## 2020-08-25 NOTE — PROGRESS NOTES
[facility] Hospitalist Group Progress Note Patient: Chilo Guaman Sr. Age: 78 y.o. : 1940 MR#: [id number] SSN: [id number] Date: 2020 Subjective: No complaints. On supplemental o2 nasal cannula, appears comfortable. Assessment/Plan:  
79 y/o male with PMHx of Type II DM, HTN, HLD and BPH who is admitted with COVID-19 pneumonia. Has been found to have acute occlusive and non-occlusive RUE DVTs and acute occlusive superficial DVT in LUE. CONSULTS- ID Acute metabolic encephalopathy due to covid 19 PNA Acute DVT RUE and acute DVT in LUE  
COVID 19 infection and pneumonia -status post treatment with Remdesivir / convalescent plasma Acute hypoxic respiratory failure due to covid 19 PNA Bilateral hand pain Diabetes mellitus type 2 Hypertension Dyslipidemia BPH Osteoarthritis - ID follows- continue dexamethasone for 6 more days. Continue vitamin c/d and zinc. Monitor off abxs. Inflammatory markers trend down. Supplemental o2 as needed- try to wean to room air if tolerates. - continue lovenox 100 mg BID  
- ISS for DM 
- lisinopril and toprol for HTN 
- voltaren for bilateral hand pain 
- PT OT fall precautions - Plan continues to be home with [facility] SNF- need to clarify plans with family - I called the son at 230pm on his cell phone, no answer, left voice message, will try again later today Spoke to son at 18pm- updated on A/P. The son is interested in SNF placement for PT OT. His second preference is home with . Patient lives alone per son. FULL CODE  
dvt ppx- lovenox 100 mg BID Gi ppx- pepcid Additional Notes:   
 
Case discussed with:  [x]Patient  [x]Family  []Nursing  []Case Management DVT Prophylaxis:  [x]Lovenox  []Hep SQ  []SCDs  []Coumadin   []On Heparin gtt Objective:  
VS:  
Visit Vitals /88 (BP 1 Location: Left leg, BP Patient Position: At rest) Pulse (!) 55 Temp 97.1 °F (36.2 °C) Resp 16  
 Ht 6' 2\" (1.88 m) Wt 99.8 kg (220 lb) SpO2 97% BMI 28.25 kg/m² Tmax/24hrs: Temp (24hrs), Av.3 °F (36.3 °C), Min:96.7 °F (35.9 °C), Max:98.7 °F (37.1 °C) Intake/Output Summary (Last 24 hours) at 2020 1422 Last data filed at 2020 9555 Gross per 24 hour Intake 1400 ml Output 200 ml Net 1200 ml General:  Elderly AA male laying in bed, resting, Alert, NAD Cardiovascular:  RRR Pulmonary:  Nasal cannula, LSC throughout; respiratory effort WNL 
GI:  +BS in all four quadrants, soft, non-tender Extremities:  No edema; 2+ dorsalis pedis pulses bilaterally Neuro: awake, alert, oriented to person, moving arms legs. Labs:   
Recent Results (from the past 24 hour(s)) GLUCOSE, POC Collection Time: 20  4:04 PM  
Result Value Ref Range Glucose (POC) 115 (H) 70 - 110 mg/dL GLUCOSE, POC Collection Time: 20  9:28 PM  
Result Value Ref Range Glucose (POC) 115 (H) 70 - 110 mg/dL D DIMER Collection Time: 20  5:16 AM  
Result Value Ref Range D DIMER 9.11 (H) <0.46 ug/ml(FEU) FERRITIN Collection Time: 20  5:16 AM  
Result Value Ref Range Ferritin 838 (H) 8 - 388 NG/ML  
C REACTIVE PROTEIN, QT Collection Time: 20  5:16 AM  
Result Value Ref Range C-Reactive protein 1.9 (H) 0 - 0.3 mg/dL LD Collection Time: 20  5:16 AM  
Result Value Ref Range  (H) 81 - 536 U/L  
METABOLIC PANEL, BASIC Collection Time: 20  5:16 AM  
Result Value Ref Range Sodium 138 136 - 145 mmol/L Potassium 3.9 3.5 - 5.5 mmol/L Chloride 108 100 - 111 mmol/L  
 CO2 25 21 - 32 mmol/L Anion gap 5 3.0 - 18 mmol/L Glucose 93 74 - 99 mg/dL BUN 21 (H) 7.0 - 18 MG/DL Creatinine 0.90 0.6 - 1.3 MG/DL  
 BUN/Creatinine ratio 23 (H) 12 - 20 GFR est AA >60 >60 ml/min/1.73m2 GFR est non-AA >60 >60 ml/min/1.73m2 Calcium 7.9 (L) 8.5 - 10.1 MG/DL  
CBC WITH AUTOMATED DIFF  Collection Time: 20  5:16 AM  
 Result Value Ref Range WBC 6.4 4.6 - 13.2 K/uL  
 RBC 4.49 (L) 4.70 - 5.50 M/uL  
 HGB 13.0 13.0 - 16.0 g/dL HCT 39.6 36.0 - 48.0 % MCV 88.2 74.0 - 97.0 FL  
 MCH 29.0 24.0 - 34.0 PG  
 MCHC 32.8 31.0 - 37.0 g/dL  
 RDW 13.4 11.6 - 14.5 % PLATELET 628 (L) 739 - 420 K/uL MPV 10.3 9.2 - 11.8 FL  
 NEUTROPHILS 54 40 - 73 % LYMPHOCYTES 28 21 - 52 % MONOCYTES 17 (H) 3 - 10 % EOSINOPHILS 1 0 - 5 % BASOPHILS 0 0 - 2 %  
 ABS. NEUTROPHILS 3.6 1.8 - 8.0 K/UL  
 ABS. LYMPHOCYTES 1.8 0.9 - 3.6 K/UL  
 ABS. MONOCYTES 1.1 0.05 - 1.2 K/UL  
 ABS. EOSINOPHILS 0.0 0.0 - 0.4 K/UL  
 ABS. BASOPHILS 0.0 0.0 - 0.1 K/UL  
 DF AUTOMATED    
GLUCOSE, POC Collection Time: 08/25/20  5:36 AM  
Result Value Ref Range Glucose (POC) 126 (H) 70 - 110 mg/dL Signed By: VANESSA Ascencio August 25, 2020

## 2020-08-25 NOTE — ROUTINE PROCESS
Patient is alert and oriented times three with no signs or symptoms of distress. Patient does have periodic confusion

## 2020-08-25 NOTE — ROUTINE PROCESS
Bedside shift change report given to Charley Grant RN (oncoming nurse) by Juan Manuel Terry RN (offgoing nurse). Report included the following information SBAR, Kardex, Intake/Output, MAR, Recent Results and Med Rec Status.

## 2020-08-25 NOTE — PROGRESS NOTES
[facility] 
(A Division of [address] [place] [address]) Follow-up Note Date of Admission: 8/18/2020       Date of Note:  8/25/2020 Summary:   
77 y/o AAM w/ DM, HTN, HLD, Nephrolithiasis, BPH adm 8/18 w/ SOB. NHR diagnosed w/ covid 8/10, became weak, w/ SOB, dry cough in past few days leading to presentation at the [facility] ED. Afebrile and WBC normal but RR consistently > 27. On 2 lpm NC SpO2 is 98%. CXR 8/18: central venous congestion if not mild central pulmonary edema (CXR 8/10 showed no acute cardiopulmonary process). Interval History: 
Remains afebrile. Knows he's in [place]. Denies shortness of breath but reports some coughing spells. Current Antimicrobials:    Prior Antimicrobials: 
None 8/23 - 2 remdesivir 8/19 - # 5 Assessment: Rec / Plan:  
Covid 19 pneumonia 
- SOB, fever, chills, sore throat, Increasing dyspnea last three days. - + Covid 8/10.   
- CXR bilateral interstitial infiltrates c/w covid (at least central venous /central pulmonary edema) - SpO2 95% on 3 lpm NC 
- Convalescent plasma given 8/20   
- Completed remdesivir 8/23 -> continue dexamethasone 6 more days 
-> monitor off antibiotics Confusion - ? Metabolic encephalopathy 
- CT Head 8/21: no acute intracranial pathology 
- improving -> per primary team  
RUE DVT 
- seen on Duplex 8/21: acute occlusive DVT 1 of 2 brachial veins RUE, acute non-occlusive thromb basilic vein LUE 
- likely covid related -> anticoagulation per primary team  
Low grade fever 
- from covid pneumonia +/- DVTs -> monitor Acute Hypoxic Respiratory Failure -> O2 as needed DM HTN   
HLD Nephrolithiasis BPH Microbiology: 
 
8/19 bcx NGTD Lines / Catheters: 
  
piv Patient Active Problem List  
Diagnosis Code  HCVD (hypertensive cardiovascular disease) I11.9  Hyperlipidemia E78.5  Diabetes mellitus ([facility] [address] E11.9  Bradycardia R00.1  Hypertension I10  
  BPH (benign prostatic hyperplasia) N40.0  Bone pain M89.8X9  Muscular weakness M62.81  Leg pain, right M79.604  Bladder neck contracture N32.0  Microscopic hematuria R31.29  
 History of nephrolithiasis Z87.442  Obesity (BMI 30.0-34. 9) E66.9  
 Pneumonia due to COVID-19 virus U07.1, J12.89  Thrombocytopenia ([facility] [address] D69.6  Shortness of breath R06.02  
 
 
Current Facility-Administered Medications Medication Dose Route Frequency  dextrose (D50W) injection syrg 25 g  25 g IntraVENous PRN  
 lisinopriL (PRINIVIL, ZESTRIL) tablet 30 mg  30 mg Oral DAILY  metoprolol succinate (TOPROL-XL) XL tablet 50 mg  50 mg Oral DAILY  enoxaparin (LOVENOX) injection 100 mg  1 mg/kg SubCUTAneous Q12H  
 glucagon (GLUCAGEN) injection 1 mg  1 mg IntraMUSCular PRN  
 insulin lispro (HUMALOG) injection   SubCUTAneous AC&HS  cholecalciferol (VITAMIN D3) (1000 Units /25 mcg) tablet 2 Tab  2,000 Units Oral DAILY  diclofenac (VOLTAREN) 1 % topical gel 2 g  2 g Topical QID  dexAMETHasone (DECADRON) tablet 6 mg  6 mg Oral DAILY  pravastatin (PRAVACHOL) tablet 40 mg  40 mg Oral QHS  famotidine (PEPCID) tablet 10 mg  10 mg Oral QPM  
 sodium chloride (NS) flush 5-40 mL  5-40 mL IntraVENous Q8H  
 sodium chloride (NS) flush 5-40 mL  5-40 mL IntraVENous PRN  
 acetaminophen (TYLENOL) tablet 650 mg  650 mg Oral Q6H PRN Or  
 acetaminophen (TYLENOL) suppository 650 mg  650 mg Rectal Q6H PRN  polyethylene glycol (MIRALAX) packet 17 g  17 g Oral DAILY PRN  promethazine (PHENERGAN) tablet 12.5 mg  12.5 mg Oral Q6H PRN Or  
 ondansetron (ZOFRAN) injection 4 mg  4 mg IntraVENous Q6H PRN  
 glucose chewable tablet 16 g  16 g Oral PRN  
 ascorbic acid (vitamin C) (VITAMIN C) tablet 500 mg  500 mg Oral DAILY  zinc sulfate (ZINCATE) 220 (50) mg capsule 1 Cap  1 Cap Oral DAILY  0.9% sodium chloride infusion 250 mL  250 mL IntraVENous PRN  
 
 
 
 Review of Systems - Negative except as in interval history Objective: 
 
Visit Vitals /88 (BP 1 Location: Left leg, BP Patient Position: At rest) Pulse (!) 55 Temp 97.1 °F (36.2 °C) Resp 16 Ht 6' 2\" (1.88 m) Wt 99.8 kg (220 lb) SpO2 97% BMI 28.25 kg/m² Temp (24hrs), Av.3 °F (36.3 °C), Min:96.7 °F (35.9 °C), Max:98.7 °F (37.1 °C) General: Well developed, well nourished 78 y.o. BLACK male in no acute distress. ENT: ENT exam normal, no neck nodes or sinus tenderness Head: normocephalic, without obvious abnormality Mouth:  mucous membranes moist, pharynx normal without lesions Neck: supple, symmetrical, trachea midline Cardio:  regular rhythm, no murmurs, rubs or gallops Chest: inspection normal - no chest wall deformities or tenderness, respiratory effort normal 
Lungs: clear to auscultation, no wheezes or rales and unlabored breathing Abdomen: soft, non-tender. Bowel sounds normal. No masses, no organomegaly. Extremities:  no redness or tenderness in the calves or thighs, no edema, tenderness RUE> LUE resolved Lab results: 
 
Chemistry Recent Labs  
  20 
0516 20 
0324 20 
0258 GLU 93 86 106*  137 139  
K 3.9 4.1 3.9  108 109 CO2 25 25 24 BUN 21* 27* 28* CREA 0.90 0.90 0.94  
CA 7.9* 8.0* 7.8* AGAP 5 4 6 BUCR 23* 30* 30* AP  --   --  43* TP  --   --  6.6 ALB  --   --  2.1*  
GLOB  --   --  4.5* AGRAT  --   --  0.5* CBC w/ Diff Recent Labs  
  20 
9707 WBC 6.4  
RBC 4.49* HGB 13.0 HCT 39.6 * GRANS 54 LYMPH 28 EOS 1 Microbiology All Micro Results Procedure Component Value Units Date/Time CULTURE, BLOOD [[id number]] Collected:  20 0035 Order Status:  Completed Specimen:  Blood Updated:  20 3609 Special Requests: NO SPECIAL REQUESTS Culture result: NO GROWTH 6 DAYS     
 CULTURE, BLOOD [[id number]] Collected:  20 0050 Order Status:  Completed Specimen:  Blood Updated:  08/25/20 9630 Special Requests: NO SPECIAL REQUESTS Culture result: NO GROWTH 6 DAYS     
 CULTURE, URINE [[id number]] Collected:  08/19/20 1350 Order Status:  Completed Specimen:  Clean catch Updated:  08/21/20 1107 Special Requests: NO SPECIAL REQUESTS Culture result: No growth (<1,000 CFU/ML) CULTURE, RESPIRATORY/SPUTUM/BRONCH Cris Number [[id number]] Collected:  08/19/20 1800 Order Status:  Canceled Specimen:  Sputum Fermín De León MD, [facility] Infectious Diseases 
[phone] 
8/25/2020  
6:31 PM

## 2020-08-25 NOTE — PROGRESS NOTES
Problem: Mobility Impaired (Adult and Pediatric) Goal: *Acute Goals and Plan of Care (Insert Text) Description: Physical Therapy Goals Initiated 8/25/2020 and to be accomplished within 7 day(s) 1. Patient will move from supine to sit and sit to supine  in bed with supervision/set-up. 2.  Patient will transfer from bed to chair and chair to bed with supervision/set-up using the least restrictive device. 3.  Patient will perform sit to stand with supervision/set-up. 4.  Patient will ambulate with supervision/set-up for 100 feet with the least restrictive device. PLOF: Patient reports he was independent with self care and functional mobility, but has had recent decline for about one months. Patient reports increased weakness and inability to ambulate. He lives alone in 1st floor apartment. Outcome: Progressing Towards Goal 
  
PHYSICAL THERAPY EVALUATION Patient: Gutierrez Moore Sr. (78 y.o. male) Date: 8/25/2020 Primary Diagnosis: COVID-19 [U07.1] Pneumonia due to COVID-19 virus [U07.1, J12.89] Precautions:   Skin, Contact, Fall ASSESSMENT : 
Patient is a pleasantly confused 79 y/o/ male admitted with PNA due to COVID. Based on the objective data described below, the patient presents with generalized weakness, impaired balance, decreased endurance, and decreased independence with functional mobility. Pt reports recent decline in functional mobility for about 1 month, he lives alone and states he has not been able to walk. Educated patient on supine HEP program to increase strength. Patient performs supine to sit with minimal trunk support. He presents with good sitting balance and fair dynamic. Pt perform sit to stand with moderate assist with bed elevated and max cues to straighten B hips/kees. He takes 3-4 lateral side steps using RW with minimal assistance and cues for sequencing. Patient will benefit from skilled PT to maximize independence with functional mobility. Patient will benefit from skilled intervention to address the above impairments. Patient's rehabilitation potential is considered to be Fair/good Factors which may influence rehabilitation potential include:  
[]         None noted 
[x]         Mental ability/status [x]         Medical condition 
[x]         Home/family situation and support systems 
[x]         Safety awareness 
[]         Pain tolerance/management 
[]         Other: PLAN : 
Recommendations and Planned Interventions:  
[x]           Bed Mobility Training             [x]    Neuromuscular Re-Education 
[x]           Transfer Training                   []    Orthotic/Prosthetic Training 
[x]           Gait Training                          []    Modalities [x]           Therapeutic Exercises           []    Edema Management/Control 
[x]           Therapeutic Activities            []    Family Training/Education 
[x]           Patient Education 
[]           Other (comment): Frequency/Duration: Patient will be followed by physical therapy 1-2 times per day/4-7 days per week to address goals. Discharge Recommendations: [place] Further Equipment Recommendations for Discharge: shower chair and rolling walker SUBJECTIVE:  
Patient stated I can't walk, its been about one month.  OBJECTIVE DATA SUMMARY:  
 
Past Medical History:  
Diagnosis Date Abnormal EKG Bone pain BPH (benign prostatic hyperplasia) Bradycardia Chest pain, unspecified R/O CAD 3/10 mild fixed inferior defect Diabetes mellitus ([facility] [address] denies 9/30/19 Echocardiogram 04/02/2010 [facility]:  EF 56%. No RWMA. Mod conc LVH. DDfx. RVSP 22 mmHg. HCVD (hypertensive cardiovascular disease) Hematuria, unspecified History of silent myocardial infarction Hypercholesteremia Hyperlipidemia Hypertension Hypertension Ill-defined condition UTI Kidney stones Leg pain, right normal exercise JOAQUIN (fall 2014) Lower extremity arterial testing 10/07/2014 No arterial disease at rest bilaterally. R JOAQUIN 1.27.  L JOAQUIN 1.35. No significant drop in JOAQUIN w/exercise c/w normal perfusion. Lower urinary tract symptoms (LUTS) Muscular weakness Nuclear cardiac imaging test 12/02/2011 Likely normal.  Fixed inferior basal and mid inferior defect likely artifact. No ischemia. EF 60%. No WMA. Other specified cardiac dysrhythmias(427.89) Bradycardia and pauses on holter Swelling of limb Unspecified sleep apnea   
 does not use cpap machine Past Surgical History:  
Procedure Laterality Date BIOPSY  3/25/99 COLONOSCOPY N/A 10/7/2019 COLONOSCOPY with bx's performed by Ashlyn Schwarz MD at [address] REPLACEMENT  12/6/11 HX UROLOGICAL  01/06/2012 [facility], Dr. Aquiles Clark, Transurethral resection of prostate, button prostatectomy HX UROLOGICAL  1/29/2015 [facility], Dr. Sheba Schmidt, ESWL  
 OR COLONOSCOPY FLX DX W/COLLJ [facility] INPATIENT REHABILITATION WHEN PFRMD    
 TOTAL HIP ARTHROPLASTY Left 2014 Dr. Erica Sommers Barriers to Learning/Limitations: yes;  altered mental status (i.e.Sedation, Confusion) Compensate with: Visual Cues, Verbal Cues, and Tactile Cues Home Situation: 
Home Situation Home Environment: Private residence # Steps to Enter: 3 One/Two Story Residence: One story Living Alone: No 
Support Systems: Child(macey), Family member(s), Friends \ neighbors Patient Expects to be Discharged to[de-identified] Private residence Current DME Used/Available at Home: Aneta Spann, rollator Critical Behavior: 
Neurologic State: Alert;Confused Orientation Level: Oriented X4 Cognition: Follows commands Safety/Judgement: Fall prevention Psychosocial 
Purposeful Interaction: Yes Pt Identified Daily Priority: Clinical issues (comment) Car[employer] Process: Nurture loving kindness;Establish trust;Teaching/learning; Attend basic human needs;Create healing environment Caring Interventions: Reassure Reassure: Therapeutic listening; Informing Strength:   
Strength: Generally decreased, functional 
  
Tone & Sensation:  
Tone: Normal 
Sensation: Intact Functional Mobility: 
Bed Mobility: 
  
Supine to Sit: Minimum assistance(HOB elevated) Sit to Supine: Moderate assistance(BLE's) Scooting: Contact guard assistance; Additional time(to EOB) Transfers: 
Sit to Stand: Moderate assistance(bed elevated) Stand to Sit: Moderate assistance Balance:  
Sitting: Impaired Sitting - Static: Good (unsupported) Sitting - Dynamic: Fair (occasional) Standing: Impaired; With support Standing - Static: Fair Standing - Dynamic : Fair Ambulation/Gait Training: 
Distance (ft): 3 Feet (ft)(lateral steps) Assistive Device: Walker, rolling Ambulation - Level of Assistance: Minimal assistance Gait Abnormalities: Decreased step clearance Step Length: Right shortened;Left shortened Interventions: Verbal cues Therapeutic Exercises:  
Patient performs 10x SLR and 10x LAQ's 
Pain: 
Pain level pre-treatment: 0/10 Pain level post-treatment: 0/10 Pain Intervention(s) : Medication (see MAR); Rest, Ice, Repositioning Response to intervention: Nurse notified, See doc flow Activity Tolerance:  
Fair Please refer to the flowsheet for vital signs taken during this treatment. After treatment:  
[]         Patient left in no apparent distress sitting up in chair 
[x]         Patient left in no apparent distress in bed 
[x]         Call bell left within reach 
[]         Nursing notified 
[]         Caregiver present [x]         Bed alarm activated 
[]         SCDs applied COMMUNICATION/EDUCATION:  
[x]         Role of Physical Therapy in the acute care setting. [x]         Fall prevention education was provided and the patient/caregiver indicated understanding. [x]         Patient/family have participated as able in goal setting and plan of care. [x]         Patient/family agree to work toward stated goals and plan of care. []         Patient understands intent and goals of therapy, but is neutral about his/her participation. []         Patient is unable to participate in goal setting/plan of care: ongoing with therapy staff. 
[]         Other: Thank you for this referral. 
Emeterio Beckham, PT Time Calculation: 32 mins Eval Complexity: History: MEDIUM  Complexity : 1-2 comorbidities / personal factors will impact the outcome/ POC Exam:MEDIUM Complexity : 3 Standardized tests and measures addressing body structure, function, activity limitation and / or participation in recreation  Presentation: MEDIUM Complexity : Evolving with changing characteristics  Clinical Decision Making:Medium Complexity    Overall Complexity:MEDIUM

## 2020-08-25 NOTE — PROGRESS NOTES
Problem: Falls - Risk of 
Goal: *Absence of Falls Description: Document Yung Snider Fall Risk and appropriate interventions in the flowsheet. Outcome: Progressing Towards Goal 
Note: Fall Risk Interventions: 
  
 
Mentation Interventions: Adequate sleep, hydration, pain control, Door open when patient unattended, Gait belt with transfers/ambulation Medication Interventions: Assess postural VS orthostatic hypotension, Evaluate medications/consider consulting pharmacy, Patient to call before getting OOB, Teach patient to arise slowly Elimination Interventions: Call light in reach, Patient to call for help with toileting needs Problem: Patient Education: Go to Patient Education Activity Goal: Patient/Family Education Outcome: Progressing Towards Goal 
  
Problem: Pressure Injury - Risk of 
Goal: *Prevention of pressure injury Description: Document Luca Scale and appropriate interventions in the flowsheet. Outcome: Progressing Towards Goal 
Note: Pressure Injury Interventions: 
Sensory Interventions: Assess changes in LOC, Avoid rigorous massage over bony prominences, Check visual cues for pain, Discuss PT/OT consult with provider, Float heels Moisture Interventions: Absorbent underpads, Assess need for specialty bed, Check for incontinence Q2 hours and as needed, Internal/External urinary devices Activity Interventions: Assess need for specialty bed, Increase time out of bed, Pressure redistribution bed/mattress(bed type), PT/OT evaluation Mobility Interventions: Assess need for specialty bed, HOB 30 degrees or less, Pressure redistribution bed/mattress (bed type), PT/OT evaluation Nutrition Interventions: Document food/fluid/supplement intake, Offer support with meals,snacks and hydration Friction and Shear Interventions: Apply protective barrier, creams and emollients, HOB 30 degrees or less, Lift sheet, Lift team/patient mobility team 
 
  
 
 
 
  
 Problem: Patient Education: Go to Patient Education Activity Goal: Patient/Family Education Outcome: Progressing Towards Goal

## 2020-08-25 NOTE — ROUTINE PROCESS
Patient remains alert and oriented times three with no periods of confusion tonight. No signs or symptoms of distress at this time

## 2020-08-25 NOTE — PROGRESS NOTES
conducted an initial consultation and Spiritual Assessment for Valentine Joshi, who is a 78 y. o.,male. Patient's Primary Language is: [place]. According to the patient's EMR Yazdanism Affiliation is: [facility].  
 
The reason the Patient came to the hospital is:  
Patient Active Problem List  
 Diagnosis Date Noted  Pneumonia due to COVID-19 virus 08/19/2020  Thrombocytopenia ([facility] [address] 08/19/2020  Shortness of breath 08/19/2020  Bladder neck contracture 01/19/2018  Microscopic hematuria 01/19/2018  History of nephrolithiasis 01/19/2018  Obesity (BMI 30.0-34.9) 01/19/2018  Leg pain, right  Bone pain  Muscular weakness  BPH (benign prostatic hyperplasia) 06/15/2012  Hypertension 05/29/2012  HCVD (hypertensive cardiovascular disease)  Hyperlipidemia  Diabetes mellitus ([facility] [address]  Bradycardia The  provided the following Interventions: 
Initiated a relationship of care and support. Explored issues of danis, belief, spirituality and Latter-day/ritual needs while hospitalized. Listened empathically. Provided chaplaincy education. Provided information about Spiritual Care Services. Offered prayer and assurance of continued prayers on patient's behalf. Chart reviewed. The following outcomes where achieved: 
Patient shared limited information about both their medical narrative and spiritual journey/beliefs.  confirmed Patient's Yazdanism Affiliation as Yazdanism. 
Patient processed feeling about current hospitalization. Patient expressed gratitude for 's visit. Assessment: 
Patient does not have any Latter-day/cultural needs that will affect patient's preferences in health care. There are no spiritual or Latter-day issues which require intervention at this time. Plan: 
Chaplains will continue to follow and will provide pastoral care on an as needed/requested basis.  recommends bedside caregivers page  on duty if patient shows signs of acute spiritual or emotional distress. [facility]. Spiritual Care  
[phone]

## 2020-08-25 NOTE — ROUTINE PROCESS
Bedside shift change report given to Jennifer Corey RN (oncoming nurse) by Charley Grant RN (offgoing nurse). Report included the following information SBAR, Kardex, Intake/Output, MAR, Recent Results and Med Rec Status.

## 2020-08-26 LAB
ALBUMIN SERPL-MCNC: 2.2 G/DL (ref 3.4–5)
ALBUMIN/GLOB SERPL: 0.5 {RATIO} (ref 0.8–1.7)
ALP SERPL-CCNC: 49 U/L (ref 45–117)
ALT SERPL-CCNC: 35 U/L (ref 16–61)
ANION GAP SERPL CALC-SCNC: 3 MMOL/L (ref 3–18)
AST SERPL-CCNC: 34 U/L (ref 10–38)
BASOPHILS # BLD: 0 K/UL (ref 0–0.1)
BASOPHILS NFR BLD: 0 % (ref 0–2)
BILIRUB SERPL-MCNC: 0.7 MG/DL (ref 0.2–1)
BUN SERPL-MCNC: 19 MG/DL (ref 7–18)
BUN/CREAT SERPL: 21 (ref 12–20)
CALCIUM SERPL-MCNC: 8.2 MG/DL (ref 8.5–10.1)
CHLORIDE SERPL-SCNC: 107 MMOL/L (ref 100–111)
CO2 SERPL-SCNC: 27 MMOL/L (ref 21–32)
CREAT SERPL-MCNC: 0.92 MG/DL (ref 0.6–1.3)
CRP SERPL-MCNC: 1.4 MG/DL (ref 0–0.3)
D DIMER PPP FEU-MCNC: 3.96 UG/ML(FEU)
DIFFERENTIAL METHOD BLD: ABNORMAL
EOSINOPHIL # BLD: 0.1 K/UL (ref 0–0.4)
EOSINOPHIL NFR BLD: 1 % (ref 0–5)
ERYTHROCYTE [DISTWIDTH] IN BLOOD BY AUTOMATED COUNT: 13.3 % (ref 11.6–14.5)
FERRITIN SERPL-MCNC: 862 NG/ML (ref 8–388)
GLOBULIN SER CALC-MCNC: 4.6 G/DL (ref 2–4)
GLUCOSE BLD STRIP.AUTO-MCNC: 103 MG/DL (ref 70–110)
GLUCOSE BLD STRIP.AUTO-MCNC: 104 MG/DL (ref 70–110)
GLUCOSE BLD STRIP.AUTO-MCNC: 139 MG/DL (ref 70–110)
GLUCOSE BLD STRIP.AUTO-MCNC: 85 MG/DL (ref 70–110)
GLUCOSE SERPL-MCNC: 87 MG/DL (ref 74–99)
HCT VFR BLD AUTO: 42.2 % (ref 36–48)
HGB BLD-MCNC: 13.7 G/DL (ref 13–16)
LDH SERPL L TO P-CCNC: 319 U/L (ref 81–234)
LYMPHOCYTES # BLD: 1.9 K/UL (ref 0.9–3.6)
LYMPHOCYTES NFR BLD: 26 % (ref 21–52)
MCH RBC QN AUTO: 28.7 PG (ref 24–34)
MCHC RBC AUTO-ENTMCNC: 32.5 G/DL (ref 31–37)
MCV RBC AUTO: 88.5 FL (ref 74–97)
MONOCYTES # BLD: 1.1 K/UL (ref 0.05–1.2)
MONOCYTES NFR BLD: 15 % (ref 3–10)
NEUTS SEG # BLD: 4.4 K/UL (ref 1.8–8)
NEUTS SEG NFR BLD: 58 % (ref 40–73)
PLATELET # BLD AUTO: 121 K/UL (ref 135–420)
PMV BLD AUTO: 10.3 FL (ref 9.2–11.8)
POTASSIUM SERPL-SCNC: 4.1 MMOL/L (ref 3.5–5.5)
PROT SERPL-MCNC: 6.8 G/DL (ref 6.4–8.2)
RBC # BLD AUTO: 4.77 M/UL (ref 4.7–5.5)
SARS-COV-2, COV2NT: DETECTED
SODIUM SERPL-SCNC: 137 MMOL/L (ref 136–145)
SOURCE, COVRS: ABNORMAL
SPECIMEN TYPE, XMCV1T: ABNORMAL
WBC # BLD AUTO: 7.5 K/UL (ref 4.6–13.2)

## 2020-08-26 PROCEDURE — 36415 COLL VENOUS BLD VENIPUNCTURE: CPT

## 2020-08-26 PROCEDURE — 85025 COMPLETE CBC W/AUTO DIFF WBC: CPT

## 2020-08-26 PROCEDURE — 74011250637 HC RX REV CODE- 250/637: Performed by: FAMILY MEDICINE

## 2020-08-26 PROCEDURE — 82962 GLUCOSE BLOOD TEST: CPT

## 2020-08-26 PROCEDURE — 97535 SELF CARE MNGMENT TRAINING: CPT

## 2020-08-26 PROCEDURE — 74011250636 HC RX REV CODE- 250/636: Performed by: INTERNAL MEDICINE

## 2020-08-26 PROCEDURE — 85379 FIBRIN DEGRADATION QUANT: CPT

## 2020-08-26 PROCEDURE — 80053 COMPREHEN METABOLIC PANEL: CPT

## 2020-08-26 PROCEDURE — 65270000029 HC RM PRIVATE

## 2020-08-26 PROCEDURE — 74011250637 HC RX REV CODE- 250/637: Performed by: INTERNAL MEDICINE

## 2020-08-26 PROCEDURE — 74011250637 HC RX REV CODE- 250/637: Performed by: PHYSICIAN ASSISTANT

## 2020-08-26 PROCEDURE — 74011250636 HC RX REV CODE- 250/636: Performed by: HOSPITALIST

## 2020-08-26 PROCEDURE — 86140 C-REACTIVE PROTEIN: CPT

## 2020-08-26 PROCEDURE — 83615 LACTATE (LD) (LDH) ENZYME: CPT

## 2020-08-26 PROCEDURE — 82728 ASSAY OF FERRITIN: CPT

## 2020-08-26 PROCEDURE — 97530 THERAPEUTIC ACTIVITIES: CPT

## 2020-08-26 PROCEDURE — 77010033678 HC OXYGEN DAILY

## 2020-08-26 PROCEDURE — 97110 THERAPEUTIC EXERCISES: CPT

## 2020-08-26 PROCEDURE — 97166 OT EVAL MOD COMPLEX 45 MIN: CPT

## 2020-08-26 PROCEDURE — 97116 GAIT TRAINING THERAPY: CPT

## 2020-08-26 RX ORDER — METOPROLOL SUCCINATE 100 MG/1
50 TABLET, EXTENDED RELEASE ORAL DAILY
Qty: 30 TAB | Refills: 0 | Status: SHIPPED
Start: 2020-08-26 | End: 2020-09-25

## 2020-08-26 RX ADMIN — Medication 500 MG: at 08:48

## 2020-08-26 RX ADMIN — APIXABAN 10 MG: 5 TABLET, FILM COATED ORAL at 18:47

## 2020-08-26 RX ADMIN — Medication 10 ML: at 14:00

## 2020-08-26 RX ADMIN — CHOLECALCIFEROL TAB 25 MCG (1000 UNIT) 2 TABLET: 25 TAB at 08:48

## 2020-08-26 RX ADMIN — LISINOPRIL 30 MG: 20 TABLET ORAL at 08:48

## 2020-08-26 RX ADMIN — Medication 10 ML: at 06:39

## 2020-08-26 RX ADMIN — DEXAMETHASONE 6 MG: 2 TABLET ORAL at 08:47

## 2020-08-26 RX ADMIN — ENOXAPARIN SODIUM 100 MG: 100 INJECTION SUBCUTANEOUS at 08:47

## 2020-08-26 RX ADMIN — METOPROLOL SUCCINATE 50 MG: 50 TABLET, EXTENDED RELEASE ORAL at 08:52

## 2020-08-26 RX ADMIN — DICLOFENAC 2 G: 10 GEL TOPICAL at 09:00

## 2020-08-26 RX ADMIN — Medication 10 ML: at 21:50

## 2020-08-26 RX ADMIN — ZINC SULFATE 220 MG (50 MG) CAPSULE 1 CAPSULE: CAPSULE at 08:49

## 2020-08-26 RX ADMIN — DICLOFENAC 2 G: 10 GEL TOPICAL at 22:00

## 2020-08-26 RX ADMIN — DICLOFENAC 2 G: 10 GEL TOPICAL at 13:00

## 2020-08-26 RX ADMIN — DICLOFENAC 2 G: 10 GEL TOPICAL at 18:00

## 2020-08-26 RX ADMIN — PRAVASTATIN SODIUM 40 MG: 20 TABLET ORAL at 21:49

## 2020-08-26 RX ADMIN — FAMOTIDINE 10 MG: 20 TABLET, FILM COATED ORAL at 21:49

## 2020-08-26 NOTE — ROUTINE PROCESS
Received report from [facility]. Pt AAOx3, NAD, breathing non labored, on O2 NC at 2L, HOB up. IV sites clean, dry and intact. Bed at the lowest level on lock position, call bell w/i reach. Bed alarm on. Bedside and Verbal shift change report given to UZMA Riley (oncoming nurse) by me (offgoing nurse).  Report included the following information SBAR, Kardex, Intake/Output, MAR, Recent Results and Cardiac Rhythm SR.

## 2020-08-26 NOTE — PROGRESS NOTES
[facility] 
(A Division of [address] [place] [address]) Follow-up Note Date of Admission: 8/18/2020       Date of Note:  8/26/2020 Summary:   
77 y/o AAM w/ DM, HTN, HLD, Nephrolithiasis, BPH adm 8/18 w/ SOB. NHR diagnosed w/ covid 8/10, became weak, w/ SOB, dry cough in past few days leading to presentation at the [facility] ED. Afebrile and WBC normal but RR consistently > 27. On 2 lpm NC SpO2 is 98%. CXR 8/18: central venous congestion if not mild central pulmonary edema (CXR 8/10 showed no acute cardiopulmonary process). Interval History: Afebrile x 5 days now. Now down to RA. Sitting in chair. Denies shortness of breath. Current Antimicrobials:    Prior Antimicrobials: 
None 8/23 - 2 remdesivir 8/19 - # 5 Assessment: Rec / Plan:  
Covid 19 pneumonia 
- SOB, fever, chills, sore throat, Increasing dyspnea last three days. - + Covid 8/10.   
- CXR bilateral interstitial infiltrates c/w covid (at least central venous /central pulmonary edema) - SpO2 95% on 3 lpm NC 
- Convalescent plasma given 8/20   
- Completed remdesivir 8/23 -> continue dexamethasone 5 more days 
-> monitor off antibiotics 
-> no objection to discharge from ID standpoint. Confusion - ? Metabolic encephalopathy 
- CT Head 8/21: no acute intracranial pathology 
- improving -> per primary team  
RUE DVT 
- seen on Duplex 8/21: acute occlusive DVT 1 of 2 brachial veins RUE, acute non-occlusive thromb basilic vein LUE 
- likely covid related -> anticoagulation per primary team  
Low grade fever 
- from covid pneumonia +/- DVTs -> monitor Acute Hypoxic Respiratory Failure -> O2 as needed DM HTN   
HLD Nephrolithiasis BPH Microbiology: 
 
8/19 bcx NGTD Lines / Catheters: 
  
piv Patient Active Problem List  
Diagnosis Code  HCVD (hypertensive cardiovascular disease) I11.9  Hyperlipidemia E78.5  Diabetes mellitus ([facility] [address] E11.9  Bradycardia R00.1  Hypertension I10  
 BPH (benign prostatic hyperplasia) N40.0  Bone pain M89.8X9  Muscular weakness M62.81  Leg pain, right M79.604  Bladder neck contracture N32.0  Microscopic hematuria R31.29  
 History of nephrolithiasis Z87.442  Obesity (BMI 30.0-34. 9) E66.9  
 Pneumonia due to COVID-19 virus U07.1, J12.89  Thrombocytopenia ([address] D69.6  Shortness of breath R06.02  
 
 
Current Facility-Administered Medications Medication Dose Route Frequency  apixaban (ELIQUIS) tablet 10 mg  10 mg Oral BID  dextrose (D50W) injection syrg 25 g  25 g IntraVENous PRN  
 lisinopriL (PRINIVIL, ZESTRIL) tablet 30 mg  30 mg Oral DAILY  metoprolol succinate (TOPROL-XL) XL tablet 50 mg  50 mg Oral DAILY  glucagon (GLUCAGEN) injection 1 mg  1 mg IntraMUSCular PRN  
 insulin lispro (HUMALOG) injection   SubCUTAneous AC&HS  cholecalciferol (VITAMIN D3) (1000 Units /25 mcg) tablet 2 Tab  2,000 Units Oral DAILY  diclofenac (VOLTAREN) 1 % topical gel 2 g  2 g Topical QID  dexAMETHasone (DECADRON) tablet 6 mg  6 mg Oral DAILY  pravastatin (PRAVACHOL) tablet 40 mg  40 mg Oral QHS  famotidine (PEPCID) tablet 10 mg  10 mg Oral QPM  
 sodium chloride (NS) flush 5-40 mL  5-40 mL IntraVENous Q8H  
 sodium chloride (NS) flush 5-40 mL  5-40 mL IntraVENous PRN  
 acetaminophen (TYLENOL) tablet 650 mg  650 mg Oral Q6H PRN Or  
 acetaminophen (TYLENOL) suppository 650 mg  650 mg Rectal Q6H PRN  polyethylene glycol (MIRALAX) packet 17 g  17 g Oral DAILY PRN  promethazine (PHENERGAN) tablet 12.5 mg  12.5 mg Oral Q6H PRN Or  
 ondansetron (ZOFRAN) injection 4 mg  4 mg IntraVENous Q6H PRN  
 glucose chewable tablet 16 g  16 g Oral PRN  
 ascorbic acid (vitamin C) (VITAMIN C) tablet 500 mg  500 mg Oral DAILY  zinc sulfate (ZINCATE) 220 (50) mg capsule 1 Cap  1 Cap Oral DAILY  0.9% sodium chloride infusion 250 mL  250 mL IntraVENous PRN  
 
 
 
 Review of Systems - Negative except as in interval history Objective: 
 
Visit Vitals /81 (BP 1 Location: Left leg, BP Patient Position: Sitting) Pulse 65 Temp 97.1 °F (36.2 °C) Resp 16 Ht 6' 2\" (1.88 m) Wt 99.8 kg (220 lb) SpO2 98% BMI 28.25 kg/m² Temp (24hrs), Av.5 °F (36.4 °C), Min:97 °F (36.1 °C), Max:98.3 °F (36.8 °C) General: Well developed, well nourished 78 y.o. BLACK male in no acute distress. ENT: ENT exam normal, no neck nodes or sinus tenderness Head: normocephalic, without obvious abnormality Mouth:  mucous membranes moist, pharynx normal without lesions Neck: supple, symmetrical, trachea midline Cardio:  regular rhythm, no murmurs, rubs or gallops Chest: inspection normal - no chest wall deformities or tenderness, respiratory effort normal 
Lungs: clear to auscultation, no wheezes or rales and unlabored breathing Abdomen: soft, non-tender. Bowel sounds normal. No masses, no organomegaly. Extremities:  no redness or tenderness in the calves or thighs, no edema, tenderness RUE> LUE resolved Lab results: 
 
Chemistry Recent Labs  
  20 
0236 20 
0516 20 
6605 GLU 87 93 86  138 137  
K 4.1 3.9 4.1  108 108 CO2 27 25 25 BUN 19* 21* 27* CREA 0.92 0.90 0.90  
CA 8.2* 7.9* 8.0* AGAP 3 5 4 BUCR 21* 23* 30* AP 49  --   --   
TP 6.8  --   --   
ALB 2.2*  --   --   
GLOB 4.6*  --   --   
AGRAT 0.5*  --   --   
 
 
CBC w/ Diff Recent Labs  
  20 
0236 20 
4593 WBC 7.5 6.4  
RBC 4.77 4.49* HGB 13.7 13.0 HCT 42.2 39.6 * 119* GRANS 58 54 LYMPH 26 28 EOS 1 1 Microbiology All Micro Results Procedure Component Value Units Date/Time CULTURE, BLOOD [[id number]] Collected:  20 0035 Order Status:  Completed Specimen:  Blood Updated:  20 3543 Special Requests: NO SPECIAL REQUESTS Culture result: NO GROWTH 6 DAYS CULTURE, BLOOD [[id number]] Collected:  08/19/20 0050 Order Status:  Completed Specimen:  Blood Updated:  08/25/20 9726 Special Requests: NO SPECIAL REQUESTS Culture result: NO GROWTH 6 DAYS     
 CULTURE, URINE [[id number]] Collected:  08/19/20 1350 Order Status:  Completed Specimen:  Clean catch Updated:  08/21/20 9207 Special Requests: NO SPECIAL REQUESTS Culture result: No growth (<1,000 CFU/ML) CULTURE, RESPIRATORY/SPUTUM/BRONCH Lorren Mention [[id number]] Collected:  08/19/20 1800 Order Status:  Canceled Specimen:  Sputum Chema Dykes MD, [facility] 
[phone] 
8/26/2020  
6:31 PM

## 2020-08-26 NOTE — PROGRESS NOTES
Notified Dr. Danna Barnett that patient's son called and requested to speak to the attending physician stating, \"I don't want to talk to the lady who called me. I want to speak to him. \"

## 2020-08-26 NOTE — PROGRESS NOTES
Chart reviewed. Per MD note, pts son would like him to have rehab before coming home. Clinicals sent via [employer] and [email or web address]. Pt is covid +. Spoke with Mary Ann Gamboa at [facility]. They are willing to accept pt for skilled rehab; can take him tomorrow. Let pt and his son know. Made Dr Marcie Cortes aware. Plan for discharge to SNF tomorrow. 1500- received call from [facility] stating she had spoken with son and he is requesting patient be transferred to [facility]. Spoke with Mei Pisano for guidance. Called and spoke with son, Fay Natarajan. Explained that if he wanted pt transferred to [facility], he would need to contact them and find a physician to accept him because he was not needing a transfer for a medical reason. \"COVID is not a medical reason??\". I explained that he was no longer on supplemental oxygen, no longer SOB at rest and had been cleared by MD and PT/OT to go to skilled rehab. Because there is a local facility that accepts covid + patients, he did not need to stay here until he tests negative. Also explained that patients DVTs could be managed outpatient. He will be calling [facility] to speak with a physician there and will get back to me. Updated Dr Marcie Cortes and [facility] Brigid Ospina RN - Outcomes Manager  [phone]

## 2020-08-26 NOTE — ROUTINE PROCESS
Bedside shift change report given to Polo Altamirano RN (oncoming nurse) by Gregory Key RN (offgoing nurse). Report included the following information SBAR, Kardex, Intake/Output, MAR, Recent Results and Med Rec Status.

## 2020-08-26 NOTE — PROGRESS NOTES
Problem: Self Care Deficits Care Plan (Adult) Goal: *Acute Goals and Plan of Care (Insert Text) Description: Occupational Therapy Goals Initiated 8/26/2020 within 7 day(s). 1.  Patient will perform upper body dressing with modified independence. 2.  Patient will perform lower body dressing with supervision/set-up. 3.  Patient will perform toileting with supervision/set-up. 4.  Patient will perform toilet transfers with supervision/set-up. 5.  Patient will perform a functional activity/ADL in standing with supervision/setup for 3-5 minutes, presenting with F+ standing balance. 6.  Patient will participate in upper extremity therapeutic exercise/activities with supervision/set-up for 8 minutes. Prior Level of Function: Pt reports he was (I) with basic self-care/ADLs and ambulated with a cane PTA. Pt lives alone. Outcome: Progressing Towards Goal 
 OCCUPATIONAL THERAPY EVALUATION Patient: Efrain Cottrell Sr. (78 y.o. male) Date: 8/26/2020 Primary Diagnosis: COVID-19 [U07.1] Pneumonia due to COVID-19 virus [U07.1, J12.89] Precautions:  Skin, Contact, Fall ASSESSMENT : 
Pt cleared to participate in OT evaluation by RN. Upon entering room, pt received semi-reclined in bed, alert, and agreeable to OT eval.  Based on the objective data described below, the patient presents with  decreased strength, decreased activity tolerance,  decreased standing balance, decreased ability to safely perform functional transfers and mobility, and impaired cognition, affecting the patients safety and ability to perform basic ADLs/IADLs, increasing the need for assistance from others. Pt oriented to person, month, and year, but disoriented to place; re-oriented pt. Pt with O2 out of nose, therefore properly donned O2. Pt performed supine-sit with min assist to participate in further self-care.  Sitting EOB, pt needing min asssist to cross LLE to bring L foot to R knee but was able doff/don L sock with SBA. Pt requiring moderate assist to stand from bed to utilize urinal in standing, presenting with F stand bal with RW support. After activity, pt able to take 4 sidesteps towards [facility], simulating C transfers. Pt requiring max verbal/tactile cueing for RW management. Pt was returned to supine position in bed. The patient requires skilled OT services to assess safety and increase independence with basic self-care/ADLs, enhancing the patients quality of life by [facility] At the end of the session, pt left resting comfortably in bed, call bell in reach, with all needs met. Patient will benefit from skilled intervention to address the above impairments. Patient's rehabilitation potential is considered to be Good Factors which may influence rehabilitation potential include:  
[]             None noted [x]             Mental ability/status [x]             Medical condition [x]             Home/family situation and support systems []             Safety awareness []             Pain tolerance/management 
[]             Other: PLAN : 
Recommendations and Planned Interventions:  
[x]               Self Care Training                  [x]      Therapeutic Activities [x]               Functional Mobility Training   [x]      Cognitive Retraining 
[x]               Therapeutic Exercises           [x]      Endurance Activities [x]               Balance Training                    []      Neuromuscular Re-Education []               Visual/Perceptual Training     [x]      Home Safety Training 
[x]               Patient Education                   [x]      Family Training/Education []               Other (comment): Frequency/Duration: Patient will be followed by occupational therapy 1-2 times per day/4-7 days per week to address goals. Discharge Recommendations: [place] Further Equipment Recommendations for Discharge: Rolling walker, shower chair, grab bars SUBJECTIVE:  
Patient stated I need to use the bathroom\" OBJECTIVE DATA SUMMARY:  
 
Past Medical History:  
Diagnosis Date Abnormal EKG Bone pain BPH (benign prostatic hyperplasia) Bradycardia Chest pain, unspecified R/O CAD 3/10 mild fixed inferior defect Diabetes mellitus ([address] denies 9/30/19 Echocardiogram 04/02/2010 [facility]:  EF 56%. No RWMA. Mod conc LVH. DDfx. RVSP 22 mmHg. HCVD (hypertensive cardiovascular disease) Hematuria, unspecified History of silent myocardial infarction Hypercholesteremia Hyperlipidemia Hypertension Hypertension Ill-defined condition UTI Kidney stones Leg pain, right   
 normal exercise JOAQUIN (fall 2014) Lower extremity arterial testing 10/07/2014 No arterial disease at rest bilaterally. R JOAQUIN 1.27.  L JOAQUIN 1.35. No significant drop in JOAQUIN w/exercise c/w normal perfusion. Lower urinary tract symptoms (LUTS) Muscular weakness Nuclear cardiac imaging test 12/02/2011 Likely normal.  Fixed inferior basal and mid inferior defect likely artifact. No ischemia. EF 60%. No WMA. Other specified cardiac dysrhythmias(427.89) Bradycardia and pauses on holter Swelling of limb Unspecified sleep apnea   
 does not use cpap machine Past Surgical History:  
Procedure Laterality Date BIOPSY  3/25/99 COLONOSCOPY N/A 10/7/2019 COLONOSCOPY with bx's performed by Félix Quintana MD at [address] [place] [address] REPLACEMENT  12/6/11 HX UROLOGICAL  01/06/2012 [facility], Dr. Adina Arambula, Transurethral resection of prostate, button prostatectomy HX UROLOGICAL  1/29/2015 [facility], Dr. Isma Magdaleno, ESWL  
 [place] COLONOSCOPY FLX DX W/COLLJ [address] WHEN PFRMD    
 TOTAL HIP ARTHROPLASTY Left 2014 Dr. Marc Lewis Barriers to Learning/Limitations: yes;  cognitive and altered mental status (i.e.Sedation, Confusion) Compensate with: visual, verbal, tactile, kinesthetic cues/model Home Situation:  
Home Situation Home Environment: Private residence # Steps to Enter: 3 One/Two Story Residence: One story Living Alone: No 
Support Systems: Child(macey), Family member(s), Friends \ neighbors Patient Expects to be Discharged to[de-identified] Private residence Current DME Used/Available at Home: Walker, rollator 
[]  Right hand dominant   []  Left hand dominant Cognitive/Behavioral Status: 
Neurologic State: Alert Orientation Level: Oriented to person;Disoriented to place;Oriented to time Cognition: Follows commands Safety/Judgement: Fall prevention Skin: Visible skin appeared intact. Edema: None noted Coordination: BUE Coordination: Within functional limits Fine Motor Skills-Upper: Left Intact; Right Intact Gross Motor Skills-Upper: Left Intact; Right Intact Balance: 
Sitting: Impaired Sitting - Static: Good (unsupported) Sitting - Dynamic: Fair (occasional) Standing: Impaired; With support Standing - Static: Fair Standing - Dynamic : Fair Strength: BUE Strength: Generally decreased, functional 
 
Tone & Sensation: BUE Tone: Normal 
Sensation: Intact Range of Motion: BUE 
AROM: Within functional limits Functional Mobility and Transfers for ADLs: 
Bed Mobility: 
Supine to Sit: Minimum assistance Sit to Supine: Moderate assistance Transfers: 
Sit to Stand: Moderate assistance Stand to Sit: Moderate assistance ADL Assessment:  
Feeding: Setup Oral Facial Hygiene/Grooming: Setup Bathing: Minimum assistance Upper Body Dressing: Stand-by assistance Lower Body Dressing: Minimum assistance; Moderate assistance Toileting: Moderate assistance ADL Intervention: 
Grooming Grooming Assistance: Set-up Position Performed: Seated edge of bed Washing Hands: Set-up Lower Body Dressing Assistance Dressing Assistance: Minimum assistance Socks: Minimum assistance Leg Crossed Method Used: Yes Position Performed: Seated edge of bed Toileting Toileting Assistance: Minimum assistance(in standing using urinal) Cognitive Retraining Safety/Judgement: Fall prevention Pain: 
Pain level pre-treatment: 0/10 Pain level post-treatment: 0/10 Pain Intervention(s): Medication (see MAR); Rest, Ice, Repositioning Response to intervention: Nurse notified, See doc flow Activity Tolerance:  
Fair Please refer to the flowsheet for vital signs taken during this treatment. After treatment:  
[] Patient left in no apparent distress sitting up in chair 
[x] Patient left in no apparent distress in bed 
[x] Call bell left within reach [x] Nursing notified 
[] Caregiver present [x] Bed alarm activated COMMUNICATION/EDUCATION:  
[x] Role of Occupational Therapy in the acute care setting 
[] Home safety education was provided and the patient/caregiver indicated understanding. [x] Patient/family have participated as able in goal setting and plan of care. [x] Patient/family agree to work toward stated goals and plan of care. [] Patient understands intent and goals of therapy, but is neutral about his/her participation. [] Patient is unable to participate in goal setting and plan of care. Thank you for this referral. 
Maurice Garcia MS, OTR/L Time Calculation: 38 mins Eval Complexity: History: MEDIUM Complexity : Expanded review of history including physical, cognitive and psychosocial  history ; Examination: MEDIUM Complexity : 3-5 performance deficits relating to physical, cognitive , or psychosocial skils that result in activity limitations and / or participation restrictions; Decision Making:MEDIUM Complexity : Patient may present with comorbidities that affect occupational performnce.  Miniml to moderate modification of tasks or assistance (eg, physical or verbal ) with assesment(s) is necessary to enable patient to complete evaluation

## 2020-08-26 NOTE — PROGRESS NOTES
[facility] Hospitalist Group Progress Note Patient: Sanjuana Tobin Sr. Age: 78 y.o. : 1940 MR#: [id number] SSN: [id number] Date: 2020 Subjective: No complaints. On supplemental o2 nasal cannula, appears comfortable. Discuss with RN to wean to room air today. Assessment/Plan:  
79 y/o male with PMHx of Type II DM, HTN, HLD and BPH who is admitted with COVID-19 pneumonia. Has been found to have acute occlusive and non-occlusive RUE DVTs and acute occlusive superficial DVT in LUE. CONSULTS- ID 1. Acute metabolic encephalopathy due to covid 19 PNA - improves 2. Acute DVT RUE and acute DVT in LUE 3. COVID 19 infection and pneumonia -status post treatment with Remdesivir / convalescent plasma 4. Acute hypoxic respiratory failure due to covid 19 PNA 5. Bilateral hand pain 6. Diabetes mellitus type 2  
7. Hypertension 8. Dyslipidemia 9. BPH 
10. Osteoarthritis - ID follows- continue dexamethasone for 5 more days. Continue vitamin c/d and zinc. Monitor off abxs. Inflammatory markers trend down. Supplemental o2 as needed- try to wean to room air if tolerates. - switch  lovenox 100 mg BID to eliquis 10 mg BID x7 days, then 5 mg BID. - ISS for DM 
- lisinopril and toprol for HTN 
- voltaren for bilateral hand pain 
- PT OT fall precautions - Son wants rehab - CM aware- plan for dc to SNF tomorrow if patient remains stable overnight  
covid 19 from  positive - [facility] will accept positive covid patients for skilled care- no need to swab again- I confirmed with CM  
 
- discussion with son at 249pm- I told the son that there is a facility that will accept covid positive patients. Now the son disagrees with dc to a SNF for rehab. He does not want the patient to leave the hospital until he is tested negative for covid.  The son is requesting transfer to [facility] immediately, or to any [facility], so that they can take care of the patient as an inpt until he tests negative for covid. I told him the pt has been weaned to room air today and doing well. I told him for the transfer to [facility]- he needs an accepting MD, bed avail, and transportation which may be out of pocket. The son agrees. Case discussed with CM. FULL CODE  
dvt ppx- eliquis Gi ppx- pepcid Additional Notes:   
 
Case discussed with:  [x]Patient  [x]Family son  [x]Nursing  []Case Management DVT Prophylaxis:  []Lovenox  []Hep SQ  []SCDs  []Coumadin   []On Heparin gtt 
ELIQUIS Objective:  
VS:  
Visit Vitals /81 (BP 1 Location: Left leg, BP Patient Position: Sitting) Pulse 65 Temp 97.1 °F (36.2 °C) Resp 16 Ht 6' 2\" (1.88 m) Wt 99.8 kg (220 lb) SpO2 98% BMI 28.25 kg/m² Tmax/24hrs: Temp (24hrs), Av.5 °F (36.4 °C), Min:97 °F (36.1 °C), Max:98.3 °F (36.8 °C) Intake/Output Summary (Last 24 hours) at 2020 1432 Last data filed at 2020 4808 Gross per 24 hour Intake 220 ml Output  Net 220 ml General:  Elderly AA male laying in bed, resting, Alert, NAD Cardiovascular:  RRR Pulmonary:  Nasal cannula, LSC throughout; respiratory effort WNL 
GI:  +BS in all four quadrants, soft, non-tender Extremities:  No edema; 2+ dorsalis pedis pulses bilaterally Neuro: awake, alert, oriented to person, moving arms legs. Labs:   
Recent Results (from the past 24 hour(s)) GLUCOSE, POC Collection Time: 20  4:29 PM  
Result Value Ref Range Glucose (POC) 116 (H) 70 - 110 mg/dL GLUCOSE, POC Collection Time: 20  9:53 PM  
Result Value Ref Range Glucose (POC) 109 70 - 110 mg/dL D DIMER Collection Time: 20  2:36 AM  
Result Value Ref Range D DIMER 3.96 (H) <0.46 ug/ml(FEU) FERRITIN Collection Time: 20  2:36 AM  
Result Value Ref Range Ferritin 862 (H) 8 - 388 NG/ML  
C REACTIVE PROTEIN, QT Collection Time: 20  2:36 AM  
Result Value Ref Range C-Reactive protein 1.4 (H) 0 - 0.3 mg/dL LD Collection Time: 08/26/20  2:36 AM  
Result Value Ref Range  (H) 81 - 234 U/L  
CBC WITH AUTOMATED DIFF Collection Time: 08/26/20  2:36 AM  
Result Value Ref Range WBC 7.5 4.6 - 13.2 K/uL  
 RBC 4.77 4.70 - 5.50 M/uL  
 HGB 13.7 13.0 - 16.0 g/dL HCT 42.2 36.0 - 48.0 % MCV 88.5 74.0 - 97.0 FL  
 MCH 28.7 24.0 - 34.0 PG  
 MCHC 32.5 31.0 - 37.0 g/dL  
 RDW 13.3 11.6 - 14.5 % PLATELET 708 (L) 284 - 420 K/uL MPV 10.3 9.2 - 11.8 FL  
 NEUTROPHILS 58 40 - 73 % LYMPHOCYTES 26 21 - 52 % MONOCYTES 15 (H) 3 - 10 % EOSINOPHILS 1 0 - 5 % BASOPHILS 0 0 - 2 %  
 ABS. NEUTROPHILS 4.4 1.8 - 8.0 K/UL  
 ABS. LYMPHOCYTES 1.9 0.9 - 3.6 K/UL  
 ABS. MONOCYTES 1.1 0.05 - 1.2 K/UL  
 ABS. EOSINOPHILS 0.1 0.0 - 0.4 K/UL  
 ABS. BASOPHILS 0.0 0.0 - 0.1 K/UL  
 DF AUTOMATED METABOLIC PANEL, COMPREHENSIVE Collection Time: 08/26/20  2:36 AM  
Result Value Ref Range Sodium 137 136 - 145 mmol/L Potassium 4.1 3.5 - 5.5 mmol/L Chloride 107 100 - 111 mmol/L  
 CO2 27 21 - 32 mmol/L Anion gap 3 3.0 - 18 mmol/L Glucose 87 74 - 99 mg/dL BUN 19 (H) 7.0 - 18 MG/DL Creatinine 0.92 0.6 - 1.3 MG/DL  
 BUN/Creatinine ratio 21 (H) 12 - 20 GFR est AA >60 >60 ml/min/1.73m2 GFR est non-AA >60 >60 ml/min/1.73m2 Calcium 8.2 (L) 8.5 - 10.1 MG/DL Bilirubin, total 0.7 0.2 - 1.0 MG/DL  
 ALT (SGPT) 35 16 - 61 U/L  
 AST (SGOT) 34 10 - 38 U/L Alk. phosphatase 49 45 - 117 U/L Protein, total 6.8 6.4 - 8.2 g/dL Albumin 2.2 (L) 3.4 - 5.0 g/dL Globulin 4.6 (H) 2.0 - 4.0 g/dL A-G Ratio 0.5 (L) 0.8 - 1.7 GLUCOSE, POC Collection Time: 08/26/20  5:44 AM  
Result Value Ref Range Glucose (POC) 85 70 - 110 mg/dL GLUCOSE, POC Collection Time: 08/26/20 12:00 PM  
Result Value Ref Range Glucose (POC) 103 70 - 110 mg/dL Signed By: VANESSA Alcaraz August 26, 2020

## 2020-08-26 NOTE — PROGRESS NOTES
Patient's O2 saturation on 2L via NC is currently at 98%. Weaned patient down to 1L O2 per order. 1330:  Patient's O2 saturation remains at 98% on 1L O2 via NC. Patient is showing no signs or symptoms of respiratory distress. Patient placed on RA per order. 1430:  Patient's O2 sat on RA is 96%.

## 2020-08-26 NOTE — PROGRESS NOTES
Patient's platelets on 2/88 were 119 and MD approved continuing administration of Lovenox. Platelets are still low today, at 121. Will continue with administration of Lovenox.

## 2020-08-26 NOTE — PROGRESS NOTES
Problem: Mobility Impaired (Adult and Pediatric) Goal: *Acute Goals and Plan of Care (Insert Text) Description: Physical Therapy Goals Initiated 8/25/2020 and to be accomplished within 7 day(s) 1. Patient will move from supine to sit and sit to supine  in bed with supervision/set-up. 2.  Patient will transfer from bed to chair and chair to bed with supervision/set-up using the least restrictive device. 3.  Patient will perform sit to stand with supervision/set-up. 4.  Patient will ambulate with supervision/set-up for 100 feet with the least restrictive device. PLOF: Patient reports he was independent with self care and functional mobility, but has had recent decline for about one months. Patient reports increased weakness and inability to ambulate. He lives alone in 1st floor apartment. Outcome: Progressing Towards Goal 
 PHYSICAL THERAPY TREATMENT Patient: Abner Boykin Sr. (78 y.o. male) Date: 8/26/2020 Diagnosis: COVID-19 [U07.1] Pneumonia due to COVID-19 virus [U07.1, J12.89] Pneumonia due to COVID-19 virus Precautions: Skin, Contact, Fall PLOF: see above ASSESSMENT: 
Pt demonstrated increased independence with transfer and ambulation. Pt transferred supine to sit with SBA. Pt stood from the bed with min A and verbal cues for hand placement and full extension at hips and knees. Pt ambulated 3 feet with RW and min A to the recliner. After a seated rest break he performed exercises as noted below. Pt then stood from the recliner with min A to encourage anterior weight shift and ambulated 10 feet with RW and CGA. Pt was left sitting up in the recliner with needs in reach and nursing notified. Progression toward goals:  
[x]      Improving appropriately and progressing toward goals 
[]      Improving slowly and progressing toward goals 
[]      Not making progress toward goals and plan of care will be adjusted PLAN: 
 Patient continues to benefit from skilled intervention to address the above impairments. Continue treatment per established plan of care. Discharge Recommendations:  Inpatient Rehab Further Equipment Recommendations for Discharge:  N/A  
 
SUBJECTIVE:  
Patient stated I feel alright.  OBJECTIVE DATA SUMMARY:  
Critical Behavior: 
Neurologic State: Alert Orientation Level: Oriented to person, Disoriented to place, Oriented to time Cognition: Follows commands Safety/Judgement: Fall prevention Functional Mobility Training: 
Bed Mobility: 
Supine to Sit: Stand-by assistance Transfers: 
Sit to Stand: Minimum assistance Stand to Sit: Minimum assistance Balance: 
Sitting: Impaired Sitting - Static: Good (unsupported) Sitting - Dynamic: Fair (occasional) Standing: With support Standing - Static: (fair) Standing - Dynamic : (fair-) Range Of Motion: 
 AROM: Within functional limits Ambulation/Gait Training: 
Distance (ft): 10 Feet (ft)(3) Assistive Device: Walker, rolling Ambulation - Level of Assistance: Contact guard assistance Gait Abnormalities: Decreased step clearance Speed/Cori: Slow Step Length: Right shortened;Left shortened Therapeutic Exercises:  
 
 
 
EXERCISE Sets Reps Active Active Assist  
Passive Self ROM Comments Ankle Pumps 1 10  [x] [] [] [] Quad Sets/Glut Sets    [] [] [] [] Hold for 5 secs Hamstring Sets   [] [] [] [] Short Arc Quads   [] [] [] [] Heel Slides 1 10 [x] [] [] [] Straight Leg Raises   [] [] [] [] Hip ABD/Add in sitting with knees extended 1 10 [x] [] [] [] Long Arc Quads 1 15 [x] [] [] [] Seated Marching 1 15 [x] [] [] []   
Standing Marching   [] [] [] []   
   [] [] [] []   
 
 
Pain: 
Pain level pre-treatment: 0/10 Pain level post-treatment: 0/10 Pain Intervention(s): n/a Activity Tolerance:  
Fair- 
Please refer to the flowsheet for vital signs taken during this treatment. After treatment: [x] Patient left in no apparent distress sitting up in chair 
[] Patient left in no apparent distress in bed 
[x] Call bell left within reach [x] Nursing notified 
[] Caregiver present 
[] Bed alarm activated 
[] SCDs applied COMMUNICATION/EDUCATION:  
[x]         Role of Physical Therapy in the acute care setting. [x]         Fall prevention education was provided and the patient/caregiver indicated understanding. [x]         Patient/family have participated as able in working toward goals and plan of care. [x]         Patient/family agree to work toward stated goals and plan of care. []         Patient understands intent and goals of therapy, but is neutral about his/her participation. []         Patient is unable to participate in stated goals/plan of care: ongoing with therapy staff. [x]         Other: instructed patient to call for nursing assistance when he wants to go back to bed [place], PT Time Calculation: 25 mins

## 2020-08-26 NOTE — PROGRESS NOTES
Problem: Patient Education: Go to Patient Education Activity Goal: Patient/Family Education Outcome: Progressing Towards Goal 
  
Problem: Pneumonia: Discharge Outcomes Goal: *Demonstrates progressive activity Outcome: Progressing Towards Goal 
Goal: *Describes follow-up/return visits to physicians Outcome: Progressing Towards Goal 
Goal: *Tolerating diet Outcome: Progressing Towards Goal 
Goal: *Verbalizes name, dosage, time, side effects, and number of days to continue medications Outcome: Progressing Towards Goal 
Goal: *Influenza immunization Outcome: Progressing Towards Goal 
Goal: *Pneumococcal immunization Outcome: Progressing Towards Goal 
Goal: *Respiratory status at baseline Outcome: Progressing Towards Goal 
Goal: *Vital signs within defined limits Outcome: Progressing Towards Goal 
Goal: *Describes available resources and support systems Outcome: Progressing Towards Goal 
Goal: *Optimal pain control at patient's stated goal 
Outcome: Progressing Towards Goal 
  
Problem: Falls - Risk of 
Goal: *Absence of Falls Description: Document Piyush Lerma Fall Risk and appropriate interventions in the flowsheet. Outcome: Progressing Towards Goal 
Note: Fall Risk Interventions: 
  
 
Mentation Interventions: Adequate sleep, hydration, pain control, More frequent rounding, Reorient patient, Update white board Medication Interventions: Assess postural VS orthostatic hypotension, Patient to call before getting OOB, Teach patient to arise slowly Elimination Interventions: Call light in reach, Patient to call for help with toileting needs, Toileting schedule/hourly rounds Problem: Pressure Injury - Risk of 
Goal: *Prevention of pressure injury Description: Document Luca Scale and appropriate interventions in the flowsheet.  
Outcome: Progressing Towards Goal 
Note: Pressure Injury Interventions: 
Sensory Interventions: Assess changes in LOC, Keep linens dry and wrinkle-free, Minimize linen layers Moisture Interventions: Absorbent underpads, Check for incontinence Q2 hours and as needed, Minimize layers Activity Interventions: Assess need for specialty bed, Pressure redistribution bed/mattress(bed type) Mobility Interventions: Assess need for specialty bed, Pressure redistribution bed/mattress (bed type), Turn and reposition approx. every two hours(pillow and wedges) Nutrition Interventions: Document food/fluid/supplement intake, Offer support with meals,snacks and hydration Friction and Shear Interventions: Apply protective barrier, creams and emollients, Lift team/patient mobility team, Minimize layers Problem: Nutrition Deficit Goal: *Optimize nutritional status Outcome: Progressing Towards Goal 
  
Problem: Diabetes Self-Management Goal: *Disease process and treatment process Description: Define diabetes and identify own type of diabetes; list 3 options for treating diabetes. Outcome: Progressing Towards Goal 
Goal: *Incorporating nutritional management into lifestyle Description: Describe effect of type, amount and timing of food on blood glucose; list 3 methods for planning meals. Outcome: Progressing Towards Goal 
Goal: *Incorporating physical activity into lifestyle Description: State effect of exercise on blood glucose levels. Outcome: Progressing Towards Goal 
Goal: *Developing strategies to promote health/change behavior Description: Define the ABC's of diabetes; identify appropriate screenings, schedule and personal plan for screenings. Outcome: Progressing Towards Goal 
Goal: *Using medications safely Description: State effect of diabetes medications on diabetes; name diabetes medication taking, action and side effects. Outcome: Progressing Towards Goal 
Goal: *Monitoring blood glucose, interpreting and using results Description: Identify recommended blood glucose targets  and personal targets. Outcome: Progressing Towards Goal 
Goal: *Prevention, detection, treatment of acute complications Description: List symptoms of hyper- and hypoglycemia; describe how to treat low blood sugar and actions for lowering  high blood glucose level. Outcome: Progressing Towards Goal 
Goal: *Prevention, detection and treatment of chronic complications Description: Define the natural course of diabetes and describe the relationship of blood glucose levels to long term complications of diabetes. Outcome: Progressing Towards Goal 
Goal: *Developing strategies to address psychosocial issues Description: Describe feelings about living with diabetes; identify support needed and support network Outcome: Progressing Towards Goal 
Goal: *Insulin pump training Outcome: Progressing Towards Goal 
Goal: *Sick day guidelines Outcome: Progressing Towards Goal 
Goal: *Patient Specific Goal (EDIT GOAL, INSERT TEXT) Outcome: Progressing Towards Goal 
  
Problem: Deep Venous Thrombosis - Risk of 
Goal: *Absence of deep venous thrombosis signs and symptoms(Stroke Metric) Outcome: Progressing Towards Goal 
Goal: *Absence of impaired coagulation signs and symptoms Outcome: Progressing Towards Goal 
Goal: *Knowledge of prescribed medications Outcome: Progressing Towards Goal 
Goal: *Absence of bleeding Outcome: Progressing Towards Goal 
  
Problem: Patient Education: Go to Patient Education Activity Goal: Patient/Family Education Outcome: Progressing Towards Goal

## 2020-08-26 NOTE — ROUTINE PROCESS
Received report from [facility]. Pt AAOx3, NAD, breathing non labored, on room air, assisted back to be and made comfortable. IV sites clean, dry and intact. Bed at the lowest level on lock position, call bell w/i reach. Bed alarm on. Bedside and Verbal shift change report given to [facility] (oncoming nurse) by me (offgoing nurse). Report included the following information SBAR, Kardex, Intake/Output, MAR and Recent Results.

## 2020-08-26 NOTE — DISCHARGE SUMMARY
Physician Discharge Summary Patient: Eliazar Carr Sr. MRN: [id number]  SSN: [id number] YOB: 1940  Age: 78 y.o. Sex: male PCP: Tate Santacruz MD 
 
Allergies: Patient has no known allergies. Admit date: 8/18/2020 Admitting Provider: Jessee Dhillon MD 
 
Discharge date: 8/27/2020 Discharging Provider: VANESSA Seo 
 
* Admission Diagnoses: COVID-19 [U07.1] Pneumonia due to COVID-19 virus [U07.1, J12.89] * Discharge Diagnoses:   
 
1. Acute metabolic encephalopathy due to covid 19 PNA - resolved 2. Acute DVT RUE and acute DVT in LUE 3. COVID 19 infection and pneumonia -status post treatment with Remdesivir / convalescent plasma 4. Acute hypoxic respiratory failure due to covid 19 PNA 5. Bilateral hand pain 6. Diabetes mellitus type 2  
7. Hypertension 8. Dyslipidemia 9. BPH 
10. Osteoarthritis HPI per admitting MD: 68-year-old -American male presenting for shortness of breath. Medical history of coronary artery disease hypertension diabetes. Patient is notable of having weakness and fatigue. Also tested COVID +8 days prior to admission. Reports associated fevers chills and sore throat. Was improving 3 days prior to admission until he became weaker and more short of breath. Afternoon of admission patient could not even get up from his chair. Denies any neurological deficits. Currently struggling to maintain O2 sats over 92% while on 3 L of oxygen. Infectious disease has been notified and plans to administer Remdesivir and convalescent plasma today. [facility] Course: Mr. Tashia Mckoy is an 79 yo AA male with a hx of type 2 DM, HTN, HLD, BPH, OA who was admitted from home on 8/19 with SOB, weakness, fatigue, fever, chills, sore throat. He was tested positive for covid 19 before presentation to the ED. He was admitted with covid 19 pneumonia.  His CXR showed bilateral interstitial infiltrates c/w covid (at least central venous /central pulmonary edema). ID was consulted and followed- he was given remdesivir, convalescent plasma, steroids. He was given vitamin c, vitamin d, and zinc.  It was recommended by ID to monitor off antibiotics. He was initially maintained on nasal cannula supplemental oxygen and was able to be weaned to room air without hypoxia. Inflammatory markers were monitored, trended down. CT head was obtained due to AMS- it was negative. His mental status returned to baseline and it was thought that it was from acute infection. # Also his course was complicated by the diagnosis of acute DVTs in the RUE and LUE. He was started on therapeutic dose lovenox. On 8/26, he was transitioned to eliquis 10 mg BID - he needs this dose for 7 days total, then he needs eliquis 5 mg BID. Anticoagulation was discussed with the patient's son-  The risks and benefits and the son agreed with anticoagulation # He was also complaining of bilateral hand pain, xray showed osteoarthritis. He was given voltaren gel. # The patient was seen by PT OT who recommended SNF. The son agreed with this plan. He will go to [facility]. Patient has been doing fine on the day of discharge without any chest pain or shortness of breath vital signs stable. He is medically stable to be transferred to skilled nursing facility today. * Procedures: NONE * No surgery found * Consults: ID Dr Artur Bianchi Significant Diagnostic Studies: 
Recent Results (from the past 24 hour(s)) GLUCOSE, POC Collection Time: 08/25/20  9:53 PM  
Result Value Ref Range Glucose (POC) 109 70 - 110 mg/dL D DIMER Collection Time: 08/26/20  2:36 AM  
Result Value Ref Range D DIMER 3.96 (H) <0.46 ug/ml(FEU) FERRITIN Collection Time: 08/26/20  2:36 AM  
Result Value Ref Range Ferritin 862 (H) 8 - 388 NG/ML  
C REACTIVE PROTEIN, QT Collection Time: 08/26/20  2:36 AM  
Result Value Ref Range C-Reactive protein 1.4 (H) 0 - 0.3 mg/dL LD Collection Time: 08/26/20  2:36 AM  
Result Value Ref Range  (H) 81 - 234 U/L  
CBC WITH AUTOMATED DIFF Collection Time: 08/26/20  2:36 AM  
Result Value Ref Range WBC 7.5 4.6 - 13.2 K/uL  
 RBC 4.77 4.70 - 5.50 M/uL  
 HGB 13.7 13.0 - 16.0 g/dL HCT 42.2 36.0 - 48.0 % MCV 88.5 74.0 - 97.0 FL  
 MCH 28.7 24.0 - 34.0 PG  
 MCHC 32.5 31.0 - 37.0 g/dL  
 RDW 13.3 11.6 - 14.5 % PLATELET 534 (L) 136 - 420 K/uL MPV 10.3 9.2 - 11.8 FL  
 NEUTROPHILS 58 40 - 73 % LYMPHOCYTES 26 21 - 52 % MONOCYTES 15 (H) 3 - 10 % EOSINOPHILS 1 0 - 5 % BASOPHILS 0 0 - 2 %  
 ABS. NEUTROPHILS 4.4 1.8 - 8.0 K/UL  
 ABS. LYMPHOCYTES 1.9 0.9 - 3.6 K/UL  
 ABS. MONOCYTES 1.1 0.05 - 1.2 K/UL  
 ABS. EOSINOPHILS 0.1 0.0 - 0.4 K/UL  
 ABS. BASOPHILS 0.0 0.0 - 0.1 K/UL  
 DF AUTOMATED METABOLIC PANEL, COMPREHENSIVE Collection Time: 08/26/20  2:36 AM  
Result Value Ref Range Sodium 137 136 - 145 mmol/L Potassium 4.1 3.5 - 5.5 mmol/L Chloride 107 100 - 111 mmol/L  
 CO2 27 21 - 32 mmol/L Anion gap 3 3.0 - 18 mmol/L Glucose 87 74 - 99 mg/dL BUN 19 (H) 7.0 - 18 MG/DL Creatinine 0.92 0.6 - 1.3 MG/DL  
 BUN/Creatinine ratio 21 (H) 12 - 20 GFR est AA >60 >60 ml/min/1.73m2 GFR est non-AA >60 >60 ml/min/1.73m2 Calcium 8.2 (L) 8.5 - 10.1 MG/DL Bilirubin, total 0.7 0.2 - 1.0 MG/DL  
 ALT (SGPT) 35 16 - 61 U/L  
 AST (SGOT) 34 10 - 38 U/L Alk. phosphatase 49 45 - 117 U/L Protein, total 6.8 6.4 - 8.2 g/dL Albumin 2.2 (L) 3.4 - 5.0 g/dL Globulin 4.6 (H) 2.0 - 4.0 g/dL A-G Ratio 0.5 (L) 0.8 - 1.7 GLUCOSE, POC Collection Time: 08/26/20  5:44 AM  
Result Value Ref Range Glucose (POC) 85 70 - 110 mg/dL GLUCOSE, POC Collection Time: 08/26/20 12:00 PM  
Result Value Ref Range Glucose (POC) 103 70 - 110 mg/dL GLUCOSE, POC Collection Time: 08/26/20  4:31 PM  
Result Value Ref Range Glucose (POC) 139 (H) 70 - 110 mg/dL XR Results (most recent): 
Results from [facility] Encounter encounter on 08/18/20 XR HAND RT AP/LAT Narrative EXAM: Right hand x-ray INDICATION: Bilateral hand pain TECHNIQUE: 3 views of the right hand obtained. COMPARISON:  None. FINDINGS: The alignment of the hand is unremarkable. No evidence of acute 
fracture or dislocation. The first MCP and the interphalangeal joints 
demonstrate osteophyte formation asymmetric joint space noted. There is no 
evidence of erosions or periostitis. No lytic or blastic focus appreciated. The 
soft tissues are unremarkable. Impression IMPRESSION: 
1. Mild multi joint osteoarthritis. Chest xray on 8/10/2020 IMPRESSION: 
No acute cardiopulmonary process. Chest xray on 8/18 IMPRESSION: At least central venous if not mild central pulmonary edema with limited 
assessment given significant hypoinflation. Dopplers of the upper extremities on 8/21 · Acute appearing thrombus noted in the right brachial vein(s). · Superficial thrombophlebitis noted within the left basilic vein. Acute occlusive deep vein thrombosis in 1 of 2 brachial veins within the right upper extremity. Acute non occlusive deep vein thrombosis in 1 of 2 brachial veins within the right upper extremity. Superficial acute occlusive thrombosis of the basilic vein within the left upper extremity CT Results (most recent): 
Results from [facility] En[facility] encounter on 08/18/20 CT HEAD WO CONT Narrative CT HEAD UNENHANCED 
 
CPT code: 52132 INDICATION: Confusion. Elevated pneumonia. Encephalopathy TECHNIQUE: 5 mm collimation axial images obtained from the skull base through 
the vertex without administration of nonionic intravenous contrast.  
  
All CT scans at this facility are performed using dose optimization technique as 
appropriate to this specific exam, to include automated exposure control, 
 adjustment of the mA and/or KP according to patient size or use of iterative 
reconstruction techniques. COMPARISON: None FINDINGS:  
No suggestion of acute hemorrhage. No extraaxial fluid collections. The ventricles are symmetric and not enlarged for patient age. No evidence for acute infarct involving any major vascular distribution. No midline shift of structures. No mass lesion appreciated within the limitations of a noncontrast exam. 
Small volume low attenuation periventricular white matter suggests chronic 
ischemic small vessel disease change. Linear high density basal ganglia most consistent with senescent calcification. Calvarium intact to the extent included. Subcentimeter ovoid density along the superior lateral left maxillary sinus 
wall. Possible retention cyst or polyp. Partially included paranasal sinuses otherwise free of disease. Impression IMPRESSION: 
1. No hemorrhage identified. No evidence of acute intracranial pathology. 2. Mild global volume loss and minimal hemispheric small vessel disease change. Discharge Exam: Please read the progress note from August 27, 2020 for additional information. Discharge medications: 
  
Current Discharge Medication List  
  
START taking these medications Details  
zinc sulfate (ZINCATE) 220 (50) mg capsule Take 1 Cap by mouth daily for 7 days. Qty: 7 Cap, Refills: 0  
  
ascorbic acid, vitamin C, (VITAMIN C) 500 mg tablet Take 1 Tab by mouth daily. Qty: 10 Tab, Refills: 0  
  
cholecalciferol (VITAMIN D3) (1000 Units /25 mcg) tablet Take 2 Tabs by mouth daily for 7 days. Qty: 14 Tab, Refills: 0  
  
dexAMETHasone (DECADRON) 6 mg tablet 6 mg p.o. daily 
Qty: 6 Tab, Refills: 0  
  
diclofenac (VOLTAREN) 1 % gel Apply 2 g to affected area four (4) times daily as needed for Pain. Gel should be measured and applied using the supplied dosing card. Apply dose (2 gm or 4 gm) to each location. If treatment site is the hands, patients should wait at least one (1) hour to wash their hands. APPLY TO bilateral hands/forearms 
Qty: 100 g, Refills: 0  
  
apixaban (ELIQUIS) 5 mg tablet 2 tablets [10 MG] p.o. twice daily for 4 days followed by 1 tablet [5 MG] p.o. twice daily 
Qty: 70 Tab, Refills: 0 CONTINUE these medications which have CHANGED Details  
metoprolol succinate (TOPROL-XL) 100 mg tablet Take 0.5 Tabs by mouth daily for 30 days. Qty: 30 Tab, Refills: 0 CONTINUE these medications which have NOT CHANGED Details  
tamsulosin (FLOMAX) 0.4 mg capsule Take 0.4 mg by mouth.  
  
polyethylene glycol (MIRALAX) 17 gram packet Take 17 g by mouth. tolterodine ER (DETROL-LA) 2 mg ER capsule Take 1 Cap by mouth daily. Qty: 30 Cap, Refills: 12  
  
raNITIdine (ZANTAC) 150 mg tablet Take 150 mg by mouth two (2) times a day. lisinopril (PRINIVIL, ZESTRIL) 30 mg tablet Take 30 mg by mouth daily. pravastatin (PRAVACHOL) 40 mg tablet Take 40 mg by mouth nightly. metFORMIN (GLUCOPHAGE) 500 mg tablet Take 500 mg by mouth two (2) times daily (with meals). Associated Diagnoses: Diabetes mellitus ([address] STOP taking these medications  
  
 oxyCODONE-acetaminophen (PERCOCET) 5-325 mg per tablet Comments:  
Reason for Stopping: * Discharge Condition: improved * Disposition: [facility]) Discharge Medications: 
Current Discharge Medication List  
  
 
 
* Follow-up Care/Patient Instructions: Activity: PT/OT Eval and Treat Diet: Cardiac Diet Wound Care: None needed Follow-up Information None Disclaimer: Sections of this note are dictated using utilizing voice recognition software, which may have resulted in some phonetic based errors in grammar and contents.  Even though attempts were made to correct all the mistakes, some may have been missed, and remained in the body of the document. If questions arise, please contact our department. Jossy Beltran August 27, 2020

## 2020-08-26 NOTE — ROUTINE PROCESS
Bedside shift change report given to Anshul Linn RN (oncoming nurse) by Toy Hinton RN (offgoing nurse). Report included the following information SBAR, Kardex, Intake/Output, MAR, Recent Results and Med Rec Status.

## 2020-08-26 NOTE — PROGRESS NOTES
Problem: Falls - Risk of 
Goal: *Absence of Falls Description: Document La Willoughby Fall Risk and appropriate interventions in the flowsheet. Outcome: Progressing Towards Goal 
Note: Fall Risk Interventions: 
  
 
Mentation Interventions: Adequate sleep, hydration, pain control, Bed/chair exit alarm, Door open when patient unattended, Evaluate medications/consider consulting pharmacy, Familiar objects from home, More frequent rounding, Toileting rounds, Update white board Medication Interventions: Assess postural VS orthostatic hypotension, Bed/chair exit alarm, Evaluate medications/consider consulting pharmacy Elimination Interventions: Bed/chair exit alarm, Call light in reach, Patient to call for help with toileting needs, Toileting schedule/hourly rounds, Urinal in reach Problem: Patient Education: Go to Patient Education Activity Goal: Patient/Family Education Outcome: Progressing Towards Goal 
  
Problem: Pressure Injury - Risk of 
Goal: *Prevention of pressure injury Description: Document Luca Scale and appropriate interventions in the flowsheet. Outcome: Progressing Towards Goal 
Note: Pressure Injury Interventions: 
Sensory Interventions: Assess changes in LOC, Keep linens dry and wrinkle-free, Maintain/enhance activity level, Minimize linen layers, Monitor skin under medical devices, Pad between skin to skin, Pressure redistribution bed/mattress (bed type) Moisture Interventions: Absorbent underpads, Apply protective barrier, creams and emollients, Assess need for specialty bed, Check for incontinence Q2 hours and as needed, Internal/External urinary devices, Limit adult briefs, Maintain skin hydration (lotion/cream), Minimize layers, Moisture barrier, Offer toileting Q_hr Activity Interventions: Assess need for specialty bed, Chair cushion, Increase time out of bed, Pressure redistribution bed/mattress(bed type), PT/OT evaluation Mobility Interventions: Assess need for specialty bed, Chair cushion, HOB 30 degrees or less, Pressure redistribution bed/mattress (bed type), PT/OT evaluation, Turn and reposition approx. every two hours(pillow and wedges) Nutrition Interventions: Document food/fluid/supplement intake Friction and Shear Interventions: Apply protective barrier, creams and emollients, Foam dressings/transparent film/skin sealants, HOB 30 degrees or less, Lift sheet, Lift team/patient mobility team, Minimize layers, Transferring/repositioning devices Problem: Patient Education: Go to Patient Education Activity Goal: Patient/Family Education Outcome: Progressing Towards Goal 
  
Problem: Nutrition Deficit Goal: *Optimize nutritional status Outcome: Progressing Towards Goal 
  
Problem: Patient Education: Go to Patient Education Activity Goal: Patient/Family Education Outcome: Progressing Towards Goal 
  
Problem: Patient Education: Go to Patient Education Activity Goal: Patient/Family Education Outcome: Progressing Towards Goal 
  
Problem: Pneumonia: Discharge Outcomes Goal: *Demonstrates progressive activity Outcome: Progressing Towards Goal 
Goal: *Describes follow-up/return visits to physicians Outcome: Progressing Towards Goal 
Goal: *Tolerating diet Outcome: Progressing Towards Goal 
Goal: *Verbalizes name, dosage, time, side effects, and number of days to continue medications Outcome: Progressing Towards Goal 
Goal: *Influenza immunization Outcome: Progressing Towards Goal 
Goal: *Pneumococcal immunization Outcome: Progressing Towards Goal 
Goal: *Respiratory status at baseline Outcome: Progressing Towards Goal 
Goal: *Vital signs within defined limits Outcome: Progressing Towards Goal 
Goal: *Describes available resources and support systems Outcome: Progressing Towards Goal 
Goal: *Optimal pain control at patient's stated goal 
Outcome: Progressing Towards Goal 
  
 Problem: Diabetes Self-Management Goal: *Disease process and treatment process Description: Define diabetes and identify own type of diabetes; list 3 options for treating diabetes. Outcome: Progressing Towards Goal 
Goal: *Incorporating nutritional management into lifestyle Description: Describe effect of type, amount and timing of food on blood glucose; list 3 methods for planning meals. Outcome: Progressing Towards Goal 
Goal: *Incorporating physical activity into lifestyle Description: State effect of exercise on blood glucose levels. Outcome: Progressing Towards Goal 
Goal: *Developing strategies to promote health/change behavior Description: Define the ABC's of diabetes; identify appropriate screenings, schedule and personal plan for screenings. Outcome: Progressing Towards Goal 
Goal: *Using medications safely Description: State effect of diabetes medications on diabetes; name diabetes medication taking, action and side effects. Outcome: Progressing Towards Goal 
Goal: *Monitoring blood glucose, interpreting and using results Description: Identify recommended blood glucose targets  and personal targets. Outcome: Progressing Towards Goal 
Goal: *Prevention, detection, treatment of acute complications Description: List symptoms of hyper- and hypoglycemia; describe how to treat low blood sugar and actions for lowering  high blood glucose level. Outcome: Progressing Towards Goal 
Goal: *Prevention, detection and treatment of chronic complications Description: Define the natural course of diabetes and describe the relationship of blood glucose levels to long term complications of diabetes. Outcome: Progressing Towards Goal 
Goal: *Developing strategies to address psychosocial issues Description: Describe feelings about living with diabetes; identify support needed and support network Outcome: Progressing Towards Goal 
Goal: *Insulin pump training Outcome: Progressing Towards Goal 
 Goal: *Sick day guidelines Outcome: Progressing Towards Goal 
Goal: *Patient Specific Goal (EDIT GOAL, INSERT TEXT) Outcome: Progressing Towards Goal 
  
Problem: Patient Education: Go to Patient Education Activity Goal: Patient/Family Education Outcome: Progressing Towards Goal

## 2020-08-27 VITALS
TEMPERATURE: 98.5 F | DIASTOLIC BLOOD PRESSURE: 67 MMHG | OXYGEN SATURATION: 95 % | HEIGHT: 74 IN | RESPIRATION RATE: 20 BRPM | WEIGHT: 220 LBS | SYSTOLIC BLOOD PRESSURE: 121 MMHG | BODY MASS INDEX: 28.23 KG/M2 | HEART RATE: 74 BPM

## 2020-08-27 LAB
ALBUMIN SERPL-MCNC: 2.4 G/DL (ref 3.4–5)
ALBUMIN/GLOB SERPL: 0.5 {RATIO} (ref 0.8–1.7)
ALP SERPL-CCNC: 49 U/L (ref 45–117)
ALT SERPL-CCNC: 35 U/L (ref 16–61)
ANION GAP SERPL CALC-SCNC: 5 MMOL/L (ref 3–18)
AST SERPL-CCNC: 23 U/L (ref 10–38)
BASOPHILS # BLD: 0 K/UL (ref 0–0.1)
BASOPHILS NFR BLD: 0 % (ref 0–2)
BILIRUB SERPL-MCNC: 0.8 MG/DL (ref 0.2–1)
BUN SERPL-MCNC: 15 MG/DL (ref 7–18)
BUN/CREAT SERPL: 17 (ref 12–20)
CALCIUM SERPL-MCNC: 8.6 MG/DL (ref 8.5–10.1)
CHLORIDE SERPL-SCNC: 105 MMOL/L (ref 100–111)
CO2 SERPL-SCNC: 26 MMOL/L (ref 21–32)
CREAT SERPL-MCNC: 0.87 MG/DL (ref 0.6–1.3)
CRP SERPL-MCNC: 1.4 MG/DL (ref 0–0.3)
D DIMER PPP FEU-MCNC: 3.2 UG/ML(FEU)
DIFFERENTIAL METHOD BLD: ABNORMAL
EOSINOPHIL # BLD: 0 K/UL (ref 0–0.4)
EOSINOPHIL NFR BLD: 0 % (ref 0–5)
ERYTHROCYTE [DISTWIDTH] IN BLOOD BY AUTOMATED COUNT: 13.1 % (ref 11.6–14.5)
FERRITIN SERPL-MCNC: 850 NG/ML (ref 8–388)
GLOBULIN SER CALC-MCNC: 4.8 G/DL (ref 2–4)
GLUCOSE BLD STRIP.AUTO-MCNC: 101 MG/DL (ref 70–110)
GLUCOSE BLD STRIP.AUTO-MCNC: 75 MG/DL (ref 70–110)
GLUCOSE SERPL-MCNC: 74 MG/DL (ref 74–99)
HCT VFR BLD AUTO: 43.1 % (ref 36–48)
HGB BLD-MCNC: 14.5 G/DL (ref 13–16)
LDH SERPL L TO P-CCNC: 291 U/L (ref 81–234)
LYMPHOCYTES # BLD: 1.9 K/UL (ref 0.9–3.6)
LYMPHOCYTES NFR BLD: 24 % (ref 21–52)
MCH RBC QN AUTO: 29.3 PG (ref 24–34)
MCHC RBC AUTO-ENTMCNC: 33.6 G/DL (ref 31–37)
MCV RBC AUTO: 87.1 FL (ref 74–97)
MONOCYTES # BLD: 1 K/UL (ref 0.05–1.2)
MONOCYTES NFR BLD: 13 % (ref 3–10)
NEUTS SEG # BLD: 5 K/UL (ref 1.8–8)
NEUTS SEG NFR BLD: 63 % (ref 40–73)
PLATELET # BLD AUTO: 138 K/UL (ref 135–420)
PMV BLD AUTO: 9.6 FL (ref 9.2–11.8)
POTASSIUM SERPL-SCNC: 3.9 MMOL/L (ref 3.5–5.5)
PROT SERPL-MCNC: 7.2 G/DL (ref 6.4–8.2)
RBC # BLD AUTO: 4.95 M/UL (ref 4.7–5.5)
SODIUM SERPL-SCNC: 136 MMOL/L (ref 136–145)
WBC # BLD AUTO: 7.9 K/UL (ref 4.6–13.2)

## 2020-08-27 PROCEDURE — 74011250637 HC RX REV CODE- 250/637: Performed by: INTERNAL MEDICINE

## 2020-08-27 PROCEDURE — 74011250637 HC RX REV CODE- 250/637: Performed by: PHYSICIAN ASSISTANT

## 2020-08-27 PROCEDURE — 86140 C-REACTIVE PROTEIN: CPT

## 2020-08-27 PROCEDURE — 83615 LACTATE (LD) (LDH) ENZYME: CPT

## 2020-08-27 PROCEDURE — 80053 COMPREHEN METABOLIC PANEL: CPT

## 2020-08-27 PROCEDURE — 85379 FIBRIN DEGRADATION QUANT: CPT

## 2020-08-27 PROCEDURE — 82962 GLUCOSE BLOOD TEST: CPT

## 2020-08-27 PROCEDURE — 82728 ASSAY OF FERRITIN: CPT

## 2020-08-27 PROCEDURE — 74011250636 HC RX REV CODE- 250/636: Performed by: INTERNAL MEDICINE

## 2020-08-27 PROCEDURE — 74011250637 HC RX REV CODE- 250/637: Performed by: FAMILY MEDICINE

## 2020-08-27 PROCEDURE — 85025 COMPLETE CBC W/AUTO DIFF WBC: CPT

## 2020-08-27 PROCEDURE — 36415 COLL VENOUS BLD VENIPUNCTURE: CPT

## 2020-08-27 RX ORDER — MELATONIN
2000 DAILY
Qty: 14 TAB | Refills: 0 | Status: SHIPPED
Start: 2020-08-28 | End: 2020-09-04

## 2020-08-27 RX ORDER — DICLOFENAC SODIUM 10 MG/G
2 GEL TOPICAL
Qty: 100 G | Refills: 0 | Status: SHIPPED
Start: 2020-08-27

## 2020-08-27 RX ORDER — ASCORBIC ACID 500 MG
500 TABLET ORAL DAILY
Qty: 10 TAB | Refills: 0 | Status: SHIPPED
Start: 2020-08-28 | End: 2021-01-01

## 2020-08-27 RX ORDER — DEXAMETHASONE 6 MG/1
TABLET ORAL
Qty: 6 TAB | Refills: 0 | Status: SHIPPED
Start: 2020-08-28 | End: 2021-01-01

## 2020-08-27 RX ORDER — ZINC SULFATE 50(220)MG
220 CAPSULE ORAL DAILY
Qty: 7 CAP | Refills: 0 | Status: SHIPPED
Start: 2020-08-28 | End: 2020-09-04

## 2020-08-27 RX ADMIN — DICLOFENAC 2 G: 10 GEL TOPICAL at 09:00

## 2020-08-27 RX ADMIN — ZINC SULFATE 220 MG (50 MG) CAPSULE 1 CAPSULE: CAPSULE at 08:13

## 2020-08-27 RX ADMIN — Medication 10 ML: at 07:25

## 2020-08-27 RX ADMIN — CHOLECALCIFEROL TAB 25 MCG (1000 UNIT) 2 TABLET: 25 TAB at 08:13

## 2020-08-27 RX ADMIN — LISINOPRIL 30 MG: 20 TABLET ORAL at 08:13

## 2020-08-27 RX ADMIN — APIXABAN 10 MG: 5 TABLET, FILM COATED ORAL at 08:12

## 2020-08-27 RX ADMIN — DEXAMETHASONE 6 MG: 2 TABLET ORAL at 08:13

## 2020-08-27 RX ADMIN — Medication 500 MG: at 08:13

## 2020-08-27 NOTE — PROGRESS NOTES
Report called to Mac Marley RN at [facility] at [phone]. All questions answered. Pt to be picked up by [email or web address] transport at 1230 today.

## 2020-08-27 NOTE — PROGRESS NOTES
[facility] 
(A Division of [address] [place] [address]) Follow-up Note Date of Admission: 8/18/2020       Date of Note:  8/27/2020 Summary:   
77 y/o AAM w/ DM, HTN, HLD, Nephrolithiasis, BPH adm 8/18 w/ SOB. [facility] diagnosed w/ covid 8/10, became weak, w/ SOB, dry cough in past few days leading to presentation at the [facility] ED. Afebrile and WBC normal but RR consistently > 27. On 2 lpm NC SpO2 is 98%. CXR 8/18: central venous congestion if not mild central pulmonary edema (CXR 8/10 showed no acute cardiopulmonary process). Interval History: Afebrile x 6 days now. Comfortable on RA. Being discharged. Current Antimicrobials:    Prior Antimicrobials: 
None 8/23 - 4 remdesivir 8/19 - # 5 Assessment: Rec / Plan:  
Covid 19 pneumonia 
- SOB, fever, chills, sore throat, Increasing dyspnea last three days. - + Covid 8/10.   
- CXR bilateral interstitial infiltrates c/w covid (at least central venous /central pulmonary edema) - SpO2 95% on 3 lpm NC 
- Convalescent plasma given 8/20   
- Completed remdesivir 8/23 -> continue dexamethasone 3 more days 
-> maintain off antibiotics Dheeraj Angry Confusion - ? Metabolic encephalopathy 
- CT Head 8/21: no acute intracranial pathology 
- improving -> per primary team  
RUE DVT 
- seen on Duplex 8/21: acute occlusive DVT 1 of 2 brachial veins RUE, acute non-occlusive thromb basilic vein LUE 
- likely covid related -> anticoagulation per primary team  
Low grade fever 
- from covid pneumonia +/- DVTs -> monitor Acute Hypoxic Respiratory Failure 
- resolved DM HTN   
HLD Nephrolithiasis BPH Microbiology: 
 
8/19 bcx NGTD Lines / Catheters: 
  
piv Patient Active Problem List  
Diagnosis Code  HCVD (hypertensive cardiovascular disease) I11.9  Hyperlipidemia E78.5  Diabetes mellitus ([facility] [address] E11.9  Bradycardia R00.1  Hypertension I10  
 BPH (benign prostatic hyperplasia) N40.0  Bone pain M89.8X9  Muscular weakness M62.81  Leg pain, right M79.604  Bladder neck contracture N32.0  Microscopic hematuria R31.29  
 History of nephrolithiasis Z87.442  Obesity (BMI 30.0-34. 9) E66.9  
 Pneumonia due to COVID-19 virus U07.1, J12.89  Thrombocytopenia ([facility] [address] D69.6  Shortness of breath R06.02  
 
 
Current Facility-Administered Medications Medication Dose Route Frequency  apixaban (ELIQUIS) tablet 10 mg  10 mg Oral BID  dextrose (D50W) injection syrg 25 g  25 g IntraVENous PRN  
 lisinopriL (PRINIVIL, ZESTRIL) tablet 30 mg  30 mg Oral DAILY  metoprolol succinate (TOPROL-XL) XL tablet 50 mg  50 mg Oral DAILY  glucagon (GLUCAGEN) injection 1 mg  1 mg IntraMUSCular PRN  
 insulin lispro (HUMALOG) injection   SubCUTAneous AC&HS  cholecalciferol (VITAMIN D3) (1000 Units /25 mcg) tablet 2 Tab  2,000 Units Oral DAILY  diclofenac (VOLTAREN) 1 % topical gel 2 g  2 g Topical QID  dexAMETHasone (DECADRON) tablet 6 mg  6 mg Oral DAILY  pravastatin (PRAVACHOL) tablet 40 mg  40 mg Oral QHS  famotidine (PEPCID) tablet 10 mg  10 mg Oral QPM  
 sodium chloride (NS) flush 5-40 mL  5-40 mL IntraVENous Q8H  
 sodium chloride (NS) flush 5-40 mL  5-40 mL IntraVENous PRN  
 acetaminophen (TYLENOL) tablet 650 mg  650 mg Oral Q6H PRN Or  
 acetaminophen (TYLENOL) suppository 650 mg  650 mg Rectal Q6H PRN  polyethylene glycol (MIRALAX) packet 17 g  17 g Oral DAILY PRN  promethazine (PHENERGAN) tablet 12.5 mg  12.5 mg Oral Q6H PRN Or  
 ondansetron (ZOFRAN) injection 4 mg  4 mg IntraVENous Q6H PRN  
 glucose chewable tablet 16 g  16 g Oral PRN  
 ascorbic acid (vitamin C) (VITAMIN C) tablet 500 mg  500 mg Oral DAILY  zinc sulfate (ZINCATE) 220 (50) mg capsule 1 Cap  1 Cap Oral DAILY  0.9% sodium chloride infusion 250 mL  250 mL IntraVENous PRN Review of Systems - Negative except as in interval history Objective: 
 
Visit Vitals /76 Pulse (!) 56 Temp 97.4 °F (36.3 °C) Resp 16 Ht 6' 2\" (1.88 m) Wt 99.8 kg (220 lb) SpO2 96% BMI 28.25 kg/m² Temp (24hrs), Av.5 °F (36.4 °C), Min:97 °F (36.1 °C), Max:97.9 °F (36.6 °C) General: Well developed, well nourished 78 y.o. BLACK male in no acute distress. ENT: ENT exam normal, no neck nodes or sinus tenderness Head: normocephalic, without obvious abnormality Mouth:  mucous membranes moist, pharynx normal without lesions Neck: supple, symmetrical, trachea midline Cardio:  regular rhythm, no murmurs, rubs or gallops Chest: inspection normal - no chest wall deformities or tenderness, respiratory effort normal 
Lungs: clear to auscultation, no wheezes or rales and unlabored breathing Abdomen: soft, non-tender. Bowel sounds normal. No masses, no organomegaly. Extremities:  no redness or tenderness in the calves or thighs, no edema, tenderness RUE> LUE resolved Lab results: 
 
Chemistry Recent Labs  
  206 20 
9597 GLU 74 87 93  137 138  
K 3.9 4.1 3.9  107 108 CO2 26 27 25 BUN 15 19* 21* CREA 0.87 0.92 0.90  
CA 8.6 8.2* 7.9* AGAP 5 3 5 BUCR 17 21* 23* AP 49 49  --   
TP 7.2 6.8  --   
ALB 2.4* 2.2*  --   
GLOB 4.8* 4.6*  --   
AGRAT 0.5* 0.5*  --   
 
 
CBC w/ Diff Recent Labs  
  205 20 
0236 20 
1313 WBC 7.9 7.5 6.4  
RBC 4.95 4.77 4.49* HGB 14.5 13.7 13.0 HCT 43.1 42.2 39.6  121* 119* GRANS 63 58 54 LYMPH 24 26 28 EOS 0 1 1 Microbiology All Micro Results Procedure Component Value Units Date/Time CULTURE, BLOOD [[id number]] Collected:  20 0035 Order Status:  Completed Specimen:  Blood Updated:  20 9041 Special Requests: NO SPECIAL REQUESTS Culture result: NO GROWTH 6 DAYS     
 CULTURE, BLOOD [[id number]] Collected:  20 0050 Order Status:  Completed Specimen:  Blood Updated:  20 5671 Special Requests: NO SPECIAL REQUESTS Culture result: NO GROWTH 6 DAYS     
 CULTURE, URINE [[id number]] Collected:  08/19/20 1350 Order Status:  Completed Specimen:  Clean catch Updated:  08/21/20 8181 Special Requests: NO SPECIAL REQUESTS Culture result: No growth (<1,000 CFU/ML) CULTURE, RESPIRATORY/SPUTUM/BRONCH Lavonne Franchesca [[id number]] Collected:  08/19/20 1800 Order Status:  Canceled Specimen:  Sputum Loco Clinton MD, [facility] 
[phone] 
8/27/2020  
6:31 PM

## 2020-08-27 NOTE — PROGRESS NOTES
Problem: Patient Education: Go to Patient Education Activity Goal: Patient/Family Education Outcome: Progressing Towards Goal 
  
Problem: Pneumonia: Discharge Outcomes Goal: *Demonstrates progressive activity Outcome: Progressing Towards Goal 
Goal: *Describes follow-up/return visits to physicians Outcome: Progressing Towards Goal 
Goal: *Tolerating diet Outcome: Progressing Towards Goal 
Goal: *Verbalizes name, dosage, time, side effects, and number of days to continue medications Outcome: Progressing Towards Goal 
Goal: *Influenza immunization Outcome: Progressing Towards Goal 
Goal: *Pneumococcal immunization Outcome: Progressing Towards Goal 
Goal: *Respiratory status at baseline Outcome: Progressing Towards Goal 
Goal: *Vital signs within defined limits Outcome: Progressing Towards Goal 
Goal: *Describes available resources and support systems Outcome: Progressing Towards Goal 
Goal: *Optimal pain control at patient's stated goal 
Outcome: Progressing Towards Goal 
  
Problem: Falls - Risk of 
Goal: *Absence of Falls Description: Document Cate Espinosa Fall Risk and appropriate interventions in the flowsheet. Outcome: Progressing Towards Goal 
Note: Fall Risk Interventions: 
Mobility Interventions: Patient to call before getting OOB, PT Consult for mobility concerns Mentation Interventions: Bed/chair exit alarm Medication Interventions: Patient to call before getting OOB, Bed/chair exit alarm Elimination Interventions: Urinal in reach, Call light in reach, Bed/chair exit alarm Problem: Patient Education: Go to Patient Education Activity Goal: Patient/Family Education Outcome: Progressing Towards Goal 
  
Problem: Pressure Injury - Risk of 
Goal: *Prevention of pressure injury Description: Document Luca Scale and appropriate interventions in the flowsheet. Outcome: Progressing Towards Goal 
Note: Pressure Injury Interventions: Sensory Interventions: Assess changes in LOC, Assess need for specialty bed, Avoid rigorous massage over bony prominences, Chair cushion, Check visual cues for pain, Discuss PT/OT consult with provider, Keep linens dry and wrinkle-free, Maintain/enhance activity level, Minimize linen layers, Monitor skin under medical devices, Pad between skin to skin, Pressure redistribution bed/mattress (bed type), Turn and reposition approx. every two hours (pillows and wedges if needed) Moisture Interventions: Check for incontinence Q2 hours and as needed, Limit adult briefs, Minimize layers Activity Interventions: PT/OT evaluation, Increase time out of bed Mobility Interventions: PT/OT evaluation Nutrition Interventions: Offer support with meals,snacks and hydration Friction and Shear Interventions: Minimize layers Problem: Patient Education: Go to Patient Education Activity Goal: Patient/Family Education Outcome: Progressing Towards Goal 
  
Problem: Nutrition Deficit Goal: *Optimize nutritional status Outcome: Progressing Towards Goal 
  
Problem: Patient Education: Go to Patient Education Activity Goal: Patient/Family Education Outcome: Progressing Towards Goal 
  
Problem: Diabetes Self-Management Goal: *Disease process and treatment process Description: Define diabetes and identify own type of diabetes; list 3 options for treating diabetes. Outcome: Progressing Towards Goal 
Goal: *Incorporating nutritional management into lifestyle Description: Describe effect of type, amount and timing of food on blood glucose; list 3 methods for planning meals. Outcome: Progressing Towards Goal 
Goal: *Incorporating physical activity into lifestyle Description: State effect of exercise on blood glucose levels. Outcome: Progressing Towards Goal 
Goal: *Developing strategies to promote health/change behavior Description: Define the ABC's of diabetes; identify appropriate screenings, schedule and personal plan for screenings. Outcome: Progressing Towards Goal 
Goal: *Using medications safely Description: State effect of diabetes medications on diabetes; name diabetes medication taking, action and side effects. Outcome: Progressing Towards Goal 
Goal: *Monitoring blood glucose, interpreting and using results Description: Identify recommended blood glucose targets  and personal targets. Outcome: Progressing Towards Goal 
Goal: *Prevention, detection, treatment of acute complications Description: List symptoms of hyper- and hypoglycemia; describe how to treat low blood sugar and actions for lowering  high blood glucose level. Outcome: Progressing Towards Goal 
Goal: *Prevention, detection and treatment of chronic complications Description: Define the natural course of diabetes and describe the relationship of blood glucose levels to long term complications of diabetes. Outcome: Progressing Towards Goal 
Goal: *Developing strategies to address psychosocial issues Description: Describe feelings about living with diabetes; identify support needed and support network Outcome: Progressing Towards Goal 
Goal: *Insulin pump training Outcome: Progressing Towards Goal 
Goal: *Sick day guidelines Outcome: Progressing Towards Goal 
Goal: *Patient Specific Goal (EDIT GOAL, INSERT TEXT) Outcome: Progressing Towards Goal 
  
Problem: Patient Education: Go to Patient Education Activity Goal: Patient/Family Education Outcome: Progressing Towards Goal 
  
Problem: Deep Venous Thrombosis - Risk of 
Goal: *Absence of deep venous thrombosis signs and symptoms(Stroke Metric) Outcome: Progressing Towards Goal 
Goal: *Absence of impaired coagulation signs and symptoms Outcome: Progressing Towards Goal 
Goal: *Knowledge of prescribed medications Outcome: Progressing Towards Goal 
Goal: *Absence of bleeding Outcome: Progressing Towards Goal 
  
 Problem: Patient Education: Go to Patient Education Activity Goal: Patient/Family Education Outcome: Progressing Towards Goal 
  
Problem: Patient Education: Go to Patient Education Activity Goal: Patient/Family Education Outcome: Progressing Towards Goal

## 2020-08-27 NOTE — PROGRESS NOTES
Problem: Falls - Risk of 
Goal: *Absence of Falls Description: Document Alicia Bello Fall Risk and appropriate interventions in the flowsheet. Outcome: Progressing Towards Goal 
Note: Fall Risk Interventions: 
  
 
Mentation Interventions: Adequate sleep, hydration, pain control, Bed/chair exit alarm, Door open when patient unattended, Evaluate medications/consider consulting pharmacy, Familiar objects from home, More frequent rounding, Reorient patient, Toileting rounds, Update white board Medication Interventions: Assess postural VS orthostatic hypotension, Bed/chair exit alarm, Evaluate medications/consider consulting pharmacy, Patient to call before getting OOB Elimination Interventions: Bed/chair exit alarm, Call light in reach, Patient to call for help with toileting needs, Toilet paper/wipes in reach, Toileting schedule/hourly rounds, Urinal in reach Problem: Patient Education: Go to Patient Education Activity Goal: Patient/Family Education Outcome: Progressing Towards Goal 
  
Problem: Pressure Injury - Risk of 
Goal: *Prevention of pressure injury Description: Document Luca Scale and appropriate interventions in the flowsheet. Outcome: Progressing Towards Goal 
Note: Pressure Injury Interventions: 
Sensory Interventions: Assess changes in LOC, Assess need for specialty bed, Avoid rigorous massage over bony prominences, Chair cushion, Check visual cues for pain, Discuss PT/OT consult with provider, Keep linens dry and wrinkle-free, Maintain/enhance activity level, Minimize linen layers, Monitor skin under medical devices, Pad between skin to skin, Pressure redistribution bed/mattress (bed type), Turn and reposition approx. every two hours (pillows and wedges if needed) Moisture Interventions: Absorbent underpads, Apply protective barrier, creams and emollients, Assess need for specialty bed, Check for incontinence Q2 hours and as needed, Limit adult briefs, Maintain skin hydration (lotion/cream), Minimize layers, Moisture barrier, Offer toileting Q_hr Activity Interventions: Assess need for specialty bed, Chair cushion, Increase time out of bed, Pressure redistribution bed/mattress(bed type), PT/OT evaluation Mobility Interventions: Assess need for specialty bed, Chair cushion, HOB 30 degrees or less, Pressure redistribution bed/mattress (bed type), PT/OT evaluation, Turn and reposition approx. every two hours(pillow and wedges) Nutrition Interventions: Document food/fluid/supplement intake Friction and Shear Interventions: Apply protective barrier, creams and emollients, Foam dressings/transparent film/skin sealants, HOB 30 degrees or less, Minimize layers, Transferring/repositioning devices Problem: Patient Education: Go to Patient Education Activity Goal: Patient/Family Education Outcome: Progressing Towards Goal 
  
Problem: Nutrition Deficit Goal: *Optimize nutritional status Outcome: Progressing Towards Goal 
  
Problem: Patient Education: Go to Patient Education Activity Goal: Patient/Family Education Outcome: Progressing Towards Goal 
  
Problem: Patient Education: Go to Patient Education Activity Goal: Patient/Family Education Outcome: Progressing Towards Goal 
  
Problem: Pneumonia: Discharge Outcomes Goal: *Demonstrates progressive activity Outcome: Progressing Towards Goal 
Goal: *Describes follow-up/return visits to physicians Outcome: Progressing Towards Goal 
Goal: *Tolerating diet Outcome: Progressing Towards Goal 
Goal: *Verbalizes name, dosage, time, side effects, and number of days to continue medications Outcome: Progressing Towards Goal 
Goal: *Influenza immunization Outcome: Progressing Towards Goal 
Goal: *Pneumococcal immunization Outcome: Progressing Towards Goal 
Goal: *Respiratory status at baseline Outcome: Progressing Towards Goal 
Goal: *Vital signs within defined limits Outcome: Progressing Towards Goal 
 Goal: *Describes available resources and support systems Outcome: Progressing Towards Goal 
Goal: *Optimal pain control at patient's stated goal 
Outcome: Progressing Towards Goal 
  
Problem: Diabetes Self-Management Goal: *Disease process and treatment process Description: Define diabetes and identify own type of diabetes; list 3 options for treating diabetes. Outcome: Progressing Towards Goal 
Goal: *Incorporating nutritional management into lifestyle Description: Describe effect of type, amount and timing of food on blood glucose; list 3 methods for planning meals. Outcome: Progressing Towards Goal 
Goal: *Incorporating physical activity into lifestyle Description: State effect of exercise on blood glucose levels. Outcome: Progressing Towards Goal 
Goal: *Developing strategies to promote health/change behavior Description: Define the ABC's of diabetes; identify appropriate screenings, schedule and personal plan for screenings. Outcome: Progressing Towards Goal 
Goal: *Using medications safely Description: State effect of diabetes medications on diabetes; name diabetes medication taking, action and side effects. Outcome: Progressing Towards Goal 
Goal: *Monitoring blood glucose, interpreting and using results Description: Identify recommended blood glucose targets  and personal targets. Outcome: Progressing Towards Goal 
Goal: *Prevention, detection, treatment of acute complications Description: List symptoms of hyper- and hypoglycemia; describe how to treat low blood sugar and actions for lowering  high blood glucose level. Outcome: Progressing Towards Goal 
Goal: *Prevention, detection and treatment of chronic complications Description: Define the natural course of diabetes and describe the relationship of blood glucose levels to long term complications of diabetes. Outcome: Progressing Towards Goal 
Goal: *Developing strategies to address psychosocial issues Description: Describe feelings about living with diabetes; identify support needed and support network Outcome: Progressing Towards Goal 
Goal: *Insulin pump training Outcome: Progressing Towards Goal 
Goal: *Sick day guidelines Outcome: Progressing Towards Goal 
Goal: *Patient Specific Goal (EDIT GOAL, INSERT TEXT) Outcome: Progressing Towards Goal 
  
Problem: Patient Education: Go to Patient Education Activity Goal: Patient/Family Education Outcome: Progressing Towards Goal 
  
Problem: Deep Venous Thrombosis - Risk of 
Goal: *Absence of deep venous thrombosis signs and symptoms(Stroke Metric) Outcome: Progressing Towards Goal 
Goal: *Absence of impaired coagulation signs and symptoms Outcome: Progressing Towards Goal 
Goal: *Knowledge of prescribed medications Outcome: Progressing Towards Goal 
Goal: *Absence of bleeding Outcome: Progressing Towards Goal 
  
Problem: Patient Education: Go to Patient Education Activity Goal: Patient/Family Education Outcome: Progressing Towards Goal 
  
Problem: Patient Education: Go to Patient Education Activity Goal: Patient/Family Education Outcome: Progressing Towards Goal

## 2020-08-27 NOTE — PROGRESS NOTES
Transition of Care Plan to SNF/Rehab 
 
SNF/Rehab Transition: 
Patient has been accepted to [facility] and meets criteria for admission. Patient will  be transported by [facility] and expected to leave at 1pm. 
 
Communication to Patient/Family: 
Spoke with patient and his son, Dimas Contreras (identified care giver) and they are agreeable to the transition plan. Communication to SNF/Rehab: 
Bedside RN, Minal Garrido, has been notified of the transition plan to the facility and to call report (phone number [phone]). Discharge information has been updated on the AVS. And communicated to facility via [facility], or CC link. SNF/Rehab Transition: PCP/Specialist:  
 
Nursing Please include all hard scripts for controlled substances, med rec and dc summary, and AVS in packet. Reviewed and confirmed with facility representative, Charitya Drivers  at [facility] they can manage the patient care needs for the following:  
 
[place] with (X) only those applicable: 
 
Medication: 
[x]  Medications will be available at the facility []  IV Antibiotics []  Controlled Substance - hard copy to be sent with patient  
[]  Weekly Labs Documents: 
[] Hard RX  Number sent - none 
[] MAR 
[] Kardex [] AVS 
[] Wound care note [x]Transfer Summary/Discharge Summary Equipment: 
[]  CPAP/BiPAP []  Wound Vacuum 
[]  Ku or Urinary Device 
[]  PICC/Central Line 
[]  Nebulizer 
[]  Ventilator Treatment: 
[]Isolation (for MRSA, VRE, etc.) COVID []Surgical Drain Management []Tracheostomy Care 
[]Dressing Changes []Dialysis with transportation and chair time  Center Mode of tranpsort []PEG Care []Oxygen []Daily Weights for Heart Failure Dietary: 
[]Any diet limitations []Tube Feedings []Total Parenteral Management (TPN) Eligible for Medicaid Long Term Services and Supports Yes: 
[] Eligible for medical assistance or will become eligible within 180 days and LTSS completed. [] Provider/Patient and/or support system has requested screening. 
[] LTSS copy provided to patient or responsible party, . 
[] LTSS unavailable at discharge will send once processed to SNF provider. 
[] LTSS  unavailable at discharged mailed to patient 
[] LTSS performed by outside agency  on  with tracking number No:  
[x] Private pay and is not financially eligible for Medicaid within the next 180 days. [] Reside out-of-state. [] A residents of a state owned/operated facility that is licensed 
by [address] [facility] or [facility] 
[] Enrollment in [facility] hospice services 
[]  [facility] 
[] Patient /Family declines to have screening completed or provide financial information for screening Financial Resources: 
Medicaid   
[] Initiated and application pending  
[] Full coverage Advanced Care Plan: 
[]Surrogate Decision Maker of Care 
[]POA []Communicated Code Status/ sent FULL Other Juanita Sharp RN - Outcomes Manager  [phone]

## 2020-08-27 NOTE — DISCHARGE INSTRUCTIONS
Patient Education        Learning How To Care for Someone Who Has COVID-19  Things to know  Most people who get COVID-19 will recover with time and home care. Here are some things to know if you're caring for someone who's sick. · Treat the symptoms. Common symptoms include a fever, coughing, and feeling short of breath. Urge the person to get extra rest and drink plenty of fluids to replace fluids lost from fever. To reduce a fever, offer acetaminophen (such as Tylenol). It may also help with muscle aches. Read and follow all instructions on the label. · Watch for signs that the illness is getting worse. The person may need medical care if they're getting sicker (for example, if it's hard to breathe). But call the doctor's office before you go. They can tell you what to do. Call 911 or emergency services if the person has any of these symptoms:  ? Severe trouble breathing or shortness of breath. ? Constant pain or pressure in their chest.  ? Confusion, or trouble thinking clearly. ? A blue tint to their lips or face. Some people are more likely to get very sick and need medical care. Call the doctor as soon as symptoms start if the person you're caring for is over 72, smokes, or has a serious health problem, like asthma, heart disease, diabetes, or an immune system problem. · Protect yourself and others. The virus spreads easily from person to person, so take extra care to avoid catching or spreading the infection. ? Keep the sick person away from others as much as you can. § Have the person stay in one room. If you can, give them their own bathroom to use. § Have only one person take care of them. Keep other people--and pets--out of the sickroom. § Have the person wear a cloth face cover around other people. This includes when anyone is in the room with them or if they leave their room (for example, to go to the bathroom).  If the face cover makes it harder for the sick person to breathe, the other person should wear a face cover. § Don't share personal items. These include dishes, cups, towels, and bedding. ? Wash your hands often and well. Use soap and water, and scrub for at least 20 seconds. This is especially important after you've been around the sick person or touched things they've touched. If soap and water aren't handy, use a hand  with at least 60% alcohol. ? Avoid touching your mouth, nose, and eyes. ? Take care with the person's laundry. It's okay to wash the sick person's laundry with yours. If you have them, wear disposable gloves when handling their dirty laundry, and wash your hands well after you touch it. Wash items in the warmest water allowed for the fabric type, and dry them completely. ? Clean high-touch items every day and anytime the sick person touches them. These include doorknobs, light switches, toilets, counters, and remote controls. Use a household disinfectant or a homemade bleach solution. (Follow the directions on the label.) If the sick person has their own room, have them disinfect it every day. ? Limit visitors to your home. To help protect family and friends, stay in touch with them only by phone or computer. Current as of: May 8, 2020               Content Version: 12.5  © 8642-5801 [employer]. Care instructions adapted under license by [employer] (which disclaims liability or warranty for this information). If you have questions about a medical condition or this instruction, always ask your healthcare professional. [place] [address] any warranty or liability for your use of this information.

## 2020-08-27 NOTE — PROGRESS NOTES
[facility] Hospitalist Group Progress Note Patient: Genevieve Parkinson Sr. Age: 78 y.o. : 1940 MR#: [id number] SSN: [id number] Date: 2020 Subjective:  
 
Patient is doing well and will be discharged today. He states he may need something for constipation. He denies SOB, CP, n/v/d. Assessment/Plan: 1. Acute metabolic encephalopathy due to covid 19 PNA - resolved   
2. Acute DVT RUE and acute DVT in LUE 3. COVID 19 infection and pneumonia -status post treatment with Remdesivir / convalescent plasma 4. Acute hypoxic respiratory failure due to covid 19 PNA 5. Bilateral hand pain 6. Diabetes mellitus type 2  
7. Hypertension 8. Dyslipidemia 9. BPH 
10. Osteoarthritis Patient to be discharged to SNF today. Goals of care:  
Disposition:  []PT/OT ordered   [] Case management referral 
 
Case discussed with:  [x]Patient  []Family  [x]Nursing  [x]Case Management DVT Prophylaxis:  []Lovenox  []Hep SQ  [x]SCDs  []Coumadin   []On Heparin gtt Objective:  
VS:  
Visit Vitals /76 Pulse (!) 56 Temp 97.4 °F (36.3 °C) Resp 16 Ht 6' 2\" (1.88 m) Wt 99.8 kg (220 lb) SpO2 96% BMI 28.25 kg/m² Tmax/24hrs: Temp (24hrs), Av.5 °F (36.4 °C), Min:97 °F (36.1 °C), Max:97.9 °F (36.6 °C) Intake/Output Summary (Last 24 hours) at 2020 1040 Last data filed at 2020 6422 Gross per 24 hour Intake 220 ml Output  Net 220 ml General:  Awake, alert, NAD Cardiovascular:  RRR, no murmurs, clicks, gallops or rubs Pulmonary: CTA bilaterally, normal respiratory effort GI: soft, NT, normal BS Extremities:  No edema or cyanosis Neuro: AAOx3 Current Facility-Administered Medications Medication Dose Route Frequency  apixaban (ELIQUIS) tablet 10 mg  10 mg Oral BID  dextrose (D50W) injection syrg 25 g  25 g IntraVENous PRN  
 lisinopriL (PRINIVIL, ZESTRIL) tablet 30 mg  30 mg Oral DAILY  metoprolol succinate (TOPROL-XL) XL tablet 50 mg  50 mg Oral DAILY  glucagon (GLUCAGEN) injection 1 mg  1 mg IntraMUSCular PRN  
 insulin lispro (HUMALOG) injection   SubCUTAneous AC&HS  cholecalciferol (VITAMIN D3) (1000 Units /25 mcg) tablet 2 Tab  2,000 Units Oral DAILY  diclofenac (VOLTAREN) 1 % topical gel 2 g  2 g Topical QID  dexAMETHasone (DECADRON) tablet 6 mg  6 mg Oral DAILY  pravastatin (PRAVACHOL) tablet 40 mg  40 mg Oral QHS  famotidine (PEPCID) tablet 10 mg  10 mg Oral QPM  
 sodium chloride (NS) flush 5-40 mL  5-40 mL IntraVENous Q8H  
 sodium chloride (NS) flush 5-40 mL  5-40 mL IntraVENous PRN  
 acetaminophen (TYLENOL) tablet 650 mg  650 mg Oral Q6H PRN Or  
 acetaminophen (TYLENOL) suppository 650 mg  650 mg Rectal Q6H PRN  polyethylene glycol (MIRALAX) packet 17 g  17 g Oral DAILY PRN  promethazine (PHENERGAN) tablet 12.5 mg  12.5 mg Oral Q6H PRN Or  
 ondansetron (ZOFRAN) injection 4 mg  4 mg IntraVENous Q6H PRN  
 glucose chewable tablet 16 g  16 g Oral PRN  
 ascorbic acid (vitamin C) (VITAMIN C) tablet 500 mg  500 mg Oral DAILY  zinc sulfate (ZINCATE) 220 (50) mg capsule 1 Cap  1 Cap Oral DAILY  0.9% sodium chloride infusion 250 mL  250 mL IntraVENous PRN Labs:   
Recent Results (from the past 24 hour(s)) GLUCOSE, POC Collection Time: 08/26/20 12:00 PM  
Result Value Ref Range Glucose (POC) 103 70 - 110 mg/dL GLUCOSE, POC Collection Time: 08/26/20  4:31 PM  
Result Value Ref Range Glucose (POC) 139 (H) 70 - 110 mg/dL GLUCOSE, POC Collection Time: 08/26/20  9:52 PM  
Result Value Ref Range Glucose (POC) 104 70 - 110 mg/dL D DIMER Collection Time: 08/27/20  4:55 AM  
Result Value Ref Range D DIMER 3.20 (H) <0.46 ug/ml(FEU) FERRITIN Collection Time: 08/27/20  4:55 AM  
Result Value Ref Range Ferritin 850 (H) 8 - 388 NG/ML  
C REACTIVE PROTEIN, QT  Collection Time: 08/27/20  4:55 AM  
 Result Value Ref Range C-Reactive protein 1.4 (H) 0 - 0.3 mg/dL LD Collection Time: 08/27/20  4:55 AM  
Result Value Ref Range  (H) 81 - 234 U/L  
CBC WITH AUTOMATED DIFF Collection Time: 08/27/20  4:55 AM  
Result Value Ref Range WBC 7.9 4.6 - 13.2 K/uL  
 RBC 4.95 4.70 - 5.50 M/uL  
 HGB 14.5 13.0 - 16.0 g/dL HCT 43.1 36.0 - 48.0 % MCV 87.1 74.0 - 97.0 FL  
 MCH 29.3 24.0 - 34.0 PG  
 MCHC 33.6 31.0 - 37.0 g/dL  
 RDW 13.1 11.6 - 14.5 % PLATELET 145 676 - 800 K/uL MPV 9.6 9.2 - 11.8 FL  
 NEUTROPHILS 63 40 - 73 % LYMPHOCYTES 24 21 - 52 % MONOCYTES 13 (H) 3 - 10 % EOSINOPHILS 0 0 - 5 % BASOPHILS 0 0 - 2 %  
 ABS. NEUTROPHILS 5.0 1.8 - 8.0 K/UL  
 ABS. LYMPHOCYTES 1.9 0.9 - 3.6 K/UL  
 ABS. MONOCYTES 1.0 0.05 - 1.2 K/UL  
 ABS. EOSINOPHILS 0.0 0.0 - 0.4 K/UL  
 ABS. BASOPHILS 0.0 0.0 - 0.1 K/UL  
 DF AUTOMATED METABOLIC PANEL, COMPREHENSIVE Collection Time: 08/27/20  4:55 AM  
Result Value Ref Range Sodium 136 136 - 145 mmol/L Potassium 3.9 3.5 - 5.5 mmol/L Chloride 105 100 - 111 mmol/L  
 CO2 26 21 - 32 mmol/L Anion gap 5 3.0 - 18 mmol/L Glucose 74 74 - 99 mg/dL BUN 15 7.0 - 18 MG/DL Creatinine 0.87 0.6 - 1.3 MG/DL  
 BUN/Creatinine ratio 17 12 - 20 GFR est AA >60 >60 ml/min/1.73m2 GFR est non-AA >60 >60 ml/min/1.73m2 Calcium 8.6 8.5 - 10.1 MG/DL Bilirubin, total 0.8 0.2 - 1.0 MG/DL  
 ALT (SGPT) 35 16 - 61 U/L  
 AST (SGOT) 23 10 - 38 U/L Alk. phosphatase 49 45 - 117 U/L Protein, total 7.2 6.4 - 8.2 g/dL Albumin 2.4 (L) 3.4 - 5.0 g/dL Globulin 4.8 (H) 2.0 - 4.0 g/dL A-G Ratio 0.5 (L) 0.8 - 1.7 GLUCOSE, POC Collection Time: 08/27/20  5:59 AM  
Result Value Ref Range Glucose (POC) 75 70 - 110 mg/dL Signed By: VANESSA Albarado  August 27, 2020 10:40 AM

## 2021-01-01 ENCOUNTER — HOSPITAL ENCOUNTER (OUTPATIENT)
Dept: PHYSICAL THERAPY | Age: 81
Discharge: HOME | End: 2021-03-11
Payer: COMMERCIAL

## 2021-01-01 ENCOUNTER — HOSPITAL ENCOUNTER (EMERGENCY)
Age: 81
Discharge: HOME | End: 2021-06-23
Attending: STUDENT IN AN ORGANIZED HEALTH CARE EDUCATION/TRAINING PROGRAM
Payer: COMMERCIAL

## 2021-01-01 ENCOUNTER — HOSPITAL ENCOUNTER (OUTPATIENT)
Dept: PHYSICAL THERAPY | Age: 81
Discharge: HOME | End: 2021-03-22
Payer: COMMERCIAL

## 2021-01-01 ENCOUNTER — HOSPITAL ENCOUNTER (OUTPATIENT)
Dept: PHYSICAL THERAPY | Age: 81
End: 2021-01-01
Payer: COMMERCIAL

## 2021-01-01 ENCOUNTER — HOSPITAL ENCOUNTER (INPATIENT)
Age: 81
LOS: 2 days | Discharge: NURSING, REHAB OR LONG-TERM CARE | DRG: 684 | End: 2021-04-26
Attending: EMERGENCY MEDICINE | Admitting: INTERNAL MEDICINE
Payer: MEDICARE

## 2021-01-01 ENCOUNTER — HOSPITAL ENCOUNTER (INPATIENT)
Dept: ULTRASOUND IMAGING | Age: 81
Discharge: HOME | DRG: 564 | End: 2021-03-29
Attending: NURSE PRACTITIONER
Payer: MEDICARE

## 2021-01-01 ENCOUNTER — APPOINTMENT (OUTPATIENT)
Dept: GENERAL RADIOLOGY | Age: 81
DRG: 564 | End: 2021-01-01
Attending: EMERGENCY MEDICINE
Payer: MEDICARE

## 2021-01-01 ENCOUNTER — APPOINTMENT (OUTPATIENT)
Dept: MRI IMAGING | Age: 81
DRG: 564 | End: 2021-01-01
Attending: STUDENT IN AN ORGANIZED HEALTH CARE EDUCATION/TRAINING PROGRAM
Payer: MEDICARE

## 2021-01-01 ENCOUNTER — OFFICE VISIT (OUTPATIENT)
Dept: ORTHOPEDIC SURGERY | Age: 81
End: 2021-01-01
Payer: COMMERCIAL

## 2021-01-01 ENCOUNTER — HOSPITAL ENCOUNTER (OUTPATIENT)
Dept: PHYSICAL THERAPY | Age: 81
Discharge: HOME | End: 2021-03-19
Payer: COMMERCIAL

## 2021-01-01 ENCOUNTER — APPOINTMENT (OUTPATIENT)
Dept: CT IMAGING | Age: 81
DRG: 564 | End: 2021-01-01
Attending: EMERGENCY MEDICINE
Payer: MEDICARE

## 2021-01-01 ENCOUNTER — HOSPITAL ENCOUNTER (EMERGENCY)
Age: 81
Discharge: HOME | End: 2021-06-06
Attending: EMERGENCY MEDICINE
Payer: COMMERCIAL

## 2021-01-01 ENCOUNTER — HOSPITAL ENCOUNTER (OUTPATIENT)
Dept: PHYSICAL THERAPY | Age: 81
Discharge: HOME | End: 2021-03-15
Payer: COMMERCIAL

## 2021-01-01 ENCOUNTER — APPOINTMENT (OUTPATIENT)
Dept: PHYSICAL THERAPY | Age: 81
End: 2021-01-01
Payer: COMMERCIAL

## 2021-01-01 ENCOUNTER — HOSPITAL ENCOUNTER (OUTPATIENT)
Dept: PHYSICAL THERAPY | Age: 81
Discharge: HOME | End: 2021-03-02
Payer: COMMERCIAL

## 2021-01-01 ENCOUNTER — HOSPITAL ENCOUNTER (INPATIENT)
Age: 81
LOS: 4 days | Discharge: NURSING, REHAB OR LONG-TERM CARE | DRG: 564 | End: 2021-04-01
Attending: EMERGENCY MEDICINE | Admitting: INTERNAL MEDICINE
Payer: MEDICARE

## 2021-01-01 ENCOUNTER — HOSPITAL ENCOUNTER (OUTPATIENT)
Dept: PHYSICAL THERAPY | Age: 81
Discharge: HOME | End: 2021-03-08
Payer: COMMERCIAL

## 2021-01-01 VITALS
HEIGHT: 74 IN | OXYGEN SATURATION: 94 % | TEMPERATURE: 97.1 F | HEART RATE: 71 BPM | DIASTOLIC BLOOD PRESSURE: 63 MMHG | WEIGHT: 220 LBS | SYSTOLIC BLOOD PRESSURE: 110 MMHG | BODY MASS INDEX: 28.23 KG/M2

## 2021-01-01 VITALS
DIASTOLIC BLOOD PRESSURE: 63 MMHG | RESPIRATION RATE: 16 BRPM | SYSTOLIC BLOOD PRESSURE: 100 MMHG | HEIGHT: 71 IN | TEMPERATURE: 98.1 F | BODY MASS INDEX: 26.6 KG/M2 | WEIGHT: 190 LBS | HEART RATE: 89 BPM | OXYGEN SATURATION: 100 %

## 2021-01-01 VITALS
OXYGEN SATURATION: 99 % | SYSTOLIC BLOOD PRESSURE: 142 MMHG | DIASTOLIC BLOOD PRESSURE: 77 MMHG | TEMPERATURE: 97.4 F | BODY MASS INDEX: 27.44 KG/M2 | RESPIRATION RATE: 18 BRPM | WEIGHT: 196 LBS | HEIGHT: 71 IN | HEART RATE: 75 BPM

## 2021-01-01 VITALS
BODY MASS INDEX: 31.06 KG/M2 | WEIGHT: 221.9 LBS | SYSTOLIC BLOOD PRESSURE: 119 MMHG | HEART RATE: 66 BPM | OXYGEN SATURATION: 98 % | DIASTOLIC BLOOD PRESSURE: 64 MMHG | HEIGHT: 71 IN | TEMPERATURE: 98 F | RESPIRATION RATE: 18 BRPM

## 2021-01-01 VITALS
DIASTOLIC BLOOD PRESSURE: 58 MMHG | OXYGEN SATURATION: 100 % | SYSTOLIC BLOOD PRESSURE: 116 MMHG | TEMPERATURE: 98.3 F | RESPIRATION RATE: 19 BRPM | HEART RATE: 64 BPM

## 2021-01-01 DIAGNOSIS — N30.01 ACUTE CYSTITIS WITH HEMATURIA: Primary | ICD-10-CM

## 2021-01-01 DIAGNOSIS — N17.9 ACUTE KIDNEY INJURY (HCC): Primary | ICD-10-CM

## 2021-01-01 DIAGNOSIS — M25.552 LEFT HIP PAIN: ICD-10-CM

## 2021-01-01 DIAGNOSIS — Z96.642 HISTORY OF TOTAL LEFT HIP REPLACEMENT: Primary | ICD-10-CM

## 2021-01-01 DIAGNOSIS — R29.898 WEAKNESS OF BOTH LOWER EXTREMITIES: ICD-10-CM

## 2021-01-01 DIAGNOSIS — R60.9 EDEMA, UNSPECIFIED TYPE: ICD-10-CM

## 2021-01-01 DIAGNOSIS — T83.511S URINARY TRACT INFECTION ASSOCIATED WITH CATHETERIZATION OF URINARY TRACT, UNSPECIFIED INDWELLING URINARY CATHETER TYPE, SEQUELA: Primary | ICD-10-CM

## 2021-01-01 DIAGNOSIS — N39.0 URINARY TRACT INFECTION ASSOCIATED WITH CATHETERIZATION OF URINARY TRACT, UNSPECIFIED INDWELLING URINARY CATHETER TYPE, SEQUELA: Primary | ICD-10-CM

## 2021-01-01 DIAGNOSIS — N39.0 ACUTE UTI: ICD-10-CM

## 2021-01-01 DIAGNOSIS — R31.9 PAINLESS HEMATURIA: ICD-10-CM

## 2021-01-01 DIAGNOSIS — N40.0 BENIGN PROSTATIC HYPERPLASIA WITHOUT LOWER URINARY TRACT SYMPTOMS: ICD-10-CM

## 2021-01-01 DIAGNOSIS — T79.6XXA TRAUMATIC RHABDOMYOLYSIS, INITIAL ENCOUNTER (HCC): Primary | ICD-10-CM

## 2021-01-01 DIAGNOSIS — R53.81 DEBILITY: ICD-10-CM

## 2021-01-01 DIAGNOSIS — I10 ESSENTIAL HYPERTENSION: ICD-10-CM

## 2021-01-01 DIAGNOSIS — R31.29 MICROSCOPIC HEMATURIA: ICD-10-CM

## 2021-01-01 DIAGNOSIS — R53.1 WEAKNESS: ICD-10-CM

## 2021-01-01 DIAGNOSIS — Z91.81 AT RISK FOR FALLS: ICD-10-CM

## 2021-01-01 LAB
ALBUMIN SERPL-MCNC: 2.5 G/DL (ref 3.4–5)
ALBUMIN SERPL-MCNC: 2.7 G/DL (ref 3.4–5)
ALBUMIN SERPL-MCNC: 2.7 G/DL (ref 3.4–5)
ALBUMIN SERPL-MCNC: 2.8 G/DL (ref 3.4–5)
ALBUMIN SERPL-MCNC: 2.9 G/DL (ref 3.4–5)
ALBUMIN SERPL-MCNC: 3 G/DL (ref 3.4–5)
ALBUMIN SERPL-MCNC: 3.3 G/DL (ref 3.4–5)
ALBUMIN/GLOB SERPL: 0.6 {RATIO} (ref 0.8–1.7)
ALBUMIN/GLOB SERPL: 0.9 {RATIO} (ref 0.8–1.7)
ALP SERPL-CCNC: 36 U/L (ref 45–117)
ALP SERPL-CCNC: 40 U/L (ref 45–117)
ALP SERPL-CCNC: 40 U/L (ref 45–117)
ALP SERPL-CCNC: 41 U/L (ref 45–117)
ALP SERPL-CCNC: 63 U/L (ref 45–117)
ALP SERPL-CCNC: 73 U/L (ref 45–117)
ALP SERPL-CCNC: 77 U/L (ref 45–117)
ALT SERPL-CCNC: 12 U/L (ref 16–61)
ALT SERPL-CCNC: 23 U/L (ref 16–61)
ALT SERPL-CCNC: 24 U/L (ref 16–61)
ALT SERPL-CCNC: 24 U/L (ref 16–61)
ALT SERPL-CCNC: 25 U/L (ref 16–61)
ALT SERPL-CCNC: 35 U/L (ref 16–61)
ALT SERPL-CCNC: 38 U/L (ref 16–61)
AMORPH CRY URNS QL MICRO: ABNORMAL
ANION GAP SERPL CALC-SCNC: 4 MMOL/L (ref 3–18)
ANION GAP SERPL CALC-SCNC: 4 MMOL/L (ref 3–18)
ANION GAP SERPL CALC-SCNC: 5 MMOL/L (ref 3–18)
ANION GAP SERPL CALC-SCNC: 6 MMOL/L (ref 3–18)
ANION GAP SERPL CALC-SCNC: 7 MMOL/L (ref 3–18)
ANION GAP SERPL CALC-SCNC: 8 MMOL/L (ref 3–18)
ANION GAP SERPL CALC-SCNC: 9 MMOL/L (ref 3–18)
APPEARANCE UR: ABNORMAL
APPEARANCE UR: CLEAR
APPEARANCE UR: CLEAR
APPEARANCE UR: NORMAL
APTT PPP: 31.9 SEC (ref 23–36.4)
APTT PPP: 35.8 SEC (ref 23–36.4)
AST SERPL-CCNC: 26 U/L (ref 10–38)
AST SERPL-CCNC: 28 U/L (ref 10–38)
AST SERPL-CCNC: 37 U/L (ref 10–38)
AST SERPL-CCNC: 65 U/L (ref 10–38)
AST SERPL-CCNC: 7 U/L (ref 10–38)
AST SERPL-CCNC: 74 U/L (ref 10–38)
AST SERPL-CCNC: 78 U/L (ref 10–38)
ATRIAL RATE: 61 BPM
ATRIAL RATE: 70 BPM
ATRIAL RATE: 70 BPM
ATRIAL RATE: 84 BPM
BACTERIA SPEC CULT: ABNORMAL
BACTERIA SPEC CULT: NORMAL
BACTERIA URNS QL MICRO: ABNORMAL /HPF
BACTERIA URNS QL MICRO: NEGATIVE /HPF
BASOPHILS # BLD: 0 K/UL (ref 0–0.1)
BASOPHILS # BLD: 0.1 K/UL (ref 0–0.1)
BASOPHILS # BLD: 0.1 K/UL (ref 0–0.1)
BASOPHILS NFR BLD: 0 % (ref 0–2)
BASOPHILS NFR BLD: 1 % (ref 0–2)
BASOPHILS NFR BLD: 1 % (ref 0–2)
BILIRUB SERPL-MCNC: 0.6 MG/DL (ref 0.2–1)
BILIRUB SERPL-MCNC: 0.6 MG/DL (ref 0.2–1)
BILIRUB SERPL-MCNC: 0.7 MG/DL (ref 0.2–1)
BILIRUB SERPL-MCNC: 1.6 MG/DL (ref 0.2–1)
BILIRUB SERPL-MCNC: 1.7 MG/DL (ref 0.2–1)
BILIRUB SERPL-MCNC: 2 MG/DL (ref 0.2–1)
BILIRUB SERPL-MCNC: 2.4 MG/DL (ref 0.2–1)
BILIRUB UR QL: NEGATIVE
BILIRUB UR QL: NORMAL
BUN SERPL-MCNC: 12 MG/DL (ref 7–18)
BUN SERPL-MCNC: 14 MG/DL (ref 7–18)
BUN SERPL-MCNC: 15 MG/DL (ref 7–18)
BUN SERPL-MCNC: 16 MG/DL (ref 7–18)
BUN SERPL-MCNC: 18 MG/DL (ref 7–18)
BUN SERPL-MCNC: 19 MG/DL (ref 7–18)
BUN SERPL-MCNC: 26 MG/DL (ref 7–18)
BUN SERPL-MCNC: 28 MG/DL (ref 7–18)
BUN SERPL-MCNC: 46 MG/DL (ref 7–18)
BUN SERPL-MCNC: 54 MG/DL (ref 7–18)
BUN/CREAT SERPL: 14 (ref 12–20)
BUN/CREAT SERPL: 15 (ref 12–20)
BUN/CREAT SERPL: 17 (ref 12–20)
BUN/CREAT SERPL: 18 (ref 12–20)
BUN/CREAT SERPL: 19 (ref 12–20)
BUN/CREAT SERPL: 21 (ref 12–20)
CALCIUM SERPL-MCNC: 7.5 MG/DL (ref 8.5–10.1)
CALCIUM SERPL-MCNC: 7.6 MG/DL (ref 8.5–10.1)
CALCIUM SERPL-MCNC: 7.7 MG/DL (ref 8.5–10.1)
CALCIUM SERPL-MCNC: 7.8 MG/DL (ref 8.5–10.1)
CALCIUM SERPL-MCNC: 7.9 MG/DL (ref 8.5–10.1)
CALCIUM SERPL-MCNC: 8 MG/DL (ref 8.5–10.1)
CALCIUM SERPL-MCNC: 8 MG/DL (ref 8.5–10.1)
CALCIUM SERPL-MCNC: 8.5 MG/DL (ref 8.5–10.1)
CALCIUM SERPL-MCNC: 9 MG/DL (ref 8.5–10.1)
CALCIUM SERPL-MCNC: 9 MG/DL (ref 8.5–10.1)
CALCULATED P AXIS, ECG09: 37 DEGREES
CALCULATED P AXIS, ECG09: 54 DEGREES
CALCULATED P AXIS, ECG09: 72 DEGREES
CALCULATED R AXIS, ECG10: -15 DEGREES
CALCULATED R AXIS, ECG10: -19 DEGREES
CALCULATED R AXIS, ECG10: -23 DEGREES
CALCULATED R AXIS, ECG10: -27 DEGREES
CALCULATED T AXIS, ECG11: 33 DEGREES
CALCULATED T AXIS, ECG11: 35 DEGREES
CALCULATED T AXIS, ECG11: 8 DEGREES
CC UR VC: ABNORMAL
CC UR VC: NORMAL
CHLORIDE SERPL-SCNC: 102 MMOL/L (ref 100–111)
CHLORIDE SERPL-SCNC: 106 MMOL/L (ref 100–111)
CHLORIDE SERPL-SCNC: 108 MMOL/L (ref 100–111)
CHLORIDE SERPL-SCNC: 110 MMOL/L (ref 100–111)
CHLORIDE SERPL-SCNC: 111 MMOL/L (ref 100–111)
CHLORIDE SERPL-SCNC: 112 MMOL/L (ref 100–111)
CK MB CFR SERPL CALC: 0.4 % (ref 0–4)
CK MB CFR SERPL CALC: 0.9 % (ref 0–4)
CK MB SERPL-MCNC: 15.6 NG/ML (ref 5–25)
CK MB SERPL-MCNC: 2.7 NG/ML (ref 5–25)
CK SERPL-CCNC: 1295 U/L (ref 39–308)
CK SERPL-CCNC: 2849 U/L (ref 39–308)
CK SERPL-CCNC: 293 U/L (ref 39–308)
CK SERPL-CCNC: 3643 U/L (ref 39–308)
CK SERPL-CCNC: 4121 U/L (ref 39–308)
CK SERPL-CCNC: 816 U/L (ref 39–308)
CO2 SERPL-SCNC: 21 MMOL/L (ref 21–32)
CO2 SERPL-SCNC: 22 MMOL/L (ref 21–32)
CO2 SERPL-SCNC: 23 MMOL/L (ref 21–32)
CO2 SERPL-SCNC: 23 MMOL/L (ref 21–32)
CO2 SERPL-SCNC: 24 MMOL/L (ref 21–32)
CO2 SERPL-SCNC: 24 MMOL/L (ref 21–32)
CO2 SERPL-SCNC: 25 MMOL/L (ref 21–32)
CO2 SERPL-SCNC: 25 MMOL/L (ref 21–32)
CO2 SERPL-SCNC: 27 MMOL/L (ref 21–32)
CO2 SERPL-SCNC: 29 MMOL/L (ref 21–32)
COLOR UR: NORMAL
COLOR UR: YELLOW
CREAT SERPL-MCNC: 0.79 MG/DL (ref 0.6–1.3)
CREAT SERPL-MCNC: 0.83 MG/DL (ref 0.6–1.3)
CREAT SERPL-MCNC: 0.87 MG/DL (ref 0.6–1.3)
CREAT SERPL-MCNC: 0.89 MG/DL (ref 0.6–1.3)
CREAT SERPL-MCNC: 0.9 MG/DL (ref 0.6–1.3)
CREAT SERPL-MCNC: 0.94 MG/DL (ref 0.6–1.3)
CREAT SERPL-MCNC: 1.38 MG/DL (ref 0.6–1.3)
CREAT SERPL-MCNC: 1.45 MG/DL (ref 0.6–1.3)
CREAT SERPL-MCNC: 2.55 MG/DL (ref 0.6–1.3)
CREAT SERPL-MCNC: 3.54 MG/DL (ref 0.6–1.3)
DIAGNOSIS, 93000: NORMAL
DIFFERENTIAL METHOD BLD: ABNORMAL
EOSINOPHIL # BLD: 0 K/UL (ref 0–0.4)
EOSINOPHIL # BLD: 0.1 K/UL (ref 0–0.4)
EOSINOPHIL # BLD: 0.2 K/UL (ref 0–0.4)
EOSINOPHIL NFR BLD: 0 % (ref 0–5)
EOSINOPHIL NFR BLD: 1 % (ref 0–5)
EOSINOPHIL NFR BLD: 2 % (ref 0–5)
EOSINOPHIL NFR BLD: 3 % (ref 0–5)
EOSINOPHIL NFR BLD: 5 % (ref 0–5)
EPITH CASTS URNS QL MICRO: ABNORMAL /LPF (ref 0–5)
EPITH CASTS URNS QL MICRO: ABNORMAL /LPF (ref 0–5)
EPITH CASTS URNS QL MICRO: NEGATIVE /LPF (ref 0–5)
EPITH CASTS URNS QL MICRO: NEGATIVE /LPF (ref 0–5)
ERYTHROCYTE [DISTWIDTH] IN BLOOD BY AUTOMATED COUNT: 13.7 % (ref 11.6–14.5)
ERYTHROCYTE [DISTWIDTH] IN BLOOD BY AUTOMATED COUNT: 13.7 % (ref 11.6–14.5)
ERYTHROCYTE [DISTWIDTH] IN BLOOD BY AUTOMATED COUNT: 13.8 % (ref 11.6–14.5)
ERYTHROCYTE [DISTWIDTH] IN BLOOD BY AUTOMATED COUNT: 13.8 % (ref 11.6–14.5)
ERYTHROCYTE [DISTWIDTH] IN BLOOD BY AUTOMATED COUNT: 13.9 % (ref 11.6–14.5)
ERYTHROCYTE [DISTWIDTH] IN BLOOD BY AUTOMATED COUNT: 13.9 % (ref 11.6–14.5)
ERYTHROCYTE [DISTWIDTH] IN BLOOD BY AUTOMATED COUNT: 14.3 % (ref 11.6–14.5)
ERYTHROCYTE [DISTWIDTH] IN BLOOD BY AUTOMATED COUNT: 14.6 % (ref 11.6–14.5)
ERYTHROCYTE [DISTWIDTH] IN BLOOD BY AUTOMATED COUNT: 14.6 % (ref 11.6–14.5)
EST. AVERAGE GLUCOSE BLD GHB EST-MCNC: 108 MG/DL
EST. AVERAGE GLUCOSE BLD GHB EST-MCNC: 111 MG/DL
GLOBULIN SER CALC-MCNC: 3 G/DL (ref 2–4)
GLOBULIN SER CALC-MCNC: 3.2 G/DL (ref 2–4)
GLOBULIN SER CALC-MCNC: 3.4 G/DL (ref 2–4)
GLOBULIN SER CALC-MCNC: 3.5 G/DL (ref 2–4)
GLOBULIN SER CALC-MCNC: 4.1 G/DL (ref 2–4)
GLOBULIN SER CALC-MCNC: 4.7 G/DL (ref 2–4)
GLOBULIN SER CALC-MCNC: 4.9 G/DL (ref 2–4)
GLUCOSE BLD STRIP.AUTO-MCNC: 100 MG/DL (ref 70–110)
GLUCOSE BLD STRIP.AUTO-MCNC: 101 MG/DL (ref 70–110)
GLUCOSE BLD STRIP.AUTO-MCNC: 103 MG/DL (ref 70–110)
GLUCOSE BLD STRIP.AUTO-MCNC: 110 MG/DL (ref 70–110)
GLUCOSE BLD STRIP.AUTO-MCNC: 123 MG/DL (ref 70–110)
GLUCOSE BLD STRIP.AUTO-MCNC: 124 MG/DL (ref 70–110)
GLUCOSE BLD STRIP.AUTO-MCNC: 153 MG/DL (ref 70–110)
GLUCOSE BLD STRIP.AUTO-MCNC: 56 MG/DL (ref 70–110)
GLUCOSE BLD STRIP.AUTO-MCNC: 60 MG/DL (ref 70–110)
GLUCOSE BLD STRIP.AUTO-MCNC: 65 MG/DL (ref 70–110)
GLUCOSE BLD STRIP.AUTO-MCNC: 70 MG/DL (ref 70–110)
GLUCOSE BLD STRIP.AUTO-MCNC: 71 MG/DL (ref 70–110)
GLUCOSE BLD STRIP.AUTO-MCNC: 73 MG/DL (ref 70–110)
GLUCOSE BLD STRIP.AUTO-MCNC: 74 MG/DL (ref 70–110)
GLUCOSE BLD STRIP.AUTO-MCNC: 75 MG/DL (ref 70–110)
GLUCOSE BLD STRIP.AUTO-MCNC: 77 MG/DL (ref 70–110)
GLUCOSE BLD STRIP.AUTO-MCNC: 77 MG/DL (ref 70–110)
GLUCOSE BLD STRIP.AUTO-MCNC: 78 MG/DL (ref 70–110)
GLUCOSE BLD STRIP.AUTO-MCNC: 79 MG/DL (ref 70–110)
GLUCOSE BLD STRIP.AUTO-MCNC: 79 MG/DL (ref 70–110)
GLUCOSE BLD STRIP.AUTO-MCNC: 81 MG/DL (ref 70–110)
GLUCOSE BLD STRIP.AUTO-MCNC: 81 MG/DL (ref 70–110)
GLUCOSE BLD STRIP.AUTO-MCNC: 84 MG/DL (ref 70–110)
GLUCOSE BLD STRIP.AUTO-MCNC: 86 MG/DL (ref 70–110)
GLUCOSE BLD STRIP.AUTO-MCNC: 89 MG/DL (ref 70–110)
GLUCOSE BLD STRIP.AUTO-MCNC: 90 MG/DL (ref 70–110)
GLUCOSE BLD STRIP.AUTO-MCNC: 94 MG/DL (ref 70–110)
GLUCOSE BLD STRIP.AUTO-MCNC: 96 MG/DL (ref 70–110)
GLUCOSE BLD STRIP.AUTO-MCNC: 96 MG/DL (ref 70–110)
GLUCOSE SERPL-MCNC: 67 MG/DL (ref 74–99)
GLUCOSE SERPL-MCNC: 68 MG/DL (ref 74–99)
GLUCOSE SERPL-MCNC: 72 MG/DL (ref 74–99)
GLUCOSE SERPL-MCNC: 76 MG/DL (ref 74–99)
GLUCOSE SERPL-MCNC: 78 MG/DL (ref 74–99)
GLUCOSE SERPL-MCNC: 79 MG/DL (ref 74–99)
GLUCOSE SERPL-MCNC: 84 MG/DL (ref 74–99)
GLUCOSE SERPL-MCNC: 99 MG/DL (ref 74–99)
GLUCOSE UR STRIP.AUTO-MCNC: NEGATIVE MG/DL
HBA1C MFR BLD: 5.4 % (ref 4.2–5.6)
HBA1C MFR BLD: 5.5 % (ref 4.2–5.6)
HCT VFR BLD AUTO: 34.6 % (ref 36–48)
HCT VFR BLD AUTO: 35 % (ref 36–48)
HCT VFR BLD AUTO: 35.1 % (ref 36–48)
HCT VFR BLD AUTO: 36.1 % (ref 36–48)
HCT VFR BLD AUTO: 36.8 % (ref 36–48)
HCT VFR BLD AUTO: 37 % (ref 36–48)
HCT VFR BLD AUTO: 37.5 % (ref 36–48)
HCT VFR BLD AUTO: 39.1 % (ref 36–48)
HCT VFR BLD AUTO: 41.1 % (ref 36–48)
HGB BLD-MCNC: 11.3 G/DL (ref 13–16)
HGB BLD-MCNC: 11.5 G/DL (ref 13–16)
HGB BLD-MCNC: 11.6 G/DL (ref 13–16)
HGB BLD-MCNC: 11.8 G/DL (ref 13–16)
HGB BLD-MCNC: 12.3 G/DL (ref 13–16)
HGB BLD-MCNC: 12.5 G/DL (ref 13–16)
HGB BLD-MCNC: 12.6 G/DL (ref 13–16)
HGB BLD-MCNC: 12.8 G/DL (ref 13–16)
HGB BLD-MCNC: 13.8 G/DL (ref 13–16)
HGB UR QL STRIP: ABNORMAL
HGB UR QL STRIP: NORMAL
HYALINE CASTS URNS QL MICRO: ABNORMAL /LPF (ref 0–2)
INR PPP: 1.2 (ref 0.8–1.2)
INR PPP: 1.3 (ref 0.8–1.2)
INR PPP: 1.4 (ref 0.8–1.2)
KETONES UR QL STRIP.AUTO: 40 MG/DL
KETONES UR QL STRIP.AUTO: NEGATIVE MG/DL
KETONES UR QL STRIP.AUTO: NEGATIVE MG/DL
KETONES UR QL STRIP.AUTO: NORMAL MG/DL
LACTATE BLD-SCNC: 1.38 MMOL/L (ref 0.4–2)
LEUKOCYTE ESTERASE UR QL STRIP.AUTO: ABNORMAL
LEUKOCYTE ESTERASE UR QL STRIP.AUTO: ABNORMAL
LEUKOCYTE ESTERASE UR QL STRIP.AUTO: NEGATIVE
LEUKOCYTE ESTERASE UR QL STRIP.AUTO: NORMAL
LYMPHOCYTES # BLD: 0.9 K/UL (ref 0.9–3.6)
LYMPHOCYTES # BLD: 1.3 K/UL (ref 0.9–3.6)
LYMPHOCYTES # BLD: 1.3 K/UL (ref 0.9–3.6)
LYMPHOCYTES # BLD: 1.7 K/UL (ref 0.9–3.6)
LYMPHOCYTES # BLD: 1.7 K/UL (ref 0.9–3.6)
LYMPHOCYTES # BLD: 1.8 K/UL (ref 0.9–3.6)
LYMPHOCYTES # BLD: 2 K/UL (ref 0.9–3.6)
LYMPHOCYTES # BLD: 2.1 K/UL (ref 0.9–3.6)
LYMPHOCYTES # BLD: 2.1 K/UL (ref 0.9–3.6)
LYMPHOCYTES NFR BLD: 14 % (ref 21–52)
LYMPHOCYTES NFR BLD: 22 % (ref 21–52)
LYMPHOCYTES NFR BLD: 23 % (ref 21–52)
LYMPHOCYTES NFR BLD: 23 % (ref 21–52)
LYMPHOCYTES NFR BLD: 24 % (ref 21–52)
LYMPHOCYTES NFR BLD: 27 % (ref 21–52)
LYMPHOCYTES NFR BLD: 29 % (ref 21–52)
LYMPHOCYTES NFR BLD: 30 % (ref 21–52)
LYMPHOCYTES NFR BLD: 34 % (ref 21–52)
MCH RBC QN AUTO: 28.1 PG (ref 24–34)
MCH RBC QN AUTO: 28.1 PG (ref 24–34)
MCH RBC QN AUTO: 28.2 PG (ref 24–34)
MCH RBC QN AUTO: 28.2 PG (ref 24–34)
MCH RBC QN AUTO: 28.3 PG (ref 24–34)
MCH RBC QN AUTO: 28.3 PG (ref 24–34)
MCH RBC QN AUTO: 28.5 PG (ref 24–34)
MCH RBC QN AUTO: 28.5 PG (ref 24–34)
MCH RBC QN AUTO: 29.4 PG (ref 24–34)
MCHC RBC AUTO-ENTMCNC: 32.3 G/DL (ref 31–37)
MCHC RBC AUTO-ENTMCNC: 32.7 G/DL (ref 31–37)
MCHC RBC AUTO-ENTMCNC: 32.7 G/DL (ref 31–37)
MCHC RBC AUTO-ENTMCNC: 32.8 G/DL (ref 31–37)
MCHC RBC AUTO-ENTMCNC: 33.4 G/DL (ref 31–37)
MCHC RBC AUTO-ENTMCNC: 33.5 G/DL (ref 31–37)
MCHC RBC AUTO-ENTMCNC: 33.6 G/DL (ref 31–37)
MCHC RBC AUTO-ENTMCNC: 33.6 G/DL (ref 31–37)
MCHC RBC AUTO-ENTMCNC: 33.8 G/DL (ref 31–37)
MCV RBC AUTO: 84.1 FL (ref 74–97)
MCV RBC AUTO: 84.2 FL (ref 74–97)
MCV RBC AUTO: 84.5 FL (ref 74–97)
MCV RBC AUTO: 84.7 FL (ref 74–97)
MCV RBC AUTO: 85.7 FL (ref 74–97)
MCV RBC AUTO: 86 FL (ref 74–97)
MCV RBC AUTO: 86.6 FL (ref 74–97)
MCV RBC AUTO: 87.3 FL (ref 74–97)
MCV RBC AUTO: 87.6 FL (ref 74–97)
MONOCYTES # BLD: 0.6 K/UL (ref 0.05–1.2)
MONOCYTES # BLD: 0.7 K/UL (ref 0.05–1.2)
MONOCYTES # BLD: 0.7 K/UL (ref 0.05–1.2)
MONOCYTES # BLD: 0.8 K/UL (ref 0.05–1.2)
MONOCYTES # BLD: 0.9 K/UL (ref 0.05–1.2)
MONOCYTES NFR BLD: 10 % (ref 3–10)
MONOCYTES NFR BLD: 11 % (ref 3–10)
MONOCYTES NFR BLD: 11 % (ref 3–10)
MONOCYTES NFR BLD: 12 % (ref 3–10)
MONOCYTES NFR BLD: 13 % (ref 3–10)
MONOCYTES NFR BLD: 13 % (ref 3–10)
MONOCYTES NFR BLD: 14 % (ref 3–10)
MONOCYTES NFR BLD: 8 % (ref 3–10)
MONOCYTES NFR BLD: 9 % (ref 3–10)
NEUTS SEG # BLD: 2.4 K/UL (ref 1.8–8)
NEUTS SEG # BLD: 3 K/UL (ref 1.8–8)
NEUTS SEG # BLD: 3.4 K/UL (ref 1.8–8)
NEUTS SEG # BLD: 3.6 K/UL (ref 1.8–8)
NEUTS SEG # BLD: 3.9 K/UL (ref 1.8–8)
NEUTS SEG # BLD: 4.5 K/UL (ref 1.8–8)
NEUTS SEG # BLD: 4.9 K/UL (ref 1.8–8)
NEUTS SEG # BLD: 5.3 K/UL (ref 1.8–8)
NEUTS SEG # BLD: 5.7 K/UL (ref 1.8–8)
NEUTS SEG NFR BLD: 48 % (ref 40–73)
NEUTS SEG NFR BLD: 55 % (ref 40–73)
NEUTS SEG NFR BLD: 56 % (ref 40–73)
NEUTS SEG NFR BLD: 60 % (ref 40–73)
NEUTS SEG NFR BLD: 61 % (ref 40–73)
NEUTS SEG NFR BLD: 62 % (ref 40–73)
NEUTS SEG NFR BLD: 65 % (ref 40–73)
NEUTS SEG NFR BLD: 65 % (ref 40–73)
NEUTS SEG NFR BLD: 77 % (ref 40–73)
NITRITE UR QL STRIP.AUTO: NEGATIVE
OTHER,OTHU: ABNORMAL
P-R INTERVAL, ECG05: 194 MS
P-R INTERVAL, ECG05: 222 MS
P-R INTERVAL, ECG05: 228 MS
P-R INTERVAL, ECG05: 238 MS
PH UR STRIP: 5 [PH] (ref 5–8)
PH UR STRIP: 6.5 [PH] (ref 5–8)
PH UR STRIP: 7 [PH]
PH UR STRIP: >8.5 [PH] (ref 5–8)
PLATELET # BLD AUTO: 175 K/UL (ref 135–420)
PLATELET # BLD AUTO: 179 K/UL (ref 135–420)
PLATELET # BLD AUTO: 181 K/UL (ref 135–420)
PLATELET # BLD AUTO: 187 K/UL (ref 135–420)
PLATELET # BLD AUTO: 199 K/UL (ref 135–420)
PLATELET # BLD AUTO: 225 K/UL (ref 135–420)
PLATELET # BLD AUTO: 277 K/UL (ref 135–420)
PLATELET # BLD AUTO: 333 K/UL (ref 135–420)
PLATELET # BLD AUTO: 380 K/UL (ref 135–420)
PMV BLD AUTO: 8.5 FL (ref 9.2–11.8)
PMV BLD AUTO: 8.9 FL (ref 9.2–11.8)
PMV BLD AUTO: 8.9 FL (ref 9.2–11.8)
PMV BLD AUTO: 9 FL (ref 9.2–11.8)
PMV BLD AUTO: 9.3 FL (ref 9.2–11.8)
PMV BLD AUTO: 9.6 FL (ref 9.2–11.8)
PMV BLD AUTO: 9.8 FL (ref 9.2–11.8)
POTASSIUM SERPL-SCNC: 3.3 MMOL/L (ref 3.5–5.5)
POTASSIUM SERPL-SCNC: 3.4 MMOL/L (ref 3.5–5.5)
POTASSIUM SERPL-SCNC: 3.4 MMOL/L (ref 3.5–5.5)
POTASSIUM SERPL-SCNC: 4 MMOL/L (ref 3.5–5.5)
POTASSIUM SERPL-SCNC: 4.1 MMOL/L (ref 3.5–5.5)
POTASSIUM SERPL-SCNC: 4.6 MMOL/L (ref 3.5–5.5)
POTASSIUM SERPL-SCNC: 4.9 MMOL/L (ref 3.5–5.5)
POTASSIUM SERPL-SCNC: 5.1 MMOL/L (ref 3.5–5.5)
PROT SERPL-MCNC: 5.7 G/DL (ref 6.4–8.2)
PROT SERPL-MCNC: 6 G/DL (ref 6.4–8.2)
PROT SERPL-MCNC: 6.3 G/DL (ref 6.4–8.2)
PROT SERPL-MCNC: 6.6 G/DL (ref 6.4–8.2)
PROT SERPL-MCNC: 6.8 G/DL (ref 6.4–8.2)
PROT SERPL-MCNC: 7.6 G/DL (ref 6.4–8.2)
PROT SERPL-MCNC: 7.7 G/DL (ref 6.4–8.2)
PROT UR STRIP-MCNC: 100 MG/DL
PROT UR STRIP-MCNC: >300 MG/DL
PROT UR STRIP-MCNC: ABNORMAL MG/DL
PROT UR STRIP-MCNC: ABNORMAL MG/DL
PROTHROMBIN TIME: 15.4 SEC (ref 11.5–15.2)
PROTHROMBIN TIME: 15.8 SEC (ref 11.5–15.2)
PROTHROMBIN TIME: 16.4 SEC (ref 11.5–15.2)
Q-T INTERVAL, ECG07: 386 MS
Q-T INTERVAL, ECG07: 390 MS
Q-T INTERVAL, ECG07: 416 MS
Q-T INTERVAL, ECG07: 420 MS
QRS DURATION, ECG06: 100 MS
QRS DURATION, ECG06: 102 MS
QRS DURATION, ECG06: 104 MS
QRS DURATION, ECG06: 98 MS
QTC CALCULATION (BEZET), ECG08: 416 MS
QTC CALCULATION (BEZET), ECG08: 418 MS
QTC CALCULATION (BEZET), ECG08: 420 MS
QTC CALCULATION (BEZET), ECG08: 460 MS
RBC # BLD AUTO: 3.95 M/UL (ref 4.35–5.65)
RBC # BLD AUTO: 4.01 M/UL (ref 4.35–5.65)
RBC # BLD AUTO: 4.08 M/UL (ref 4.35–5.65)
RBC # BLD AUTO: 4.17 M/UL (ref 4.35–5.65)
RBC # BLD AUTO: 4.37 M/UL (ref 4.7–5.5)
RBC # BLD AUTO: 4.38 M/UL (ref 4.7–5.5)
RBC # BLD AUTO: 4.46 M/UL (ref 4.7–5.5)
RBC # BLD AUTO: 4.56 M/UL (ref 4.7–5.5)
RBC # BLD AUTO: 4.85 M/UL (ref 4.7–5.5)
RBC #/AREA URNS HPF: ABNORMAL /HPF (ref 0–5)
RBC #/AREA URNS HPF: NORMAL /HPF (ref 0–5)
SERVICE CMNT-IMP: ABNORMAL
SERVICE CMNT-IMP: NORMAL
SODIUM SERPL-SCNC: 137 MMOL/L (ref 136–145)
SODIUM SERPL-SCNC: 138 MMOL/L (ref 136–145)
SODIUM SERPL-SCNC: 138 MMOL/L (ref 136–145)
SODIUM SERPL-SCNC: 140 MMOL/L (ref 136–145)
SODIUM SERPL-SCNC: 141 MMOL/L (ref 136–145)
SP GR UR REFRACTOMETRY: 1.01 (ref 1–1.03)
SP GR UR REFRACTOMETRY: 1.03 (ref 1–1.03)
SP GR UR REFRACTOMETRY: >1.03 (ref 1–1.03)
SP GR UR REFRACTOMETRY: >1.03 (ref 1–1.04)
TRI-PHOS CRY URNS QL MICRO: ABNORMAL
TROPONIN I SERPL-MCNC: 0.02 NG/ML (ref 0–0.04)
TROPONIN I SERPL-MCNC: <0.02 NG/ML (ref 0–0.04)
UROBILINOGEN UR QL STRIP.AUTO: 0.2 EU/DL (ref 0.2–1)
UROBILINOGEN UR QL STRIP.AUTO: 1 EU/DL
UROBILINOGEN UR QL STRIP.AUTO: 1 EU/DL (ref 0.2–1)
UROBILINOGEN UR QL STRIP.AUTO: 1 EU/DL (ref 0.2–1)
VENTRICULAR RATE, ECG03: 60 BPM
VENTRICULAR RATE, ECG03: 61 BPM
VENTRICULAR RATE, ECG03: 70 BPM
VENTRICULAR RATE, ECG03: 84 BPM
WBC # BLD AUTO: 4.9 K/UL (ref 4.6–13.2)
WBC # BLD AUTO: 5.4 K/UL (ref 4.6–13.2)
WBC # BLD AUTO: 5.6 K/UL (ref 4.6–13.2)
WBC # BLD AUTO: 5.9 K/UL (ref 4.6–13.2)
WBC # BLD AUTO: 6.4 K/UL (ref 4.6–13.2)
WBC # BLD AUTO: 7.1 K/UL (ref 4.6–13.2)
WBC # BLD AUTO: 7.4 K/UL (ref 4.6–13.2)
WBC # BLD AUTO: 8.2 K/UL (ref 4.6–13.2)
WBC # BLD AUTO: 8.8 K/UL (ref 4.6–13.2)
WBC URNS QL MICRO: ABNORMAL /HPF (ref 0–5)
WBC URNS QL MICRO: NORMAL /HPF (ref 0–4)

## 2021-01-01 PROCEDURE — 97530 THERAPEUTIC ACTIVITIES: CPT

## 2021-01-01 PROCEDURE — 82553 CREATINE MB FRACTION: CPT

## 2021-01-01 PROCEDURE — 74011250637 HC RX REV CODE- 250/637: Performed by: NURSE PRACTITIONER

## 2021-01-01 PROCEDURE — 97110 THERAPEUTIC EXERCISES: CPT

## 2021-01-01 PROCEDURE — 82962 GLUCOSE BLOOD TEST: CPT

## 2021-01-01 PROCEDURE — 93005 ELECTROCARDIOGRAM TRACING: CPT

## 2021-01-01 PROCEDURE — 74011636637 HC RX REV CODE- 636/637: Performed by: HOSPITALIST

## 2021-01-01 PROCEDURE — 97162 PT EVAL MOD COMPLEX 30 MIN: CPT

## 2021-01-01 PROCEDURE — 80053 COMPREHEN METABOLIC PANEL: CPT

## 2021-01-01 PROCEDURE — 51702 INSERT TEMP BLADDER CATH: CPT

## 2021-01-01 PROCEDURE — 71045 X-RAY EXAM CHEST 1 VIEW: CPT

## 2021-01-01 PROCEDURE — 74011250637 HC RX REV CODE- 250/637: Performed by: INTERNAL MEDICINE

## 2021-01-01 PROCEDURE — 97116 GAIT TRAINING THERAPY: CPT

## 2021-01-01 PROCEDURE — 82550 ASSAY OF CK (CPK): CPT

## 2021-01-01 PROCEDURE — 2709999900 HC NON-CHARGEABLE SUPPLY

## 2021-01-01 PROCEDURE — 65660000000 HC RM CCU STEPDOWN

## 2021-01-01 PROCEDURE — 74011250636 HC RX REV CODE- 250/636: Performed by: PHYSICIAN ASSISTANT

## 2021-01-01 PROCEDURE — 85025 COMPLETE CBC W/AUTO DIFF WBC: CPT

## 2021-01-01 PROCEDURE — 99232 SBSQ HOSP IP/OBS MODERATE 35: CPT | Performed by: STUDENT IN AN ORGANIZED HEALTH CARE EDUCATION/TRAINING PROGRAM

## 2021-01-01 PROCEDURE — 99285 EMERGENCY DEPT VISIT HI MDM: CPT

## 2021-01-01 PROCEDURE — 87040 BLOOD CULTURE FOR BACTERIA: CPT

## 2021-01-01 PROCEDURE — 74011000250 HC RX REV CODE- 250: Performed by: NURSE PRACTITIONER

## 2021-01-01 PROCEDURE — 81001 URINALYSIS AUTO W/SCOPE: CPT

## 2021-01-01 PROCEDURE — 80048 BASIC METABOLIC PNL TOTAL CA: CPT

## 2021-01-01 PROCEDURE — 77030040831 HC BAG URINE DRNG MDII -A

## 2021-01-01 PROCEDURE — APPSS60 APP SPLIT SHARED TIME 46-60 MINUTES: Performed by: NURSE PRACTITIONER

## 2021-01-01 PROCEDURE — 96360 HYDRATION IV INFUSION INIT: CPT

## 2021-01-01 PROCEDURE — 99203 OFFICE O/P NEW LOW 30 MIN: CPT | Performed by: SPECIALIST

## 2021-01-01 PROCEDURE — 36415 COLL VENOUS BLD VENIPUNCTURE: CPT

## 2021-01-01 PROCEDURE — 94761 N-INVAS EAR/PLS OXIMETRY MLT: CPT

## 2021-01-01 PROCEDURE — 95816 EEG AWAKE AND DROWSY: CPT | Performed by: STUDENT IN AN ORGANIZED HEALTH CARE EDUCATION/TRAINING PROGRAM

## 2021-01-01 PROCEDURE — 74011250636 HC RX REV CODE- 250/636: Performed by: NURSE PRACTITIONER

## 2021-01-01 PROCEDURE — 97535 SELF CARE MNGMENT TRAINING: CPT

## 2021-01-01 PROCEDURE — 99284 EMERGENCY DEPT VISIT MOD MDM: CPT

## 2021-01-01 PROCEDURE — 74011000250 HC RX REV CODE- 250: Performed by: PHYSICIAN ASSISTANT

## 2021-01-01 PROCEDURE — 72141 MRI NECK SPINE W/O DYE: CPT

## 2021-01-01 PROCEDURE — 73521 X-RAY EXAM HIPS BI 2 VIEWS: CPT

## 2021-01-01 PROCEDURE — 97112 NEUROMUSCULAR REEDUCATION: CPT

## 2021-01-01 PROCEDURE — 65270000029 HC RM PRIVATE

## 2021-01-01 PROCEDURE — 77030040393 HC DRSG OPTIFOAM GENT MDII -B

## 2021-01-01 PROCEDURE — 83036 HEMOGLOBIN GLYCOSYLATED A1C: CPT

## 2021-01-01 PROCEDURE — 87086 URINE CULTURE/COLONY COUNT: CPT

## 2021-01-01 PROCEDURE — 70498 CT ANGIOGRAPHY NECK: CPT

## 2021-01-01 PROCEDURE — 83605 ASSAY OF LACTIC ACID: CPT

## 2021-01-01 PROCEDURE — 72148 MRI LUMBAR SPINE W/O DYE: CPT

## 2021-01-01 PROCEDURE — 97166 OT EVAL MOD COMPLEX 45 MIN: CPT

## 2021-01-01 PROCEDURE — 96374 THER/PROPH/DIAG INJ IV PUSH: CPT

## 2021-01-01 PROCEDURE — 85610 PROTHROMBIN TIME: CPT

## 2021-01-01 PROCEDURE — 73502 X-RAY EXAM HIP UNI 2-3 VIEWS: CPT | Performed by: SPECIALIST

## 2021-01-01 PROCEDURE — 96361 HYDRATE IV INFUSION ADD-ON: CPT

## 2021-01-01 PROCEDURE — 97161 PT EVAL LOW COMPLEX 20 MIN: CPT

## 2021-01-01 PROCEDURE — 95819 EEG AWAKE AND ASLEEP: CPT

## 2021-01-01 PROCEDURE — 74011000636 HC RX REV CODE- 636: Performed by: EMERGENCY MEDICINE

## 2021-01-01 PROCEDURE — 85730 THROMBOPLASTIN TIME PARTIAL: CPT

## 2021-01-01 PROCEDURE — 99233 SBSQ HOSP IP/OBS HIGH 50: CPT | Performed by: STUDENT IN AN ORGANIZED HEALTH CARE EDUCATION/TRAINING PROGRAM

## 2021-01-01 PROCEDURE — 70551 MRI BRAIN STEM W/O DYE: CPT

## 2021-01-01 PROCEDURE — 99222 1ST HOSP IP/OBS MODERATE 55: CPT | Performed by: INTERNAL MEDICINE

## 2021-01-01 PROCEDURE — 99239 HOSP IP/OBS DSCHRG MGMT >30: CPT | Performed by: HOSPITALIST

## 2021-01-01 PROCEDURE — 76770 US EXAM ABDO BACK WALL COMP: CPT

## 2021-01-01 PROCEDURE — 99283 EMERGENCY DEPT VISIT LOW MDM: CPT

## 2021-01-01 PROCEDURE — 99222 1ST HOSP IP/OBS MODERATE 55: CPT | Performed by: STUDENT IN AN ORGANIZED HEALTH CARE EDUCATION/TRAINING PROGRAM

## 2021-01-01 PROCEDURE — 99232 SBSQ HOSP IP/OBS MODERATE 35: CPT | Performed by: HOSPITALIST

## 2021-01-01 PROCEDURE — 70450 CT HEAD/BRAIN W/O DYE: CPT

## 2021-01-01 PROCEDURE — 74011250636 HC RX REV CODE- 250/636: Performed by: EMERGENCY MEDICINE

## 2021-01-01 PROCEDURE — 74011250636 HC RX REV CODE- 250/636: Performed by: INTERNAL MEDICINE

## 2021-01-01 PROCEDURE — 74011000250 HC RX REV CODE- 250: Performed by: EMERGENCY MEDICINE

## 2021-01-01 RX ORDER — INSULIN LISPRO 100 [IU]/ML
INJECTION, SOLUTION INTRAVENOUS; SUBCUTANEOUS
Status: DISCONTINUED | OUTPATIENT
Start: 2021-01-01 | End: 2021-01-01 | Stop reason: HOSPADM

## 2021-01-01 RX ORDER — PROMETHAZINE HYDROCHLORIDE 12.5 MG/1
12.5 TABLET ORAL
Status: DISCONTINUED | OUTPATIENT
Start: 2021-01-01 | End: 2021-01-01 | Stop reason: HOSPADM

## 2021-01-01 RX ORDER — PANTOPRAZOLE SODIUM 40 MG/1
40 TABLET, DELAYED RELEASE ORAL DAILY PRN
Status: DISCONTINUED | OUTPATIENT
Start: 2021-01-01 | End: 2021-01-01 | Stop reason: HOSPADM

## 2021-01-01 RX ORDER — ACETAMINOPHEN 325 MG/1
650 TABLET ORAL
Status: DISCONTINUED | OUTPATIENT
Start: 2021-01-01 | End: 2021-01-01 | Stop reason: HOSPADM

## 2021-01-01 RX ORDER — MAGNESIUM SULFATE 100 %
4 CRYSTALS MISCELLANEOUS AS NEEDED
Status: DISCONTINUED | OUTPATIENT
Start: 2021-01-01 | End: 2021-01-01 | Stop reason: SDUPTHER

## 2021-01-01 RX ORDER — SODIUM CHLORIDE 9 MG/ML
75 INJECTION, SOLUTION INTRAVENOUS CONTINUOUS
Status: DISCONTINUED | OUTPATIENT
Start: 2021-01-01 | End: 2021-01-01 | Stop reason: HOSPADM

## 2021-01-01 RX ORDER — ONDANSETRON 2 MG/ML
4 INJECTION INTRAMUSCULAR; INTRAVENOUS
Status: DISCONTINUED | OUTPATIENT
Start: 2021-01-01 | End: 2021-01-01 | Stop reason: HOSPADM

## 2021-01-01 RX ORDER — SULFAMETHOXAZOLE AND TRIMETHOPRIM 800; 160 MG/1; MG/1
1 TABLET ORAL 2 TIMES DAILY
Qty: 20 TABLET | Refills: 0 | Status: SHIPPED | OUTPATIENT
Start: 2021-01-01 | End: 2021-01-01

## 2021-01-01 RX ORDER — DEXTROSE 50 % IN WATER (D50W) INTRAVENOUS SYRINGE
25-50 AS NEEDED
Status: DISCONTINUED | OUTPATIENT
Start: 2021-01-01 | End: 2021-01-01 | Stop reason: SDUPTHER

## 2021-01-01 RX ORDER — SODIUM CHLORIDE 0.9 % (FLUSH) 0.9 %
5-40 SYRINGE (ML) INJECTION EVERY 8 HOURS
Status: DISCONTINUED | OUTPATIENT
Start: 2021-01-01 | End: 2021-01-01 | Stop reason: HOSPADM

## 2021-01-01 RX ORDER — SODIUM CHLORIDE 0.9 % (FLUSH) 0.9 %
5-10 SYRINGE (ML) INJECTION AS NEEDED
Status: DISCONTINUED | OUTPATIENT
Start: 2021-01-01 | End: 2021-01-01 | Stop reason: HOSPADM

## 2021-01-01 RX ORDER — MAGNESIUM SULFATE 100 %
4 CRYSTALS MISCELLANEOUS AS NEEDED
Status: DISCONTINUED | OUTPATIENT
Start: 2021-01-01 | End: 2021-01-01 | Stop reason: HOSPADM

## 2021-01-01 RX ORDER — SODIUM CHLORIDE 0.9 % (FLUSH) 0.9 %
5-40 SYRINGE (ML) INJECTION AS NEEDED
Status: DISCONTINUED | OUTPATIENT
Start: 2021-01-01 | End: 2021-01-01 | Stop reason: HOSPADM

## 2021-01-01 RX ORDER — IPRATROPIUM BROMIDE AND ALBUTEROL SULFATE 2.5; .5 MG/3ML; MG/3ML
3 SOLUTION RESPIRATORY (INHALATION)
Status: DISCONTINUED | OUTPATIENT
Start: 2021-01-01 | End: 2021-01-01 | Stop reason: HOSPADM

## 2021-01-01 RX ORDER — PRAVASTATIN SODIUM 20 MG/1
40 TABLET ORAL
Status: DISCONTINUED | OUTPATIENT
Start: 2021-01-01 | End: 2021-01-01 | Stop reason: HOSPADM

## 2021-01-01 RX ORDER — ACETAMINOPHEN 650 MG/1
650 SUPPOSITORY RECTAL
Status: DISCONTINUED | OUTPATIENT
Start: 2021-01-01 | End: 2021-01-01 | Stop reason: HOSPADM

## 2021-01-01 RX ORDER — TAMSULOSIN HYDROCHLORIDE 0.4 MG/1
0.4 CAPSULE ORAL DAILY
Status: DISCONTINUED | OUTPATIENT
Start: 2021-01-01 | End: 2021-01-01 | Stop reason: HOSPADM

## 2021-01-01 RX ORDER — LANOLIN ALCOHOL/MO/W.PET/CERES
12 CREAM (GRAM) TOPICAL
Status: DISCONTINUED | OUTPATIENT
Start: 2021-01-01 | End: 2021-01-01 | Stop reason: HOSPADM

## 2021-01-01 RX ORDER — DOCUSATE SODIUM 100 MG/1
100 CAPSULE, LIQUID FILLED ORAL
Status: DISCONTINUED | OUTPATIENT
Start: 2021-01-01 | End: 2021-01-01 | Stop reason: HOSPADM

## 2021-01-01 RX ORDER — DEXTROSE 50 % IN WATER (D50W) INTRAVENOUS SYRINGE
25-50 AS NEEDED
Status: DISCONTINUED | OUTPATIENT
Start: 2021-01-01 | End: 2021-01-01 | Stop reason: HOSPADM

## 2021-01-01 RX ORDER — POLYETHYLENE GLYCOL 3350 17 G/17G
17 POWDER, FOR SOLUTION ORAL DAILY PRN
Status: DISCONTINUED | OUTPATIENT
Start: 2021-01-01 | End: 2021-01-01 | Stop reason: HOSPADM

## 2021-01-01 RX ORDER — CEPHALEXIN 500 MG/1
500 CAPSULE ORAL 4 TIMES DAILY
Qty: 28 CAPSULE | Refills: 0 | Status: SHIPPED | OUTPATIENT
Start: 2021-01-01 | End: 2021-01-01

## 2021-01-01 RX ORDER — INSULIN LISPRO 100 [IU]/ML
INJECTION, SOLUTION INTRAVENOUS; SUBCUTANEOUS
Status: DISCONTINUED | OUTPATIENT
Start: 2021-01-01 | End: 2021-01-01 | Stop reason: SDUPTHER

## 2021-01-01 RX ADMIN — SODIUM CHLORIDE 125 ML/HR: 900 INJECTION, SOLUTION INTRAVENOUS at 23:52

## 2021-01-01 RX ADMIN — TAMSULOSIN HYDROCHLORIDE 0.4 MG: 0.4 CAPSULE ORAL at 11:21

## 2021-01-01 RX ADMIN — APIXABAN 2.5 MG: 2.5 TABLET, FILM COATED ORAL at 21:45

## 2021-01-01 RX ADMIN — ACETAMINOPHEN 650 MG: 325 TABLET ORAL at 18:05

## 2021-01-01 RX ADMIN — TAMSULOSIN HYDROCHLORIDE 0.4 MG: 0.4 CAPSULE ORAL at 08:40

## 2021-01-01 RX ADMIN — SODIUM CHLORIDE 125 ML/HR: 900 INJECTION, SOLUTION INTRAVENOUS at 04:17

## 2021-01-01 RX ADMIN — SODIUM CHLORIDE 1000 ML: 900 INJECTION, SOLUTION INTRAVENOUS at 00:38

## 2021-01-01 RX ADMIN — Medication 10 ML: at 17:13

## 2021-01-01 RX ADMIN — Medication 10 ML: at 17:43

## 2021-01-01 RX ADMIN — IOPAMIDOL 100 ML: 755 INJECTION, SOLUTION INTRAVENOUS at 16:01

## 2021-01-01 RX ADMIN — Medication 10 ML: at 21:23

## 2021-01-01 RX ADMIN — TAMSULOSIN HYDROCHLORIDE 0.4 MG: 0.4 CAPSULE ORAL at 09:11

## 2021-01-01 RX ADMIN — SODIUM CHLORIDE 125 ML/HR: 900 INJECTION, SOLUTION INTRAVENOUS at 05:38

## 2021-01-01 RX ADMIN — CEFTRIAXONE SODIUM 2 G: 2 INJECTION, POWDER, FOR SOLUTION INTRAMUSCULAR; INTRAVENOUS at 22:23

## 2021-01-01 RX ADMIN — PRAVASTATIN SODIUM 40 MG: 20 TABLET ORAL at 00:38

## 2021-01-01 RX ADMIN — Medication 10 ML: at 21:50

## 2021-01-01 RX ADMIN — PRAVASTATIN SODIUM 40 MG: 20 TABLET ORAL at 21:44

## 2021-01-01 RX ADMIN — TAMSULOSIN HYDROCHLORIDE 0.4 MG: 0.4 CAPSULE ORAL at 08:02

## 2021-01-01 RX ADMIN — SODIUM CHLORIDE 125 ML/HR: 900 INJECTION, SOLUTION INTRAVENOUS at 12:35

## 2021-01-01 RX ADMIN — Medication 10 ML: at 05:39

## 2021-01-01 RX ADMIN — Medication 10 ML: at 14:10

## 2021-01-01 RX ADMIN — TAMSULOSIN HYDROCHLORIDE 0.4 MG: 0.4 CAPSULE ORAL at 09:16

## 2021-01-01 RX ADMIN — APIXABAN 2.5 MG: 2.5 TABLET, FILM COATED ORAL at 08:02

## 2021-01-01 RX ADMIN — INSULIN LISPRO 2 UNITS: 100 INJECTION, SOLUTION INTRAVENOUS; SUBCUTANEOUS at 18:04

## 2021-01-01 RX ADMIN — CEFTRIAXONE SODIUM 2 G: 2 INJECTION, POWDER, FOR SOLUTION INTRAMUSCULAR; INTRAVENOUS at 22:49

## 2021-01-01 RX ADMIN — Medication 10 ML: at 05:52

## 2021-01-01 RX ADMIN — SODIUM CHLORIDE 125 ML/HR: 900 INJECTION, SOLUTION INTRAVENOUS at 07:11

## 2021-01-01 RX ADMIN — SODIUM CHLORIDE 1000 ML: 900 INJECTION, SOLUTION INTRAVENOUS at 20:02

## 2021-01-01 RX ADMIN — SODIUM CHLORIDE 75 ML/HR: 900 INJECTION, SOLUTION INTRAVENOUS at 06:04

## 2021-01-01 RX ADMIN — Medication 10 ML: at 16:52

## 2021-01-01 RX ADMIN — SODIUM CHLORIDE 1000 ML: 900 INJECTION, SOLUTION INTRAVENOUS at 16:42

## 2021-01-01 RX ADMIN — TAMSULOSIN HYDROCHLORIDE 0.4 MG: 0.4 CAPSULE ORAL at 08:50

## 2021-01-01 RX ADMIN — APIXABAN 2.5 MG: 2.5 TABLET, FILM COATED ORAL at 11:30

## 2021-01-01 RX ADMIN — SODIUM CHLORIDE 125 ML/HR: 900 INJECTION, SOLUTION INTRAVENOUS at 15:42

## 2021-01-01 RX ADMIN — SODIUM CHLORIDE 75 ML/HR: 900 INJECTION, SOLUTION INTRAVENOUS at 13:48

## 2021-01-01 RX ADMIN — SODIUM CHLORIDE 125 ML/HR: 900 INJECTION, SOLUTION INTRAVENOUS at 21:22

## 2021-01-01 RX ADMIN — SODIUM CHLORIDE 125 ML/HR: 900 INJECTION, SOLUTION INTRAVENOUS at 16:42

## 2021-01-01 RX ADMIN — Medication 10 ML: at 21:01

## 2021-01-01 RX ADMIN — WATER 1 G: 1 INJECTION INTRAMUSCULAR; INTRAVENOUS; SUBCUTANEOUS at 20:49

## 2021-02-18 NOTE — PROGRESS NOTES
Patient: Fatmata Garcia Sr. MRN: [id number]       SSN: [id number] YOB: 1940        AGE: 78 y.o. SEX: male PCP: Edgar Arroyo MD 
02/18/21 CC: LEFT HIP PAIN, LEG WEAKNESS AND PRETIBIAL EDEMA HISTORY:  Cresencio Simon is a 78 y.o. male who is seen for left hip pain and weakness. He is s/p left THR on 12/6/11 by [facility]. He has been experiencing increased left knee pain for the past several weeks. He does not recall any injury. He mainly feels as if he is losing strength in his legs. He uses a walker around his house. He previously saw Dr. Chelsie Lewis for back pain. Pain Assessment  2/18/2021 Location of Pain Leg  
Pain Location Comment - Location Modifiers Right;Left Severity of Pain 4 Quality of Pain Other (Comment); Aching;Dull Quality of Pain Comment weakness Duration of Pain A few minutes Frequency of Pain Intermittent Aggravating Factors Walking Aggravating Factors Comment - Limiting Behavior Yes Relieving Factors - Relieving Factors Comment - Result of Injury No  
Work-Related Injury - Type of Injury - Occupation, etc:  Mr. Kimmy Mchugh retired 20 years ago. He previously worked as a  at [employer]. He lives alone in [place]. He does his own cooking and cleaning. His son checks on him. He is quite sedentary and usually spends his time watching a lot of TV. He takes a diuretic for leg swelling. Mr. Kimmy Mchugh weighs 220 lbs and is 6'2\" tall. Lab Results Component Value Date/Time Hemoglobin A1c 5.9 (H) 08/19/2020 07:47 PM  
 
Weight Metrics 2/18/2021 8/18/2020 12/19/2019 10/7/2019 1/14/2019 4/2/2018 1/19/2018 Weight 220 lb 220 lb 231 lb 231 lb 230 lb 230 lb 230 lb BMI 28.25 kg/m2 28.25 kg/m2 32.22 kg/m2 32.22 kg/m2 32.08 kg/m2 32.08 kg/m2 32.08 kg/m2 Patient Active Problem List  
Diagnosis Code  HCVD (hypertensive cardiovascular disease) I11.9  Hyperlipidemia E78.5  Diabetes mellitus ([facility] E11.9  Bradycardia R00.1  Hypertension I10  
 BPH (benign prostatic hyperplasia) N40.0  Bone pain M89.8X9  Muscular weakness M62.81  Leg pain, right M79.604  Bladder neck contracture N32.0  Microscopic hematuria R31.29  
 History of nephrolithiasis Z87.442  Obesity (BMI 30.0-34. 9) E66.9  
 Pneumonia due to COVID-19 virus U07.1, J12.82  Thrombocytopenia ([facility] D69.6  Shortness of breath R06.02  
 
REVIEW OF SYSTEMS:  
 Constitutional Symptoms: Negative Eyes: Negative Ears, Nose, Throat and Mouth: Negative Cardiovascular: Negative Respiratory: Negative Genitourinary: Per HPI Gastrointestinal: Per HPI Integumentary (Skin and/or Breast): Negative Musculoskeletal: Per HPI Endocrine/Rheumatologic: Negative Neurological: Per HPI Hematology/Lymphatic: Negative Allergic/Immunologic: Negative Phychiatric: Negative Social History Socioeconomic History  Marital status:  Spouse name: Not on file  Number of children: Not on file  Years of education: Not on file  Highest education level: Not on file Occupational History  Not on file Social Needs  Financial resource strain: Not on file  Food insecurity Worry: Not on file Inability: Not on file  Transportation needs Medical: Not on file Non-medical: Not on file Tobacco Use  Smoking status: Never Smoker  Smokeless tobacco: Never Used Substance and Sexual Activity  Alcohol use: No  
 Drug use: No  
 Sexual activity: Not on file Lifestyle  Physical activity Days per week: Not on file Minutes per session: Not on file  Stress: Not on file Relationships  Social connections Talks on phone: Not on file Gets together: Not on file Attends Baptism service: Not on file Active member of club or organization: Not on file Attends meetings of clubs or organizations: Not on file Relationship status: Not on file  Intimate partner violence Fear of current or ex partner: Not on file Emotionally abused: Not on file Physically abused: Not on file Forced sexual activity: Not on file Other Topics Concern  Not on file Social History Narrative  Not on file No Known Allergies Current Outpatient Medications Medication Sig  
 ascorbic acid, vitamin C, (VITAMIN C) 500 mg tablet Take 1 Tab by mouth daily.  dexAMETHasone (DECADRON) 6 mg tablet 6 mg p.o. daily  diclofenac (VOLTAREN) 1 % gel Apply 2 g to affected area four (4) times daily as needed for Pain. Gel should be measured and applied using the supplied dosing card. Apply dose (2 gm or 4 gm) to each location. If treatment site is the hands, patients should wait at least one (1) hour to wash their hands. APPLY TO bilateral hands/forearms  apixaban (ELIQUIS) 5 mg tablet 2 tablets [10 MG] p.o. twice daily for 4 days followed by 1 tablet [5 MG] p.o. twice daily  tamsulosin (FLOMAX) 0.4 mg capsule Take 0.4 mg by mouth.  polyethylene glycol (MIRALAX) 17 gram packet Take 17 g by mouth.  tolterodine ER (DETROL-LA) 2 mg ER capsule Take 1 Cap by mouth daily.  raNITIdine (ZANTAC) 150 mg tablet Take 150 mg by mouth two (2) times a day.  lisinopril (PRINIVIL, ZESTRIL) 30 mg tablet Take 30 mg by mouth daily.  pravastatin (PRAVACHOL) 40 mg tablet Take 40 mg by mouth nightly.  metFORMIN (GLUCOPHAGE) 500 mg tablet Take 500 mg by mouth two (2) times daily (with meals). No current facility-administered medications for this visit. PHYSICAL EXAMINATION: 
Visit Vitals /63 (BP 1 Location: Left arm, BP Patient Position: Sitting, BP Cuff Size: Large adult) Pulse 71 Temp 97.1 °F (36.2 °C) (Skin) Ht 6' 2\" (1.88 m) Wt 220 lb (99.8 kg) SpO2 94% BMI 28.25 kg/m² ORTHO EXAMINATION: 
Examination Right hip Left hip Skin Intact Intact, well healed incision site External Rotation ROM 15 10 Internal Rotation ROM 10 10 Trochanteric tenderness - - Hip flexion contracture - - Antalgic gait - - Trendelenberg sign - - Lumbar tenderness - - Straight leg raise - - Calf tenderness - - Neurovascular Intact Intact Ambulating with single point cane in right hand 2+ pretibial edema with venous stasis RADIOGRAPHS: 
XR LEFT MILENA 2/18/21 VIKASH IMPRESSION:  Low AP pelvis and lateral views - No fracture, prosthetic components in satisfactory position and alignment. Good right hip joint space. IMPRESSION:   
  ICD-10-CM ICD-9-CM 1. History of total left hip replacement  Z96.642 V43.64 REFERRAL TO PHYSICAL THERAPY 2. Left hip pain  M25.552 719.45 AMB POC X-RAY RADEX HIP UNI WITH PELVIS 2-3 VIEWS  
   REFERRAL TO PHYSICAL THERAPY 3. Edema, unspecified type  R60.9 782.3 AMB SUPPLY ORDER  
4. At risk for falls  Z91.81 V15.88 PLAN: Compression stocking Rx provided. He will start a brief course of outpatient physical therapy. There is no need for further surgery at this time. He will follow up as needed.    
 
Scribed by MD Renay Kennedy) as dictated by Kaleb Marin MD

## 2021-03-02 NOTE — PROGRESS NOTES
[facility] [address] [facility] 
[phone] fax Plan of Care/ Statement of Necessity for Physical Therapy Services Patient name: Lilia Bledsoe Sr. Start of Care: 3/2/2021 Referral source: Patricio Ricketts MD : 1940 Medical Diagnosis: Left hip pain [M25.552] Payor: [employer] / Plan: [employer] / Product Type: PPO /  Onset Date: Treatment Diagnosis: Left Hip Pain Prior Hospitalization: see medical history Provider#: [id number] Medications: Verified on Patient summary List  
 Comorbidities: HBP, Arthritis Prior Level of Function: Lives Alone. Independent. The Plan of Care and following information is based on the information from the initial evaluation. Assessment/ key information: Pt is 78 yr old male with CC of weakness and pain to his left hip. Pt reported a fall 3 months ago. Pt reported he had a left THR 11 yrs ago with success. He is currently using a SPC but will benefit from a RW for more stability. Pt ambulates with an Antalgic gait and with decreased weight bearing onto his left LE. He presents with decreased hip mobility and flexibility, decreased strength, and decreased tolerance to safely perform transfers from a chair. Pt lives alone and is mostly independent. He wants to be able to safely perform tub, chair and car transfers, standing up from various heights and to balance dynamically and statically. Pt will benefit from skilled therapy to improve strength, balance, transfers, ambulation tolerance to continue to be safe and independent at home. Evaluation Complexity History MEDIUM  Complexity : 1-2 comorbidities / personal factors will impact the outcome/ POC ; Examination MEDIUM Complexity : 3 Standardized tests and measures addressing body structure, function, activity limitation and / or participation in recreation  ;Presentation MEDIUM Complexity : Evolving with changing characteristics  ; Clinical Decision Making MEDIUM Complexity : FOTO score of 26-74 Overall Complexity Rating: MEDIUM Problem List: pain affecting function, decrease ROM, decrease strength, edema affecting function, impaired gait/ balance, decrease ADL/ functional abilitiies, decrease activity tolerance, decrease flexibility/ joint mobility and decrease transfer abilities Treatment Plan may include any combination of the following: Therapeutic exercise, Therapeutic activities, Neuromuscular re-education, Physical agent/modality, Gait/balance training, Manual therapy, Patient education, Self Care training, Functional mobility training, Home safety training and Stair training Patient / Family readiness to learn indicated by: asking questions, trying to perform skills and interest 
Persons(s) to be included in education: patient (P) Barriers to Learning/Limitations: None Patient Goal (s): Improvement with stepping into/out of his bath tub Patient Self Reported Health Status: good Rehabilitation Potential: good Short Term Goals: To be accomplished in 1 weeks: 1. Pt will be compliant with a HEP to improve function. Long Term Goals: To be accomplished in 4 weeks: 1. Pt will increase FOTO score by 13 pts to improve function. 2. Pt will increase left hip strength to 4+/5 to ease with ADL's.  
 3. Pt will perform sit to stand x10 with demonstration of safety and mechanics to ease with transfers. 4. Pt will navigate an obstacle course w/o LOB to improve dynamic ambulation for short household distances. Frequency / Duration: Patient to be seen 2 times per week for 6 weeks. Patient/ Caregiver education and instruction: Diagnosis, prognosis, self care and exercises 
 [x]  Plan of care has been reviewed with PTA Certification Period: 3/2/21-4/1/21 Terrance Smalls, PT 3/2/2021 10:17 AM 
 
________________________________________________________________________ I certify that the above Therapy Services are being furnished while the patient is under my care. I agree with the treatment plan and certify that this therapy is necessary. Physician's Signature:____________Date:_________TIME:________ Hardik Hazel MD 
** Signature, Date and Time must be completed for valid certification ** Please sign and return to [address] [place] 
[phone] fax

## 2021-03-02 NOTE — PROGRESS NOTES
PT DAILY TREATMENT NOTE  Patient Name: Aysha Marks Sr. 
Date:3/2/2021 : 1940 [x]  Patient  Verified Payor: [place] MINAYA / Plan: [id number] / Product Type: PPO / In time:1250  Out time:130 Total Treatment Time (min): 40 Visit #: 1 of 10-12 Medicare/BCBS Only Total Timed Codes (min):  40 1:1 Treatment Time:  25 Treatment Area: Left hip pain [M25.552] SUBJECTIVE Pain Level (0-10 scale): 0/10 Any medication changes, allergies to medications, adverse drug reactions, diagnosis change, or new procedure performed?: [x] No    [] Yes (see summary sheet for update) Subjective functional status/changes:   [] No changes reported See eval 
 
OBJECTIVE 25 min Therapeutic Exercise:  [] See flow sheet :  
Rationale: increase ROM, increase strength, improve coordination and improve balance to improve the patients ability to ease with ADL's With 
 [] TE 
 [] TA 
 [] neuro 
 [] other: Patient Education: [x] Review HEP [] Progressed/Changed HEP based on:  
[] positioning   [] body mechanics   [] transfers   [] heat/ice application   
[] other:   
 
Other Objective/Functional Measures: see eval  
 
Pain Level (0-10 scale) post treatment: 0/10 ASSESSMENT/Changes in Function: seepoc Patient will continue to benefit from skilled PT services to modify and progress therapeutic interventions, address functional mobility deficits, address ROM deficits, address strength deficits, analyze and address soft tissue restrictions, analyze and cue movement patterns, analyze and modify body mechanics/ergonomics, assess and modify postural abnormalities and address imbalance/dizziness to attain remaining goals. [x]  See Plan of Care 
[]  See progress note/recertification 
[]  See Discharge Summary Progress towards goals / Updated goals: 
seepo PLAN [x]  Upgrade activities as tolerated     [x]  Continue plan of care []  Update interventions per flow sheet      
[]  Discharge due to:_ 
[]  Other:_ Benito Frias, PT 3/2/2021  10:16 AM 
 
Future Appointments Date Time Provider Nakia Roy 3/2/2021 12:45 PM [facility] [id number]

## 2021-03-08 NOTE — PROGRESS NOTES
PT DAILY TREATMENT NOTE  Patient Name: Mary Ba. 
Date:3/8/2021 : 1940 [x]  Patient  Verified Payor: HOUSTON MINAYA / Plan: [id number] / Product Type: PPO / In time: 1:30P  Out time:2:15P Total Treatment Time (min): 45 Visit #: 2 of 12 Medicare/BCBS Only Total Timed Codes (min): 45 1:1 Treatment Time:  42 Treatment Area: Left hip pain [M25.552] SUBJECTIVE Pain Level (0-10 scale): 5/10 Any medication changes, allergies to medications, adverse drug reactions, diagnosis change, or new procedure performed?: [x] No    [] Yes (see summary sheet for update) Subjective functional status/changes:   [] No changes reported Patient reports his left side is sore today. He still has difficulty walking when he first gets up in the morning. He has been performing his HEP daily OBJECTIVE 28 min Therapeutic Exercise:  [x] See flow sheet :  
Rationale: increase ROM and increase strength to improve the patients ability to perform ADLs with increased ease 17 min Therapeutic Activity:  [x]  See flow sheet : vitals assessment, sit to stands, patient education Rationale: increase strength and improve coordination  to improve the patients ability to perform ADLs with increased ease With 
 [] TE 
 [x] TA 
 [] neuro 
 [] other: Patient Education: [x] Review HEP [] Progressed/Changed HEP based on:  
[] positioning   [] body mechanics   [] transfers   [] heat/ice application   
[x] other: elevation and compression garments to assist with LE swelling Other Objective/Functional Measures:  
 
 
Decreased step length bilaterally, step to gait pattern Difficulty with sit to stand transfers with decreased anterior trunk lean Verbal and visual for anterior/forward trunk lean and foot placement with sit to stands with improvement noted Intermittent min A and UE use to stand Patient unable to fully extend right knee; stands with right knee in increased flexion Pain Level (0-10 scale) post treatment: 0/10 ASSESSMENT/Changes in Function: Initiated activities today. Patient reported pain alleviated on bike at beginning of session and denied pain with activities throughout session. Patient presents with bilateral LE edema and was educated on continued elevation and use of compression garments to assist. He demonstrates continued difficulty with sit to stand transfers without UE assist and inability to fully extend right knee. Patient will continue to benefit from skilled PT services to modify and progress therapeutic interventions, address functional mobility deficits, address ROM deficits, address strength deficits, analyze and address soft tissue restrictions, analyze and cue movement patterns, analyze and modify body mechanics/ergonomics, assess and modify postural abnormalities, address imbalance/dizziness and instruct in home and community integration to attain remaining goals. Progress towards goals / Updated goals: 
Short Term Goals: To be accomplished in 1 weeks: 1. Pt will be compliant with a HEP to improve function. Long Term Goals: To be accomplished in 4 weeks: 1. Pt will increase FOTO score by 13 pts to improve function. 2. Pt will increase left hip strength to 4+/5 to ease with ADL's.  
            3. Pt will perform sit to stand x10 with demonstration of safety and mechanics to ease with transfers. 4. Pt will navigate an obstacle course w/o LOB to improve dynamic ambulation for short household distances. Frequency PLAN [x]  Upgrade activities as tolerated     [x]  Continue plan of care 
[]  Update interventions per flow sheet      
[]  Discharge due to:_ 
[]  Other:_   
 
Kacy Perkins, PT 3/8/2021  10:11 AM 
 
Future Appointments Date Time Provider Nakia Roy 3/8/2021  1:30 PM Adan Lackey PT [facility]  
3/11/2021  1:30 PM Adan Maher Juan Hannah [facility]  
3/15/2021 12:00 PM Read Claudine, PT [facility]

## 2021-03-11 NOTE — PROGRESS NOTES
PT DAILY TREATMENT NOTE  Patient Name: Massimo Parker Sr. 
[id number] : 1940 [x]  Patient  Verified Payor: [employer] / Plan: [id number] / Product Type: PPO / In time:1:31P  Out time:2:13P Total Treatment Time (min): 42 Visit #: 3 of 12 Medicare/BCBS Only Total Timed Codes (min):  42 1:1 Treatment Time:  42 Treatment Area: Left hip pain [M25.552] SUBJECTIVE Pain Level (0-10 scale): 5/10 left knee Any medication changes, allergies to medications, adverse drug reactions, diagnosis change, or new procedure performed?: [x] No    [] Yes (see summary sheet for update) Subjective functional status/changes:   [] No changes reported Patient reports his left knee is a little painful today. He had his second dose of the COVID-19 vaccine today. He felt ok after last session. OBJECTIVE 27 min Therapeutic Exercise:  [x] See flow sheet :  
Rationale: increase ROM and increase strength to improve the patients ability to perform functional tasks with increased ease 15 min Therapeutic Activity:  [x]  See flow sheet : vitals assessment, sit to stand transfers, patient education Rationale: increase strength and improve coordination  to improve the patients ability to perform functional tasks with increased ease With 
 [] TE 
 [] TA 
 [] neuro 
 [] other: Patient Education: [x] Review HEP [] Progressed/Changed HEP based on:  
[] positioning   [] body mechanics   [] transfers   [] heat/ice application   
[] other:   
 
Other Objective/Functional Measures:  
 
Difficulty maintaining left foot in pedal on bike due to weakness, limited ROM, and shoe size/width Patient educated on performing LTR and stretches prior to getting out of bed to assist with pain/stiffness in the morning Patient requires increased time to complete activities Continued difficulty with sit to stand transfers Min increase in right hip/low back pain with LTR to left alleviated with cues to limit range Pain Level (0-10 scale) post treatment: 0/10 ASSESSMENT/Changes in Function: Patient continues to have difficulty with sit to stand transfers with and without UE assist due to limited anterior trunk lean. He is able to perform activity with increased ease with max verbal and visual cues to facilitate forward trunk lean and for extensor activation to maintain upright posture. Patient denied pain in left knee at end of session. Patient will continue to benefit from skilled PT services to modify and progress therapeutic interventions, address functional mobility deficits, address ROM deficits, address strength deficits, analyze and address soft tissue restrictions, analyze and cue movement patterns, analyze and modify body mechanics/ergonomics, assess and modify postural abnormalities, address imbalance/dizziness and instruct in home and community integration to attain remaining goals. Progress towards goals / Updated goals: 
Short Term Goals: To be accomplished in 1 weeks: 
            1. Pt will be compliant with a HEP to improve function.  MET 3/11/21 Long Term Goals: To be accomplished in 4 weeks: 
            1. Pt will increase FOTO score by 13 pts to improve function. 
            2. Pt will increase left hip strength to 4+/5 to ease with ADL's.  
            7. Pt will perform sit to stand x10 with demonstration of safety and mechanics to ease with transfers. 
            4. Pt will navigate an obstacle course w/o LOB to improve dynamic ambulation for short household distances.  PLAN [x]  Upgrade activities as tolerated     [x]  Continue plan of care 
[]  Update interventions per flow sheet      
[]  Discharge due to:_ 
[]  Other:_   
 
Juan Wells, PT 3/11/2021  10:22 AM 
 
Future Appointments Date Time Provider Nakia Roy 3/11/2021  1:30 PM [facility]  
3/15/2021 12:00 PM Ella Reed[facility]

## 2021-03-15 NOTE — PROGRESS NOTES
PT DAILY TREATMENT NOTE  Patient Name: Carlos Montero Sr. 
Date:3/15/2021 : 1940 [x]  Patient  Verified Payor: HOUSTON MINAYA / Plan: [id number] / Product Type: PPO / In time:1206  Out time:1250 Total Treatment Time (min): 44 Visit #: 4 of 8 Medicare/BCBS Only Total Timed Codes (min):  44 1:1 Treatment Time:  44 Treatment Area: Left hip pain [M25.552] SUBJECTIVE Pain Level (0-10 scale): 0/10 hip, 3/10 knee Any medication changes, allergies to medications, adverse drug reactions, diagnosis change, or new procedure performed?: [x] No    [] Yes (see summary sheet for update) Subjective functional status/changes:   [] No changes reported Pt reports he is able to stand up with greater ease since starting Therapy. OBJECTIVE 34 min Therapeutic Exercise:  [] See flow sheet :  
Rationale: increase ROM, increase strength, improve coordination, improve balance and increase proprioception to improve the patients ability to ease with aDL's 
 
10 min Therapeutic Activity:  []  See flow sheet :foot inspection Rationale: foot inspection due to discoloration and swelling  to improve the patients ability to ease with ambulation function With 
 [] TE 
 [] TA 
 [] neuro 
 [] other: Patient Education: [x] Review HEP [] Progressed/Changed HEP based on:  
[] positioning   [] body mechanics   [] transfers   [] heat/ice application   
[] other:   
 
Other Objective/Functional Measures: Sit to stand  21 inches with cueing for foot placement and anterior trunk lean x7 Romberg ECx 30 sec, w/o LOB Added bed mobility for ease of transfers. Increased HS stiffness, SLR required assist on the left. Pain Level (0-10 scale) post treatment: 0/10 ASSESSMENT/Changes in Function: slow steady progress. Recommended pt and son to call Podiatrist today to make appt to get feet checked for nail trimming and darker discoloration to toes.  Pt and son agreed to follow up with MD/Podiatrist.  
 
Patient will continue to benefit from skilled PT services to modify and progress therapeutic interventions, address functional mobility deficits, address ROM deficits, address strength deficits, analyze and address soft tissue restrictions, analyze and cue movement patterns, analyze and modify body mechanics/ergonomics, assess and modify postural abnormalities and address imbalance/dizziness to attain remaining goals. [x]  See Plan of Care 
[]  See progress note/recertification 
[]  See Discharge Summary Progress towards goals / Updated goals: 
   
Short Term Goals: To be accomplished in 1 weeks: 
            1. Pt will be compliant with a HEP to improve function.  MET 3/11/21 Long Term Goals: To be accomplished in 4 weeks: 
            1. Pt will increase FOTO score by 13 pts to improve function. 
            2. Pt will increase left hip strength to 4+/5 to ease with ADL's.  
            2. Pt will perform sit to stand x10 with demonstration of safety and mechanics to ease with transfers. 
            4. Pt will navigate an obstacle course w/o LOB to improve dynamic ambulation for short household distances.  PLAN [x]  Upgrade activities as tolerated     [x]  Continue plan of care 
[]  Update interventions per flow sheet      
[]  Discharge due to:_ 
[]  Other:_ Benito Frias, PT 3/15/2021  10:32 AM 
 
Future Appointments Date Time Provider Nakia Roy 3/15/2021 12:00 PM Mirtha Reyes, PT [facility]  
3/19/2021  9:45 AM Adan Braswell, [facility]  
3/22/2021 11:15 AM Adan Braswell, [facility]  
3/24/2021 12:45 PM Mirtha Reyes, PT [facility]

## 2021-03-19 NOTE — PROGRESS NOTES
PT DAILY TREATMENT NOTE  Patient Name: Massimo Parker Sr. 
Date:3/19/2021 : 1940 [x]  Patient  Verified Payor: BLUE CROSS / Plan: [id number] / Product Type: PPO / In time:9:45A Out time:10:28A Total Treatment Time (min): 43 Visit #: 5 of 8 Medicare/BCBS Only Total Timed Codes (min):  43 1:1 Treatment Time:  37 Treatment Area: Left hip pain [M25.552] SUBJECTIVE Pain Level (0-10 scale): 0/10 Any medication changes, allergies to medications, adverse drug reactions, diagnosis change, or new procedure performed?: [x] No    [] Yes (see summary sheet for update) Subjective functional status/changes:   [] No changes reported Patient went to his physician yesterday and was given a machine to help with the swelling in his legs. He is supposed to go to the hospital at some point to have this. He is also supposed to be fitted for shoes; nurses are supposed to come to his home to fit him for the shoes. He reports he felt a little better after last session. OBJECTIVE 31 min Therapeutic Exercise:  [x] See flow sheet :  
Rationale: increase ROM, increase strength and improve coordination to improve the patients ability to perform ADLs with increased ease 12 min Therapeutic Activity:  [x]  See flow sheet : vitals assessment, sit to stand transfers, mini squats Rationale: increase ROM, increase strength, improve coordination and improve balance  to improve the patients ability to perform ADLs with increased ease With 
 [] TE 
 [] TA 
 [] neuro 
 [] other: Patient Education: [x] Review HEP [] Progressed/Changed HEP based on:  
[] positioning   [] body mechanics   [] transfers   [] heat/ice application   
[] other:   
 
Other Objective/Functional Measures:  
 
/80, HR 65 bpm 
Patient lacking TKE leading to difficulty with LAQ Verbal and visual cues for form with mini squats Mod A for sit to stand transfers with decreased anterior trunk lean Patient educated on exaggerating trunk lean to assist with transfer with SBA to CGA Pain Level (0-10 scale) post treatment: 0/10 ASSESSMENT/Changes in Function: Patient making slow progress towards goals with skilled outpatient PT. He demonstrates improving functional mobility with sit to stand transfers evidenced by ability to stand from lowered table height without UE support. Patient demonstrates poor carryover with anterior trunk lean for setup and requires mod initial verbal and visual cues. Good tolerance to session overall. Patient will continue to benefit from skilled PT services to modify and progress therapeutic interventions, address functional mobility deficits, address ROM deficits, address strength deficits, analyze and address soft tissue restrictions, analyze and cue movement patterns, analyze and modify body mechanics/ergonomics, assess and modify postural abnormalities, address imbalance/dizziness and instruct in home and community integration to attain remaining goals. Progress towards goals / Updated goals: 
Short Term Goals: To be accomplished in 1 weeks: 
            1. Pt will be compliant with a HEP to improve function.  MET 3/11/21 Long Term Goals: To be accomplished in 4 weeks: 
            1. Pt will increase FOTO score by 13 pts to improve function. 
            2. Pt will increase left hip strength to 4+/5 to ease with ADL's.  
            1. Pt will perform sit to stand x10 with demonstration of safety and mechanics to ease with transfers. Progressing-Sit to stand  21 inches with cueing for foot placement and anterior trunk lean x7 (3/15/21) [id number]. Pt will navigate an obstacle course w/o LOB to improve dynamic ambulation for short household distances.  PLAN [x]  Upgrade activities as tolerated     [x]  Continue plan of care 
[]  Update interventions per flow sheet      
[]  Discharge due to:_ 
[]  Other:_   
 
[place], PT 3/19/2021 9:28 AM 
 
Future Appointments Date Time Provider Nakia Roy 3/19/2021  9:45 AM Adan Mcintosh, PT [facility]  
3/22/2021 11:15 AM Bailey Hill [facility]  
3/24/2021 12:45 PM Zack Salvador, PT [facility]

## 2021-03-22 NOTE — PROGRESS NOTES
PT DAILY TREATMENT NOTE  Patient Name: Gregory Gibson Sr. 
Date:3/22/2021 : 1940 [x]  Patient  Verified Payor: BLUE CROSS / Plan: [id number] / Product Type: PPO / In time:11:15  Out time:11:55 Total Treatment Time (min): 40 Visit #: 6 of 8 Medicare/BCBS Only Total Timed Codes (min):  40 1:1 Treatment Time:  40 Treatment Area: Left hip pain [M25.552] SUBJECTIVE Pain Level (0-10 scale): 0 Any medication changes, allergies to medications, adverse drug reactions, diagnosis change, or new procedure performed?: [x] No    [] Yes (see summary sheet for update) Subjective functional status/changes:   [] No changes reported Pt reports feeling ok, no difficulty over the weekend OBJECTIVE 20 min Therapeutic Exercise:  [x] See flow sheet :  
Rationale: increase ROM and increase strength to improve the patients ability to perform ADLs 10 min Therapeutic Activity:  [x]  See flow sheet :  
Rationale: increase strength, improve coordination, improve balance and increase proprioception  to improve the patients ability to increase ease with daily tasks 10 min Neuromuscular Re-education:  [x]  See flow sheet :  
Rationale: increase strength, improve coordination, improve balance and increase proprioception  to improve the patients ability to perform functional tasks With 
 [] TE 
 [] TA 
 [] neuro 
 [] other: Patient Education: [x] Review HEP [] Progressed/Changed HEP based on:  
[] positioning   [] body mechanics   [] transfers   [] heat/ice application   
[] other:   
 
Other Objective/Functional Measures:  
No LOB with rhomb on floor Postural cues in standing and seated on EOB 
vc's to decr foot drag and incr step length while amb with rollator Pain Level (0-10 scale) post treatment: 0 
 
ASSESSMENT/Changes in Function: Demo's improved activity tolerance today, able to perform incr'd reps and added therex.  Vc's while amb with rollator to decr foot drag and incr step length as pt tends to amb with shuffle pattern. Postural cues throughout. Cont's to be challenged with sit to stand, req's min A and excessive use of back of legs. Denies pain throughout treatment. Patient will continue to benefit from skilled PT services to modify and progress therapeutic interventions, address functional mobility deficits, address ROM deficits, address strength deficits, analyze and address soft tissue restrictions, analyze and cue movement patterns, analyze and modify body mechanics/ergonomics, assess and modify postural abnormalities, address imbalance/dizziness and instruct in home and community integration to attain remaining goals. []  See Plan of Care 
[]  See progress note/recertification 
[]  See Discharge Summary Progress towards goals / Updated goals: 
Short Term Goals: To be accomplished in 1 weeks: 
            1. Pt will be compliant with a HEP to improve function.  MET 3/11/21 Long Term Goals: To be accomplished in 4 weeks: 
            1. Pt will increase FOTO score by 13 pts to improve function. 
            2. Pt will increase left hip strength to 4+/5 to ease with ADL's.  
            2. Pt will perform sit to stand x10 with demonstration of safety and mechanics to ease with transfers. Progressing-Sit to stand  21 inches with cueing for foot placement and anterior trunk lean x7 (3/15/21) [id number]. Pt will navigate an obstacle course w/o LOB to improve dynamic ambulation for short household PLAN 
[]  Upgrade activities as tolerated     [x]  Continue plan of care 
[]  Update interventions per flow sheet      
[]  Discharge due to:_ 
[]  Other:_ Lesley Chambers, JD 3/22/2021  11:10 AM 
 
Future Appointments Date Time Provider Nakia Roy 3/22/2021 11:15 AM [facility]  
3/24/2021 12:45 PM Anastacia Trujillo, PT [facility]

## 2021-03-28 PROBLEM — M62.82 RHABDOMYOLYSIS: Status: ACTIVE | Noted: 2021-01-01

## 2021-03-28 PROBLEM — N39.0 UTI (URINARY TRACT INFECTION): Status: ACTIVE | Noted: 2021-01-01

## 2021-03-28 NOTE — Clinical Note
Status[de-identified] INPATIENT [101]   Type of Bed: Telemetry [19]   Inpatient Hospitalization Certified Necessary for the Following Reasons: 3. Patient receiving treatment that can only be provided in an inpatient setting (further clarification in H&P documentation)   Admitting Diagnosis: Rhabdomyolysis [728.88. ICD-9-CM]   Admitting Diagnosis: UTI (urinary tract infection) [409220]   Admitting Physician: Regino Canavan   Attending Physician: Regino Canavan   Estimated Length of Stay: 2 Midnights   Discharge Plan[de-identified] Home with Office Follow-up

## 2021-03-28 NOTE — ED PROVIDER NOTES
EMERGENCY DEPARTMENT HISTORY AND PHYSICAL EXAM 
 
3:17 PM 
 
 
Date: 3/28/2021 Patient Name: Nini Butler Sr. 
 
History of Presenting Illness Chief Complaint Patient presents with  Extremity Weakness History Provided By: Patient Location/Duration/Severity/Modifying factors Patient is an 41-year-old male with a history of COVID-19 less than 1 year ago, DVT on anticoagulation, diabetes, hypertension, BPH, osteoarthritis, the presents emergency department after being found on the floor at home after a presumed fall. The patient was last heard from last evening by the patient's son. Patient was checked on today and was found to be on the floor laying on his right side. The patient was more confused than his baseline according to the patient's son who provided history to the nurse and to EMS. The patient was last contacted with by phone at 10:00 last evening at that time he was little confused below his baseline. The patient has been declining per reports. In the emergency department the patient says he feels like he hurts especially in his hips and is weak after falling. The patient denies any headache or neck pain. The patient per nursing was found to have caked on urine and feces and pressure sores on his right side of his body. Patient is unsure when his last time he ate. Patient is not a smoker, drinker, or drug user. Given the patient slurred speech and being found on the floor a Code S was called on arrival and the patient went to have an immediate CT scan of the brain. PCP: James Amaro MD 
 
Current Facility-Administered Medications Medication Dose Route Frequency Provider Last Rate Last Admin  
 0.9% sodium chloride infusion  125 mL/hr IntraVENous CONTINUOUS Ren Ponce  mL/hr at 03/28/21 1642 125 mL/hr at 03/28/21 1642  sodium chloride (NS) flush 5-10 mL  5-10 mL IntraVENous PRN Ren Ponce MD      
 sodium chloride 0.9 % bolus infusion 1,000 mL  1,000 mL IntraVENous ONCE Elena JOSHI MD 1,000 mL/hr at 03/28/21 2002 1,000 mL at 03/28/21 2002  cefTRIAXone (ROCEPHIN) 2 g in sterile water (preservative free) 20 mL IV syringe  2 g IntraVENous Q24H Sigrid Roblero MD      
 
Current Outpatient Medications Medication Sig Dispense Refill  ascorbic acid, vitamin C, (VITAMIN C) 500 mg tablet Take 1 Tab by mouth daily. 10 Tab 0  
 dexAMETHasone (DECADRON) 6 mg tablet 6 mg p.o. daily 6 Tab 0  
 diclofenac (VOLTAREN) 1 % gel Apply 2 g to affected area four (4) times daily as needed for Pain. Gel should be measured and applied using the supplied dosing card. Apply dose (2 gm or 4 gm) to each location. If treatment site is the hands, patients should wait at least one (1) hour to wash their hands. APPLY TO bilateral hands/forearms 100 g 0  
 apixaban (ELIQUIS) 5 mg tablet 2 tablets [10 MG] p.o. twice daily for 4 days followed by 1 tablet [5 MG] p.o. twice daily 70 Tab 0  
 tamsulosin (FLOMAX) 0.4 mg capsule Take 0.4 mg by mouth.  polyethylene glycol (MIRALAX) 17 gram packet Take 17 g by mouth.  tolterodine ER (DETROL-LA) 2 mg ER capsule Take 1 Cap by mouth daily. 30 Cap 12  
 raNITIdine (ZANTAC) 150 mg tablet Take 150 mg by mouth two (2) times a day.  lisinopril (PRINIVIL, ZESTRIL) 30 mg tablet Take 30 mg by mouth daily.  pravastatin (PRAVACHOL) 40 mg tablet Take 40 mg by mouth nightly.  metFORMIN (GLUCOPHAGE) 500 mg tablet Take 500 mg by mouth two (2) times daily (with meals). Past History Past Medical History: 
Past Medical History:  
Diagnosis Date  Abnormal EKG  Bone pain  BPH (benign prostatic hyperplasia)  Bradycardia  Chest pain, unspecified R/O CAD 3/10 mild fixed inferior defect  Diabetes mellitus ([facility] [address] denies 9/30/19  Echocardiogram [facility]  EF 56%. No RWMA. Mod conc LVH. DDfx. RVSP 22 mmHg.     
 HCVD (hypertensive cardiovascular disease)  Hematuria, unspecified  History of silent myocardial infarction  Hypercholesteremia  Hyperlipidemia  Hypertension  Hypertension  Ill-defined condition UTI  Kidney stones  Leg pain, right   
 normal exercise JOAQUIN (fall 2014)  Lower extremity arterial testing 10/07/2014 No arterial disease at rest bilaterally. R JOAQUIN 1.27.  L JOAQUIN 1.35. No significant drop in JOAQUIN w/exercise c/w normal perfusion.  Lower urinary tract symptoms (LUTS)  Muscular weakness  Nuclear cardiac imaging test 12/02/2011 Likely normal.  Fixed inferior basal and mid inferior defect likely artifact. No ischemia. EF 60%. No WMA.  Other specified cardiac dysrhythmias(427.89) Bradycardia and pauses on holter  Swelling of limb  Unspecified sleep apnea   
 does not use cpap machine Past Surgical History: 
Past Surgical History:  
Procedure Laterality Date  BIOPSY  3/25/99  
 COLONOSCOPY N/A 10/7/2019 COLONOSCOPY with bx's performed by Jonatan Schwab MD at [facility] HX HIP REPLACEMENT  12/6/11  HX UROLOGICAL  01/06/2012 [address], Dr. Alexander Douglas, Transurethral resection of prostate, button prostatectomy  HX UROLOGICAL  1/29/2015 1316 Dr. Cleveland Pritchett, [facility]  
 KS COLONOSCOPY FLX DX W/COLLJ SPEC WHEN PFRMD    
 KS TOTAL HIP ARTHROPLASTY Left 2014 Dr. Vasquez Signs Family History: 
Family History Problem Relation Age of Onset  Coronary Artery Disease Father  Coronary Artery Disease Brother History of PTCA  Heart Disease Neg Hx Social History: 
Social History Tobacco Use  Smoking status: Never Smoker  Smokeless tobacco: Never Used Substance Use Topics  Alcohol use: No  
 Drug use: No  
 
 
Allergies: 
No Known Allergies Review of Systems Review of Systems Constitutional: Positive for activity change and fatigue. Negative for fever.   
HENT: Negative for congestion and rhinorrhea. Eyes: Negative for visual disturbance. Respiratory: Negative for shortness of breath. Cardiovascular: Negative for chest pain and palpitations. Gastrointestinal: Negative for abdominal pain, diarrhea, nausea and vomiting. Genitourinary: Negative for dysuria and hematuria. Musculoskeletal: Negative for back pain. Skin: Positive for wound. Negative for rash. Neurological: Positive for weakness and light-headedness. Negative for dizziness. All other systems reviewed and are negative. Physical Exam  
 
Visit Vitals /60 Pulse 70 Temp 97.3 °F (36.3 °C) Resp 15 Ht 5' 11\" (1.803 m) Wt 90.7 kg (200 lb) SpO2 99% BMI 27.89 kg/m² Physical Exam 
Vitals signs and nursing note reviewed. Constitutional:   
   General: He is not in acute distress. Appearance: He is well-developed. HENT:  
   Head: Normocephalic and atraumatic. Right Ear: External ear normal.  
   Left Ear: External ear normal.  
   Nose: Nose normal.  
Eyes:  
   General: No scleral icterus. Conjunctiva/sclera: Conjunctivae normal.  
   Pupils: Pupils are equal, round, and reactive to light. Neck: Musculoskeletal: Normal range of motion and neck supple. Thyroid: No thyromegaly. Vascular: No JVD. Trachea: No tracheal deviation. Cardiovascular:  
   Rate and Rhythm: Normal rate and regular rhythm. Heart sounds: Normal heart sounds. No murmur. No friction rub. No gallop. Pulmonary:  
   Effort: Pulmonary effort is normal.  
   Breath sounds: Normal breath sounds. Chest:  
   Chest wall: No tenderness. Abdominal:  
   General: Bowel sounds are normal. There is no distension. Palpations: Abdomen is soft. Tenderness: There is no abdominal tenderness. There is no guarding or rebound. Musculoskeletal:     
   General: No tenderness. Comments:  The right leg is contracted, swollen, and there is a 5 cm x 5 cm round area of skin breakdown to the right hip, pain with extension of the right lower extremity, right hip tenderness on palpation, mild left hip tenderness on palpation, distal pulses palpable Pressure breakdown also noted in the low back Lymphadenopathy:  
   Cervical: No cervical adenopathy. Skin: 
   General: Skin is warm and dry. Capillary Refill: Capillary refill takes less than 2 seconds. Comments: Skin breakdown as above Neurological:  
   Mental Status: He is alert. Cranial Nerves: No cranial nerve deficit. Coordination: Coordination normal.  
   Comments: Patient is oriented x2, follows commands however is globally weak especially in his lower extremities where the right leg is contracted, dysarthria, symmetric upper extremity strength Psychiatric:     
   Behavior: Behavior normal.     
   Thought Content: Thought content normal.     
   Judgment: Judgment normal.  
 
 
 
 
Diagnostic Study Results Labs - Recent Results (from the past 12 hour(s)) CBC WITH AUTOMATED DIFF Collection Time: 03/28/21  2:54 PM  
Result Value Ref Range WBC 6.4 4.6 - 13.2 K/uL  
 RBC 4.85 4.70 - 5.50 M/uL  
 HGB 13.8 13.0 - 16.0 g/dL HCT 41.1 36.0 - 48.0 % MCV 84.7 74.0 - 97.0 FL  
 MCH 28.5 24.0 - 34.0 PG  
 MCHC 33.6 31.0 - 37.0 g/dL  
 RDW 13.8 11.6 - 14.5 % PLATELET 798 683 - 648 K/uL MPV 9.6 9.2 - 11.8 FL  
 NEUTROPHILS 77 (H) 40 - 73 % LYMPHOCYTES 14 (L) 21 - 52 % MONOCYTES 9 3 - 10 % EOSINOPHILS 0 0 - 5 % BASOPHILS 0 0 - 2 %  
 ABS. NEUTROPHILS 4.9 1.8 - 8.0 K/UL  
 ABS. LYMPHOCYTES 0.9 0.9 - 3.6 K/UL  
 ABS. MONOCYTES 0.6 0.05 - 1.2 K/UL  
 ABS. EOSINOPHILS 0.0 0.0 - 0.4 K/UL  
 ABS. BASOPHILS 0.0 0.0 - 0.1 K/UL  
 DF AUTOMATED    
PTT Collection Time: 03/28/21  2:54 PM  
Result Value Ref Range aPTT 31.9 23.0 - 36.4 SEC PROTHROMBIN TIME + INR Collection Time: 03/28/21  2:54 PM  
Result Value Ref Range  Prothrombin time 15.4 (H) 11.5 - 15.2 sec  
 INR 1.2 0.8 - 1.2 EKG, 12 LEAD, INITIAL Collection Time: 03/28/21  4:14 PM  
Result Value Ref Range Ventricular Rate 60 BPM  
 Atrial Rate 70 BPM  
 P-R Interval 238 ms QRS Duration 98 ms Q-T Interval 420 ms QTC Calculation (Bezet) 420 ms Calculated P Axis 54 degrees Calculated R Axis -23 degrees Diagnosis Sinus rhythm with marked sinus arrhythmia with 1st degree AV block Otherwise normal ECG When compared with ECG of 19-AUG-2020 00:03, 
CA interval has increased Vent. rate has decreased BY  30 BPM 
  
CARDIAC PANEL,(CK, CKMB & TROPONIN) Collection Time: 03/28/21  4:53 PM  
Result Value Ref Range CK - MB 15.6 (H) <3.6 ng/ml CK-MB Index 0.4 0.0 - 4.0 % CK 4,121 (H) 39 - 308 U/L Troponin-I, QT 0.02 0.0 - 9.693 NG/ML  
METABOLIC PANEL, COMPREHENSIVE Collection Time: 03/28/21  4:53 PM  
Result Value Ref Range Sodium 140 136 - 145 mmol/L Potassium 3.4 (L) 3.5 - 5.5 mmol/L Chloride 110 100 - 111 mmol/L  
 CO2 23 21 - 32 mmol/L Anion gap 7 3.0 - 18 mmol/L Glucose 72 (L) 74 - 99 mg/dL BUN 18 7.0 - 18 MG/DL Creatinine 0.94 0.6 - 1.3 MG/DL  
 BUN/Creatinine ratio 19 12 - 20 GFR est AA >60 >60 ml/min/1.73m2 GFR est non-AA >60 >60 ml/min/1.73m2 Calcium 7.8 (L) 8.5 - 10.1 MG/DL Bilirubin, total 2.4 (H) 0.2 - 1.0 MG/DL  
 ALT (SGPT) 23 16 - 61 U/L  
 AST (SGOT) 78 (H) 10 - 38 U/L Alk. phosphatase 40 (L) 45 - 117 U/L Protein, total 6.8 6.4 - 8.2 g/dL Albumin 3.3 (L) 3.4 - 5.0 g/dL Globulin 3.5 2.0 - 4.0 g/dL A-G Ratio 0.9 0.8 - 1.7 URINALYSIS W/ RFLX MICROSCOPIC Collection Time: 03/28/21  6:42 PM  
Result Value Ref Range Color YELLOW Appearance CLEAR Specific gravity >1.030 (H) 1.005 - 1.030  
 pH (UA) 6.5 5.0 - 8.0 Protein TRACE (A) NEG mg/dL Glucose Negative NEG mg/dL Ketone 40 (A) NEG mg/dL Bilirubin Negative NEG Blood MODERATE (A) NEG Urobilinogen 1.0 0.2 - 1.0 EU/dL Nitrites Negative NEG Leukocyte Esterase Negative NEG    
URINE MICROSCOPIC ONLY Collection Time: 03/28/21  6:42 PM  
Result Value Ref Range WBC 4 to 11 0 - 4 /hpf  
 RBC 0 to 3 0 - 5 /hpf Epithelial cells Negative 0 - 5 /lpf Bacteria Negative NEG /hpf POC LACTIC ACID Collection Time: 03/28/21  7:51 PM  
Result Value Ref Range Lactic Acid (POC) 1.38 0.40 - 2.00 mmol/L Radiologic Studies -  
CT HEAD WO CONT Final Result No acute intracranial abnormality. Please note that MR is more sensitive for  
early acute ischemia. Discussed with Dr. Hamlet Hayes at 1524 hours. CTA HEAD NECK W CONT    (Results Pending) XR HIPS BI W OR WO AP PELV    (Results Pending) XR CHEST SNGL V    (Results Pending) Chest x-ray no acute process interpreted by me Hips and pelvis: Left hip arthroplasty, no fracture, right hip no fracture, pelvis no acute fracture, interpreted by me Medical Decision Making I am the first provider for this patient. I reviewed the vital signs, available nursing notes, past medical history, past surgical history, family history and social history. Vital Signs-Reviewed the patient's vital signs. EKG: Sinus rhythm at 60, first-degree AV block, no STEMI, interpreted by me Records Reviewed: Nursing Notes, Old Medical Records, Previous Radiology Studies and Previous Laboratory Studies (Time of Review: 3:17 PM) ED Course: Progress Notes, Reevaluation, and Consults: 
 
 Patient was seen on arrival with a complaint of dysarthria and fall with lasting normal at 10 PM last evening. A Code S was called and was able to discuss the case with the teleneurologist would like a CT angiogram of the head neck as well. Patient is on anticoagulation and is not a candidate for intervention. Hamlet Hayes, DO 3:18 PM 
 
Radiology called and patient CT head is reassuring. Hamlet Hayes, DO 3:28 PM 
 
The patient has an elevated CK, decubitus ulcerations from being on the floor, and debility and inability to extend his legs. This point the patient will require admission and discussed the case with the hospitalist and will proceed with inpatient care. The patient also has a urinary tract infection which may be one of the sources of his decline. Extended Emergency Contact Information Primary Emergency Contact: Marcellus Perales Home Phone: [phone] Work Phone: [phone] Mobile Phone: [phone] Relation: Son  needed? No 
Secondary Emergency Contact: Tracy Gonsalves Home Phone: [phone] Relation: Spouse Attempted to call family to provide an update however did not get an answer. The patient be admitted and contact will be made by the admitting team.Tim Castro, DO 8:24 PM 
 
 
Provider Notes (Medical Decision Making): MDM Number of Diagnoses or Management Options Diagnosis management comments: Patient is an 77-year-old male with a history of hypertension, diabetes, COVID-19 less than a year ago, DVT on anticoagulation, BPH, osteoarthritis, chronic pain, presents emergency department after being found laying on his right side at home with last being heard from a 10 PM last night. The patient's last seen normal is unclear however Code S was called on arrival.  Patient was seen by the teleneurologist was thought this is more likely to be a metabolic process and recommended further work-up for other causes of his weakness including look for injury from the fall. Further stroke work-up is not necessarily needed at this time. Will follow patient's cardiac labs including CK, hydrate, chest x-ray, x-ray both hips, urinalysis, and then reevaluate. Zaida Vidal, DO 5:04 PM 
 
 
 
Procedures Critical Care Time:.  Critical Care Time: The services I provided to this patient were to treat and/or prevent clinically significant deterioration that could result in the failure of one or more body systems and/or organ systems due to stroke evaluation with intervention consideration be a Code S process and CT angiogram. 
 
Services included the following: 
-reviewing nursing notes and old charts 
-vital sign assessments 
-direct patient care 
-medication orders and management 
-interpreting and reviewing diagnostic studies/labs 
-re-evaluations 
-documentation time Aggregate critical care time was 33 minutes, which includes only time during which I was engaged in work directly related to the patient's care as described above, whether I was at bedside or elsewhere in the Emergency Department. It did not include time spent performing other reported procedures or the services of residents, students, nurses, or advance practice providers. Johana King DO 8:07 PM 
 
 
Clinical Impression: 1. Traumatic rhabdomyolysis, initial encounter ([facility] [address] 2. Acute UTI 3. Debility 4. Weakness Disposition: Admit Follow-up Information None Patient's Medications Start Taking No medications on file Continue Taking APIXABAN (ELIQUIS) 5 MG TABLET    2 tablets [10 MG] p.o. twice daily for 4 days followed by 1 tablet [5 MG] p.o. twice daily ASCORBIC ACID, VITAMIN C, (VITAMIN C) 500 MG TABLET    Take 1 Tab by mouth daily. DEXAMETHASONE (DECADRON) 6 MG TABLET    6 mg p.o. daily DICLOFENAC (VOLTAREN) 1 % GEL    Apply 2 g to affected area four (4) times daily as needed for Pain. Gel should be measured and applied using the supplied dosing card. Apply dose (2 gm or 4 gm) to each location. If treatment site is the hands, patients should wait at least one (1) hour to wash their hands. APPLY TO bilateral hands/forearms LISINOPRIL (PRINIVIL, ZESTRIL) 30 MG TABLET    Take 30 mg by mouth daily. METFORMIN (GLUCOPHAGE) 500 MG TABLET    Take 500 mg by mouth two (2) times daily (with meals). POLYETHYLENE GLYCOL (MIRALAX) 17 GRAM PACKET    Take 17 g by mouth. PRAVASTATIN (PRAVACHOL) 40 MG TABLET    Take 40 mg by mouth nightly. RANITIDINE (ZANTAC) 150 MG TABLET    Take 150 mg by mouth two (2) times a day. TAMSULOSIN (FLOMAX) 0.4 MG CAPSULE    Take 0.4 mg by mouth. TOLTERODINE ER (DETROL-LA) 2 MG ER CAPSULE    Take 1 Cap by mouth daily. These Medications have changed No medications on file Stop Taking No medications on file Disclaimer: Sections of this note are dictated using utilizing voice recognition software. Minor typographical errors may be present. If questions arise, please do not hesitate to contact me or call our department.

## 2021-03-28 NOTE — ED TRIAGE NOTES
Patient arrives into ED bed as a code stroke with right sided weakness, patient was covered in urine upon arrival. Per charge nurse, patient was found laying on floor by son for an unknown amount of time. Patient arrives with leg contractions bilaterally, painful when this nurse attempted to straighten the lower extremities. Patient complains of right hip and left leg pain at this moment. Patient has bilateral swelling on lower extremities. Per son, patient is able to answer questions however today there was an increase in confusion.

## 2021-03-28 NOTE — ED NOTES
Patient gown, linens, chuxx, and brief replaced. Patient educated on plan of care. Assessment performed. Patient awake, alert, oriented, answering questions. Right side deficits noted, patient continues to have right hip pain. Unable to straighten legs on stretcher. Placed on continuous cardiac monitoring to ensure vital signs are stable. Patient in no distress at this moment. Call light within reach.

## 2021-03-29 PROBLEM — G93.41 METABOLIC ENCEPHALOPATHY: Status: ACTIVE | Noted: 2021-01-01

## 2021-03-29 NOTE — PROGRESS NOTES
An 78years old male patient with medical history of hypertension, diabetes, and DVT on anticoagulation was brought to the emergency room after a fall. He was found down by his family members yesterday morning and was brought to the emergency room. It was mentioned that patient was confused when he was found down. He was also noticed to have incontinence to bowel and urine. No observed seizure-like activity. He claims that his lower extremities are weaker, more on the left lower extremity after his hip surgery about 12 years ago. He uses a walker for walking. Moves his upper extremities symmetrically. CT scan of the head in the emergency room did not show any acute changes. CT angiography of the head and neck showed atherosclerotic changes at the carotid bifurcation but no significant stenosis; atherosclerotic changes also seen in the bilateral intradural vertebral arteries. On CBC, did not have any leukocytosis. Metabolic panel showed hypocalcemia and increased bilirubin. He has elevated CK to 3643. He denied any previous history of stroke. Social History Socioeconomic History  Marital status:  Spouse name: Not on file  Number of children: Not on file  Years of education: Not on file  Highest education level: Not on file Occupational History  Not on file Social Needs  Financial resource strain: Not on file  Food insecurity Worry: Not on file Inability: Not on file  Transportation needs Medical: Not on file Non-medical: Not on file Tobacco Use  Smoking status: Never Smoker  Smokeless tobacco: Never Used Substance and Sexual Activity  Alcohol use: No  
 Drug use: No  
 Sexual activity: Not on file Lifestyle  Physical activity Days per week: Not on file Minutes per session: Not on file  Stress: Not on file Relationships  Social connections Talks on phone: Not on file Gets together: Not on file Attends Buddhist service: Not on file Active member of club or organization: Not on file Attends meetings of clubs or organizations: Not on file Relationship status: Not on file  Intimate partner violence Fear of current or ex partner: Not on file Emotionally abused: Not on file Physically abused: Not on file Forced sexual activity: Not on file Other Topics Concern  Not on file Social History Narrative  Not on file Family History Problem Relation Age of Onset  Coronary Artery Disease Father  Coronary Artery Disease Brother History of PTCA  Heart Disease Neg Hx Current Facility-Administered Medications Medication Dose Route Frequency Provider Last Rate Last Admin  insulin lispro (HUMALOG) injection   SubCUTAneous AC&HS Ulysses Martinez MD      
 glucose chewable tablet 16 g  4 Tab Oral PRN Ulysses Martinez MD      
 glucagon (GLUCAGEN) injection 1 mg  1 mg IntraMUSCular PRN Ulysses Martinez MD      
 dextrose (D50W) injection syrg 12.5-25 g  25-50 mL IntraVENous PRN Ulysses Martinez MD      
 0.9% sodium chloride infusion  125 mL/hr IntraVENous CONTINUOUS Leon Gomez  mL/hr at 03/28/21 1642 125 mL/hr at 03/28/21 1642  sodium chloride (NS) flush 5-10 mL  5-10 mL IntraVENous PRN Leon Gomez NP      
 cefTRIAXone (ROCEPHIN) 2 g in sterile water (preservative free) 20 mL IV syringe  2 g IntraVENous Q24H Leon Gomez NP   2 g at 03/28/21 2223  sodium chloride (NS) flush 5-40 mL  5-40 mL IntraVENous Q8H Kristyn Gallego NP      
 sodium chloride (NS) flush 5-40 mL  5-40 mL IntraVENous PRN Leon Gomez NP      
 acetaminophen (TYLENOL) tablet 650 mg  650 mg Oral Q6H PRN Leonjustin Gomez NP  Or  
 acetaminophen (TYLENOL) suppository 650 mg  650 mg Rectal Q6H PRN Leon Gomez NP      
 polyethylene glycol (MIRALAX) packet 17 g  17 g Oral DAILY PRN Leon Gomez NP      
 promethazine (PHENERGAN) tablet 12.5 mg  12.5 mg Oral Q6H PRN James Perez NP Or  
 ondansetron (ZOFRAN) injection 4 mg  4 mg IntraVENous Q6H PRN James Perez NP      
 tamsulosin (FLOMAX) capsule 0.4 mg  0.4 mg Oral DAILY Jordy BATRES NP   0.4 mg at 03/29/21 1121 Past Medical History:  
Diagnosis Date  Abnormal EKG  Bone pain  BPH (benign prostatic hyperplasia)  Bradycardia  Chest pain, unspecified R/O CAD 3/10 mild fixed inferior defect  Diabetes mellitus ([address] denies 9/30/19  Echocardiogram [facility]  EF 56%. No RWMA. Mod conc LVH. DDfx. RVSP 22 mmHg.  HCVD (hypertensive cardiovascular disease)  Hematuria, unspecified  History of silent myocardial infarction  Hypercholesteremia  Hyperlipidemia  Hypertension  Hypertension  Ill-defined condition UTI  Kidney stones  Leg pain, right   
 normal exercise JOAQUIN (fall 2014)  Lower extremity arterial testing 10/07/2014 No arterial disease at rest bilaterally. R JOAQUIN 1.27.  L JOAQUIN 1.35. No significant drop in JOAQUIN w/exercise c/w normal perfusion.  Lower urinary tract symptoms (LUTS)  Muscular weakness  Nuclear cardiac imaging test 12/02/2011 Likely normal.  Fixed inferior basal and mid inferior defect likely artifact. No ischemia. EF 60%. No WMA.  Other specified cardiac dysrhythmias(427.89) Bradycardia and pauses on holter  Swelling of limb  Unspecified sleep apnea   
 does not use cpap machine Past Surgical History:  
Procedure Laterality Date  BIOPSY  3/25/99  
 COLONOSCOPY N/A 10/7/2019 COLONOSCOPY with bx's performed by Aquiles Mike MD at [address] HX HIP REPLACEMENT  12/6/11  HX UROLOGICAL  01/06/2012 [facility], Dr. Demarcus Haddad, Transurethral resection of prostate, button prostatectomy  HX UROLOGICAL  1/29/2015  [facility], Dr. Richards , ESWL  
 AL COLONOSCOPY FLX DX W/COLLJ SPEC WHEN PFRMD    
 [place] TOTAL HIP ARTHROPLASTY Left 2014 Dr. Arelis Esquivel No Known Allergies Patient Active Problem List  
Diagnosis Code  HCVD (hypertensive cardiovascular disease) I11.9  Hyperlipidemia E78.5  Diabetes mellitus ([facility] E11.9  Bradycardia R00.1  Hypertension I10  
 BPH (benign prostatic hyperplasia) N40.0  Bone pain M89.8X9  Muscular weakness M62.81  Leg pain, right M79.604  Bladder neck contracture N32.0  Microscopic hematuria R31.29  
 History of nephrolithiasis Z87.442  Obesity (BMI 30.0-34. 9) E66.9  
 Pneumonia due to COVID-19 virus U07.1, J12.82  Thrombocytopenia ([facility] D69.6  Shortness of breath R06.02  
 Rhabdomyolysis M62.82  
 UTI (urinary tract infection) N39.0  Metabolic encephalopathy N88.44 Review of Systems:  
Constitutional no fever or chills Skin denies rash or itching HENT  Denies hearing lose Eyes denies diplopia vision lose Respiratory denies shortness of breath Cardiovascular denies chest pain Gastrointestinal denies nausea, vomiting Genitourinary; had urine incontinence on arrival.  
Musculoskeletal: No swelling; lower extremity weakness. Hematology has easy bruising. Neurological as above in HPI PHYSICAL EXAMINATION:   
 
VITAL SIGNS:   
Visit Vitals /70 Pulse 72 Temp 98.2 °F (36.8 °C) Resp 16 Ht 5' 11\" (1.803 m) Wt 90.7 kg (200 lb) SpO2 99% BMI 27.89 kg/m² GENERAL: no apparent distress. EXTREMITIES:  Muscle tone is normal. 
HEAD:    The patient is normocephalic. NEUROLOGIC EXAMINATION Mental status: Awake, alert, oriented x3, follows simple and complex commands, no neglect, no extinction to DSS or VSS. Speech and languge: fluent, coherent,  and comprehension intact CN: VFF, EOMI, PERRLA, face sensation intact , no facial asymmetry noted, palate elevation symmetric bilat, SS+SCM 5/5 bilat, tongue midline Motor: no pronator drift, strength over the upper extremities is 5/5; unable to lift his lower extremities off the bed but symmetric movement. Coordination: FNF accurate w/o dysmetria; unable to assess heel-to-shin DTR: 2+ throughout, toes downgoing BL Gait: not tested Result Information Status: Final result (Exam End: 3/28/2021 16:00) Provider Status: Open Study Result EXAM: CTA HEAD NECK W CONT 
  
CLINICAL INDICATIONS: stroke evaluation 
  
TECHNIQUE: Helical CT scan of the brain and neck were performed at 1.25 mm 
intervals during rapid IV bolus contrast administration. These data were 
reconstructed at .625 mm intervals for vascular analysis. The data was also 
reviewed at 2.5 mm intervals for accompanying soft tissue analysis. 3D post 
processed images, including surface shaded displays, were produced for this exam 
on independent console, permanently archived and interpreted. 
  
All CT scans at this facility are performed using dose optimization technique as 
appropriate to a performed exam, to include automated exposure control, 
adjustment of the MA and/or kV according to patient size (including appropriate 
matching for site-specific examinations) or use of  iterative reconstruction 
technique. 
  
CONTRAST: 100 cc Isovue 370 
  
COMPARISON: Head CT March 28 
  
CTA SOFT TISSUE ANALYSIS: 
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 Bones: Spondylotic changes are identified in the cervical spine there is 
osteophytic projection arising at the C5-6 level into the central canal there 
may be a degree of central canal stenosis present here see image 49 series 3. 
  
CTA NECK VASCULAR ANALYSIS:  
  
Aortic arch: Left sided with typical configuration.  Tortuosity of great vessels 
identified reflecting atherosclerosis there is a bovine arch, type I 
configuration 
  
Innominate: Patent 
  
Right Subclavian: Patent Left Subclavian: Patent 
  
Right carotid: 
-CCA: Patent 
-ECA: Patent 
-ICA: Patent. Estimated less than 10%% stenosis of proximal ICA by NASCET 
criteria. 
  
Left carotid: 
-CCA: Patent 
-ECA: Patent 
-ICA: Patent Estimated less than 10%% stenosis of proximal ICA by NASCET 
criteria. 
  
Right vertebral: Patent codominant Left vertebral: Patent 
  
  
CTA BRAIN VASCULAR ANALYSIS:  
  
Right anterior circulation: 
-ICA: Patent 
-LUC: Patent  
-MCA: Patent 
  
Left anterior circulation: 
-ICA: Patent 
-LUC: Patent  
-MCA: Patent 
  
-ACOM: Unremarkable 
-PCOM: Not well seen 
  
Posterior circulation: 
-RVA: Patent 
-LVA: Patent 
-Basilar: Patent 
-Right PCA: Patent 
-Left PCA: Patent 
  
Major dural venous sinuses: Unremarkable 
  
  
IMPRESSION 1. There is atherosclerotic changes of the carotid bifurcations but no 
significant stenosis seen 
  
Heavy atherosclerotic changes in the intradural fourth segment of the vertebral 
arteries bilaterally reconstruction images demonstrate no flow limitations 
identified to suggest severe stenosis 
  
Calcified pleural plaques right lung apex suggest asbestosis 
  
Wet read was provided 1700 hours March 28 there is no disagreement Result Information Status: Final result (Exam End: 3/28/2021 15:21) Provider Status: Open Study Result INDICATION: Code stroke 
  
COMPARISON: 8/20 
  
TECHNIQUE: Unenhanced CT of the head was performed using 5 mm images. Brain and 
bone windows were generated.  All CT scans at this facility are performed using 
doze optimization technique as appropriate to a performed exam, to include 
automated exposure control, adjustment of the mA and/or KV according to patient 
size (including appropriate matching for site specific examinations), or use of 
iterative reconstruction technique.  
  
FINDINGS: 
  
Similar global volume loss and minimal periventricular and subcortical white 
matter hypoattenuation, presumed chronic microvascular ischemic change. There is 
no significant white matter disease. There is no intracranial hemorrhage, 
extra-axial collection, mass, mass effect or midline shift. The basilar 
cisterns are open. No acute infarct is identified. The bone windows demonstrate 
no abnormalities. The visualized portions of the paranasal sinuses and mastoid 
air cells are clear. 
  
IMPRESSION 
  
No acute intracranial abnormality. Please note that MR is more sensitive for 
early acute ischemia. . 
 
  
CBC:  
Lab Results Component Value Date/Time WBC 5.6 03/29/2021 04:20 AM  
 RBC 4.38 (L) 03/29/2021 04:20 AM  
 HGB 12.5 (L) 03/29/2021 04:20 AM  
 HCT 37.0 03/29/2021 04:20 AM  
 PLATELET 204 60/51/6282 04:20 AM  
 
BMP:  
Lab Results Component Value Date/Time Glucose 67 (L) 03/29/2021 04:20 AM  
 Sodium 141 03/29/2021 04:20 AM  
 Potassium 3.4 (L) 03/29/2021 04:20 AM  
 Chloride 112 (H) 03/29/2021 04:20 AM  
 CO2 24 03/29/2021 04:20 AM  
 BUN 16 03/29/2021 04:20 AM  
 Creatinine 0.90 03/29/2021 04:20 AM  
 Calcium 7.5 (L) 03/29/2021 04:20 AM  
 
CMP:  
Lab Results Component Value Date/Time Glucose 67 (L) 03/29/2021 04:20 AM  
 Sodium 141 03/29/2021 04:20 AM  
 Potassium 3.4 (L) 03/29/2021 04:20 AM  
 Chloride 112 (H) 03/29/2021 04:20 AM  
 CO2 24 03/29/2021 04:20 AM  
 BUN 16 03/29/2021 04:20 AM  
 Creatinine 0.90 03/29/2021 04:20 AM  
 Calcium 7.5 (L) 03/29/2021 04:20 AM  
 Anion gap 5 03/29/2021 04:20 AM  
 BUN/Creatinine ratio 18 03/29/2021 04:20 AM  
 Alk. phosphatase 36 (L) 03/29/2021 04:20 AM  
 Protein, total 6.3 (L) 03/29/2021 04:20 AM  
 Albumin 2.9 (L) 03/29/2021 04:20 AM  
 Globulin 3.4 03/29/2021 04:20 AM  
 A-G Ratio 0.9 03/29/2021 04:20 AM  
 
 
Impression: An 78years old male patient with above medical problems was brought to the emergency room after a fall. Found with altered mental status.   Complains of weakness of the lower extremities. But on exam, moving his upper extremities and lower extremity symmetrically; but unable to lift his lower extremities off the bed. Has been having left lower extremity weakness since he has a hip surgery about 12 years ago. He was found to have pressure sores over the right hip/buttock possibly suggesting that patient might have chronic weakness. No previous history of seizure. CT scan of the brain did not show any acute changes. CTA of the head and neck only showed atherosclerotic changes with no significant stenosis. Differentials for the fall and altered mental status: Mechanical fall, seizure, still need to rule out stroke. Elevated CK probably from prolonged immobility. Normal renal function. Plan: 
-MRI of the brain -EEG 
-Follow CMP/electrolytes and correct for any changes 
-Call for questions  
-Will l follow patient PLEASE NOTE:  
This document has been produced using voice recognition software. Unrecognized errors in transcription may be present.

## 2021-03-29 NOTE — ED NOTES
Pt noted to have murmur, EKG performed and shown to . Hospitalist paged to inform them of noted murmur

## 2021-03-29 NOTE — ED NOTES
Updated pts son via telephone on pts status, pts son talked with provider as well via phone for updates.

## 2021-03-29 NOTE — ROUTINE PROCESS
TRANSFER - IN REPORT: 
 
Verbal report received from Syd RODRÍGUEZ Rn(name) on [employer]  being received from ED(unit) for routine progression of care Report consisted of patients Situation, Background, Assessment and  
Recommendations(SBAR). Information from the following report(s) SBAR, Kardex, ED Summary and Recent Results was reviewed with the receiving nurse. Opportunity for questions and clarification was provided. Assessment completed upon patients arrival to unit and care assumed.

## 2021-03-29 NOTE — WOUND CARE
Physical Exam 
Musculoskeletal:  
     Legs: 
 
 
  
Focused assessment  ER12 POA right hip/buttock skin tear. Some avulsed tissue, which is gently re-approximated. 6cm x 5cm. Fully granulating wound base, with copious serous drainage. Topical treatment protocol in place. Clean wound to right hip with wound spray then pat dry. Apply Opticell Ag to wound bed and cover with silicone dressing. Change every 2 days and prn soilage or dislodgement. Care discussed with primary nurse, Toan GUSMAN. Care turned over to nursing staff at this time. Sheela Barcenas RN, BSN, [address] [place] [address],[address]

## 2021-03-29 NOTE — PROGRESS NOTES
MRI Safety Screening form needs to be filled out and FAXED to [phone] BEFORE MRI can be scheduled. If unable to acquire information from patient, [facility] must be contacted, or screening xrays will need to be ordred.  
  
If pt is Claustrophobic or needs Pain Meds, please have these ordered in advance to help facilitate MRI exam.

## 2021-03-29 NOTE — ROUTINE PROCESS
TRANSFER - OUT REPORT: 
 
Verbal report given to Amarilys Татьяна  on [facility].  being transferred to  for routine progression of care Report consisted of patients Situation, Background, Assessment and  
Recommendations(SBAR). Information from the following report(s) SBAR and ED Summary was reviewed with the receiving nurse. Lines:  
Peripheral IV 03/28/21 Right Antecubital (Active) Site Assessment Clean, dry, & intact 03/28/21 1559 Phlebitis Assessment 0 03/28/21 1559 Infiltration Assessment 0 03/28/21 1559 Hub Color/Line Status Pink 03/28/21 1559 Peripheral IV 03/28/21 Left Hand (Active) Opportunity for questions and clarification was provided. Patient transported with: 
 [employer]

## 2021-03-29 NOTE — ED NOTES
Bedside shift change report given to [facility] [address] (oncoming nurse) by Maria Werner (offgoing nurse). Report included the following information SBAR, ED Summary and MAR.

## 2021-03-29 NOTE — ED NOTES
Checked pt blood sugar, 56. Gave one cup of orange juice will recheck. Pt ANOx4 with no complaints or changes in mentation. Drank orange juice with no difficulty noted

## 2021-03-29 NOTE — H&P
History & Physical 
 
Patient: Eliazar Carr Sr. MRN: [id number]  CSN: [id number] YOB: 1940  Age: 78 y.o. Sex: male DOA: 3/28/2021 Chief Complaint:  
Chief Complaint Patient presents with  Extremity Weakness HPI:  
 
Eliazar Carr Sr. is a 78 y.o.  male with hx of HTN, HLD, DVT on anticoagulation, Covid-19 (less than one year ago), BPH, OA who presented to the ED via EMS today after being found on the floor at home after a presumed fall. Family last talked to pt around 2200 last night and did seem a bit confused per son. This morning, family checked on him and found pt on floor laying on his right side. In the ED, nurses report pt was soiled with urine and feces with pressures sores noted on the right side of his body. Pt is not sure when the last time he ate. He has been declining per reports. ED Course 
VSS, POC lactic acid 1.38, WBC 6.4,  Hgb 13.8, Hct 41.1, sodium 140, potassium 3.4, BUN 18, creatinine 0.94, ALT 23, AST 78, alk phos 40, CK 4121, troponin 0.02.  UA showed moderate blood, RBC 0 to 3, negative bacteria, WBC 4 to 11. Pt noted to have slurred speech. Code S activated on arrival and he had an immediate CT head which was negative. Tele neurology consulted and reviewed CTa head/neck which is reported negative by his read. Offical read of CTa head/neck pending. Xray bilateral hips and CXR obtained, read pending. Pt started on IVFs and IV abx, also received 2L NS bolus. We are asked to admit for further management of care. Blood cultures x 2 have been ordered and pending. Pt has rollator and cane, pt has been enrolled in physical therapy in the past 2 weeks. He lives alone but son states he has been declining. He took him to see podiatrist last week due to edema BLE, had PVL BLE on 3/26 which was negative for DVT, also had PVL arterial doppler that was negative. Past Medical History:  
Diagnosis Date  Abnormal EKG  Bone pain  BPH (benign prostatic hyperplasia)  Bradycardia  Chest pain, unspecified R/O CAD 3/10 mild fixed inferior defect  Diabetes mellitus ([facility] [address] denies 9/30/19  Echocardiogram 04/02/2010 [facility]:  EF 56%. No RWMA. Mod conc LVH. DDfx. RVSP 22 mmHg.  HCVD (hypertensive cardiovascular disease)  Hematuria, unspecified  History of silent myocardial infarction  Hypercholesteremia  Hyperlipidemia  Hypertension  Hypertension  Ill-defined condition UTI  Kidney stones  Leg pain, right   
 normal exercise JOAQUIN (fall 2014)  Lower extremity arterial testing 10/07/2014 No arterial disease at rest bilaterally. R JOAQUIN 1.27.  L JOAQUIN 1.35. No significant drop in JOAQUIN w/exercise c/w normal perfusion.  Lower urinary tract symptoms (LUTS)  Muscular weakness  Nuclear cardiac imaging test 12/02/2011 Likely normal.  Fixed inferior basal and mid inferior defect likely artifact. No ischemia. EF 60%. No WMA.  Other specified cardiac dysrhythmias(427.89) Bradycardia and pauses on holter  Swelling of limb  Unspecified sleep apnea   
 does not use cpap machine Past Surgical History:  
Procedure Laterality Date  BIOPSY  3/25/99  
 COLONOSCOPY N/A 10/7/2019 COLONOSCOPY with bx's performed by Alphonso Vazquez MD at [facility] HX HIP REPLACEMENT  12/6/11  HX UROLOGICAL  01/06/2012 [facility], Dr. Carmella Murillo, Transurethral resection of prostate, button prostatectomy  HX UROLOGICAL  1/29/2015 [facility], Dr. Koch Prow, ESWL  
 [place] COLONOSCOPY FLX DX W/COLLJ SPEC WHEN PFRMD    
 [place] TOTAL HIP ARTHROPLASTY Left 2014 Dr. Michelle Magana Family History Problem Relation Age of Onset  Coronary Artery Disease Father  Coronary Artery Disease Brother History of PTCA  Heart Disease Neg Hx Social History Socioeconomic History  Marital status:  Spouse name: Not on file  Number of children: Not on file  Years of education: Not on file  Highest education level: Not on file Tobacco Use  Smoking status: Never Smoker  Smokeless tobacco: Never Used Substance and Sexual Activity  Alcohol use: No  
 Drug use: No  
 
 
Prior to Admission medications Medication Sig Start Date End Date Taking? Authorizing Provider  
ascorbic acid, vitamin C, (VITAMIN C) 500 mg tablet Take 1 Tab by mouth daily. 8/28/20   Aleksandr Dey MD  
dexAMETHasone (DECADRON) 6 mg tablet 6 mg p.o. daily 8/28/20   Aleksandr Dye MD  
diclofenac (VOLTAREN) 1 % gel Apply 2 g to affected area four (4) times daily as needed for Pain. Gel should be measured and applied using the supplied dosing card. Apply dose (2 gm or 4 gm) to each location. If treatment site is the hands, patients should wait at least one (1) hour to wash their hands. APPLY TO bilateral hands/forearms 8/27/20   Aleksandr Dey MD  
apixaban (ELIQUIS) 5 mg tablet 2 tablets [10 MG] p.o. twice daily for 4 days followed by 1 tablet [5 MG] p.o. twice daily 8/27/20   Aleksandr Dey MD  
tamsulosin [facility]) 0.4 mg capsule Take 0.4 mg by mouth. 12/10/14   Provider, Historical  
polyethylene glycol (MIRALAX) 17 gram packet Take 17 g by mouth. 2/12/12   Provider, Historical  
tolterodine ER (DETROL-LA) 2 mg ER capsule Take 1 Cap by mouth daily. 12/19/19   Kristy Bain MD  
raNITIdine (ZANTAC) 150 mg tablet Take 150 mg by mouth two (2) times a day. 8/2/17   Provider, Historical  
lisinopril (PRINIVIL, ZESTRIL) 30 mg tablet Take 30 mg by mouth daily. 5/14/15   Provider, Historical  
pravastatin (PRAVACHOL) 40 mg tablet Take 40 mg by mouth nightly. Provider, Historical  
metFORMIN (GLUCOPHAGE) 500 mg tablet Take 500 mg by mouth two (2) times daily (with meals). Provider, Historical  
 
 
No Known Allergies Unable to obtain reliable Review of Systems due to pt condition Physical Exam: Physical Exam: 
Visit Vitals /60 Pulse 70 Temp 97.3 °F (36.3 °C) Resp 15 Ht 5' 11\" (1.803 m) Wt 90.7 kg (200 lb) SpO2 99% BMI 27.89 kg/m² O2 Device: Room air Temp (24hrs), Av.3 °F (36.3 °C), Min:97.3 °F (36.3 °C), Max:97.3 °F (36.3 °C) No intake/output data recorded. No intake/output data recorded. General:  Alert, cooperative, no distress, appears stated age. Head: Normocephalic, without obvious abnormality, atraumatic. Eyes:  Conjunctivae/corneas clear. PERRL, EOMs intact. Nose: Nares normal. No drainage or sinus tenderness. Neck: Supple, symmetrical, trachea midline, no adenopathy, thyroid: no enlargement, no carotid bruit and no JVD. Lungs:   Clear to auscultation bilaterally. Heart:  Regular rate and rhythm, S1, S2 normal.  
  Abdomen: Soft, non-tender. Bowel sounds normal.   
Extremities: 1+ edema BLE. Pulses: 2+ and symmetric all extremities. Skin:  From limited exam- able to see Stage 2/3 to right hip. Neurologic: AAO to self and year, follow simple commands with frequent redirection. Minimal use of RLE.  equal bilaterally. Labs Reviewed: All lab results for the last 24 hours reviewed. Recent Results (from the past 24 hour(s)) CBC WITH AUTOMATED DIFF Collection Time: 21  2:54 PM  
Result Value Ref Range WBC 6.4 4.6 - 13.2 K/uL  
 RBC 4.85 4.70 - 5.50 M/uL  
 HGB 13.8 13.0 - 16.0 g/dL HCT 41.1 36.0 - 48.0 % MCV 84.7 74.0 - 97.0 FL  
 MCH 28.5 24.0 - 34.0 PG  
 MCHC 33.6 31.0 - 37.0 g/dL  
 RDW 13.8 11.6 - 14.5 % PLATELET 765 392 - 555 K/uL MPV 9.6 9.2 - 11.8 FL  
 NEUTROPHILS 77 (H) 40 - 73 % LYMPHOCYTES 14 (L) 21 - 52 % MONOCYTES 9 3 - 10 % EOSINOPHILS 0 0 - 5 % BASOPHILS 0 0 - 2 %  
 ABS. NEUTROPHILS 4.9 1.8 - 8.0 K/UL  
 ABS. LYMPHOCYTES 0.9 0.9 - 3.6 K/UL  
 ABS. MONOCYTES 0.6 0.05 - 1.2 K/UL  
 ABS. EOSINOPHILS 0.0 0.0 - 0.4 K/UL  
 ABS.  BASOPHILS 0.0 0.0 - 0.1 K/UL  
 DF AUTOMATED    
PTT Collection Time: 03/28/21  2:54 PM  
Result Value Ref Range aPTT 31.9 23.0 - 36.4 SEC PROTHROMBIN TIME + INR Collection Time: 03/28/21  2:54 PM  
Result Value Ref Range Prothrombin time 15.4 (H) 11.5 - 15.2 sec INR 1.2 0.8 - 1.2 EKG, 12 LEAD, INITIAL Collection Time: 03/28/21  4:14 PM  
Result Value Ref Range Ventricular Rate 60 BPM  
 Atrial Rate 70 BPM  
 P-R Interval 238 ms QRS Duration 98 ms Q-T Interval 420 ms QTC Calculation (Bezet) 420 ms Calculated P Axis 54 degrees Calculated R Axis -23 degrees Diagnosis Sinus rhythm with marked sinus arrhythmia with 1st degree AV block Otherwise normal ECG When compared with ECG of 19-AUG-2020 00:03, 
OK interval has increased Vent. rate has decreased BY  30 BPM 
  
CARDIAC PANEL,(CK, CKMB & TROPONIN) Collection Time: 03/28/21  4:53 PM  
Result Value Ref Range CK - MB 15.6 (H) <3.6 ng/ml CK-MB Index 0.4 0.0 - 4.0 % CK 4,121 (H) 39 - 308 U/L Troponin-I, QT 0.02 0.0 - 8.260 NG/ML  
METABOLIC PANEL, COMPREHENSIVE Collection Time: 03/28/21  4:53 PM  
Result Value Ref Range Sodium 140 136 - 145 mmol/L Potassium 3.4 (L) 3.5 - 5.5 mmol/L Chloride 110 100 - 111 mmol/L  
 CO2 23 21 - 32 mmol/L Anion gap 7 3.0 - 18 mmol/L Glucose 72 (L) 74 - 99 mg/dL BUN 18 7.0 - 18 MG/DL Creatinine 0.94 0.6 - 1.3 MG/DL  
 BUN/Creatinine ratio 19 12 - 20 GFR est AA >60 >60 ml/min/1.73m2 GFR est non-AA >60 >60 ml/min/1.73m2 Calcium 7.8 (L) 8.5 - 10.1 MG/DL Bilirubin, total 2.4 (H) 0.2 - 1.0 MG/DL  
 ALT (SGPT) 23 16 - 61 U/L  
 AST (SGOT) 78 (H) 10 - 38 U/L Alk. phosphatase 40 (L) 45 - 117 U/L Protein, total 6.8 6.4 - 8.2 g/dL Albumin 3.3 (L) 3.4 - 5.0 g/dL Globulin 3.5 2.0 - 4.0 g/dL A-G Ratio 0.9 0.8 - 1.7 URINALYSIS W/ RFLX MICROSCOPIC Collection Time: 03/28/21  6:42 PM  
Result Value Ref Range Color YELLOW Appearance CLEAR  Specific gravity >1.030 (H) 1.005 - 1.030  
 pH (UA) 6.5 5.0 - 8.0 Protein TRACE (A) NEG mg/dL Glucose Negative NEG mg/dL Ketone 40 (A) NEG mg/dL Bilirubin Negative NEG Blood MODERATE (A) NEG Urobilinogen 1.0 0.2 - 1.0 EU/dL Nitrites Negative NEG Leukocyte Esterase Negative NEG    
URINE MICROSCOPIC ONLY Collection Time: 03/28/21  6:42 PM  
Result Value Ref Range WBC 4 to 11 0 - 4 /hpf  
 RBC 0 to 3 0 - 5 /hpf Epithelial cells Negative 0 - 5 /lpf Bacteria Negative NEG /hpf POC LACTIC ACID Collection Time: 03/28/21  7:51 PM  
Result Value Ref Range Lactic Acid (POC) 1.38 0.40 - 2.00 mmol/L  
 
CT Results  (Last 48 hours) 03/28/21 1521  CT HEAD WO CONT Final result Impression: No acute intracranial abnormality. Please note that MR is more sensitive for  
early acute ischemia. Discussed with Dr. Navin Atkins at 1524 hours. Narrative:  INDICATION: Code stroke COMPARISON: 8/20 TECHNIQUE: Unenhanced CT of the head was performed using 5 mm images. Brain and  
bone windows were generated. All CT scans at this facility are performed using  
doze optimization technique as appropriate to a performed exam, to include  
automated exposure control, adjustment of the mA and/or KV according to patient  
size (including appropriate matching for site specific examinations), or use of  
iterative reconstruction technique. FINDINGS:  
   
Similar global volume loss and minimal periventricular and subcortical white  
matter hypoattenuation, presumed chronic microvascular ischemic change. There is  
no significant white matter disease. There is no intracranial hemorrhage,  
extra-axial collection, mass, mass effect or midline shift. The basilar  
cisterns are open. No acute infarct is identified. The bone windows demonstrate  
no abnormalities. The visualized portions of the paranasal sinuses and mastoid  
air cells are clear. Procedures/imaging: see electronic medical records for all procedures/Xrays and details which were not copied into this note but were reviewed prior to creation of Plan Assessment/Plan Assessment 1. Rhabdomyolysis 2. Acute metabolic encephalopathy 3. Fall at home 4. Hypokalemia, mild 5. Hypertension 6. Hyperlipidemia 7. T2DM 8. Hx DVT RUE and LUE on Eliquis 8/2020 9. Deconditioning 10. Hx Covid-19, treated in August 2020 11. Wound to right hip and buttocks POA 12. BPH 13. Advanced age Plan 1. IVFs. Trend CK daily. 2. Please collect blood cultures x 2. Continue IV abx. Follow urine culture. 3. Monitor accuchecks ac/hs, correctional SSI, check A1C 
4. Follow CTa head/neck results- reported negative per tele neurology 5. Follow CXR and bilateral hips xray 6. Consult wound care 7. Monitor vital signs, weight per unit protocol 8. PT, OT Eval and treat 9. Consult CM to assist w/ dc planning- more than likely will need SNF-pt has been declining and lives alone 10. Pt had recent PVL BLE negative for DVT on 3/26/2021 and has been on Eliquis since 8/2020- stop Eliqius 11. Fall precautions Diet: Cardiac/Diabetic DVT/GI Prophylaxis: SCD's Code Status: FULL Contact: Sury Alcantara [phone]. Nurse was speaking with pt's son so I was able to talk with him over the phone to update on admission and plan of care. He was able to assist with home meds. All questions answered. Confirmed full code status. Disclaimer: Sections of this note are dictated using utilizing voice recognition software. Minor typographical errors may be present. If questions arise, please do not hesitate to contact me or call our department. CATHRYN DuarteC 
[facility] 
pager [phone]

## 2021-03-29 NOTE — PROGRESS NOTES
Problem: Mobility Impaired (Adult and Pediatric) Goal: *Acute Goals and Plan of Care (Insert Text) Description: Physical Therapy Goals Initiated 3/29/2021 and to be accomplished within 7 day(s) 1. Patient will move from supine to sit and sit to supine  in bed with supervision/set-up. 2.  Patient will transfer from bed to chair and chair to bed with supervision/set-up using the least restrictive device. 3.  Patient will perform sit to stand with supervision/set-up. 4.  Patient will ambulate with supervision/set-up for 50 feet with the least restrictive device. PLOF: Mod I with ww. Lives alone. Outcome: Progressing Towards Goal 
 PHYSICAL THERAPY EVALUATION Patient: Bonnie Parra Sr. (78 y.o. male) Date: 3/29/2021 Primary Diagnosis: Rhabdomyolysis [M62.82] UTI (urinary tract infection) [N39.0] Precautions: Fall ASSESSMENT : 
Evaluated in ED 12. Oriented to person, time, and place. Max A for supine to sit. Seated EOB with min A for static balance; mod A for dynamic. Deferred standing d/t increased assist with bed mobility and sitting balance. Max A for sit to supine. Seated on stretcher with HOB elevated. Educated on need for RN assistance with mobility; verbalized understanding. Call bell in reach. Patient will benefit from skilled intervention to address the above impairments. Patient's rehabilitation potential is considered to be Fair Factors which may influence rehabilitation potential include:  
[]         None noted 
[]         Mental ability/status [x]         Medical condition 
[]         Home/family situation and support systems 
[]         Safety awareness 
[]         Pain tolerance/management 
[]         Other: PLAN : 
Recommendations and Planned Interventions:  
[x]           Bed Mobility Training             [x]    Neuromuscular Re-Education 
[x]           Transfer Training                   []    Orthotic/Prosthetic Training 
[x]           Gait Training []    Modalities [x]           Therapeutic Exercises           []    Edema Management/Control 
[x]           Therapeutic Activities            [x]    Family Training/Education 
[x]           Patient Education 
[]           Other (comment): Frequency/Duration: Patient will be followed by physical therapy 3-5 times a week to address goals. Discharge Recommendations: [place] Further Equipment Recommendations for Discharge: rolling walker SUBJECTIVE:  
Patient stated My left side is my weak side.  OBJECTIVE DATA SUMMARY:  
 
Past Medical History:  
Diagnosis Date Abnormal EKG Bone pain BPH (benign prostatic hyperplasia) Bradycardia Chest pain, unspecified R/O CAD 3/10 mild fixed inferior defect Diabetes mellitus ([address] denies 9/30/19 Echocardiogram 04/02/2010 [facility]:  EF 56%. No RWMA. Mod conc LVH. DDfx. RVSP 22 mmHg. HCVD (hypertensive cardiovascular disease) Hematuria, unspecified History of silent myocardial infarction Hypercholesteremia Hyperlipidemia Hypertension Hypertension Ill-defined condition UTI Kidney stones Leg pain, right   
 normal exercise JOAQUIN (fall 2014) Lower extremity arterial testing 10/07/2014 No arterial disease at rest bilaterally. R JOAQUIN 1.27.  L JOAQUIN 1.35. No significant drop in JOAQUIN w/exercise c/w normal perfusion. Lower urinary tract symptoms (LUTS) Muscular weakness Nuclear cardiac imaging test 12/02/2011 Likely normal.  Fixed inferior basal and mid inferior defect likely artifact. No ischemia. EF 60%. No WMA. Other specified cardiac dysrhythmias(427.89) Bradycardia and pauses on holter Swelling of limb Unspecified sleep apnea   
 does not use cpap machine Past Surgical History:  
Procedure Laterality Date BIOPSY  3/25/99 COLONOSCOPY N/A 10/7/2019  COLONOSCOPY with bx's performed by Kaley Wiggins MD at [facility] ENDOSCOPY  
 HX HERNIA REPAIR    
 HX HIP REPLACEMENT  12/6/11 HX UROLOGICAL  01/06/2012 [facility], Dr. Alexander Douglas, Transurethral resection of prostate, button prostatectomy HX UROLOGICAL  1/29/2015 [facility], Dr. Cleveland Jones, ESWL  
 [place] COLONOSCOPY FLX DX W/COLLJ [address] WHEN PFRMD    
 [place] TOTAL HIP ARTHROPLASTY Left 2014 Dr. Vasquez Signs Barriers to Learning/Limitations: None Compensate with: Visual Cues, Verbal Cues, Tactile Cues and Kinesthetic Cues Home Situation: 
Home Situation Home Environment: Private residence # Steps to Enter: 3 Rails to Enter: Yes One/Two Story Residence: One story Living Alone: Yes Support Systems: Family member(s) Patient Expects to be Discharged to[de-identified] Private residence Current DME Used/Available at Home: Walker, rolling Critical Behavior: 
Neurologic State: Alert Orientation Level: Oriented to place;Oriented to person;Oriented to time Cognition: Follows commands Strength:   
Manual Muscle Testing (LE) 
       R     L Hip Flexion:   3+/5  3+/5 Knee EXT:   3+/5  3+/5 Knee FLEX:   3+/5  3+/5 Ankle DF:   3+/5  3+/5 
_________________________________________________ Range Of Motion: [facility] Functional Mobility: 
Bed Mobility: 
Supine to Sit: Maximum assistance Sit to Supine: Maximum assistance Scooting: Maximum assistance Balance:  
Sitting: Impaired Sitting - Static: Fair (occasional) Sitting - Dynamic: Poor (constant support) Neuro Re-education: 
Seated balance 3 minutes Pain: 
Pain level pre-treatment: 0/10 Pain level post-treatment: 0/10 Activity Tolerance:  
Fair After treatment:  
[]         Patient left in no apparent distress sitting up in chair 
[x]         Patient left in no apparent distress in bed 
[x]         Call bell left within reach [x]         Nursing notified 
[]         Caregiver present 
[]         Bed alarm activated 
[]         SCDs applied COMMUNICATION/EDUCATION:  
[x]         Role of physical therapy and plan of care in the acute care setting. [x]         Fall prevention education was provided and the patient/caregiver indicated understanding. [x]         Patient/family have participated as able in goal setting and plan of care. []         Patient/family agree to work toward stated goals and plan of care. []         Patient understands intent and goals of therapy, but is neutral about his/her participation. []         Patient is unable to participate in goal setting/plan of care: ongoing with therapy staff. Thank you for this referral. 
Roxann Livingston, PT Time Calculation: 15 mins Eval Complexity: History: MEDIUM  Complexity : 1-2 comorbidities / personal factors will impact the outcome/ POC Exam:MEDIUM Complexity : 3 Standardized tests and measures addressing body structure, function, activity limitation and / or participation in recreation  Presentation: MEDIUM Complexity : Evolving with changing characteristics  Clinical Decision Making:Medium Complexity    Clinical judgement; ROM, MMT, functional mobility Overall Complexity:MEDIUM

## 2021-03-29 NOTE — ED NOTES
Report received from [facility] and Zahida Stevens RN patient sleeping with eyes closed easily aroused no s/sx of hypoglycemia at this time.

## 2021-03-29 NOTE — ED NOTES
Patient was seen by wound care Trudy Norris RN for wound re-dressing and evaluation on the right side. Patient is alert and oriented x 4. Breaths sound clear bi-lateral and equal on both sides. Patient was changed and repositioned with condom cath attached.

## 2021-03-29 NOTE — VIRTUAL HEALTH
Called placed regarding sepsis bundle - antibiotics admin. Thanks Raheem Spivey RN 
Olga Nick [place] [phone] - Sepsis

## 2021-03-29 NOTE — PROGRESS NOTES
Progress Note Patient: Severiano Jewett Sr.               Sex: male          DOA: 3/28/2021 YOB: 1940      Age:  78 y.o.        LOS:  LOS: 1 day CHIEF COMPLAINT:  Rhabdomyolysis with improved metabolic encephalopathy Subjective:  
 
Patient awake and interacting Getting wound care Objective:  
  
Visit Vitals /70 Pulse 72 Temp 98.2 °F (36.8 °C) Resp 16 Ht 5' 11\" (1.803 m) Wt 90.7 kg (200 lb) SpO2 99% BMI 27.89 kg/m² Physical Exam: 
Gen:  Patient is in no distress. No complaint HEENT and Neck:  PERRL, No cervical tenderness or masses Lungs:  Clear bilaterally. No rales, no wheeze. Normal effort. Heart:  Regular Rate and Rhythm. No murmur or gallop. No JVD. No edema. Abdomen:  Soft, non tender, no masses, no Hepatosplenomegally Extremities:  No digital clubbing, no cyanosis Neuro:  Alert and oriented, Normal affect, Cranial nerves intact, No sensory deficits Lab/Data Reviewed: 
BMP:  
Lab Results Component Value Date/Time  03/29/2021 04:20 AM  
 K 3.4 (L) 03/29/2021 04:20 AM  
  (H) 03/29/2021 04:20 AM  
 CO2 24 03/29/2021 04:20 AM  
 AGAP 5 03/29/2021 04:20 AM  
 GLU 67 (L) 03/29/2021 04:20 AM  
 BUN 16 03/29/2021 04:20 AM  
 CREA 0.90 03/29/2021 04:20 AM  
 GFRAA >60 03/29/2021 04:20 AM  
 GFRNA >60 03/29/2021 04:20 AM  
 
CBC:  
Lab Results Component Value Date/Time WBC 5.6 03/29/2021 04:20 AM  
 HGB 12.5 (L) 03/29/2021 04:20 AM  
 HCT 37.0 03/29/2021 04:20 AM  
  03/29/2021 04:20 AM  
 
Recent Glucose Results:  
Lab Results Component Value Date/Time GLU 67 (L) 03/29/2021 04:20 AM  
 GLU 72 (L) 03/28/2021 04:53 PM  
 
 
 
 
Assessment/Plan Principal Problem: 
  Rhabdomyolysis (3/28/2021) Active Problems: Metabolic encephalopathy (8/10/6736) Hypertension (5/29/2012) UTI (urinary tract infection) (3/28/2021) Diabetes mellitus ([email or web address] () Plan: 
Follow CPK Mental status is improving Mobilize as able BP control Continue IV antibiotics, follow culture BS control

## 2021-03-29 NOTE — ED NOTES
Assumed care of pt, pt laying on stretcher with eyes closed, responsive to verbal stimuli. Respirations even and unlabored. Pt ANOX4. Initial neuro check completed. No facial droop or arm drift noted. Pt lower extremities bilaterally weak with slight contractures. Pt able to follow commands but unable to fully assess lower extremities due to weakness.

## 2021-03-30 NOTE — ROUTINE PROCESS
Bedside and Verbal shift change report given to Birgit Swanson (oncoming nurse) by Aniya Ocampo RN (offgoing nurse). Report included the following information SBAR, Kardex and Recent Results.

## 2021-03-30 NOTE — PROGRESS NOTES
New PT evaluation seen and acknowledged. Patient is already on PT caseload. Patient will bee seen as schedule allows.  Thank you for this referral. 
 
Jean Pierre Samson, PT, DPT

## 2021-03-30 NOTE — PROGRESS NOTES
Problem: Falls - Risk of 
Goal: *Absence of Falls Description: Document Fabricio Emmanuel Fall Risk and appropriate interventions in the flowsheet. Outcome: Progressing Towards Goal 
Note: Fall Risk Interventions: 
Mobility Interventions: Bed/chair exit alarm, OT consult for ADLs, Patient to call before getting OOB, PT Consult for mobility concerns, Utilize walker, cane, or other assistive device Medication Interventions: Bed/chair exit alarm, Patient to call before getting OOB Elimination Interventions: Call light in reach, Patient to call for help with toileting needs History of Falls Interventions: Bed/chair exit alarm, Room close to nurse's station Problem: Patient Education: Go to Patient Education Activity Goal: Patient/Family Education Outcome: Progressing Towards Goal 
  
Problem: Patient Education: Go to Patient Education Activity Goal: Patient/Family Education Outcome: Progressing Towards Goal 
  
Problem: Pressure Injury - Risk of 
Goal: *Prevention of pressure injury Description: Document Luca Scale and appropriate interventions in the flowsheet. Outcome: Progressing Towards Goal 
Note: Pressure Injury Interventions: 
Sensory Interventions: Assess changes in LOC Moisture Interventions: Absorbent underpads, Internal/External urinary devices Activity Interventions: PT/OT evaluation, Pressure redistribution bed/mattress(bed type), Increase time out of bed Mobility Interventions: HOB 30 degrees or less Nutrition Interventions: Document food/fluid/supplement intake Friction and Shear Interventions: Minimize layers, Apply protective barrier, creams and emollients Problem: Patient Education: Go to Patient Education Activity Goal: Patient/Family Education Outcome: Progressing Towards Goal 
  
Problem: Pain Goal: *Control of Pain Outcome: Progressing Towards Goal 
Goal: *PALLIATIVE CARE:  Alleviation of Pain Outcome: Progressing Towards Goal 
  
Problem: Patient Education: Go to Patient Education Activity Goal: Patient/Family Education Outcome: Progressing Towards Goal 
  
Problem: Injury - Risk of, Adverse Drug Event Goal: *Absence of adverse drug events Outcome: Progressing Towards Goal 
Goal: *Absence of medication errors Outcome: Progressing Towards Goal 
Goal: *Knowledge of prescribed medications Outcome: Progressing Towards Goal 
  
Problem: Patient Education: Go to Patient Education Activity Goal: Patient/Family Education Outcome: Progressing Towards Goal

## 2021-03-30 NOTE — PROGRESS NOTES
Re:  Jez Tomas Sr.,Follow up visit     3/30/2021 7:58 PM 
 
SSN: [id number] Subjective:   Abhi Richter was seen on follow up. No acute changes. He was fully conscious and answering questions appropriately. Continues to have the weakness over his lower extremities which does not appear acute. He was able to stand up with physical therapy but has difficulty walking. Had MRI of the brain which did not show any acute changes. Had EEG which didn't show epileptiform discharges. Medications:   
Current Facility-Administered Medications Medication Dose Route Frequency Provider Last Rate Last Admin  insulin lispro (HUMALOG) injection   SubCUTAneous AC&HS Joyce Cooper MD   2 Units at 03/30/21 1804  
 glucose chewable tablet 16 g  4 Tab Oral PRN Joyce Cooper MD      
 glucagon (GLUCAGEN) injection 1 mg  1 mg IntraMUSCular PRN Joyce Cooper MD      
 dextrose (D50W) injection syrg 12.5-25 g  25-50 mL IntraVENous PRN Joyce Cooper MD      
 0.9% sodium chloride infusion  125 mL/hr IntraVENous CONTINUOUS Gabriele KRISTEN Dykes 125 mL/hr at 03/30/21 1235 125 mL/hr at 03/30/21 1235  sodium chloride (NS) flush 5-10 mL  5-10 mL IntraVENous PRN Gabriele Dykes NP      
 sodium chloride (NS) flush 5-40 mL  5-40 mL IntraVENous Q8H Margeret Mon S, NP   10 mL at 03/30/21 1652  sodium chloride (NS) flush 5-40 mL  5-40 mL IntraVENous PRN Gabriele Dykes NP      
 acetaminophen (TYLENOL) tablet 650 mg  650 mg Oral Q6H PRN Gabrieleera Dykes NP   650 mg at 03/30/21 1805 Or  acetaminophen (TYLENOL) suppository 650 mg  650 mg Rectal Q6H PRN Gabriele Dykes NP      
 polyethylene glycol (MIRALAX) packet 17 g  17 g Oral DAILY PRN Gabriele Dykes NP      
 promethazine (PHENERGAN) tablet 12.5 mg  12.5 mg Oral Q6H PRN Gabriele Dykes NP  Or  
 ondansetron (ZOFRAN) injection 4 mg  4 mg IntraVENous Q6H PRN Gabriele Dykes NP      
 tamsulosin (FLOMAX) capsule 0.4 mg  0.4 mg Oral DAILY Mark Jose BATRES NP   0.4 mg at 03/30/21 0025 Vital signs:   
Visit Vitals /71 (BP 1 Location: Left arm, BP Patient Position: At rest) Pulse 67 Temp 97.8 °F (36.6 °C) Resp 18 Ht 5' 11\" (1.803 m) Wt 99.3 kg (219 lb) SpO2 99% BMI 30.54 kg/m² Review of Systems:  
Constitutional no fever or chills Skin denies rash or itching HENT  Denies hearing lose Eyes denies diplopia vision lose Respiratory denies shortness of breath Cardiovascular denies chest pain Gastrointestinal denies nausea, vomiting Genitourinary; had urine incontinence on arrival.  
Musculoskeletal: No swelling; lower extremity weakness. Hematology has easy bruising. Neurological as above in HPI Patient Active Problem List  
Diagnosis Code  HCVD (hypertensive cardiovascular disease) I11.9  Hyperlipidemia E78.5  Diabetes mellitus ([facility] [address] E11.9  Bradycardia R00.1  Hypertension I10  
 BPH (benign prostatic hyperplasia) N40.0  Bone pain M89.8X9  Muscular weakness M62.81  Leg pain, right M79.604  Bladder neck contracture N32.0  Microscopic hematuria R31.29  
 History of nephrolithiasis Z87.442  Obesity (BMI 30.0-34. 9) E66.9  
 Pneumonia due to COVID-19 virus U07.1, J12.82  Thrombocytopenia ([facility] [address] D69.6  Shortness of breath R06.02  
 Rhabdomyolysis M62.82  
 UTI (urinary tract infection) N39.0  Metabolic encephalopathy D27.26 Objective:  
GENERAL:                  no apparent distress. EXTREMITIES:            Muscle tone is normal. 
HEAD:                          The patient is normocephalic. 
  
NEUROLOGIC EXAMINATION Mental status: Awake, alert, oriented x3, follows simple and complex commands, no neglect, no extinction to DSS or VSS. Speech and languge: fluent, coherent,  and comprehension intact CN: VFF, EOMI, PERRLA, face sensation intact , no facial asymmetry noted, palate elevation symmetric bilat, SS+SCM 5/5 bilat, tongue midline Motor: no pronator drift, strength over the upper extremities is 5/5; unable to lift his lower extremities off the bed but symmetric movement. Coordination: FNF accurate w/o dysmetria; unable to assess heel-to-shin DTR: 2+ throughout, toes downgoing BL Gait: not tested Result Information 
  
Status: Final result (Exam End: 3/28/2021 16:00) Provider Status: Open Study Result 
  EXAM: CTA HEAD NECK W CONT 
  
CLINICAL INDICATIONS: stroke evaluation 
  
TECHNIQUE: Helical CT scan of the brain and neck were performed at 1.25 mm 
intervals during rapid IV bolus contrast administration.  These data were 
reconstructed at .625 mm intervals for vascular analysis.  The data was also 
reviewed at 2.5 mm intervals for accompanying soft tissue analysis. 3D post 
processed images, including surface shaded displays, were produced for this exam 
on independent console, permanently archived and interpreted. 
  
All CT scans at this facility are performed using dose optimization technique as 
appropriate to a performed exam, to include automated exposure control, 
adjustment of the MA and/or kV according to patient size (including appropriate 
matching for site-specific examinations) or use of  iterative reconstruction 
technique. 
  
CONTRAST: 100 cc Isovue 370 
  
COMPARISON: Head CT March 28 
  
CTA SOFT TISSUE ANALYSIS: 
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 Bones: Spondylotic changes are identified in the cervical spine there is 
osteophytic projection arising at the C5-6 level into the central canal there 
may be a degree of central canal stenosis present here see image 49 series 3. 
  
CTA NECK VASCULAR ANALYSIS:  
  
Aortic arch: Left sided with typical configuration. Tortuosity of great vessels 
identified reflecting atherosclerosis there is a bovine arch, type I 
configuration 
  
Innominate: Patent 
  
Right Subclavian: Patent Left Subclavian: Patent 
  
Right carotid: 
-CCA: Patent 
-ECA: Patent 
-ICA: Patent. Estimated less than 10%% stenosis of proximal ICA by NASCET 
criteria. 
  
Left carotid: 
-CCA: Patent 
-ECA: Patent 
-ICA: Patent Estimated less than 10%% stenosis of proximal ICA by NASCET 
criteria. 
  
Right vertebral: Patent codominant 
  
Left vertebral: Patent 
  
  
CTA BRAIN VASCULAR ANALYSIS:  
  
Right anterior circulation: 
-ICA: Patent 
-LUC: Patent  
-MCA: Patent 
  
Left anterior circulation: 
-ICA: Patent 
-LUC: Patent  
-MCA: Patent 
  
-ACOM: Unremarkable 
-PCOM: Not well seen 
  
Posterior circulation: 
-RVA: Patent 
-LVA: Patent 
-Basilar: Patent 
-Right PCA: Patent 
-Left PCA: Patent 
  
Major dural venous sinuses: Unremarkable 
  
  
IMPRESSION 1. There is atherosclerotic changes of the carotid bifurcations but no 
significant stenosis seen 
  
Heavy atherosclerotic changes in the intradural fourth segment of the vertebral 
arteries bilaterally reconstruction images demonstrate no flow limitations 
identified to suggest severe stenosis 
  
Calcified pleural plaques right lung apex suggest asbestosis 
  
Wet read was provided 1700 hours March 28 there is no disagreement   
  
  
Result Information 
  
Status: Final result (Exam End: 3/28/2021 15:21) Provider Status: Open Study Result 
  
INDICATION: Code stroke 
  
COMPARISON: 8/20 
  
TECHNIQUE: Unenhanced CT of the head was performed using 5 mm images. Brain and 
bone windows were generated.  All CT scans at this facility are performed using 
doze optimization technique as appropriate to a performed exam, to include 
automated exposure control, adjustment of the mA and/or KV according to patient 
size (including appropriate matching for site specific examinations), or use of 
iterative reconstruction technique.  
  
FINDINGS: 
  
Similar global volume loss and minimal periventricular and subcortical white 
matter hypoattenuation, presumed chronic microvascular ischemic change. There is 
no significant white matter disease. There is no intracranial hemorrhage, 
extra-axial collection, mass, mass effect or midline shift.  The basilar 
cisterns are open. No acute infarct is identified. The bone windows demonstrate 
no abnormalities. The visualized portions of the paranasal sinuses and mastoid 
air cells are clear. 
  
IMPRESSION 
  
No acute intracranial abnormality. Please note that MR is more sensitive for 
early acute ischemia. Study Result MRI BRAIN WITHOUT CONTRAST 
  
PROVIDED REASON FOR EXAM: AMS, fall, and generalized weakness. Additional History: Patient was found down with some confusion Comparison Studies: Noncontrast head CT 3/28/2021 
  
Imaging Technique:  
  Sequences:  Sagittal,  Coronal and axial T1 weighted, Diffusion Weighted 
(DWI), Axial T2 weighted, Axial T2 FLAIR, and SWI/GRE MRI images of the brain. 
  
Contrast Material:  NONE 
  
Limiting Factors/Major Artifacts: None. 
  
FINDINGS: 
  
Brain Parenchyma: Diffusion sequence negative for cytotoxic edema. No findings 
of an acute infarct. Cerebral white matter signal is normal for patient age. Trace amount of increased T2 signal at the margins of the frontal horns likely 
related to aging. No chronic cortical infarcts or chronic lacunar infarcts. No 
visible masses or midline shift. 
  
CSF Spaces:  Global cerebral and cerebellar volume loss. The ventricles are not 
enlarged relative to the brain volumes. No hydrocephalus. 
  
Vascular System:  Grossly patent flow in basilar and internal cerebral arteries. No findings of dural sinus thrombosis.  
  
Hemorrhage:  No acute hemorrhage. No chronic microhemorrhage. 
  
Other Structures: 
  
Calvarium: No suspicious marrow signal. 
  
Sella: Pituitary is not enlarged. Visualized Upper Cervical Spine: Normal. 
Orbits: Normal for non-dedicated exam. Bilateral lens surgery. Paranasal Sinuses: No significant paranasal sinus disease. Mastoid Air Cells:  No effusion. 
  
IMPRESSION 
  
No acute intracranial abnormality. No mass, hemorrhage, or acute infarct. 
   
 
 
CBC:  
Lab Results Component Value Date/Time WBC 5.9 03/30/2021 01:53 AM  
 RBC 4.56 (L) 03/30/2021 01:53 AM  
 HGB 12.8 (L) 03/30/2021 01:53 AM  
 HCT 39.1 03/30/2021 01:53 AM  
 PLATELET 214 89/07/2910 01:53 AM  
 
BMP:  
Lab Results Component Value Date/Time Glucose 68 (L) 03/30/2021 01:53 AM  
 Sodium 141 03/30/2021 01:53 AM  
 Potassium 4.0 03/30/2021 01:53 AM  
 Chloride 110 03/30/2021 01:53 AM  
 CO2 25 03/30/2021 01:53 AM  
 BUN 15 03/30/2021 01:53 AM  
 Creatinine 0.87 03/30/2021 01:53 AM  
 Calcium 7.6 (L) 03/30/2021 01:53 AM  
 
CMP:  
Lab Results Component Value Date/Time Glucose 68 (L) 03/30/2021 01:53 AM  
 Sodium 141 03/30/2021 01:53 AM  
 Potassium 4.0 03/30/2021 01:53 AM  
 Chloride 110 03/30/2021 01:53 AM  
 CO2 25 03/30/2021 01:53 AM  
 BUN 15 03/30/2021 01:53 AM  
 Creatinine 0.87 03/30/2021 01:53 AM  
 Calcium 7.6 (L) 03/30/2021 01:53 AM  
 Anion gap 6 03/30/2021 01:53 AM  
 BUN/Creatinine ratio 17 03/30/2021 01:53 AM  
 Alk. phosphatase 40 (L) 03/30/2021 01:53 AM  
 Protein, total 6.0 (L) 03/30/2021 01:53 AM  
 Albumin 2.8 (L) 03/30/2021 01:53 AM  
 Globulin 3.2 03/30/2021 01:53 AM  
 A-G Ratio 0.9 03/30/2021 01:53 AM  
 
Coagulation:  
Lab Results Component Value Date/Time Prothrombin time 15.4 (H) 03/28/2021 02:54 PM  
 INR 1.2 03/28/2021 02:54 PM  
 aPTT 31.9 03/28/2021 02:54 PM  
 
 
Impression: An 78years old male patient with above medical problems was brought to the emergency room after a fall. Found with altered mental status. Complains of weakness of the lower extremities.   But on exam, moving his upper extremities and lower extremity symmetrically; but unable to lift his lower extremities off the bed. Has been having left lower extremity weakness since he has a hip surgery about 12 years ago. He was found to have pressure sores over the right hip/buttock possibly suggesting that patient might have chronic weakness. No previous history of seizure. CT scan of the brain did not show any acute changes. CTA of the head and neck only showed atherosclerotic changes with no significant stenosis. MRI of the brain did not show any acute changes. Elevated CK probably from prolonged immobility. Normal renal function. Difficult to explain his chronic lower extremity weakness. Will get MRI of the cervical and lumbosacral spines. 
  
Plan: 
-MRI of the brain: as above 
-MRI of the cervical and lumbosacral spines -EEG: no epileptiform discharges.  
-Follow recommendations from physical therapy/patient therapy 
-Will follow results from the spine MRI 
-We will follow patient. 
  
 
Sincerely, Hardik Diaz M.D. PLEASE NOTE:  
This document has been produced using voice recognition software. Unrecognized errors in transcription may be present.

## 2021-03-30 NOTE — ROUTINE PROCESS
Bedside and Verbal shift change report given to Lm Vides (oncoming nurse) by Juan Puentes (offgoing nurse). Report included the following information SBAR, Kardex and Recent Results.

## 2021-03-30 NOTE — PROGRESS NOTES
Problem: Mobility Impaired (Adult and Pediatric) Goal: *Acute Goals and Plan of Care (Insert Text) Description: Physical Therapy Goals Initiated 3/29/2021 and to be accomplished within 7 day(s) 1. Patient will move from supine to sit and sit to supine  in bed with supervision/set-up. 2.  Patient will transfer from bed to chair and chair to bed with supervision/set-up using the least restrictive device. 3.  Patient will perform sit to stand with supervision/set-up. 4.  Patient will ambulate with supervision/set-up for 50 feet with the least restrictive device. PLOF: Mod I with ww. Lives alone. Outcome: Progressing Towards Goal 
  
PHYSICAL THERAPY TREATMENT Patient: Jayda Denise Sr. (78 y.o. male) Date: 3/30/2021 Diagnosis: Rhabdomyolysis [M62.82] UTI (urinary tract infection) [N39.0] Rhabdomyolysis Precautions: Fall ASSESSMENT: 
Pt seen with both PT and OT to maximize patients safety, mobility, and participation. Pt found supine in bed, in NAD, willing to work with PT. His speech is still dysarthric, but pt alert and orientedx4. Pt slowly performed supine-sit with maxAx2. Pt had fair seated balance, tended to lean back with bringing his feet up to take off socks. Pt stood with maxAx2 and RW. Initially, pt remained with B knees bent and head down, cueing to keep upright posture and knees straight. Pt did well with standing and was able to slowly take 5 side steps with max cueing to lean to each side to unweight opposite leg to step. Pt keeps a narrow PEDRO at times, reminded to keep feet shoulder width apart. He was able to stand/take steps for total of 3 minutes before sitting down. Pt then assisted back supine with modAx2. He complained of sharp neck pain at times. AROM of cervical spine showed decreased left rotation. Pt was encouraged to continue cervical rotation in bed.  Pt scooted up towards [facility] with totalAx2 and left with HOB elevated, B LE elevated on pillow, RN student in room and transport getting ready to take pt down for stress test. Pt left with all needs met. Progression toward goals:  
[]      Improving appropriately and progressing toward goals [x]      Improving slowly and progressing toward goals 
[]      Not making progress toward goals and plan of care will be adjusted PLAN: 
Patient continues to benefit from skilled intervention to address the above impairments. Continue treatment per established plan of care. Discharge Recommendations:  Rehab Further Equipment Recommendations for Discharge:  hospital bed, mechanical lift, rolling walker, and wheelchair SUBJECTIVE:  
Patient stated I can't believe how weak I am. OBJECTIVE DATA SUMMARY:  
Critical Behavior: 
Neurologic State: Alert Orientation Level: Oriented X4 Cognition: Follows commands Safety/Judgement: Fall prevention Functional Mobility Training: 
Bed Mobility: 
  
Supine to Sit: Maximum assistance;Assist x2 Sit to Supine: Moderate assistance;Assist x2 Scooting: Total assistance;Assist x2 Transfers: 
Sit to Stand: Maximum assistance;Assist x2 Stand to Sit: Maximum assistance;Assist x2 Balance: 
Sitting: Impaired; With support Sitting - Static: Fair (occasional) Sitting - Dynamic: Fair (occasional) Standing: Impaired; With support Standing - Static: Fair Standing - Dynamic : Fair;Poor Ambulation/Gait Training: 
Distance (ft): 5 Feet (ft) Assistive Device: Walker, rolling Ambulation - Level of Assistance: Maximum assistance;Assist x2 Gait Abnormalities: Decreased step clearance Base of Support: Narrowed Speed/Cori: Shuffled; Slow Step Length: Right shortened;Left shortened Pain: 
Pain level pre-treatment: 4/10- neck pain Pain level post-treatment: 4/10 Pain Intervention(s): Medication (see MAR); Rest, Repositioning Response to intervention: Nurse notified, See doc flow Activity Tolerance:  
Pt tolerated mobility fair Please refer to the flowsheet for vital signs taken during this treatment. After treatment:  
[] Patient left in no apparent distress sitting up in chair 
[x] Patient left in no apparent distress in bed 
[x] Call bell left within reach [x] Nursing notified 
[] Caregiver present 
[] Bed alarm activated 
[] SCDs applied COMMUNICATION/EDUCATION:  
[x]         Role of Physical Therapy in the acute care setting. [x]         Fall prevention education was provided and the patient/caregiver indicated understanding. [x]         Patient/family have participated as able in working toward goals and plan of care. []         Patient/family agree to work toward stated goals and plan of care. []         Patient understands intent and goals of therapy, but is neutral about his/her participation. []         Patient is unable to participate in stated goals/plan of care: ongoing with therapy staff. 
[]         Other: 
 
   
[facility] Time Calculation: 23 mins

## 2021-03-30 NOTE — ROUTINE PROCESS
Primary Nurse Maxi Hilton RN and Roshan HERNANDEZ RN performed a dual skin assessment on this patient Impairment noted- see wound doc flow sheet Luca score is 15. Pt has blister on right hip, sacral area is red, but blanches, bruise on right shoulder, and bruise on right knee.

## 2021-03-30 NOTE — PROGRESS NOTES
Spoke to patient's son and updated him about patient's current treatment plan including diagnosis. Patient's son is satisfied with my answers and stated he will look forward to talking to patient's attending physician tomorrow.

## 2021-03-30 NOTE — PROGRESS NOTES
conducted an initial consultation and Spiritual Assessment for Marci Gustafson, who is a 78 y. o.,male. Patient's Primary Language is: [place]. According to the patient's EMR Jain Affiliation is: [facility].  
 
The reason the Patient came to the hospital is:  
Patient Active Problem List  
 Diagnosis Date Noted  Metabolic encephalopathy 52/08/4660 Priority: 2 - Two  Rhabdomyolysis 03/28/2021  UTI (urinary tract infection) 03/28/2021  Pneumonia due to COVID-19 virus 08/19/2020  Thrombocytopenia ([facility] [address] 08/19/2020  Shortness of breath 08/19/2020  Bladder neck contracture 01/19/2018  Microscopic hematuria 01/19/2018  History of nephrolithiasis 01/19/2018  Obesity (BMI 30.0-34.9) 01/19/2018  Leg pain, right  Bone pain  Muscular weakness  BPH (benign prostatic hyperplasia) 06/15/2012  Hypertension 05/29/2012  HCVD (hypertensive cardiovascular disease)  Hyperlipidemia  Diabetes mellitus ([facility] [address]  Bradycardia The  provided the following Interventions: 
Initiated a relationship of care and support. Listened empathetically. Provided chaplaincy education. Offered assurance of continued prayers on patient's behalf. Chart reviewed. The following outcomes where achieved: 
Patient expressed gratitude for 's visit. Assessment: 
Patient does not have any known Muslim/cultural needs that will affect patient's preferences in health care. There are no known spiritual or Muslim issues which require intervention at this time. Plan: 
Chaplains will continue to follow and will provide pastoral care as needed and as requested. Neisha Stanton, Yung Landa. Div Spiritual Care  
[phone]

## 2021-03-30 NOTE — ROUTINE PROCESS
Bedside and Verbal shift change report given to Linda Ramirez RN (oncoming nurse) by Aniyah Solomon RN (offgoing nurse). Report given with SBAR, Kardex, Intake/Output, MAR, Accordion and Recent Results.

## 2021-03-30 NOTE — CONSULTS
Comprehensive Nutrition Assessment Type and Reason for Visit: Initial, Consult Nutrition Recommendations/Plan: - Modify diet; remove diabetic diet restrictions - Add supplement: Ensure Enlive once daily - Monitor BG levels - IVF per MD 
 
 
Nutrition Assessment:  Pt off unit at time of visit for EEG. Noted had episodes of hypoglycemia yesterday, 67 and 68 mg/dL. Pt on po diet with diabetic diet restrictions. Noted recent BG levels ranging from 70-86 mg/dL. Noted po intake of 80% of breakfast today per chart documentation. Lunch meal was in room at time of visit. Wt loss PTA per chart hx; although, per nursing screen pt  denied experiencing recent unplanned wt loss PTA. Per H&P, pt unsure of when he had last eaten. Malnutrition Assessment: 
Malnutrition Status:  Insufficient data Nutrition History and Allergies: Past medical hx: CAD, DM, hypercholesterolemia, HLD, HTN, kidney stones, hernia repair. Noted wt loss of 12 lb, 5.2% x 1 year and 3 month PTA per chart hx; although, per nursing screen; although, per nursing screen, pt denied experiencing any recent unplanned wt loss PTA. Per H&P, pt reported being unsure of the last time he had eaten. No known food allergies Estimated Daily Nutrient Needs: 
Energy (kcal): 2257-7646; Weight Used for Energy Requirements: Current(99.3 kg) Protein (g): 77-99; Weight Used for Protein Requirements: Current(x0.8-1) Fluid (ml/day): 5543-0794; Method Used for Fluid Requirements: 1 ml/kcal 
 
 
Nutrition Related Findings:  BM PTA. Meds:  NS at 125 mL/hr   
 
Wounds:   
Skin tears Current Nutrition Therapies: DIET CARDIAC Regular; Consistent Carb 1800kcal 
 
Anthropometric Measures: 
· Height:  5' 11\" (180.3 cm) · Current Body Wt:  99.3 kg (218 lb 14.7 oz) · Admission Body Wt:  199 lb 15.3 oz(pt reported) · Usual Body Wt:  104.8 kg (231 lb)(December 2019 per chart hx) · Ideal Body Wt:  172 lbs:  127.3 % · Adjusted Body Weight:   ; Weight Adjustment for: No adjustment · BMI Category:  Obese class 1 (BMI 30.0-34. 9) Nutrition Diagnosis: · Altered nutrition-related lab values related to (impaired nutrient utilization) as evidenced by (pt with episodes of hypoglycemia) · Predicted inadequate energy intake related to (confusion) as evidenced by (pt unsure of when he last eaten PTA per H&P) Nutrition Interventions:  
Food and/or Nutrient Delivery: Modify current diet, IV fluid delivery Nutrition Education and Counseling: Education not indicated Coordination of Nutrition Care: Continue to monitor while inpatient Goals: 
PO intake will meet >75% of patient's estimated nutrition needs within the next 7 days. Blood glucose will be within target range of  mg/dL within the next 5-7 days. Nutrition Monitoring and Evaluation:  
Behavioral-Environmental Outcomes: None identified Food/Nutrient Intake Outcomes: Food and nutrient intake, Supplement intake, IVF intake Physical Signs/Symptoms Outcomes: Biochemical data, Meal time behavior, Nutrition focused physical findings Discharge Planning: Too soon to determine Electronically signed by Mariann Villafana RD on 3/30/2021 at 1:33 PM 
 
Contact: [phone]

## 2021-03-30 NOTE — PROGRESS NOTES
Problem: Self Care Deficits Care Plan (Adult) Goal: *Acute Goals and Plan of Care (Insert Text) Description: Occupational Therapy Goals Initiated 3/30/2021 within 7 day(s). 1.  Patient will perform bed mobility in preparation for selfcare with moderate assistance x 1.  
2.  Patient will perform lower body dressing with minimal assistance/contact guard assist. 
3.  Patient will perform functional activity sitting EOB for 4-7 minutes with modified independence, F+ balance. 4.  Patient will perform toilet transfers with moderate assistance x 2. 
5.  Patient will perform all aspects of toileting with moderate assistance. 6.  Patient will participate in upper extremity therapeutic exercise/activities with modified independence for 8 minutes. 7.  Patient will utilize energy conservation techniques during functional activities with verbal cues. Prior Level of Function: Patient was modified independent, additional time with self-care and used a RW for functional mobility PTA. Outcome: Progressing Towards Goal 
  
OCCUPATIONAL THERAPY EVALUATION Patient: Chantal Kaiser Sr. (78 y.o. male) Date: 3/30/2021 Primary Diagnosis: Rhabdomyolysis [M62.82] UTI (urinary tract infection) [N39.0] Precautions:Fall, Aspiration ASSESSMENT : 
Based on the objective data described below, the patient presents with decreased strength, decreased AROM of neck, decreased independence, decreased safety awareness, decreased functional balance, and decreased functional mobility, which impedes pt performance in basic self-care/ADL tasks. Patient would benefit from skilled OT to restore PLOF and maximize function. Patient will benefit from skilled intervention to address the above impairments. Patient's rehabilitation potential is considered to be Fair Factors which may influence rehabilitation potential include:  
[]             None noted [x]             Mental ability/status [x]             Medical condition [x] Home/family situation and support systems [x]             Safety awareness [x]             Pain tolerance/management 
[]             Other: PLAN : 
Recommendations and Planned Interventions:  
[x]               Self Care Training                  [x]      Therapeutic Activities [x]               Functional Mobility Training   []      Cognitive Retraining 
[x]               Therapeutic Exercises           [x]      Endurance Activities [x]               Balance Training                    [x]      Neuromuscular Re-Education []               Visual/Perceptual Training     [x]      Home Safety Training 
[x]               Patient Education                   [x]      Family Training/Education []               Other (comment): Frequency/Duration: Patient will be followed by occupational therapy 1-2 times per day/4-7 days per week to address goals. Discharge Recommendations: Rehab Further Equipment Recommendations for Discharge: hospital bed, mechanical lift, rolling walker, and wheelchair SUBJECTIVE:  
Patient stated I can do my socks OBJECTIVE DATA SUMMARY:  
 
Past Medical History:  
Diagnosis Date Abnormal EKG Bone pain BPH (benign prostatic hyperplasia) Bradycardia Chest pain, unspecified R/O CAD 3/10 mild fixed inferior defect Diabetes mellitus ([facility] [place] [address] denies 9/30/19 Echocardiogram 04/02/2010 [facility]:  EF 56%. No RWMA. Mod conc LVH. DDfx. RVSP 22 mmHg. HCVD (hypertensive cardiovascular disease) Hematuria, unspecified History of silent myocardial infarction Hypercholesteremia Hyperlipidemia Hypertension Hypertension Ill-defined condition UTI Kidney stones Leg pain, right   
 normal exercise JOAQUIN (fall 2014) Lower extremity arterial testing 10/07/2014 No arterial disease at rest bilaterally. R JOAQUIN 1.27.  L JOAQUIN 1.35. No significant drop in JOAQUIN w/exercise c/w normal perfusion.   
 Lower urinary tract symptoms (LUTS) Muscular weakness Nuclear cardiac imaging test 12/02/2011 Likely normal.  Fixed inferior basal and mid inferior defect likely artifact. No ischemia. EF 60%. No WMA. Other specified cardiac dysrhythmias(427.89) Bradycardia and pauses on holter Swelling of limb Unspecified sleep apnea   
 does not use cpap machine Past Surgical History:  
Procedure Laterality Date BIOPSY  3/25/99 COLONOSCOPY N/A 10/7/2019 COLONOSCOPY with bx's performed by Mary Chavez MD at [address] REPLACEMENT  12/6/11 HX UROLOGICAL  01/06/2012 [facility], Dr. Micah Franco, Transurethral resection of prostate, button prostatectomy HX UROLOGICAL  1/29/2015 [facility], Dr. Clemente Toth, ESWL  
 [facility] COL[place]OSCOPY FLX DX W/COLLJ [address] WHEN PFRMD    
 [facility] TOTAL HIP ARTHROPLASTY Left 2014 Dr. Shan Lackey Barriers to Learning/Limitations: None Compensate with: visual, verbal, tactile, kinesthetic cues/model Home Situation:  
Home Situation Home Environment: Private residence # Steps to Enter: 3 Rails to Enter: Yes One/Two Story Residence: One story Living Alone: Yes Support Systems: Child(macey) Patient Expects to be Discharged to[de-identified] Private residence Current DME Used/Available at Home: [employer] Tub or Shower Type: Tub/Shower combination 
[x]  Right hand dominant   []  Left hand dominant Cognitive/Behavioral Status: 
Neurologic State: Alert Orientation Level: Oriented to person;Oriented to place;Oriented to situation Cognition: Follows commands;Poor safety awareness Safety/Judgement: Fall prevention Skin: Intact Edema: None noted Vision/Perceptual:   
Acuity: Within Defined Limits Coordination: BUE Fine Motor Skills-Upper: Left Intact; Right Intact Gross Motor Skills-Upper: Left Intact; Right Intact Balance: 
Sitting: Impaired; With support Sitting - Static: Fair (occasional) Sitting - Dynamic: (F-) Standing: Impaired; With support Standing - Static: Fair Standing - Dynamic : Fair;Poor Strength: BUE Strength: Generally decreased, functional 
  
 
Tone & Sensation: BUE Tone: Normal 
Sensation: Intact Range of Motion: BUE 
AROM: Generally decreased, functional 
 
Functional Mobility and Transfers for ADLs: 
Bed Mobility: 
Supine to Sit: Maximum assistance;Assist x2 Sit to Supine: Moderate assistance;Assist x2 Scooting: Total assistance;Assist x2 Transfers: 
Sit to Stand: Maximum assistance;Assist x2 Stand to Sit: Maximum assistance;Assist x2 ADL Assessment:  
Feeding: Setup;Stand-by assistance Oral Facial Hygiene/Grooming: Setup;Stand-by assistance Bathing: Moderate assistance(F- balance seated) Upper Body Dressing: Stand-by assistance Lower Body Dressing: Moderate assistance;Maximum assistance(standing) Toileting: Moderate assistance;Maximum assistance(standing) ADL Intervention: Lower Body Dressing Assistance Dressing Assistance: Moderate assistance Socks: Moderate assistance(F- balance; LLE more difficult than RLE) Leg Crossed Method Used: Yes Position Performed: Seated edge of bed Cognitive Retraining Safety/Judgement: Fall prevention Therapeutic Exercise: 
Patient educated on the importance of neck flexion/ext, lateral rotation and shoulder shrugs 2/2 intermittent neck pain this session. Pain: 
Pain level pre-treatment: 0/10 Pain level post-treatment: 2/10 , neck pain -not consistent Pain Intervention(s): Medication (see MAR); Response to intervention: Nurse notified, See doc flow Activity Tolerance:  
Patient demonstrated fair- activity tolerance during OT evaluation. Please refer to the flowsheet for vital signs taken during this treatment. After treatment:  
[] Patient left in no apparent distress sitting up in chair 
[x] Patient left in no apparent distress in bed 
[x] Call bell left within reach [x] Nursing notified 
[] Caregiver present 
[] Bed alarm activated COMMUNICATION/EDUCATION:  
[x] Role of Occupational Therapy in the acute care setting 
[x] Home safety education was provided and the patient/caregiver indicated understanding. [x] Patient/family have participated as able in goal setting and plan of care. [x] Patient/family agree to work toward stated goals and plan of care. [] Patient understands intent and goals of therapy, but is neutral about his/her participation. [] Patient is unable to participate in goal setting and plan of care. Thank you for this referral. 
Antoinette Rodriguez OTR/L Time Calculation: 26 mins Eval Complexity: History: MEDIUM Complexity : Expanded review of history including physical, cognitive and psychosocial  history ; Examination: HIGH Complexity : 5 or more performance deficits relating to physical, cognitive , or psychosocial skils that result in activity limitations and / or participation restrictions; Decision Making:HIGH Complexity : Patient presents with comorbidities that affect occupational performance. Signifigant modification of tasks or assistance (eg, physical or verbal) with assessment (s) is necessary to enable patient to complete evaluation

## 2021-03-30 NOTE — PROGRESS NOTES
Progress Note Patient: La Cueva Sr.               Sex: male          DOA: 3/28/2021 YOB: 1940      Age:  78 y.o.        LOS:  LOS: 2 days CHIEF COMPLAINT:  Rhabdomyolysis improving, confusion seems resolved. Subjective:  
 
Patient is awake and talking He got EEG today He feels good Objective:  
  
Visit Vitals /73 (BP 1 Location: Left arm, BP Patient Position: Sitting) Pulse 67 Temp 97.6 °F (36.4 °C) Resp 16 Ht 5' 11\" (1.803 m) Wt 99.3 kg (219 lb) SpO2 99% BMI 30.54 kg/m² Physical Exam: 
Gen:  No distress, no complaint Lungs:  Clear bilaterally, no wheeze or rhonchi Heart:  Regular rate and rhythm, no murmurs or gallops Abdomen:  Soft, non-tender, normal bowel sounds Lab/Data Reviewed: 
BMP:  
Lab Results Component Value Date/Time  03/30/2021 01:53 AM  
 K 4.0 03/30/2021 01:53 AM  
  03/30/2021 01:53 AM  
 CO2 25 03/30/2021 01:53 AM  
 AGAP 6 03/30/2021 01:53 AM  
 GLU 68 (L) 03/30/2021 01:53 AM  
 BUN 15 03/30/2021 01:53 AM  
 CREA 0.87 03/30/2021 01:53 AM  
 GFRAA >60 03/30/2021 01:53 AM  
 GFRNA >60 03/30/2021 01:53 AM  
 
CBC:  
Lab Results Component Value Date/Time WBC 5.9 03/30/2021 01:53 AM  
 HGB 12.8 (L) 03/30/2021 01:53 AM  
 HCT 39.1 03/30/2021 01:53 AM  
  03/30/2021 01:53 AM  
 
 
 
 
Assessment/Plan Principal Problem: 
  Rhabdomyolysis (3/28/2021) Active Problems: Metabolic encephalopathy (9/62/7000) Hypertension (5/29/2012) UTI (urinary tract infection) (3/28/2021) Ruled out Diabetes mellitus ([address] [address] () Plan: 
Following CPK with IVF No evidence of seizure activity in the Hospital, EEG today Mobilize with PT 
Urine culture is negative, will stop antibiotics BS control.

## 2021-03-31 NOTE — ROUTINE PROCESS
Bedside and Verbal shift change report given to osito rn (oncoming nurse) by Kat Ohara RN (offgoing nurse). Report given with SBAR, Kardex, Intake/Output, MAR, Accordion and Recent Results.

## 2021-03-31 NOTE — PROGRESS NOTES
Progress Note Patient: Reid Ca Sr.               Sex: male          DOA: 3/28/2021 YOB: 1940      Age:  78 y.o.        LOS:  LOS: 3 days CHIEF COMPLAINT:  Rhabdomyolysis improved with ongoing weakness Subjective:  
 
Patient is feeling better He has relatively clear mental status Objective:  
  
Visit Vitals BP (!) 147/86 (BP 1 Location: Left upper arm, BP Patient Position: At rest) Pulse 81 Temp 97.8 °F (36.6 °C) Resp 16 Ht 5' 11\" (1.803 m) Wt 101.2 kg (223 lb) SpO2 96% BMI 31.10 kg/m² Physical Exam: 
Gen:  No distress, no complaint Lungs:  Clear bilaterally, no wheeze or rhonchi Heart:  Regular rate and rhythm, no murmurs or gallops Abdomen:  Soft, non-tender, normal bowel sounds Lab/Data Reviewed: 
BMP:  
Lab Results Component Value Date/Time  03/31/2021 04:30 AM  
 K 4.1 03/31/2021 04:30 AM  
  03/31/2021 04:30 AM  
 CO2 24 03/31/2021 04:30 AM  
 AGAP 7 03/31/2021 04:30 AM  
 GLU 79 03/31/2021 04:30 AM  
 BUN 14 03/31/2021 04:30 AM  
 CREA 0.79 03/31/2021 04:30 AM  
 GFRAA >60 03/31/2021 04:30 AM  
 GFRNA >60 03/31/2021 04:30 AM  
 
CBC:  
Lab Results Component Value Date/Time WBC 4.9 03/31/2021 04:30 AM  
 HGB 12.3 (L) 03/31/2021 04:30 AM  
 HCT 36.8 03/31/2021 04:30 AM  
  03/31/2021 04:30 AM  
 
 
 
 
Assessment/Plan Principal Problem: 
  Rhabdomyolysis (3/28/2021) Active Problems: Metabolic encephalopathy (7/34/6301) Hypertension (5/29/2012) UTI (urinary tract infection) (3/28/2021) Diabetes mellitus ([facility] [address] () Plan: 
Continue with PT 
MRI spine today BS control Mobilization Working toward disposition, discussed with Case Management today regarding SNF

## 2021-03-31 NOTE — PROGRESS NOTES
Problem: Mobility Impaired (Adult and Pediatric) Goal: *Acute Goals and Plan of Care (Insert Text) Description: Physical Therapy Goals Initiated 3/29/2021 and to be accomplished within 7 day(s) 1. Patient will move from supine to sit and sit to supine  in bed with supervision/set-up. 2.  Patient will transfer from bed to chair and chair to bed with supervision/set-up using the least restrictive device. 3.  Patient will perform sit to stand with supervision/set-up. 4.  Patient will ambulate with supervision/set-up for 50 feet with the least restrictive device. PLOF: Mod I with ww. Lives alone. Outcome: Progressing Towards Goal 
  
PHYSICAL THERAPY TREATMENT Patient: Lino Carballo Sr. (78 y.o. male) Date: 3/31/2021 Diagnosis: Rhabdomyolysis [M62.82] UTI (urinary tract infection) [N39.0] Rhabdomyolysis Precautions: Fall, Aspiration ASSESSMENT: 
Pt found supine in bed, in NAD, willing to work with PT. He reports feeling better. He performed supine-sit with Derian and additional time. Pt requires increased time for all mobility 2/2 stiffness and weakness throughout body. Pt stood with maxAx1 and RW, with max cueing for pushing with B UE's and using rocking for momentum. Once partially standing, he has trouble with lifting his R arm up to reach for the walker. He initially stands with a crouched body with B knees bent. Pt able to fully stand up for 1 full minute with maxA before needing to sit down. Pt back sitting for seated rest and performed another STS with maxA. Bed pads replaced 2/2 being soiled. Pt performed exercises per flow sheet sitting on EOB. He was also given B cervical ROM and cervical flex/ext exercises to improve cervical ROM and muscle stiffness as he states he still has some sharp cervical pain at times. Pt returned back supine in bed and was assisted scooting up towards [facility].  He was left with HOB elevated, pillow underneath knees for support, with all bell nearby, bed alarm on. Pt and RN edu on helping pt get sitting on Eob to eat his meals. Progression toward goals:  
[]      Improving appropriately and progressing toward goals [x]      Improving slowly and progressing toward goals 
[]      Not making progress toward goals and plan of care will be adjusted PLAN: 
Patient continues to benefit from skilled intervention to address the above impairments. Continue treatment per established plan of care. Discharge Recommendations:  Rehab Further Equipment Recommendations for Discharge:  N/A  
 
SUBJECTIVE:  
Patient stated i'd like to sit on the edge of the bed for my lunch.  OBJECTIVE DATA SUMMARY:  
Critical Behavior: 
Neurologic State: Alert Orientation Level: Oriented X4 Cognition: Follows commands Safety/Judgement: Fall prevention Functional Mobility Training: 
Bed Mobility: 
  
Supine to Sit: Minimum assistance Sit to Supine: Moderate assistance Scooting: Maximum assistance Transfers: 
Sit to Stand: Maximum assistance;Assist x1;Additional time Stand to Sit: Minimum assistance Balance: 
Sitting: Impaired; With support Sitting - Static: Good (unsupported) Sitting - Dynamic: Fair (occasional) Standing: Impaired; With support Standing - Static: Fair Standing - Dynamic : Poor Therapeutic Exercises: Pt performed exercises per flow sheet sitting on EOB EXERCISE Sets Reps Active Active Assist  
Passive Self ROM Comments Ankle Pumps 2 20  [x] [] [] [] Quad Sets/Glut Sets    [] [] [] [] Hold for 5 secs Hamstring Sets   [] [] [] [] Short Arc Quads   [] [] [] [] Heel Slides   [] [] [] [] Straight Leg Raises   [] [] [] [] Hip Add   [] [] [] [] Hold for 5 secs, w/ pillow squeeze Long Arc Quads 2 15 [x] [] [] [] Seated Marching 2 20 [x] [] [] []   
Standing Marching   [] [] [] []   
   [] [] [] []   
 
 
Pain: 
Pain level pre-treatment: 4/10 Pain level post-treatment: 4/10 Pain Intervention(s): Medication (see MAR); Rest, Repositioning Response to intervention: Nurse notified, See doc flow Activity Tolerance:  
Pt tolerated mobility well Please refer to the flowsheet for vital signs taken during this treatment. After treatment:  
[] Patient left in no apparent distress sitting up in chair 
[x] Patient left in no apparent distress in bed 
[x] Call bell left within reach [x] Nursing notified 
[] Caregiver present [x] Bed alarm activated 
[] SCDs applied COMMUNICATION/EDUCATION:  
[x]         Role of Physical Therapy in the acute care setting. [x]         Fall prevention education was provided and the patient/caregiver indicated understanding. [x]         Patient/family have participated as able in working toward goals and plan of care. []         Patient/family agree to work toward stated goals and plan of care. []         Patient understands intent and goals of therapy, but is neutral about his/her participation. []         Patient is unable to participate in stated goals/plan of care: ongoing with therapy staff. 
[]         Other: 
 
   
Allen Bernal Time Calculation: 29 mins

## 2021-03-31 NOTE — PROGRESS NOTES
Bedside and Verbal shift [facility] ([facility] nurse) by Paul Trinidad RN 
 (offgoing nurse). Report given with DANNI, Tawana, MAR and Recent Results.

## 2021-03-31 NOTE — PROGRESS NOTES
Problem: Falls - Risk of 
Goal: *Absence of Falls Description: Document Nadia Robert Fall Risk and appropriate interventions in the flowsheet. Outcome: Progressing Towards Goal 
Note: Fall Risk Interventions: 
Mobility Interventions: Bed/chair exit alarm, PT Consult for mobility concerns, Patient to call before getting OOB, PT Consult for assist device competence, Strengthening exercises (ROM-active/passive) Medication Interventions: Bed/chair exit alarm, Teach patient to arise slowly Elimination Interventions: Bed/chair exit alarm, Call light in reach, Patient to call for help with toileting needs History of Falls Interventions: Bed/chair exit alarm, Room close to nurse's station, Door open when patient unattended Problem: Patient Education: Go to Patient Education Activity Goal: Patient/Family Education Outcome: Progressing Towards Goal 
  
Problem: Pain Goal: *Control of Pain Outcome: Progressing Towards Goal 
  
Problem: Patient Education: Go to Patient Education Activity Goal: Patient/Family Education Outcome: Progressing Towards Goal 
  
Problem: Nutrition Deficit Goal: *Optimize nutritional status Outcome: Progressing Towards Goal

## 2021-03-31 NOTE — PROGRESS NOTES
MRI Safety Screening form needs to be filled out and FAXED to [phone] BEFORE MRI can be scheduled. If unable to acquire information from patient, [facility] must be contacted, or screening xrays will need to be ordred.  
 
If pt is Claustrophobic or needs Pain Meds, please have these ordered in advance to help facilitate MRI exam.

## 2021-03-31 NOTE — PROGRESS NOTES
Problem: Falls - Risk of 
Goal: *Absence of Falls Description: Document Onalee Gum Fall Risk and appropriate interventions in the flowsheet. Outcome: Progressing Towards Goal 
Note: Fall Risk Interventions: 
Mobility Interventions: Bed/chair exit alarm, OT consult for ADLs, Patient to call before getting OOB, PT Consult for mobility concerns Medication Interventions: Bed/chair exit alarm Elimination Interventions: Call light in reach History of Falls Interventions: Bed/chair exit alarm, Door open when patient unattended, Room close to nurse's station Problem: Patient Education: Go to Patient Education Activity Goal: Patient/Family Education Outcome: Progressing Towards Goal 
  
Problem: Patient Education: Go to Patient Education Activity Goal: Patient/Family Education Outcome: Progressing Towards Goal 
  
Problem: Pressure Injury - Risk of 
Goal: *Prevention of pressure injury Description: Document Luca Scale and appropriate interventions in the flowsheet. Outcome: Progressing Towards Goal 
Note: Pressure Injury Interventions: 
Sensory Interventions: Assess need for specialty bed Moisture Interventions: Absorbent underpads Activity Interventions: Increase time out of bed Mobility Interventions: HOB 30 degrees or less Nutrition Interventions: Document food/fluid/supplement intake Friction and Shear Interventions: Minimize layers Problem: Patient Education: Go to Patient Education Activity Goal: Patient/Family Education Outcome: Progressing Towards Goal 
  
Problem: Pain Goal: *Control of Pain Outcome: Progressing Towards Goal 
Goal: *PALLIATIVE CARE:  Alleviation of Pain Outcome: Progressing Towards Goal 
  
Problem: Patient Education: Go to Patient Education Activity Goal: Patient/Family Education Outcome: Progressing Towards Goal 
  
Problem: Injury - Risk of, Adverse Drug Event Goal: *Absence of adverse drug events Outcome: Progressing Towards Goal 
Goal: *Absence of medication errors Outcome: Progressing Towards Goal 
Goal: *Knowledge of prescribed medications Outcome: Progressing Towards Goal 
  
Problem: Patient Education: Go to Patient Education Activity Goal: Patient/Family Education Outcome: Progressing Towards Goal 
  
Problem: Patient Education: Go to Patient Education Activity Goal: Patient/Family Education Outcome: Progressing Towards Goal 
  
Problem: Nutrition Deficit Goal: *Optimize nutritional status Outcome: Progressing Towards Goal

## 2021-03-31 NOTE — PROGRESS NOTES
Reason for Admission:  Rhabdomyolysis [M62.82] UTI (urinary tract infection) [N39.0] RUR Score:    13 Plan for utilizing home health:    no   
                 
Likelihood of Readmission:   LOW Transition of Care Plan:         
 
 
Initial assessment completed with patient. Cognitive status of patient: oriented to time, place, person and situation. Face sheet information confirmed:  yes. The patient designates son to participate in his discharge plan and to receive any needed information. This patient lives in a single family home with patient. Patient is able to navigate steps as needed. Prior to hospitalization, patient was considered to be independent with ADLs/IADLS : yes . Patient has a current ACP document on file: no 
 
 
Healthcare Decision Maker:  
 
Click here to complete [place] including selection of the Healthcare Decision Maker Relationship (ie \"Primary\") The patient and other:  BLS will be available to transport patient home upon discharge. The patient already has Merlin Mock,  medical equipment available in the home. Patient is not currently active with home health. Patient has not stayed in a skilled nursing facility or rehab. This patient is on dialysis :no 
 
List of available SNF agencies were provided and reviewed with the patient prior to discharge. Freedom of choice signed: yes, for [facility] and [address] [place] [address]. Currently, the discharge plan is SNF. The patient states that he can obtain his medications from the pharmacy, and take his medications as directed. Patient's current insurance is Medicare and [employer] Care Management Interventions PCP Verified by CM: Yes Mode of Transport at Discharge: Self Physical Therapy Consult: Yes Occupational Therapy Consult: Yes Current Support Network: Lives Alone Confirm Follow Up Transport: Family The Plan for Transition of Care is Related to the Following Treatment Goals : SNF The Patient and/or Patient Representative was Provided with a Choice of Provider and Agrees with the Discharge Plan?: Yes Freedom of Choice List was Provided with Basic Dialogue that Supports the Patient's Individualized Plan of Care/Goals, Treatment Preferences and Shares the Quality Data Associated with the Providers?: Yes Discharge Location Discharge Placement: Skilled nursing facility Basia Osborne RN BSN Care Manager [phone]

## 2021-04-01 NOTE — PROGRESS NOTES
Problem: Falls - Risk of 
Goal: *Absence of Falls Description: Document Roz Connors Fall Risk and appropriate interventions in the flowsheet. Outcome: Progressing Towards Goal 
Note: Fall Risk Interventions: 
Mobility Interventions: Bed/chair exit alarm, Patient to call before getting OOB, PT Consult for mobility concerns, Strengthening exercises (ROM-active/passive) Medication Interventions: Bed/chair exit alarm, Patient to call before getting OOB, Teach patient to arise slowly Elimination Interventions: Bed/chair exit alarm, Call light in reach, Patient to call for help with toileting needs, Toileting schedule/hourly rounds History of Falls Interventions: Bed/chair exit alarm, Consult care management for discharge planning, Door open when patient unattended, Room close to nurse's station Problem: Patient Education: Go to Patient Education Activity Goal: Patient/Family Education Outcome: Progressing Towards Goal 
  
Problem: Pressure Injury - Risk of 
Goal: *Prevention of pressure injury Description: Document Luca Scale and appropriate interventions in the flowsheet. Outcome: Progressing Towards Goal 
Note: Pressure Injury Interventions: 
Sensory Interventions: Assess changes in LOC, Keep linens dry and wrinkle-free, Maintain/enhance activity level, Minimize linen layers, Pressure redistribution bed/mattress (bed type), Use 30-degree side-lying position Moisture Interventions: Absorbent underpads, Apply protective barrier, creams and emollients, Check for incontinence Q2 hours and as needed, Internal/External urinary devices, Limit adult briefs, Maintain skin hydration (lotion/cream), Minimize layers, Moisture barrier Activity Interventions: Increase time out of bed, Pressure redistribution bed/mattress(bed type), PT/OT evaluation Mobility Interventions: HOB 30 degrees or less, Float heels, Pressure redistribution bed/mattress (bed type), PT/OT evaluation, Turn and reposition approx. every two hours(pillow and wedges) Nutrition Interventions: Document food/fluid/supplement intake, Offer support with meals,snacks and hydration Friction and Shear Interventions: Apply protective barrier, creams and emollients, Foam dressings/transparent film/skin sealants, HOB 30 degrees or less, Minimize layers Problem: Patient Education: Go to Patient Education Activity Goal: Patient/Family Education Outcome: Progressing Towards Goal 
  
Problem: Pain Goal: *Control of Pain Outcome: Progressing Towards Goal 
Goal: *PALLIATIVE CARE:  Alleviation of Pain Outcome: Progressing Towards Goal 
  
Problem: Patient Education: Go to Patient Education Activity Goal: Patient/Family Education Outcome: Progressing Towards Goal 
  
Problem: Injury - Risk of, Adverse Drug Event Goal: *Absence of adverse drug events Outcome: Progressing Towards Goal 
Goal: *Absence of medication errors Outcome: Progressing Towards Goal 
Goal: *Knowledge of prescribed medications Outcome: Progressing Towards Goal 
  
Problem: Patient Education: Go to Patient Education Activity Goal: Patient/Family Education Outcome: Progressing Towards Goal

## 2021-04-01 NOTE — PROCEDURES
[address] [place] [address]  
EEG Name:  Miguelina Salazar 
MR#:   [id number] :  [id number] ACCOUNT #:  [de-identified] DATE OF SERVICE:  2021 INPATIENT RECORDING 
 
ORDERING PROVIDER:  Kang Arce MD 
 
EEG Number:  [id number]  REASON FOR EEG:  Evaluation of altered mental status. EEG TECHNIQUE:  Standard 10-20 system with 16-channel recording and an EKG. Activation procedure used was photic stimulation. Total duration of the recording was 28 minutes. EEG REPORT:  The background consists of posterior dominant alpha rhythms at a frequency of 7-8 Hz and attenuates to eye opening. No significant asymmetry between the right and left sides. There are no obvious spikes or sharp wave discharges. There are sleep-related changes. No epileptiform discharges during sleep and photic stimulation. IMPRESSION:  This is basically a normal EEG recording. CLINICAL CORRELATION:  Normal EEG does not rule out the possibility of seizures or epilepsy. Kaila Zaman MD 
 
 
[id number] 
D:  2021 8:17 
T:  2021 15:23 
JOB #:  [id number]

## 2021-04-01 NOTE — DISCHARGE SUMMARY
Discharge Summary Patient: Eliazar Carr Sr.               Sex: male          DOA: 3/28/2021 YOB: 1940      Age:  78 y.o.        LOS:  LOS: 4 days Admit Date: 3/28/2021 Discharge Date: 4/1/2021 Discharge Medications:    
Current Discharge Medication List  
  
CONTINUE these medications which have NOT CHANGED Details  
apixaban (ELIQUIS) 5 mg tablet 2 tablets [10 MG] p.o. twice daily for 4 days followed by 1 tablet [5 MG] p.o. twice daily 
Qty: 70 Tab, Refills: 0  
  
tamsulosin (FLOMAX) 0.4 mg capsule Take 0.4 mg by mouth. tolterodine ER (DETROL-LA) 2 mg ER capsule Take 1 Cap by mouth daily. Qty: 30 Cap, Refills: 12  
  
pravastatin (PRAVACHOL) 40 mg tablet Take 40 mg by mouth nightly. metFORMIN (GLUCOPHAGE) 500 mg tablet Take 500 mg by mouth two (2) times daily (with meals). Associated Diagnoses: Diabetes mellitus ([address] diclofenac (VOLTAREN) 1 % gel Apply 2 g to affected area four (4) times daily as needed for Pain. Gel should be measured and applied using the supplied dosing card. Apply dose (2 gm or 4 gm) to each location. If treatment site is the hands, patients should wait at least one (1) hour to wash their hands. APPLY TO bilateral hands/forearms 
Qty: 100 g, Refills: 0  
  
polyethylene glycol (MIRALAX) 17 gram packet Take 17 g by mouth. raNITIdine (ZANTAC) 150 mg tablet Take 150 mg by mouth two (2) times a day. lisinopril (PRINIVIL, ZESTRIL) 30 mg tablet Take 30 mg by mouth daily. Follow-up: PCP Discharge Condition: Stable Activity: PT/OT Eval and Treat Diet: Resume previous diet Labs: 
Labs: Results:  
   
Chemistry Recent Labs 04/01/21 
0034 03/31/21 
0430 03/30/21 
0153 GLU 78 79 68*  141 141  
K 4.1 4.1 4.0  
 110 110 CO2 25 24 25 BUN 12 14 15 CREA 0.83 0.79 0.87 CA 7.9* 8.0* 7.6* AGAP 4 7 6 BUCR 14 18 17 AP  --  41* 40* TP  --  5.7* 6.0* ALB  --  2.7* 2.8* GLOB  --  3.0 3.2 AGRAT  --  0.9 0.9  
  
CBC w/Diff Recent Labs 04/01/21 
0034 03/31/21 
0430 03/30/21 
0153 WBC 5.4 4.9 5.9  
RBC 4.46* 4.37* 4.56* HGB 12.6* 12.3* 12.8* HCT 37.5 36.8 39.1  181 187 GRANS 56 48 60 LYMPH 30 34 23 EOS 2 5 3 Cardiac Enzymes Recent Labs 04/01/21 
0034 03/31/21 
0430 * 1,295* Coagulation No results for input(s): PTP, INR, APTT, INREXT in the last 72 hours. Lipid Panel Lab Results Component Value Date/Time Cholesterol, total 72 02/04/2020 10:54 AM  
 HDL Cholesterol 42 02/04/2020 10:54 AM  
 LDL, calculated Cannot be calculated 02/04/2020 10:54 AM  
 VLDL, calculated  02/04/2020 10:54 AM  
  Calculation not valid with this patient's other Lipid values. Triglyceride 381 (H) 02/04/2020 10:54 AM  
 CHOL/HDL Ratio 1.7 02/04/2020 10:54 AM  
  
BNP No results for input(s): BNPP in the last 72 hours. Liver Enzymes Recent Labs  
  03/31/21 
0430 TP 5.7* ALB 2.7* AP 41* Thyroid Studies Lab Results Component Value Date/Time T4, Total 8.3 02/04/2020 10:54 AM  
 TSH 0.68 08/20/2020 05:17 AM  
    
 
Imaging: Xr Chest Sngl V Result Date: 3/29/2021 EXAM:  XR CHEST SNGL V INDICATION:   Altered mental status COMPARISON: 8/18/2020. FINDINGS: Hypoinflation and elevation of the right hemidiaphragm. Stable mildly enlarged cardiac silhouette. No pneumothorax, pleural effusion or consolidation. Likely atelectasis in the right base. Intact osseous structures. Hypoinflation and likely right basilar atelectasis. No definite acute cardiopulmonary abnormality. Mri Brain Wo Cont Result Date: 3/30/2021 MRI BRAIN WITHOUT CONTRAST PROVIDED REASON FOR EXAM: AMS, fall, and generalized weakness.  Additional History: Patient was found down with some confusion Comparison Studies: Noncontrast head CT 3/28/2021 Imaging Technique:   Sequences:  Sagittal,  Coronal and axial T1 weighted, Diffusion Weighted (DWI), Axial T2 weighted, Axial T2 FLAIR, and SWI/GRE MRI images of the brain. Contrast Material:  NONE Limiting Factors/Major Artifacts: None. FINDINGS: Brain Parenchyma: Diffusion sequence negative for cytotoxic edema. No findings of an acute infarct. Cerebral white matter signal is normal for patient age. Trace amount of increased T2 signal at the margins of the frontal horns likely related to aging. No chronic cortical infarcts or chronic lacunar infarcts. No visible masses or midline shift. CSF Spaces:  Global cerebral and cerebellar volume loss. The ventricles are not enlarged relative to the brain volumes. No hydrocephalus. Vascular System:  Grossly patent flow in basilar and internal cerebral arteries. No findings of dural sinus thrombosis. Hemorrhage:  No acute hemorrhage. No chronic microhemorrhage. Other Structures: Calvarium: No suspicious marrow signal. Sella: Pituitary is not enlarged. Visualized Upper Cervical Spine: Normal. Orbits: Normal for non-dedicated exam. Bilateral lens surgery. Paranasal Sinuses: No significant paranasal sinus disease. Mastoid Air Cells:  No effusion. No acute intracranial abnormality. No mass, hemorrhage, or acute infarct. Mri Cerv Spine Wo Cont Result Date: 3/31/2021 MR CERVICAL SPINE WITHOUT CONTRAST HISTORY: Admitted after a fall; unable to ambulate and move his lower extremities COMPARISON: Recent CTA head neck March 28 TECHNIQUE: Multisequence multiplanar imaging through the cervical spine. FINDINGS: Alignment: Intact lordosis Vertebral body height: Normal Marrow signal: Unremarkable Disc spaces: Preserved height and signal intensity Cervicomedullary Junction: Patent Cervical cord: No cord expansion or atrophy.  On axial imaging, findings at each level are as follows: C2/C3: Central canal intact at this level cord is not compressed neuroforamina unremarkable for any significant stenosis there is a very mild midline protrusion bulge which touches the ventral aspect of the cord but signal the cord is otherwise normal C3/C4: Mild to moderate midline annular bulge touches the ventral aspect of the cord the neuroforamina unremarkable central canal at this level 0.76 cm stenotic. Cord exhibits no signal abnormality C4/C5: There is a left central moderate-sized spondylotic bar projecting into the canal this results in some stenotic canal stenosis 0.56 cm. The cord is mildly compressed along its left ventral aspect The right and left neuroforamina demonstrate moderate stenosis secondary to osteophyte arising from the uncinate joints. This could explain right and/or left C5 radicular complaints. Cord signal on this study is unremarkable for any edema. C5/C6: Mild to moderate midline annular bulge is present cord is effaced mildly along its ventral surface. Central canal this level 0.81 cm mildly stenotic The right and left neuroforamina show no evidence of any significant stenosis. C6/C7: Patent canal and foramina. C7/T1: Patent canal and foramina. Other structures: On the inversion recovery images there is suggestion of some mild increased signal in the anterior aspect of C5 and some mild increased signal anterior to C4-C7. No evidence of a definite focal collection is seen this could represent some mild sprain to the strap musculature. There is evidence for a degree of canal stenosis present from C3 through C6 as described Most severe compression to the cord occurs at the C4-5 level Despite these areas of compression of the cord signal at this time is normal. Please note there is some mild motion artifact Question possibility of some injury to the bone marrow C5 and possible sprain to the strap musculature as well. Thank you for enabling us to participate in the care of this patient. Mri Lumb Spine Wo Cont Result Date: 3/31/2021 EXAM: MRI LUMB SPINE WO CONT CLINICAL INDICATIONS/HISTORY: Admitted after a fall; unable to ambulate and move his lower extremities COMPARISON: None Technique: Multi-sequence multiplanar T1, T2, STIR imaging with and without fat saturation obtained centered on the lumbar spine. FINDINGS: Alignment: Intact lordosis Vertebral body height: Normal Marrow signal: Unremarkable Disc spaces: Preserved height and signal intensity Conus: Terminates at T12-L1 Axial imaging correlation: L1-2: Patent canal and foramina. L2-3: Patent canal and foramina. L3-4: Central canal right neuroforamina demonstrates encroachment from facet hypertrophy and a small focal herniation into the caudal aspect of the neuroforamina which contacts and mildly compresses the exiting right L3 nerve root. Moderate placed on sagittal image 13 series 2. L4-5: Central canal stenosis AP dimension 0.4 cm there is moderately severe facet arthropathy with hypertrophic changes there is also a subluxation 0.6 cm of L4 on 5. The right and left neuroforamina are not stenotic L5-S1: Central canal intact left right neuroforamina demonstrate no significant stenosis. Portions of the sacrum included on both sides show no evidence of edema to suggest insufficiency fracture here. Other structures: Benign cyst arising posterior cortex of the right kidney Large amount of stool is retained in the rectum question significance Distal cord Conus region show no evidence of compression There is evidence for canal stenosis at the L4-5 level Note is made of neuroforaminal stenosis with mild compression of the exiting right L3 root at the L3-4 level Ct Head Wo Cont Result Date: 3/28/2021 INDICATION: Code stroke COMPARISON: 8/20 TECHNIQUE: Unenhanced CT of the head was performed using 5 mm images. Brain and bone windows were generated.  All CT scans at this facility are performed using doze optimization technique as appropriate to a performed exam, to include automated exposure control, adjustment of the mA and/or KV according to patient size (including appropriate matching for site specific examinations), or use of iterative reconstruction technique. FINDINGS: Similar global volume loss and minimal periventricular and subcortical white matter hypoattenuation, presumed chronic microvascular ischemic change. There is no significant white matter disease. There is no intracranial hemorrhage, extra-axial collection, mass, mass effect or midline shift. The basilar cisterns are open. No acute infarct is identified. The bone windows demonstrate no abnormalities. The visualized portions of the paranasal sinuses and mastoid air cells are clear. No acute intracranial abnormality. Please note that MR is more sensitive for early acute ischemia. Discussed with Dr. Prachi Villarreal at 1524 hours. Us Retroperitoneum Comp Result Date: 3/29/2021 Retroperitoneal Ultrasound Limited INDICATION: Abnormal urinalysis, blood and protein in the urine. TECHNIQUE: Select images from retroperitoneal ultrasound provided for interpretation. COMPARISON:  No prior studies for comparison. FINDINGS:  Right kidney: The right kidney measures 9 x 5 x 5 cm. Normal echogenicity with lobular border. Right kidney upper pole cyst 3.5 cm. No nephrolithiasis or hydronephrosis. Lobular border. No hydronephrosis or nephrolithiasis. Left kidney: The left kidney measures 11 by 4 x 4 centimeter. Normal echogenicity with lobular border. Upper pole cyst 1.6 x 1.5 x 2 cm Bladder: The bladder is distended and unremarkable. Prevoid volume of 463.7 mL. Darletta Moses No free fluid noted in the pelvis. Single bilateral simple renal cysts. No hydronephrosis. Xr Hips Bi W Or Wo Ap Pelv Result Date: 3/29/2021 EXAM:  XR HIPS BI W OR WO AP PELV INDICATION:   Pain COMPARISON: None. FINDINGS: An AP view of the pelvis and bilateral frog-leg views Osseous pelvis appears intact. Excreted contrast in the urinary bladder. Left total hip arthroplasty without evidence for periprostatic fracture or loosening. No right hip fracture or dislocation. No acute osseous abnormality.  
 
Cta Head Neck W Cont Result Date: 3/29/2021 EXAM: CTA HEAD NECK W CONT CLINICAL INDICATIONS: stroke evaluation TECHNIQUE: Helical CT scan of the brain and neck were performed at 1.25 mm intervals during rapid IV bolus contrast administration. These data were reconstructed at .625 mm intervals for vascular analysis. The data was also reviewed at 2.5 mm intervals for accompanying soft tissue analysis. 3D post processed images, including surface shaded displays, were produced for this exam on independent console, permanently archived and interpreted. All CT scans at this facility are performed using dose optimization technique as appropriate to a performed exam, to include automated exposure control, adjustment of the MA and/or kV according to patient size (including appropriate matching for site-specific examinations) or use of  iterative reconstruction technique. CONTRAST: 100 cc Isovue 370 COMPARISON: Head CT March 28 CTA SOFT TISSUE ANALYSIS: Lungs: Ossified pleural plaques identified on the right side Upper chest: Unremarkable Neck: Patient is edentulous Lymph nodes: No adenopathy Orbits: Prior bilateral cataract surgeries Paranasal sinuses: Clear Brain: No hemorrhage or mass effect. Bones: Spondylotic changes are identified in the cervical spine there is osteophytic projection arising at the C5-6 level into the central canal there may be a degree of central canal stenosis present here see image 49 series 3. CTA NECK VASCULAR ANALYSIS: Aortic arch: Left sided with typical configuration. Tortuosity of great vessels identified reflecting atherosclerosis there is a bovine arch, type I configuration Innominate: Patent Right Subclavian: Patent Left Subclavian: Patent Right carotid: -CCA: Patent -ECA: Patent -ICA: Patent. Estimated less than 10%% stenosis of proximal ICA by NASCET criteria. Left carotid: -CCA: Patent -ECA: Patent -ICA: Patent Estimated less than 10%% stenosis of proximal ICA by NASCET criteria.  Right vertebral: Patent codominant  Left vertebral: Patent CTA BRAIN VASCULAR ANALYSIS: Right anterior circulation: -ICA: Patent -LUC: Patent -MCA: Patent Left anterior circulation: -ICA: Patent -LUC: Patent -MCA: Patent -ACOM: Unremarkable -PCOM: Not well seen Posterior circulation: -RVA: Patent -LVA: Patent -Basilar: Patent -Right PCA: Patent -Left PCA: Patent Major dural venous sinuses: Unremarkable 1. There is atherosclerotic changes of the carotid bifurcations but no significant stenosis seen Heavy atherosclerotic changes in the intradural fourth segment of the vertebral arteries bilaterally reconstruction images demonstrate no flow limitations identified to suggest severe stenosis Calcified pleural plaques right lung apex suggest asbestosis Wet read was provided 1700 hours March 28 there is no disagreement with the wet read, final read Consults: Neurology Treatment Team: Treatment Team: Attending Provider: Carl Fletcher MD; Consulting Provider: Donnell Garay MD; Utilization Review: Oumou Sandra RN; Dietitian: Radha Salas, [address] [place] [address] Significant Diagnostic Studies: labs:  
Recent Results (from the past 24 hour(s)) GLUCOSE, POC Collection Time: 03/31/21  3:55 PM  
Result Value Ref Range Glucose (POC) 123 (H) 70 - 110 mg/dL GLUCOSE, POC Collection Time: 03/31/21  9:01 PM  
Result Value Ref Range Glucose (POC) 103 70 - 110 mg/dL CK Collection Time: 04/01/21 12:34 AM  
Result Value Ref Range  (H) 39 - 435 U/L  
METABOLIC PANEL, BASIC Collection Time: 04/01/21 12:34 AM  
Result Value Ref Range Sodium 140 136 - 145 mmol/L Potassium 4.1 3.5 - 5.5 mmol/L Chloride 111 100 - 111 mmol/L  
 CO2 25 21 - 32 mmol/L Anion gap 4 3.0 - 18 mmol/L Glucose 78 74 - 99 mg/dL BUN 12 7.0 - 18 MG/DL Creatinine 0.83 0.6 - 1.3 MG/DL  
 BUN/Creatinine ratio 14 12 - 20 GFR est AA >60 >60 ml/min/1.73m2 GFR est non-AA >60 >60 ml/min/1.73m2  Calcium 7.9 (L) 8.5 - 10.1 MG/DL  
CBC WITH AUTOMATED DIFF Collection Time: 04/01/21 12:34 AM  
Result Value Ref Range WBC 5.4 4.6 - 13.2 K/uL  
 RBC 4.46 (L) 4.70 - 5.50 M/uL  
 HGB 12.6 (L) 13.0 - 16.0 g/dL HCT 37.5 36.0 - 48.0 % MCV 84.1 74.0 - 97.0 FL  
 MCH 28.3 24.0 - 34.0 PG  
 MCHC 33.6 31.0 - 37.0 g/dL  
 RDW 13.7 11.6 - 14.5 % PLATELET 818 671 - 882 K/uL MPV 9.0 (L) 9.2 - 11.8 FL  
 NEUTROPHILS 56 40 - 73 % LYMPHOCYTES 30 21 - 52 % MONOCYTES 12 (H) 3 - 10 % EOSINOPHILS 2 0 - 5 % BASOPHILS 0 0 - 2 %  
 ABS. NEUTROPHILS 3.0 1.8 - 8.0 K/UL  
 ABS. LYMPHOCYTES 1.7 0.9 - 3.6 K/UL  
 ABS. MONOCYTES 0.6 0.05 - 1.2 K/UL  
 ABS. EOSINOPHILS 0.1 0.0 - 0.4 K/UL  
 ABS. BASOPHILS 0.0 0.0 - 0.1 K/UL  
 DF AUTOMATED    
GLUCOSE, POC Collection Time: 04/01/21  6:17 AM  
Result Value Ref Range Glucose (POC) 96 70 - 110 mg/dL GLUCOSE, POC Collection Time: 04/01/21 11:59 AM  
Result Value Ref Range Glucose (POC) 84 70 - 110 mg/dL Discharge diagnoses:   
Problem List as of 4/1/2021 Date Reviewed: 2/18/2021 Codes Class Noted - Resolved Metabolic encephalopathy [id number]: G93.41 
ICD-9-CM: 348.31  3/29/2021 - Present * (Principal) Rhabdomyolysis ICD-10-CM: B83.69 ICD-9-CM: 728.88  3/28/2021 - Present UTI (urinary tract infection) ICD-10-CM: N39.0 ICD-9-CM: 599.0  3/28/2021 - Present Pneumonia due to COVID-19 virus ICD-10-CM: U07.1, J12.82 ICD-9-CM: 480.8, 079.89  8/19/2020 - Present Overview Signed 8/19/2020  6:29 AM by Pedro Bowen MD  
  Notably elevated ferritin, D Dimer, LDH Thrombocytopenia ([facility] [address] ICD-10-CM: D69.6 ICD-9-CM: 287.5  8/19/2020 - Present Shortness of breath ICD-10-CM: R06.02 
ICD-9-CM: 786.05  8/19/2020 - Present Bladder neck contracture ICD-10-CM: N32.0 ICD-9-CM: 596.0  1/19/2018 - Present Microscopic hematuria ICD-10-CM: R31.29 ICD-9-CM: 599.72  1/19/2018 - Present  History of nephrolithiasis ICD-10-CM: E50.479 ICD-9-CM: V13.01  1/19/2018 - Present Obesity (BMI 30.0-34.9) ICD-10-CM: S42.0 ICD-9-CM: 278.00  1/19/2018 - Present Leg pain, right ICD-10-CM: M79.604 ICD-9-CM: 729.5  Unknown - Present Overview Signed 5/15/2015 12:55 PM by Kel Mercer  
  normal exercise JOAQUIN (fall 2014) Bone pain ICD-10-CM: F46.1M5 ICD-9-CM: 733.90  Unknown - Present Muscular weakness ICD-10-CM: M62.81 ICD-9-CM: 728.87  Unknown - Present BPH (benign prostatic hyperplasia) ICD-10-CM: N40.0 ICD-9-CM: 600.00  6/15/2012 - Present Hypertension ICD-10-CM: I10 
ICD-9-CM: 401.9  5/29/2012 - Present HCVD (hypertensive cardiovascular disease) ICD-10-CM: I11.9 ICD-9-CM: 402.90  Unknown - Present Overview Signed 5/15/2015 12:55 PM by Kel Mercer  
  LV EF 55%, moderate LVH (echo 2010) Hyperlipidemia ICD-10-CM: E78.5 ICD-9-CM: 272.4  Unknown - Present Diabetes mellitus ([facility] [address] ICD-10-CM: E11.9 ICD-9-CM: 250.00  Unknown - Present Bradycardia ICD-10-CM: R00.1 ICD-9-CM: 427.89  Unknown - Present RESOLVED: Hypertension ICD-10-CM: I11.9 ICD-9-CM: 402.10  Unknown - 11/28/2011 Hospital Course:  
Major issues addressed during hospitalization outlined  below. Kinnie Essex is a 78 y.o.  male with hx of HTN, HLD, DVT on anticoagulation, Covid-19 (less than one year ago), BPH, OA who presented to the ED via EMS today after being found on the floor at home after a presumed fall. Family last talked to pt around 2200 last night and did seem a bit confused per son. This morning, family checked on him and found pt on floor laying on his right side. In the ED, nurses report pt was soiled with urine and feces with pressures sores noted on the right side of his body. Pt is not sure when the last time he ate. He has been declining per reports. Patient was admitted 2/2 AMS, weakness and found to have significant rhabdo.   He was treated with bolus and continuous IVF with improvement, stable creatinine and steady decrease of CK. His mental status improved. Neuro consulted as he was ruled out for cva with CTA and MRI both negative. MRIs of spine done as well, this did not show significant stenosis that would raise question of central stenosis, cauda equina etc.  He should f/u as an op for minor findings. Given a normal creatinine and improved CK he is clear for dc to snf today,  PT/OT recommended this and followed during hospitalization. Ehsan Paul MD 
April 1, 2021 Total time spent 40 minutes

## 2021-04-01 NOTE — DISCHARGE INSTRUCTIONS
Patient armband removed and shredded  MyChart Activation    Thank you for requesting access to [facility]. Please follow the instructions below to securely access and download your online medical record. [facility] allows you to send messages to your doctor, view your test results, renew your prescriptions, schedule appointments, and more. How Do I Sign Up? 1. In your internet browser, go to [email or web address]  2. Click on the First Time User? Click Here link in the Sign In box. You will be redirect to the New Member Sign Up page. 3. Enter your [facility] Access Code exactly as it appears below. You will not need to use this code after youve completed the sign-up process. If you do not sign up before the expiration date, you must request a new code. [facility] Access Code: [email or web address]  Expires: 2021  2:31 PM (This is the date your [facility] access code will )    4. Enter the last four digits of your Social Security Number (xxxx) and Date of Birth (mm/dd/yyyy) as indicated and click Submit. You will be taken to the next sign-up page. 5. Create a [facility] ID. This will be your [facility] login ID and cannot be changed, so think of one that is secure and easy to remember. 6. Create a [facility] password. You can change your password at any time. 7. Enter your Password Reset Question and Answer. This can be used at a later time if you forget your password. 8. Enter your e-mail address. You will receive e-mail notification when new information is available in [address]. 9. Click Sign Up. You can now view and download portions of your medical record. 10. Click the Download Summary menu link to download a portable copy of your medical information. Additional Information    If you have questions, please visit the Frequently Asked Questions section of the [facility] website at [email or web address] Remember, [facility] is NOT to be used for urgent needs.  For medical emergencies, dial 69 Olga Fontaine from Nurse    PATIENT INSTRUCTIONS:    After general anesthesia or intravenous sedation, for 24 hours or while taking prescription Narcotics:  · Limit your activities  · Do not drive and operate hazardous machinery  · Do not make important personal or business decisions  · Do  not drink alcoholic beverages  · If you have not urinated within 8 hours after discharge, please contact your surgeon on call. Report the following to your surgeon:  · Excessive pain, swelling, redness or odor of or around the surgical area  · Temperature over 100.5  · Nausea and vomiting lasting longer than 4 hours or if unable to take medications  · Any signs of decreased circulation or nerve impairment to extremity: change in color, persistent  numbness, tingling, coldness or increase pain  · Any questions    What to do at Home:  Recommended activity: Activity as tolerated, with [facility] and rehabilitation    If you experience any of the following symptoms chest pain, shortness of breath , fever, chills, elevated temperature, please follow up with nearest Emergency room. *  Please give a list of your current medications to your Primary Care Provider. *  Please update this list whenever your medications are discontinued, doses are      changed, or new medications (including over-the-counter products) are added. *  Please carry medication information at all times in case of emergency situations. These are general instructions for a healthy lifestyle:    No smoking/ No tobacco products/ Avoid exposure to second hand smoke  Surgeon General's Warning:  Quitting smoking now greatly reduces serious risk to your health.     Obesity, smoking, and sedentary lifestyle greatly increases your risk for illness    A healthy diet, regular physical exercise & weight monitoring are important for maintaining a healthy lifestyle    You may be retaining fluid if you have a history of heart failure or if you experience any of the following symptoms:  Weight gain of 3 pounds or more overnight or 5 pounds in a week, increased swelling in our hands or feet or shortness of breath while lying flat in bed. Please call your doctor as soon as you notice any of these symptoms; do not wait until your next office visit. The discharge information has been reviewed with the caregiver. The caregiver verbalized understanding. Discharge medications reviewed with the caregiver and appropriate educational materials and side effects teaching were provided.   ___________________________________________________________________________________________________________________________________

## 2021-04-01 NOTE — PROGRESS NOTES
Re:  Lucernesarwat Esparzasean .,Follow up visit     3/31/2021 10:11 PM 
 
SSN: [id number] Subjective:   Moses Wright was seen on follow up. No acute changes. Continues to have weakness of the lower extremities; but able to move them in bed. He has MRIs of the cervical and lumbar spines. Lumbar spine MRI showed multifocal degenerative changes with L4-5 central canal stenosis. MRI of the cervical spine showed multilevel degenerative changes with mild to moderate central canal stenosis; compression of the spinal cord at C4-5 level. Medications:   
Current Facility-Administered Medications Medication Dose Route Frequency Provider Last Rate Last Admin  insulin lispro (HUMALOG) injection   SubCUTAneous AC&HS Olga Hamilton MD   Stopped at 03/30/21 2200  
 glucose chewable tablet 16 g  4 Tab Oral PRN Olga Hamilton MD      
 glucagon (GLUCAGEN) injection 1 mg  1 mg IntraMUSCular PRN Olga Hamilton MD      
 dextrose (D50W) injection syrg 12.5-25 g  25-50 mL IntraVENous PRN Olga Hamilton MD      
 0.9% sodium chloride infusion  125 mL/hr IntraVENous CONTINUOUS Carey Srinivas  mL/hr at 03/31/21 1542 125 mL/hr at 03/31/21 1542  sodium chloride (NS) flush 5-10 mL  5-10 mL IntraVENous PRN [place] KRISTEN Espino      
 sodium chloride (NS) flush 5-40 mL  5-40 mL IntraVENous Q8H Lamona Spectjordan S NP   10 mL at 03/31/21 2101  
 sodium chloride (NS) flush 5-40 mL  5-40 mL IntraVENous PRN Carey Espino NP      
 acetaminophen (TYLENOL) tablet 650 mg  650 mg Oral Q6H PRN Carey Srinivas NP   650 mg at 03/30/21 1805 Or  acetaminophen (TYLENOL) suppository 650 mg  650 mg Rectal Q6H PRN [place] KRISTEN Espino      
 polyethylene glycol (MIRALAX) packet 17 g  17 g Oral DAILY PRN Carey Espino NP      
 promethazine (PHENERGAN) tablet 12.5 mg  12.5 mg Oral Q6H PRN Carey Espino NP  Or  
 ondansetron (ZOFRAN) injection 4 mg  4 mg IntraVENous Q6H PRN Sherry Solorio NP      
 tamsulosin [facility]) capsule 0.4 mg  0.4 mg Oral DAILY Sherry Solorio NP   0.4 mg at 03/31/21 4654 Vital signs:   
Visit Vitals /79 (BP 1 Location: Left upper arm, BP Patient Position: At rest) Pulse 62 Temp 98.8 °F (37.1 °C) Resp 18 Ht 5' 11\" (1.803 m) Wt 101.2 kg (223 lb) SpO2 97% BMI 31.10 kg/m² Review of Systems:  
Constitutional no fever or chills Skin denies rash or itching HENT  Denies hearing lose Eyes denies diplopia vision lose Respiratory denies shortness of breath Cardiovascular denies chest pain Gastrointestinal denies nausea, vomiting Genitourinary; had urine incontinence on arrival.  
Musculoskeletal: No swelling; lower extremity weakness. Hematology has easy bruising. Neurological as above in HPI Patient Active Problem List  
Diagnosis Code  HCVD (hypertensive cardiovascular disease) I11.9  Hyperlipidemia E78.5  Diabetes mellitus ([facility] [address] E11.9  Bradycardia R00.1  Hypertension I10  
 BPH (benign prostatic hyperplasia) N40.0  Bone pain M89.8X9  Muscular weakness M62.81  Leg pain, right M79.604  Bladder neck contracture N32.0  Microscopic hematuria R31.29  
 History of nephrolithiasis Z87.442  Obesity (BMI 30.0-34. 9) E66.9  
 Pneumonia due to COVID-19 virus U07.1, J12.82  Thrombocytopenia ([facility] [address] D69.6  Shortness of breath R06.02  
 Rhabdomyolysis M62.82  
 UTI (urinary tract infection) N39.0  Metabolic encephalopathy B39.00 Objective:  
GENERAL:                  no apparent distress. EXTREMITIES:            Muscle tone is normal. 
HEAD:                          The patient is normocephalic. 
  
NEUROLOGIC EXAMINATION Mental status: Awake, alert, oriented x3, follows simple and complex commands, no neglect, no extinction to DSS or VSS. Speech and languge: fluent, coherent,  and comprehension intact CN: VFF, EOMI, PERRLA, face sensation intact , no facial asymmetry noted, palate elevation symmetric bilat, SS+SCM 5/5 bilat, tongue midline Motor: no pronator drift, strength over the upper extremities is 5/5; unable to lift his lower extremities off the bed but symmetric movement. Coordination: FNF accurate w/o dysmetria; unable to assess heel-to-shin DTR: 2+ throughout, toes downgoing BL Gait: not tested Result Information 
  
Status: Final result (Exam End: 3/28/2021 16:00) Provider Status: Open Study Result 
  EXAM: CTA HEAD NECK W CONT 
  
CLINICAL INDICATIONS: stroke evaluation 
  
TECHNIQUE: Helical CT scan of the brain and neck were performed at 1.25 mm 
intervals during rapid IV bolus contrast administration.  These data were 
reconstructed at .625 mm intervals for vascular analysis.  The data was also 
reviewed at 2.5 mm intervals for accompanying soft tissue analysis. 3D post 
processed images, including surface shaded displays, were produced for this exam 
on independent console, permanently archived and interpreted. 
  
All CT scans at this facility are performed using dose optimization technique as 
appropriate to a performed exam, to include automated exposure control, 
adjustment of the MA and/or kV according to patient size (including appropriate 
matching for site-specific examinations) or use of  iterative reconstruction 
technique. 
  
CONTRAST: 100 cc Isovue 370 
  
COMPARISON: Head CT March 28 
  
CTA SOFT TISSUE ANALYSIS: 
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  
Bones: Spondylotic changes are identified in the cervical spine there is 
osteophytic projection arising at the C5-6 level into the central canal there 
may be a degree of central canal stenosis present here see image 49 series 3. 
  
CTA NECK VASCULAR ANALYSIS:  
  
Aortic arch: Left sided with typical configuration. Tortuosity of great vessels 
identified reflecting atherosclerosis there is a bovine arch, type I 
configuration 
  
Innominate: Patent 
  
Right Subclavian: Patent Left Subclavian: Patent 
  
Right carotid: 
-CCA: Patent 
-ECA: Patent 
-ICA: Patent. Estimated less than 10%% stenosis of proximal ICA by NASCET 
criteria. 
  
Left carotid: 
-CCA: Patent 
-ECA: Patent 
-ICA: Patent Estimated less than 10%% stenosis of proximal ICA by NASCET 
criteria. 
  
Right vertebral: Patent codominant 
  
Left vertebral: Patent 
  
  
CTA BRAIN VASCULAR ANALYSIS:  
  
Right anterior circulation: 
-ICA: Patent 
-LUC: Patent  
-MCA: Patent 
  
Left anterior circulation: 
-ICA: Patent 
-LUC: Patent  
-MCA: Patent 
  
-ACOM: Unremarkable 
-PCOM: Not well seen 
  
Posterior circulation: 
-RVA: Patent 
-LVA: Patent 
-Basilar: Patent 
-Right PCA: Patent 
-Left PCA: Patent 
  
Major dural venous sinuses: Unremarkable 
  
  
IMPRESSION 1. There is atherosclerotic changes of the carotid bifurcations but no 
significant stenosis seen 
  
Heavy atherosclerotic changes in the intradural fourth segment of the vertebral 
arteries bilaterally reconstruction images demonstrate no flow limitations 
identified to suggest severe stenosis 
  
Calcified pleural plaques right lung apex suggest asbestosis 
  
Wet read was provided 1700 hours March 28 there is no disagreement   
  
  
Result Information 
  
Status: Final result (Exam End: 3/28/2021 15:21) Provider Status: Open Study Result 
  
INDICATION: Code stroke 
  
COMPARISON: 8/20 
  
TECHNIQUE: Unenhanced CT of the head was performed using 5 mm images. Brain and 
bone windows were generated.  All CT scans at this facility are performed using 
doze optimization technique as appropriate to a performed exam, to include 
automated exposure control, adjustment of the mA and/or KV according to patient 
size (including appropriate matching for site specific examinations), or use of 
iterative reconstruction technique.  
  
FINDINGS: 
  
Similar global volume loss and minimal periventricular and subcortical white 
matter hypoattenuation, presumed chronic microvascular ischemic change. There is 
no significant white matter disease. There is no intracranial hemorrhage, 
extra-axial collection, mass, mass effect or midline shift.  The basilar 
cisterns are open. No acute infarct is identified. The bone windows demonstrate 
no abnormalities. The visualized portions of the paranasal sinuses and mastoid 
air cells are clear. 
  
IMPRESSION 
  
No acute intracranial abnormality. Please note that MR is more sensitive for 
early acute ischemia. Study Result MRI BRAIN WITHOUT CONTRAST 
  
PROVIDED REASON FOR EXAM: AMS, fall, and generalized weakness. Additional History: Patient was found down with some confusion Comparison Studies: Noncontrast head CT 3/28/2021 
  
Imaging Technique:  
  Sequences:  Sagittal,  Coronal and axial T1 weighted, Diffusion Weighted 
(DWI), Axial T2 weighted, Axial T2 FLAIR, and SWI/GRE MRI images of the brain. 
  
Contrast Material:  NONE 
  
Limiting Factors/Major Artifacts: None. 
  
FINDINGS: 
  
Brain Parenchyma: Diffusion sequence negative for cytotoxic edema. No findings 
of an acute infarct. Cerebral white matter signal is normal for patient age. Trace amount of increased T2 signal at the margins of the frontal horns likely 
related to aging. No chronic cortical infarcts or chronic lacunar infarcts. No 
visible masses or midline shift. 
  
CSF Spaces:  Global cerebral and cerebellar volume loss. The ventricles are not 
enlarged relative to the brain volumes. No hydrocephalus. 
  
Vascular System:  Grossly patent flow in basilar and internal cerebral arteries. No findings of dural sinus thrombosis.  
  
Hemorrhage:  No acute hemorrhage.  No chronic microhemorrhage. 
  
Other Structures: 
  
Calvarium: No suspicious marrow signal. 
  Sella: Pituitary is not enlarged. Visualized Upper Cervical Spine: Normal. 
Orbits: Normal for non-dedicated exam. Bilateral lens surgery. Paranasal Sinuses: No significant paranasal sinus disease. Mastoid Air Cells:  No effusion. 
  
IMPRESSION 
  
No acute intracranial abnormality. No mass, hemorrhage, or acute infarct. 
   
 
Study Result MR CERVICAL SPINE WITHOUT CONTRAST 
  
HISTORY: Admitted after a fall; unable to ambulate and move his lower 
extremities 
  
COMPARISON: Recent CTA head neck March 28 
  
TECHNIQUE: Multisequence multiplanar imaging through the cervical spine. 
  
FINDINGS: 
  
Alignment: Intact lordosis Vertebral body height: Normal 
Marrow signal: Unremarkable Disc spaces: Preserved height and signal intensity 
  
Cervicomedullary Junction: Patent Cervical cord: No cord expansion or atrophy.  
  
On axial imaging, findings at each level are as follows: 
  
C2/C3: Central canal intact at this level cord is not compressed neuroforamina 
unremarkable for any significant stenosis there is a very mild midline 
protrusion bulge which touches the ventral aspect of the cord but signal the 
cord is otherwise normal 
  
C3/C4: Mild to moderate midline annular bulge touches the ventral aspect of the 
cord the neuroforamina unremarkable central canal at this level 0.76 cm 
stenotic. Cord exhibits no signal abnormality 
  
C4/C5: There is a left central moderate-sized spondylotic bar projecting into 
the canal this results in some stenotic canal stenosis 0.56 cm. The cord is mildly compressed along its left ventral aspect The right and left 
neuroforamina demonstrate moderate stenosis secondary to osteophyte arising from 
the uncinate joints. This could explain right and/or left C5 radicular 
complaints. Cord signal on this study is unremarkable for any edema. 
  
C5/C6: Mild to moderate midline annular bulge is present cord is effaced mildly 
along its ventral surface.  Central canal this level 0.81 cm mildly stenotic 
  
The right and left neuroforamina show no evidence of any significant stenosis. 
  
C6/C7: Patent canal and foramina. 
  
C7/T1: Patent canal and foramina. 
  
Other structures: On the inversion recovery images there is suggestion of some 
mild increased signal in the anterior aspect of C5 and some mild increased 
signal anterior to C4-C7. No evidence of a definite focal collection is seen 
this could represent some mild sprain to the strap musculature. 
  
IMPRESSION 
  
There is evidence for a degree of canal stenosis present from C3 through C6 as 
described 
  
Most severe compression to the cord occurs at the C4-5 level 
  
Despite these areas of compression of the cord signal at this time is normal. 
Please note there is some mild motion artifact Question possibility of some injury to the bone marrow C5 and possible sprain to 
the strap musculature as well. 
   
 
Result Information Status: Final result (Exam End: 3/31/2021 12:21) Provider Status: Open Study Result EXAM: MRI LUMB SPINE WO CONT 
  
CLINICAL INDICATIONS/HISTORY: Admitted after a fall; unable to ambulate and move 
his lower extremities 
  
COMPARISON: None 
  
Technique: Multi-sequence multiplanar T1, T2, STIR imaging with and without fat 
saturation obtained centered on the lumbar spine.  
  
FINDINGS:  
  
Alignment: Intact lordosis Vertebral body height: Normal 
Marrow signal: Unremarkable Disc spaces: Preserved height and signal intensity Conus: Terminates at T12-L1 
  
Axial imaging correlation: 
  
  
  
L1-2: Patent canal and foramina. 
  
L2-3: Patent canal and foramina. 
  
L3-4: Central canal right neuroforamina demonstrates encroachment from facet 
hypertrophy and a small focal herniation into the caudal aspect of the 
neuroforamina which contacts and mildly compresses the exiting right L3 nerve 
root.  Moderate placed on sagittal image 13 series 2. 
  
L4-5: Central canal stenosis AP dimension 0.4 cm there is moderately severe 
facet arthropathy with hypertrophic changes there is also a subluxation 0.6 cm 
of L4 on 5. 
  
The right and left neuroforamina are not stenotic 
  
L5-S1: Central canal intact left right neuroforamina demonstrate no significant 
stenosis. 
  
Portions of the sacrum included on both sides show no evidence of edema to 
suggest insufficiency fracture here. 
  
  
  
Other structures: Benign cyst arising posterior cortex of the right kidney Large amount of stool is retained in the rectum question significance 
  
Distal cord Conus region show no evidence of compression CBC:  
Lab Results Component Value Date/Time WBC 4.9 03/31/2021 04:30 AM  
 RBC 4.37 (L) 03/31/2021 04:30 AM  
 HGB 12.3 (L) 03/31/2021 04:30 AM  
 HCT 36.8 03/31/2021 04:30 AM  
 PLATELET 721 14/69/2479 04:30 AM  
 
BMP:  
Lab Results Component Value Date/Time Glucose 79 03/31/2021 04:30 AM  
 Sodium 141 03/31/2021 04:30 AM  
 Potassium 4.1 03/31/2021 04:30 AM  
 Chloride 110 03/31/2021 04:30 AM  
 CO2 24 03/31/2021 04:30 AM  
 BUN 14 03/31/2021 04:30 AM  
 Creatinine 0.79 03/31/2021 04:30 AM  
 Calcium 8.0 (L) 03/31/2021 04:30 AM  
 
CMP:  
Lab Results Component Value Date/Time Glucose 79 03/31/2021 04:30 AM  
 Sodium 141 03/31/2021 04:30 AM  
 Potassium 4.1 03/31/2021 04:30 AM  
 Chloride 110 03/31/2021 04:30 AM  
 CO2 24 03/31/2021 04:30 AM  
 BUN 14 03/31/2021 04:30 AM  
 Creatinine 0.79 03/31/2021 04:30 AM  
 Calcium 8.0 (L) 03/31/2021 04:30 AM  
 Anion gap 7 03/31/2021 04:30 AM  
 BUN/Creatinine ratio 18 03/31/2021 04:30 AM  
 Alk. phosphatase 41 (L) 03/31/2021 04:30 AM  
 Protein, total 5.7 (L) 03/31/2021 04:30 AM  
 Albumin 2.7 (L) 03/31/2021 04:30 AM  
 Globulin 3.0 03/31/2021 04:30 AM  
 A-G Ratio 0.9 03/31/2021 04:30 AM  
 
Coagulation:  
Lab Results Component Value Date/Time  Prothrombin time 15.4 (H) 03/28/2021 02:54 PM  
 INR 1.2 03/28/2021 02:54 PM  
 aPTT 31.9 03/28/2021 02:54 PM  
 
 
Impression: An 78years old male patient with above medical problems was brought to the emergency room after a fall. Found with altered mental status. Complains of weakness of the lower extremities. But on exam, moving his upper extremities and lower extremity symmetrically; but unable to lift his lower extremities off the bed. Has been having left lower extremity weakness since he has a hip surgery about 12 years ago. He was found to have pressure sores over the right hip/buttock possibly suggesting that patient might have chronic weakness. No previous history of seizure. CT scan of the brain did not show any acute changes. CTA of the head and neck only showed atherosclerotic changes with no significant stenosis. MRI of the brain did not show any acute changes. Elevated CK probably from prolonged immobility. Normal renal function. MRI of the cervical and lumbosacral spines as above: L4-5 central canal stenosis on the lumbosacral spine and C4-5 spinal cord compression over the cervical spine. 
  
Plan: 
-MRI of the brain: as above 
-MRI of the cervical and lumbosacral spines as above -EEG: no epileptiform discharges.  
-Consult spine surgery 
-Follow recommendations from PT OT 
-Call for questions 
-We will follow as needed. 
  
 
Sincerely, Marcus De León M.D. PLEASE NOTE:  
This document has been produced using voice recognition software. Unrecognized errors in transcription may be present.

## 2021-04-01 NOTE — PROGRESS NOTES
Patient has been accepted to [address] [place] [address] Dr and rehab. Called Son Dejan Reyes, who is agreeable to this facility. Dr Glenys Brownlee called to inform of bed offer. Basia Osborne RN BSN Care Manager [phone]

## 2021-04-01 NOTE — PROGRESS NOTES
Transition of Care Plan to SNF/Rehab 
 
SNF/Rehab Transition: 
Patient has been accepted to [facility] and rehab and meets criteria for admission. Patient will  be transported by Patricio Gilliam and expected to leave at 5:30pm. 
 
Communication to Patient/Family: Met with patient and son over the phone (identified care giver) and they are agreeable to the transition plan. Communication to SNF/Rehab: 
Bedside RN, Indira, has been notified of the transition plan to the facility and to call report (phone number [phone]). Discharge information has been updated on the AVS. And communicated to facility via [facility], or CC link. SNF/Rehab Transition: PCP/Specialist:  
 
Nursing Please include all hard scripts for controlled substances, med rec and dc summary, and AVS in packet. Reviewed and confirmed with facility representative, Rabia Livingston  at [facility] and rehab they can manage the patient care needs for the following:  
 
Judge Moulton with (X) only those applicable: 
 
Medication: 
[x]  Medications will be available at the facility []  IV Antibiotics []  Controlled Substance - hard copy to be sent with patient  
[]  Weekly Labs Documents: 
[] Hard RX  Number sent  
[] MAR 
[] Kardex [] AVS 
[] Wound care note [x]Transfer Summary/Discharge Summary Equipment: 
[]  CPAP/BiPAP []  Wound Vacuum 
[]  Ku or Urinary Device 
[]  PICC/Central Line 
[]  Nebulizer 
[]  Ventilator Treatment: 
[]Isolation (for MRSA, VRE, etc.) []Surgical Drain Management []Tracheostomy Care [x]Dressing Changes []Dialysis with transportation and chair time  Center Mode of tranpsort []PEG Care []Oxygen []Daily Weights for Heart Failure Stage II right hip Dietary: 
[]Any diet limitations []Tube Feedings []Total Parenteral Management (TPN) Eligible for Medicaid Long Term Services and Supports Yes: 
[] Eligible for medical assistance or will become eligible within 180 days and LTSS completed. [] Provider/Patient and/or support system has requested screening. [x] LTSS copy provided to patient or responsible party, . 
[] LTSS unavailable at discharge will send once processed to SNF provider. 
[] LTSS  unavailable at discharged mailed to patient 
[] LTSS performed by outside agency  on  with tracking number No:  
[] Private pay and is not financially eligible for Medicaid within the next 180 days. [] Reside out-of-state. [] A residents of a state owned/operated facility that is licensed 
by [facility] or [facility] 
[] Enrollment in [facility] hospice services 
[] [address] [place] [address] [facility] 
[] Patient /Family declines to have screening completed or provide financial information for screening Financial Resources: 
Medicaid   
[] Initiated and application pending  
[] Full coverage Advanced Care Plan: 
[]Surrogate Decision Maker of Care 
[]POA []Communicated Code Status/ sent  (DDNR, POST, Advance Directive) Justen Romo RN BSN Care Manager [phone]

## 2021-04-01 NOTE — PROGRESS NOTES
UAI submitted for processing on the Medicaid portal for SNF. Amari Perea RN BSN Care Manager [phone]

## 2021-04-01 NOTE — PROGRESS NOTES
Progress Note Patient: Lino Carballo Sr.               Sex: male          DOA: 3/28/2021 YOB: 1940      Age:  78 y.o.        LOS:  LOS: 4 days CHIEF COMPLAINT:  Rhabdomyolysis improved with ongoing weakness Subjective:  
 
Appears doing well, NAD 
SNF when available Objective:  
  
Visit Vitals /84 (BP 1 Location: Left arm, BP Patient Position: Sitting) Pulse 65 Temp 98.5 °F (36.9 °C) Resp 18 Ht 5' 11\" (1.803 m) Wt 100.7 kg (221 lb 14.4 oz) SpO2 96% BMI 30.95 kg/m² Physical Exam: 
Gen:  No distress, no complaint Lungs:  Clear bilaterally, no wheeze or rhonchi Heart:  Regular rate and rhythm, no murmurs or gallops Abdomen:  Soft, non-tender, normal bowel sounds Lab/Data Reviewed: 
BMP:  
Lab Results Component Value Date/Time  04/01/2021 12:34 AM  
 K 4.1 04/01/2021 12:34 AM  
  04/01/2021 12:34 AM  
 CO2 25 04/01/2021 12:34 AM  
 AGAP 4 04/01/2021 12:34 AM  
 GLU 78 04/01/2021 12:34 AM  
 BUN 12 04/01/2021 12:34 AM  
 CREA 0.83 04/01/2021 12:34 AM  
 GFRAA >60 04/01/2021 12:34 AM  
 GFRNA >60 04/01/2021 12:34 AM  
 
CBC:  
Lab Results Component Value Date/Time WBC 5.4 04/01/2021 12:34 AM  
 HGB 12.6 (L) 04/01/2021 12:34 AM  
 HCT 37.5 04/01/2021 12:34 AM  
  04/01/2021 12:34 AM  
 
 
 
 
Assessment/Plan Principal Problem: 
  Rhabdomyolysis (3/28/2021) Active Problems: Metabolic encephalopathy (6/39/7331) Diabetes mellitus ([address] () Hypertension (5/29/2012) UTI (urinary tract infection) (3/28/2021) Plan: 
Continue with PT 
SNF when able MRIs do not appear alarming- can f/u OP Decrease IVF

## 2021-04-01 NOTE — PROGRESS NOTES
Problem: Falls - Risk of 
Goal: *Absence of Falls Description: Document Fabricio Emmanuel Fall Risk and appropriate interventions in the flowsheet. 4/1/2021 0743 by Martin Delacruz RN Outcome: Progressing Towards Goal 
Note: Fall Risk Interventions: 
Mobility Interventions: Bed/chair exit alarm, Patient to call before getting OOB, PT Consult for mobility concerns, Strengthening exercises (ROM-active/passive) Medication Interventions: Bed/chair exit alarm, Patient to call before getting OOB, Teach patient to arise slowly Elimination Interventions: Bed/chair exit alarm, Call light in reach, Patient to call for help with toileting needs, Toileting schedule/hourly rounds History of Falls Interventions: Bed/chair exit alarm, Consult care management for discharge planning, Door open when patient unattended, Room close to nurse's station 3/31/2021 1752 by Martin Delacruz RN Outcome: Progressing Towards Goal 
Note: Fall Risk Interventions: 
Mobility Interventions: Bed/chair exit alarm, PT Consult for mobility concerns, Patient to call before getting OOB, PT Consult for assist device competence, Strengthening exercises (ROM-active/passive) Medication Interventions: Bed/chair exit alarm, Teach patient to arise slowly Elimination Interventions: Bed/chair exit alarm, Call light in reach, Patient to call for help with toileting needs History of Falls Interventions: Bed/chair exit alarm, Room close to nurse's station, Door open when patient unattended Problem: Patient Education: Go to Patient Education Activity Goal: Patient/Family Education Outcome: Progressing Towards Goal 
  
Problem: Pressure Injury - Risk of 
Goal: *Prevention of pressure injury Description: Document Luca Scale and appropriate interventions in the flowsheet.  
Outcome: Progressing Towards Goal 
Note: Pressure Injury Interventions: 
Sensory Interventions: Assess changes in LOC, Keep linens dry and wrinkle-free, Maintain/enhance activity level, Minimize linen layers, Pressure redistribution bed/mattress (bed type), Use 30-degree side-lying position Moisture Interventions: Absorbent underpads, Apply protective barrier, creams and emollients, Check for incontinence Q2 hours and as needed, Internal/External urinary devices, Limit adult briefs, Maintain skin hydration (lotion/cream), Minimize layers, Moisture barrier Activity Interventions: Increase time out of bed, Pressure redistribution bed/mattress(bed type), PT/OT evaluation Mobility Interventions: HOB 30 degrees or less, Float heels, Pressure redistribution bed/mattress (bed type), PT/OT evaluation, Turn and reposition approx. every two hours(pillow and wedges) Nutrition Interventions: Document food/fluid/supplement intake, Offer support with meals,snacks and hydration Friction and Shear Interventions: Apply protective barrier, creams and emollients, Foam dressings/transparent film/skin sealants, HOB 30 degrees or less, Minimize layers Problem: Patient Education: Go to Patient Education Activity Goal: Patient/Family Education Outcome: Progressing Towards Goal 
  
Problem: Pain Goal: *Control of Pain 4/1/2021 0743 by Ronald Jacobson RN Outcome: Progressing Towards Goal 
3/31/2021 1752 by Ronald Jacobson RN Outcome: Progressing Towards Goal 
  
Problem: Patient Education: Go to Patient Education Activity Goal: Patient/Family Education Outcome: Progressing Towards Goal 
  
Problem: Injury - Risk of, Adverse Drug Event Goal: *Absence of adverse drug events Outcome: Progressing Towards Goal 
  
Problem: Patient Education: Go to Patient Education Activity Goal: Patient/Family Education Outcome: Progressing Towards Goal 
  
Problem: Nutrition Deficit Goal: *Optimize nutritional status Outcome: Progressing Towards Goal

## 2021-04-01 NOTE — ROUTINE PROCESS
Bedside and Verbal shift change report given to UZMA Jacobson (oncoming nurse) by Quan Flannery RN (offgoing nurse). Report included the following information SBAR, Kardex, Intake/Output, MAR and Recent Results.

## 2021-04-24 PROBLEM — N17.9 ACUTE RENAL FAILURE (ARF) (HCC): Status: ACTIVE | Noted: 2021-01-01

## 2021-04-24 PROBLEM — R26.9 ABNORMALITY OF GAIT AND MOBILITY: Status: ACTIVE | Noted: 2021-01-01

## 2021-04-24 NOTE — Clinical Note
Status[de-identified] INPATIENT [101]   Type of Bed: Medical [8]   Inpatient Hospitalization Certified Necessary for the Following Reasons: 3.  Patient receiving treatment that can only be provided in an inpatient setting (further clarification in H&P documentation)   Admitting Diagnosis: Acute renal failure (ARF) [facility]) [[id number]]   Admitting Physician: Billy Dowell   Attending Physician: Billy Dowell   Estimated Length of Stay: 2 Midnights   Discharge Plan[de-identified] Extended Care Facility (e.g. Adult Home, Nursing Home, etc.)

## 2021-04-25 NOTE — PROGRESS NOTES
AFIB APIXABAN DOSE ADJUSTMENT If indicated for AFib, Apixaban dose should be decreased to 2.5mg BID  IF ANY TWO of the following are present 
  
a. Serum Creatinine greater than or equal to 1.5 (CrCl 19.1 mL/min (SCr 3.54 mg/dL)) b. Age greater than or equal to [de-identified] years (Current Age 78 y.o.) 
  
c. Weight less than or equal to  60 kg (Last recorded weight: 100.7 kg as of 4/01/21) Substitution Information per the P&T Committee approved [facility] Nonformulary Medication Formulary Interchange  
apixaban (ELIQUIS) tablet 5 mg apixaban (ELIQUIS) tablet 2.5 mg BID

## 2021-04-25 NOTE — ROUTINE PROCESS
Assumed care of patient. Bedside verbal report received from 1 [facility] Malinda Rodirguez RN including SBAR, MAR, and Kardex. Vitals within normal limits. Assessment completed, patient in no apparent distress.

## 2021-04-25 NOTE — ED NOTES
Condom catheter placed. Pt changed into hospital gown. Medication administered. Pt was able to swallow pills with no coughing or choking. Pt requesting something to eat. Provided pt with pb&j and water.

## 2021-04-25 NOTE — ROUTINE PROCESS
Bedside and Verbal shift change report given to UZMA JEAN-BAPTISTE (oncoming nurse) by US Smith RN (offgoing nurse). Report included the following information SBAR, Kardex, Intake/Output and MAR.

## 2021-04-25 NOTE — PROGRESS NOTES
INTERIM UPDATE - 2300 EST on 4/24/2021 Called to admit a Patient for Acute Kidney Injury Stage III likely 2°/2 Dehydration, as Patient reportedly states (and demonstrates) that he is unable to stand and ambulate. Patient reports that he has effectively missed meals and is not drinking, likely accounting for his POOJA. Anticipate that Patient will require Rehabilitation or (most likely, as Patient was likely discharged from Rehabilitation after \"running out of days) [address] Plan: Will admit Patient.

## 2021-04-25 NOTE — ED TRIAGE NOTES
Patient brought in by medics for hip pain and family was concerned about the patient's in ability to move. As per patient he was discharged from rehab on Monday where he spent 30 days after undergoing left hip replacement, patient states that he is unable to ambulate and did not really ambulate at the facility. Patient's legs noted to be contracted at the hips and knees and both have 1 - 2 plus edema. Patient is incontinent. He is supposed to start home PT and HHA on Monday but according to the medics the patient lives alone with the son checking in on him but the family reports that the patient is not able to do much for himself and is just laying in bed.

## 2021-04-25 NOTE — PROGRESS NOTES
conducted an initial consultation and Spiritual Assessment for Brian Retana, who is a 78 y. o.,male. Patient's Primary Language is: [place]. According to the patient's EMR Islam Affiliation is: [facility].  
 
The reason the Patient came to the hospital is:  
Patient Active Problem List  
 Diagnosis Date Noted  Metabolic encephalopathy 97/92/4827 Priority: 2 - Two  Stage 3 acute kidney injury ([facility] [address] 04/24/2021  Abnormality of gait and mobility 04/24/2021  Rhabdomyolysis 03/28/2021  UTI (urinary tract infection) 03/28/2021  Pneumonia due to COVID-19 virus 08/19/2020  Thrombocytopenia ([facility] [address] 08/19/2020  Shortness of breath 08/19/2020  Bladder neck contracture 01/19/2018  Microscopic hematuria 01/19/2018  History of nephrolithiasis 01/19/2018  Obesity (BMI 30.0-34.9) 01/19/2018  Leg pain, right  Bone pain  Muscular weakness  BPH (benign prostatic hyperplasia) 06/15/2012  Hypertension 05/29/2012  HCVD (hypertensive cardiovascular disease)  HLD (hyperlipidemia)  Diabetes mellitus ([facility] [address]  Bradycardia The  provided the following Interventions: 
Initiated a relationship of care and support. Explored issues of danis, belief, spirituality and Roman Catholic/ritual needs while hospitalized. Listened empathically as he requested prayer. Offered prayer according to his request. 
Chart reviewed. The following outcomes where achieved: 
Patient expressed gratitude for 's visit. Assessment: 
Patient does not have any Roman Catholic/cultural needs that will affect patient's preferences in health care. There are no spiritual or Roman Catholic issues which require intervention at this time. Plan: 
Chaplains will continue to follow and will provide pastoral care on an as needed/requested basis.  recommends bedside caregivers page  on duty if patient shows signs of acute spiritual or emotional distress. carie Voss Spiritual [facility]  
[phone]

## 2021-04-25 NOTE — ED PROVIDER NOTES
EMERGENCY DEPARTMENT HISTORY AND PHYSICAL EXAM 
 
8:33 PM 
 
 
Date: 4/24/2021 Patient Name: Jez Tomas Sr. 
 
History of Presenting Illness Chief Complaint Patient presents with  
 Hip Pain History Provided By: Patient and EMS Chief Complaint: generalized weakness Duration:  Days Timing:  Gradual 
Location: BL legs Quality: Aching Severity: Severe Modifying Factors: none Associated Symptoms: denies any other associated signs or symptoms Additional History (Context): Michael Zamora is a 78 y.o. male with Hypertension, metabolic encephalopathy, rhabdo, UTI, history of Covid, BPH, diabetes who presents with deconditioning. Had recent admission at [facility] and rehab. Was discharged to home on 4/21. Apparently since being home he has been unable to get up and move around. He states he is supposed to start home physical therapy in 2 days on Monday. Appears he was not fully ambulatory even upon discharge from rehab per the patient although he does appear to be somewhat a poor historian. He is complaining of bilateral leg pain and just inability to walk. He is unsure if he is still on antibiotics, per nursing home records would have been started on 4/20 for UTI. Janessa Jara PCP: Shant Martins MD 
 
Current Facility-Administered Medications Medication Dose Route Frequency Provider Last Rate Last Admin  sodium chloride 0.9 % bolus infusion 1,000 mL  1,000 mL IntraVENous ONCE Cortney Meléndez, DO      
 
Current Outpatient Medications Medication Sig Dispense Refill  diclofenac (VOLTAREN) 1 % gel Apply 2 g to affected area four (4) times daily as needed for Pain. Gel should be measured and applied using the supplied dosing card. Apply dose (2 gm or 4 gm) to each location. If treatment site is the hands, patients should wait at least one (1) hour to wash their hands. APPLY TO bilateral hands/forearms 100 g 0  
 apixaban (ELIQUIS) 5 mg tablet 2 tablets [10 MG] p.o. twice daily for 4 days followed by 1 tablet [5 MG] p.o. twice daily 70 Tab 0  
 tamsulosin (FLOMAX) 0.4 mg capsule Take 0.4 mg by mouth.  polyethylene glycol (MIRALAX) 17 gram packet Take 17 g by mouth.  tolterodine ER (DETROL-LA) 2 mg ER capsule Take 1 Cap by mouth daily. 30 Cap 12  
 raNITIdine (ZANTAC) 150 mg tablet Take 150 mg by mouth two (2) times a day.  lisinopril (PRINIVIL, ZESTRIL) 30 mg tablet Take 30 mg by mouth daily.  pravastatin (PRAVACHOL) 40 mg tablet Take 40 mg by mouth nightly.  metFORMIN (GLUCOPHAGE) 500 mg tablet Take 500 mg by mouth two (2) times daily (with meals). Past History Past Medical History: 
Past Medical History:  
Diagnosis Date  Abnormal EKG  Bone pain  BPH (benign prostatic hyperplasia)  Bradycardia  Chest pain, unspecified R/O CAD 3/10 mild fixed inferior defect  Diabetes mellitus ([facility] [address] denies 9/30/19  Echocardiogram [facility]:  EF 56%. No RWMA. Mod conc LVH. DDfx. RVSP 22 mmHg.  HCVD (hypertensive cardiovascular disease)  Hematuria, unspecified  History of silent myocardial infarction  Hypercholesteremia  Hyperlipidemia  Hypertension  Hypertension  Ill-defined condition UTI  Kidney stones  Leg pain, right   
 normal exercise JOAQUIN (fall 2014)  Lower extremity arterial testing 10/07/2014 No arterial disease at rest bilaterally. R JOAQUIN 1.27.  L JOAQUIN 1.35. No significant drop in JOAQUIN w/exercise c/w normal perfusion.  Lower urinary tract symptoms (LUTS)  Muscular weakness  Nuclear cardiac imaging test 12/02/2011 Likely normal.  Fixed inferior basal and mid inferior defect likely artifact. No ischemia. EF 60%. No WMA.  Other specified cardiac dysrhythmias(427.89) Bradycardia and pauses on holter  Swelling of limb  Unspecified sleep apnea   
 does not use cpap machine Past Surgical History: 
Past Surgical History:  
Procedure Laterality Date  BIOPSY  3/25/99  
 COLONOSCOPY N/A 10/7/2019 COLONOSCOPY with bx's performed by Jeannette Ramos MD at [address] HX HIP REPLACEMENT  12/6/11  HX UROLOGICAL  01/06/2012 [facility], Dr. Gayla Rodríguez, Transurethral resection of prostate, button prostatectomy  HX UROLOGICAL  1/29/2015 [facility], Dr. Dorys Ho, ESWL  
 RI COLONOSCOPY FLX DX W/COLLJ SPEC WHEN PFRMD    
 RI TOTAL HIP ARTHROPLASTY Left 2014 Dr. Jose Petty Family History: 
Family History Problem Relation Age of Onset  Coronary Artery Disease Father  Coronary Artery Disease Brother History of PTCA  Heart Disease Neg Hx Social History: 
Social History Tobacco Use  Smoking status: Never Smoker  Smokeless tobacco: Never Used Substance Use Topics  Alcohol use: No  
 Drug use: No  
 
 
Allergies: 
No Known Allergies Review of Systems Review of Systems Constitutional: Negative for fever. Respiratory: Negative for shortness of breath. Cardiovascular: Negative for chest pain. Gastrointestinal: Negative for abdominal pain and vomiting. All other systems reviewed and are negative. Physical Exam  
 
Visit Vitals BP (!) 101/57 (BP 1 Location: Left upper arm, BP Patient Position: At rest;Supine) Pulse 84 Temp 97.7 °F (36.5 °C) Resp 16 SpO2 98% Physical Exam 
Constitutional:   
   Appearance: He is well-developed. HENT:  
   Head: Normocephalic and atraumatic. Neck: Musculoskeletal: Neck supple. Vascular: No JVD. Cardiovascular:  
   Rate and Rhythm: Normal rate and regular rhythm. Pulmonary:  
   Effort: Pulmonary effort is normal. No respiratory distress. Breath sounds: Normal breath sounds. Abdominal:  
   General: There is no distension. Palpations: Abdomen is soft. Tenderness: There is no abdominal tenderness. There is no guarding or rebound. Musculoskeletal:  
   Comments: No joint tenderness Skin: 
   General: Skin is warm and dry. Findings: No erythema. Neurological:  
   Mental Status: He is alert and oriented to person, place, and time. Comments: Global weakness bilateral lower extremities although strength is 5 out of 5 in plantar and dorsiflexion, but unable to fully raise the legs up off the bed, gross sensation intact bilateral lower. Strength out of 5 bilateral upper Psychiatric:     
   Judgment: Judgment normal.  
 
 
 
 
Diagnostic Study Results Labs - Recent Results (from the past 12 hour(s)) CBC WITH AUTOMATED DIFF Collection Time: 04/24/21  8:44 PM  
Result Value Ref Range WBC 8.8 4.6 - 13.2 K/uL  
 RBC 4.08 (L) 4.35 - 5.65 M/uL  
 HGB 11.5 (L) 13.0 - 16.0 g/dL HCT 35.1 (L) 36.0 - 48.0 % MCV 86.0 74.0 - 97.0 FL  
 MCH 28.2 24.0 - 34.0 PG  
 MCHC 32.8 31.0 - 37.0 g/dL  
 RDW 14.6 (H) 11.6 - 14.5 % PLATELET 355 268 - 284 K/uL MPV 8.9 (L) 9.2 - 11.8 FL  
 NEUTROPHILS 65 40 - 73 % LYMPHOCYTES 24 21 - 52 % MONOCYTES 8 3 - 10 % EOSINOPHILS 1 0 - 5 % BASOPHILS 1 0 - 2 %  
 ABS. NEUTROPHILS 5.7 1.8 - 8.0 K/UL  
 ABS. LYMPHOCYTES 2.1 0.9 - 3.6 K/UL  
 ABS. MONOCYTES 0.7 0.05 - 1.2 K/UL  
 ABS. EOSINOPHILS 0.1 0.0 - 0.4 K/UL  
 ABS. BASOPHILS 0.1 0.0 - 0.1 K/UL  
 DF AUTOMATED METABOLIC PANEL, COMPREHENSIVE Collection Time: 04/24/21  8:44 PM  
Result Value Ref Range Sodium 138 136 - 145 mmol/L Potassium 5.1 3.5 - 5.5 mmol/L Chloride 108 100 - 111 mmol/L  
 CO2 21 21 - 32 mmol/L Anion gap 9 3.0 - 18 mmol/L Glucose 78 74 - 99 mg/dL BUN 54 (H) 7.0 - 18 MG/DL Creatinine 3.54 (H) 0.6 - 1.3 MG/DL  
 BUN/Creatinine ratio 15 12 - 20 GFR est AA 20 (L) >60 ml/min/1.73m2 GFR est non-AA 17 (L) >60 ml/min/1.73m2 Calcium 8.5 8.5 - 10.1 MG/DL Bilirubin, total 0.6 0.2 - 1.0 MG/DL  
 ALT (SGPT) 38 16 - 61 U/L  
 AST (SGOT) 26 10 - 38 U/L Alk.  phosphatase 73 45 - 117 U/L  
 Protein, total 7.6 6.4 - 8.2 g/dL Albumin 2.7 (L) 3.4 - 5.0 g/dL Globulin 4.9 (H) 2.0 - 4.0 g/dL A-G Ratio 0.6 (L) 0.8 - 1.7 CARDIAC PANEL,(CK, CKMB & TROPONIN) Collection Time: 04/24/21  8:44 PM  
Result Value Ref Range CK - MB 2.7 <3.6 ng/ml CK-MB Index 0.9 0.0 - 4.0 %  39 - 308 U/L Troponin-I, QT <0.02 0.0 - 0.045 NG/ML  
EKG, 12 LEAD, INITIAL Collection Time: 04/24/21  8:53 PM  
Result Value Ref Range Ventricular Rate 84 BPM  
 Atrial Rate 84 BPM  
 P-R Interval 222 ms QRS Duration 104 ms Q-T Interval 390 ms QTC Calculation (Bezet) 460 ms Calculated P Axis 37 degrees Calculated R Axis -19 degrees Calculated T Axis 35 degrees Diagnosis Sinus rhythm with 1st degree AV block with premature atrial complexes Otherwise normal ECG When compared with ECG of 29-MAR-2021 02:11, 
premature atrial complexes are now present Nonspecific T wave abnormality, improved in Inferior leads Nonspecific T wave abnormality no longer evident in Lateral leads Radiologic Studies - No orders to display Medical Decision Making I am the first provider for this patient. I reviewed the vital signs, available nursing notes, past medical history, past surgical history, family history and social history. Vital Signs-Reviewed the patient's vital signs. Pulse Oximetry Analysis -  98 on room air (Interpretation)nl EKG: Interpreted by the EP. Time Interpreted: 2055 Rate: 84 Rhythm: Normal Sinus Rhythm Interpretation: Left axis, Q waves lead III, normal sinus rhythm, first-degree AV block, otherwise normal intervals, no ST changes Comparison: 3/29/2021, no significant change Records Reviewed: Nursing Notes, Old Medical Records and Previous electrocardiograms (Time of Review: 8:33 PM) ED Course: Progress Notes, Reevaluation, and Consults: 
 
 
Provider Notes (Medical Decision Making): 61-year-old male presenting with global lower extremity weakness. Had recent admission for similar, did have some rhabdo so we will check CK. Check basic labs. Also eval for UTI is unsure if patient is actually being treated for this. He denies any new falls or trauma, doubt need for repeat imaging. Of note while he was inpatient had an MRI of his head and also spine showing no acute process. Suspect likely just deconditioning. 9:35 PM 
Discussed with pt's son, has been taking cipro for uti. States he believes pt was initially walking when he was first in rehab, but hasn't been walking at all since he has been home and he does not believe he had been walking towards the end of his rehab stay. 10:59 PM 
Consult with Dr. Corwin Noe, hospitalist, accepts patient for admission For Hospitalized Patients: 
 
1. Hospitalization Decision Time: The decision to hospitalize the patient was made by Dr. Corwin Noe at 1059PM on 4/24/2021 2. Aspirin: Aspirin was not given because the patient did not present with a stroke at the time of their Emergency Department evaluation Diagnosis Clinical Impression: 1. Acute kidney injury ([address] Disposition: admit

## 2021-04-25 NOTE — PROGRESS NOTES
[facility] Progress Note Patient: Moses Gregory Sr. Age: 78 y.o. : 1940 MR#: [id number] SSN: [id number] Date/Time: 2021 Subjective:  
 
Patient seen and evaluated, lying in bed, no acute distress. Patient admitted yesterday for POOJA Assessment/Plan: 1. POOJAlikely secondary to poor oral intake and dehydration, started on IVF with improvement in creatinine, continue to monitor. 2. History of BPHcontinue Flomax 3. History of hyperlipidemiacontinue Pravachol 4. History of type 2 diabetesinsulin sliding scale, monitor blood sugars 5. History of DVTcontinue Eliquis DVT prophylaxisEliquis Full code Case discussed with:  [x]Patient  []Family  []Nursing  []Case Management DVT Prophylaxis:  []Lovenox  []Hep SQ  []SCDs  []Coumadin   []On Heparin gtt Objective:  
VS:  
Visit Vitals /75 (BP 1 Location: Left upper arm) Pulse (P) 90 Temp (P) 98.5 °F (36.9 °C) Resp 20 SpO2 96% Tmax/24hrs: Temp (24hrs), Av.9 °F (36.6 °C), Min:97.5 °F (36.4 °C), Max:98.5 °F (36.9 °C) IOBRIEFNo intake or output data in the 24 hours ending 21 1300 General:  Alert, cooperative, no acute distress HEENT: PERRLA, anicteric sclerae. Pulmonary:  CTA Bilaterally. No Wheezing/Rhonchi/Rales. Cardiovascular: Regular rate and Rhythm. GI:  Soft, Non distended, Non tender. + Bowel sounds. Extremities:  No edema, cyanosis, clubbing. No calf tenderness. Neurologic: Alert and oriented X 3. No acute neuro deficits. Additional: 
 
Medications:  
Current Facility-Administered Medications Medication Dose Route Frequency  pravastatin (PRAVACHOL) tablet 40 mg  40 mg Oral QHS  tamsulosin (FLOMAX) capsule 0.4 mg  0.4 mg Oral DAILY  sodium chloride (NS) flush 5-40 mL  5-40 mL IntraVENous Q8H  
 sodium chloride (NS) flush 5-40 mL  5-40 mL IntraVENous PRN  
 acetaminophen (TYLENOL) tablet 650 mg  650 mg Oral Q6H PRN  Or  acetaminophen (TYLENOL) suppository 650 mg  650 mg Rectal Q6H PRN  
 ondansetron (ZOFRAN) injection 4 mg  4 mg IntraVENous Q4H PRN  
 albuterol-ipratropium (DUO-NEB) 2.5 MG-0.5 MG/3 ML  3 mL Nebulization Q6H PRN  
 docusate sodium (COLACE) capsule 100 mg  100 mg Oral BID PRN  
 melatonin tablet 12 mg  12 mg Oral QHS PRN  pantoprazole (PROTONIX) tablet 40 mg  40 mg Oral DAILY PRN  
 0.9% sodium chloride infusion  75 mL/hr IntraVENous CONTINUOUS  
 insulin lispro (HUMALOG) injection   SubCUTAneous AC&HS  
 glucose chewable tablet 16 g  4 Tab Oral PRN  
 glucagon (GLUCAGEN) injection 1 mg  1 mg IntraMUSCular PRN  
 dextrose (D50W) injection syrg 12.5-25 g  25-50 mL IntraVENous PRN  
 apixaban (ELIQUIS) tablet 2.5 mg  2.5 mg Oral BID Imaging: XR Results (most recent): 
Results from [facility] Encounter encounter on 03/28/21 XR CHEST SNGL V  
 Narrative EXAM:  XR CHEST SNGL V 
 
INDICATION:   Altered mental status COMPARISON: 8/18/2020. FINDINGS: 
Hypoinflation and elevation of the right hemidiaphragm. Stable mildly enlarged 
cardiac silhouette. No pneumothorax, pleural effusion or consolidation. Likely 
atelectasis in the right base. Intact osseous structures. Impression Hypoinflation and likely right basilar atelectasis. No definite acute 
cardiopulmonary abnormality. CT Results (most recent): 
Results from [facility] Encounter encounter on 03/28/21 CTA HEAD NECK W CONT Narrative EXAM: CTA HEAD NECK W CONT 
 
CLINICAL INDICATIONS: stroke evaluation TECHNIQUE: Helical CT scan of the brain and neck were performed at 1.25 mm 
intervals during rapid IV bolus contrast administration. These data were 
reconstructed at .625 mm intervals for vascular analysis. The data was also 
reviewed at 2.5 mm intervals for accompanying soft tissue analysis.  3D post 
processed images, including surface shaded displays, were produced for this exam 
on independent console, permanently archived and interpreted. All CT scans at this facility are performed using dose optimization technique as 
appropriate to a performed exam, to include automated exposure control, 
adjustment of the MA and/or kV according to patient size (including appropriate 
matching for site-specific examinations) or use of  iterative reconstruction 
technique. CONTRAST: 100 cc Isovue 370 COMPARISON: Head CT March 28 CTA SOFT TISSUE ANALYSIS: 
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 Bones: Spondylotic changes are identified in the cervical spine there is 
osteophytic projection arising at the C5-6 level into the central canal there 
may be a degree of central canal stenosis present here see image 49 series 3. CTA NECK VASCULAR ANALYSIS:  
 
Aortic arch: Left sided with typical configuration. Tortuosity of great vessels 
identified reflecting atherosclerosis there is a bovine arch, type I 
configuration Innominate: Patent Right Subclavian: Patent Left Subclavian: Patent Right carotid: 
-CCA: Patent 
-ECA: Patent 
-ICA: Patent. Estimated less than 10%% stenosis of proximal ICA by NASCET 
criteria. Left carotid: 
-CCA: Patent 
-ECA: Patent 
-ICA: Patent Estimated less than 10%% stenosis of proximal ICA by NASCET 
criteria. Right vertebral: Patent codominant Left vertebral: Patent CTA BRAIN VASCULAR ANALYSIS:  
 
Right anterior circulation: 
-ICA: Patent 
-LUC: Patent  
-MCA: Patent Left anterior circulation: 
-ICA: Patent 
-LUC: Patent  
-MCA: Patent 
 
-ACOM: Unremarkable 
-PCOM: Not well seen Posterior circulation: 
-RVA: Patent 
-LVA: Patent 
-Basilar: Patent 
-Right PCA: Patent 
-Left PCA: Patent Major dural venous sinuses: Unremarkable Impression 1.  There is atherosclerotic changes of the carotid bifurcations but no 
significant stenosis seen Heavy atherosclerotic changes in the intradural fourth segment of the vertebral 
arteries bilaterally reconstruction images demonstrate no flow limitations 
identified to suggest severe stenosis Calcified pleural plaques right lung apex suggest asbestosis Wet read was provided 1700 hours March 28 there is no disagreement with the wet 
read, final read Labs:   
Recent Results (from the past 48 hour(s)) CBC WITH AUTOMATED DIFF Collection Time: 04/24/21  8:44 PM  
Result Value Ref Range WBC 8.8 4.6 - 13.2 K/uL  
 RBC 4.08 (L) 4.35 - 5.65 M/uL  
 HGB 11.5 (L) 13.0 - 16.0 g/dL HCT 35.1 (L) 36.0 - 48.0 % MCV 86.0 74.0 - 97.0 FL  
 MCH 28.2 24.0 - 34.0 PG  
 MCHC 32.8 31.0 - 37.0 g/dL  
 RDW 14.6 (H) 11.6 - 14.5 % PLATELET 591 569 - 343 K/uL MPV 8.9 (L) 9.2 - 11.8 FL  
 NEUTROPHILS 65 40 - 73 % LYMPHOCYTES 24 21 - 52 % MONOCYTES 8 3 - 10 % EOSINOPHILS 1 0 - 5 % BASOPHILS 1 0 - 2 %  
 ABS. NEUTROPHILS 5.7 1.8 - 8.0 K/UL  
 ABS. LYMPHOCYTES 2.1 0.9 - 3.6 K/UL  
 ABS. MONOCYTES 0.7 0.05 - 1.2 K/UL  
 ABS. EOSINOPHILS 0.1 0.0 - 0.4 K/UL  
 ABS. BASOPHILS 0.1 0.0 - 0.1 K/UL  
 DF AUTOMATED METABOLIC PANEL, COMPREHENSIVE Collection Time: 04/24/21  8:44 PM  
Result Value Ref Range Sodium 138 136 - 145 mmol/L Potassium 5.1 3.5 - 5.5 mmol/L Chloride 108 100 - 111 mmol/L  
 CO2 21 21 - 32 mmol/L Anion gap 9 3.0 - 18 mmol/L Glucose 78 74 - 99 mg/dL BUN 54 (H) 7.0 - 18 MG/DL Creatinine 3.54 (H) 0.6 - 1.3 MG/DL  
 BUN/Creatinine ratio 15 12 - 20 GFR est AA 20 (L) >60 ml/min/1.73m2 GFR est non-AA 17 (L) >60 ml/min/1.73m2 Calcium 8.5 8.5 - 10.1 MG/DL Bilirubin, total 0.6 0.2 - 1.0 MG/DL  
 ALT (SGPT) 38 16 - 61 U/L  
 AST (SGOT) 26 10 - 38 U/L Alk. phosphatase 73 45 - 117 U/L Protein, total 7.6 6.4 - 8.2 g/dL Albumin 2.7 (L) 3.4 - 5.0 g/dL Globulin 4.9 (H) 2.0 - 4.0 g/dL A-G Ratio 0.6 (L) 0.8 - 1.7 CARDIAC PANEL,(CK, CKMB & TROPONIN) Collection Time: 04/24/21  8:44 PM  
Result Value Ref Range CK - MB 2.7 <3.6 ng/ml CK-MB Index 0.9 0.0 - 4.0 %  39 - 308 U/L Troponin-I, QT <0.02 0.0 - 0.045 NG/ML  
EKG, 12 LEAD, INITIAL Collection Time: 04/24/21  8:53 PM  
Result Value Ref Range Ventricular Rate 84 BPM  
 Atrial Rate 84 BPM  
 P-R Interval 222 ms QRS Duration 104 ms Q-T Interval 390 ms QTC Calculation (Bezet) 460 ms Calculated P Axis 37 degrees Calculated R Axis -19 degrees Calculated T Axis 35 degrees Diagnosis Sinus rhythm with 1st degree AV block with premature atrial complexes Otherwise normal ECG When compared with ECG of 29-MAR-2021 02:11, 
premature atrial complexes are now present Nonspecific T wave abnormality, improved in Inferior leads Nonspecific T wave abnormality no longer evident in Lateral leads URINALYSIS W/ RFLX MICROSCOPIC Collection Time: 04/25/21 12:39 AM  
Result Value Ref Range Color YELLOW Appearance CLEAR Specific gravity 1.015 1.005 - 1.030    
 pH (UA) 5.0 5.0 - 8.0 Protein TRACE (A) NEG mg/dL Glucose Negative NEG mg/dL Ketone Negative NEG mg/dL Bilirubin Negative NEG Blood SMALL (A) NEG Urobilinogen 0.2 0.2 - 1.0 EU/dL Nitrites Negative NEG Leukocyte Esterase SMALL (A) NEG URINE MICROSCOPIC ONLY Collection Time: 04/25/21 12:39 AM  
Result Value Ref Range WBC 10 to 15 0 - 5 /hpf  
 RBC 5 to 10 0 - 5 /hpf Epithelial cells FEW 0 - 5 /lpf Bacteria 1+ (A) NEG /hpf Hyaline cast 1 to 2 0 - 2 /lpf  
GLUCOSE, POC Collection Time: 04/25/21  5:52 AM  
Result Value Ref Range Glucose (POC) 90 70 - 110 mg/dL METABOLIC PANEL, COMPREHENSIVE Collection Time: 04/25/21  6:48 AM  
Result Value Ref Range Sodium 140 136 - 145 mmol/L Potassium 4.6 3.5 - 5.5 mmol/L  Chloride 110 100 - 111 mmol/L CO2 22 21 - 32 mmol/L Anion gap 8 3.0 - 18 mmol/L Glucose 78 74 - 99 mg/dL BUN 46 (H) 7.0 - 18 MG/DL Creatinine 2.55 (H) 0.6 - 1.3 MG/DL  
 BUN/Creatinine ratio 18 12 - 20 GFR est AA 29 (L) >60 ml/min/1.73m2 GFR est non-AA 24 (L) >60 ml/min/1.73m2 Calcium 8.0 (L) 8.5 - 10.1 MG/DL Bilirubin, total 0.6 0.2 - 1.0 MG/DL  
 ALT (SGPT) 35 16 - 61 U/L  
 AST (SGOT) 28 10 - 38 U/L Alk. phosphatase 77 45 - 117 U/L Protein, total 6.6 6.4 - 8.2 g/dL Albumin 2.5 (L) 3.4 - 5.0 g/dL Globulin 4.1 (H) 2.0 - 4.0 g/dL A-G Ratio 0.6 (L) 0.8 - 1.7    
CBC WITH AUTOMATED DIFF Collection Time: 04/25/21  6:48 AM  
Result Value Ref Range WBC 7.1 4.6 - 13.2 K/uL  
 RBC 4.01 (L) 4.35 - 5.65 M/uL  
 HGB 11.3 (L) 13.0 - 16.0 g/dL HCT 35.0 (L) 36.0 - 48.0 % MCV 87.3 74.0 - 97.0 FL  
 MCH 28.2 24.0 - 34.0 PG  
 MCHC 32.3 31.0 - 37.0 g/dL  
 RDW 14.6 (H) 11.6 - 14.5 % PLATELET 207 594 - 661 K/uL MPV 8.9 (L) 9.2 - 11.8 FL  
 NEUTROPHILS 55 40 - 73 % LYMPHOCYTES 29 21 - 52 % MONOCYTES 11 (H) 3 - 10 % EOSINOPHILS 2 0 - 5 % BASOPHILS 1 0 - 2 %  
 ABS. NEUTROPHILS 3.9 1.8 - 8.0 K/UL  
 ABS. LYMPHOCYTES 2.1 0.9 - 3.6 K/UL  
 ABS. MONOCYTES 0.8 0.05 - 1.2 K/UL  
 ABS. EOSINOPHILS 0.2 0.0 - 0.4 K/UL  
 ABS. BASOPHILS 0.1 0.0 - 0.1 K/UL  
 DF AUTOMATED    
GLUCOSE, POC Collection Time: 04/25/21 11:32 AM  
Result Value Ref Range Glucose (POC) 74 70 - 110 mg/dL GLUCOSE, POC Collection Time: 04/25/21 12:06 PM  
Result Value Ref Range Glucose (POC) 81 70 - 110 mg/dL Signed By: Hilda Spivey MD   
 April 25, 2021 I spent 35 minutes with the patient in face-to-face consultation, of which greater than 50% was spent in counseling and coordination of care as described above Disclaimer: Sections of this note are dictated using utilizing voice recognition software. Minor typographical errors may be present.  If questions arise, please do not hesitate to contact me or call our department.

## 2021-04-25 NOTE — H&P
History and Physical 
 
Patient: Feliciano Medrano Sr. MRN: [id number]  SSN: [id number] YOB: 1940  Age: 78 y.o. Sex: male Subjective:  
  
Becca Jeffery is a 78 y.o. -American male who presents to [facility] ER with complaint of Hip Pain. Patient states that he was discharged home from a [address] approximately 3 days ago and has been sitting around his home since that time, unable to ambulate and care for himself. Patient reports that he was unable to eat or drink for the last 3 days, but was able to ambulate before his previous hospitalization. Patient endorses that he has had contractures for some time, but has been able to ambulate even with the contractures before his most recent hospitalization. Patient denies flank pain. Patient reports reduced urine output, reduced oral intake, and inability to ambulate. Patient states that he has a home Aide that comes one day a week, but the Aide had not visited him since discharged from [place]. Patient states that he will have PT/OT at home starting next week. Patient affirms that no one lives with him at this time. Patient denies fevers, chills, nausea, vomiting, diarrhea, dysuria, chest pain, abdominal pain, and cough. In [facility] ER, Patient is noted to have Creatinine 3.54 (Baseline Creatinine 0.8). Patient is admitted to [address] [place] [address] ([facility]) for management of Acute Kidney Injury Stage 3 likely 2°/2 Dehydration. Past Medical History:  
Diagnosis Date  Abnormal EKG  Bone pain  BPH (benign prostatic hyperplasia)  Bradycardia  Chest pain, unspecified R/O CAD 3/10 mild fixed inferior defect  Diabetes mellitus ([facility] [address] denies 9/30/19  Echocardiogram 04/02/2010 [facility]:  EF 56%. No RWMA. Mod conc LVH. DDfx. RVSP 22 mmHg.  HCVD (hypertensive cardiovascular disease)  Hematuria, unspecified  History of silent myocardial infarction  Hypercholesteremia  Hyperlipidemia  Hypertension  Hypertension  Ill-defined condition UTI  Kidney stones  Leg pain, right   
 normal exercise JOAQUIN (fall 2014)  Lower extremity arterial testing 10/07/2014 No arterial disease at rest bilaterally. R JOAQUIN 1.27.  L JOAQUIN 1.35. No significant drop in JOAQUIN w/exercise c/w normal perfusion.  Lower urinary tract symptoms (LUTS)  Muscular weakness  Nuclear cardiac imaging test 12/02/2011 Likely normal.  Fixed inferior basal and mid inferior defect likely artifact. No ischemia. EF 60%. No WMA.  Other specified cardiac dysrhythmias(427.89) Bradycardia and pauses on holter  Swelling of limb  Unspecified sleep apnea   
 does not use cpap machine Past Surgical History:  
Procedure Laterality Date  BIOPSY  3/25/99  
 COLONOSCOPY N/A 10/7/2019 COLONOSCOPY with bx's performed by Brooke Ahumada MD at [address] [facility] HX HIP REPLACEMENT  12/6/11  HX UROLOGICAL  01/06/2012 [facility], Dr. Yvette Sainz, Transurethral resection of prostate, button prostatectomy  HX UROLOGICAL  1/29/2015 [facility], Dr. Nirali Huitron, ESWL  
 MO COLONOSCOPY FLX DX W/COLLJ SPEC WHEN PFRMD    
 MO TOTAL HIP ARTHROPLASTY Left 2014 Dr. Yulia Martinez Family History Problem Relation Age of Onset  Coronary Artery Disease Father  Coronary Artery Disease Brother History of PTCA  Heart Disease Neg Hx Social History Tobacco Use  Smoking status: Never Smoker  Smokeless tobacco: Never Used Substance Use Topics  Alcohol use: No  
  
Prior to Admission medications Medication Sig Start Date End Date Taking? Authorizing Provider  
diclofenac (VOLTAREN) 1 % gel Apply 2 g to affected area four (4) times daily as needed for Pain. Gel should be measured and applied using the supplied dosing card. Apply dose (2 gm or 4 gm) to each location.   
If treatment site is the hands, patients should wait at least one (1) hour to wash their hands. APPLY TO bilateral hands/forearms 8/27/20   Maria Monroe MD  
apixaban (ELIQUIS) 5 mg tablet 2 tablets [10 MG] p.o. twice daily for 4 days followed by 1 tablet [5 MG] p.o. twice daily 8/27/20   Maria Monroe MD  
tamsulosin [facility]) 0.4 mg capsule Take 0.4 mg by mouth. 12/10/14   Provider, Historical  
polyethylene glycol (MIRALAX) 17 gram packet Take 17 g by mouth. 2/12/12   Provider, Historical  
tolterodine ER (DETROL-LA) 2 mg ER capsule Take 1 Cap by mouth daily. 12/19/19   Donavon Sosa MD  
raNITIdine (ZANTAC) 150 mg tablet Take 150 mg by mouth two (2) times a day. 8/2/17   Provider, Historical  
lisinopril (PRINIVIL, ZESTRIL) 30 mg tablet Take 30 mg by mouth daily. 5/14/15   Provider, Historical  
pravastatin (PRAVACHOL) 40 mg tablet Take 40 mg by mouth nightly. Provider, Historical  
metFORMIN (GLUCOPHAGE) 500 mg tablet Take 500 mg by mouth two (2) times daily (with meals). Provider, Historical  
  
 
No Known Allergies Review of Systems: 
(+) Joint Pain/Swelling 
(+) Decreased ROM 
(+) Reduced PO Intake  
(+) Oligouria (-) Fevers (-) Chills (-) Cough (-) Wheezing (-) Shortness of Breath (-) BLE Edema (-) Dyspnea on Exertion (-) Chest Pain (-) Abdominal Pain (-) Nausea (-) Vomiting (-) Diarrhea (-) Polyuria (-) Dysuria All other systems have been reviewed and are negative Objective:  
 
Vitals:  
 04/24/21 2050 BP: (!) 101/57 Pulse: 84 Resp: 16 Temp: 97.7 °F (36.5 °C) SpO2: 98% Physical Exam: 
General:  Elderly Adult -American male lying in bed in no acute distress HEENT:  Atraumatic, normocephalic; Pupils equally round and reactive to light with accommodation; (+) Bilateral Pseudophakia; Extraocular muscles intact; (+) Somewhat Moist Oropharynx without erythema, edema, or exudates; (+) Surgical Mask in place Chest:  No pectus carinatum; No pectus excavatum Cardiovascular:  Regular rate and rhythm with (+) II/VI Systolic Murmur without rubs or gallops Respiratory:  Clear to Auscultation Bilaterally without wheezes, rales, or rhonchi; normal effort of breathing Abdominal:  Soft, non-tense, non-tender abdomen; BS present without guarding, rebound, or masses :  Deferred Extremities:  Pulses 2+ x4 with (+) Minimal Pitting Edema to Mid-Tibia of LLE and Distal Tibia of RLE without cyanosis; (+) Contractures of Bilateral Knees and Hips Musculoskeletal:  Strength 5/5 and symmetrical in BUE and BLE Integument:  No rash on face, forearms, or legs Neurological:  Alerted & Oriented; No gross deficits of Visual Acuity, Eye Movement, Jaw Opening, Facial Expression, Hearing, Phonation, or Head Movement; No gross deficits of Tongue Movement or Slurring of Speech; (+) Increased Rigidity; (+) Questionable Tremor; (+) Questionable Stutter Psychiatric:  Affect is appropriate; Language is present and fluent with (+) Questionable Stutter; Behavior is appropriate Laboratory Studies: 
CMP:  
Lab Results Component Value Date/Time  04/24/2021 08:44 PM  
 K 5.1 04/24/2021 08:44 PM  
  04/24/2021 08:44 PM  
 CO2 21 04/24/2021 08:44 PM  
 AGAP 9 04/24/2021 08:44 PM  
 GLU 78 04/24/2021 08:44 PM  
 BUN 54 (H) 04/24/2021 08:44 PM  
 CREA 3.54 (H) 04/24/2021 08:44 PM  
 GFRAA 20 (L) 04/24/2021 08:44 PM  
 GFRNA 17 (L) 04/24/2021 08:44 PM  
 CA 8.5 04/24/2021 08:44 PM  
 ALB 2.7 (L) 04/24/2021 08:44 PM  
 TP 7.6 04/24/2021 08:44 PM  
 GLOB 4.9 (H) 04/24/2021 08:44 PM  
 AGRAT 0.6 (L) 04/24/2021 08:44 PM  
 ALT 38 04/24/2021 08:44 PM  
 
CBC:  
Lab Results Component Value Date/Time WBC 8.8 04/24/2021 08:44 PM  
 HGB 11.5 (L) 04/24/2021 08:44 PM  
 HCT 35.1 (L) 04/24/2021 08:44 PM  
  04/24/2021 08:44 PM  
 
All Cardiac Markers in the last 24 hours:  
Lab Results Component Value Date/Time   04/24/2021 08:44 PM  
 CKMB 2.7 04/24/2021 08:44 PM  
 CKND1 0.9 04/24/2021 08:44 PM  
 TROIQ <0.02 04/24/2021 08:44 PM  
 
Recent Glucose Results:  
Lab Results Component Value Date/Time GLU 78 04/24/2021 08:44 PM  
 
COAGS: No results found for: APTT, PTP, INR, INREXT, INREXT Liver Panel:  
Lab Results Component Value Date/Time ALB 2.7 (L) 04/24/2021 08:44 PM  
 TP 7.6 04/24/2021 08:44 PM  
 GLOB 4.9 (H) 04/24/2021 08:44 PM  
 AGRAT 0.6 (L) 04/24/2021 08:44 PM  
 ALT 38 04/24/2021 08:44 PM  
 AP 73 04/24/2021 08:44 PM  
  
 
Images Reviewed: 
No results found. I have personally reviewed the EKG and have found, per my read, some R'R activity, W-wave in lead aVF, and questionable LVH. Assessment:  
 
Hospital Problems  Date Reviewed: 4/24/2021 Codes Class Noted POA * (Principal) Stage 3 acute kidney injury ([facility] ICD-10-CM: N17.9 ICD-9-CM: 584.9  4/24/2021 Yes Abnormality of gait and mobility ICD-10-CM: R26.9 ICD-9-CM: 781.2  4/24/2021 Yes Obesity (BMI 30.0-34.9) ICD-10-CM: M54.5 ICD-9-CM: 278.00  1/19/2018 Yes  
   
 BPH (benign prostatic hyperplasia) ICD-10-CM: N40.0 ICD-9-CM: 600.00  6/15/2012 Yes HCVD (hypertensive cardiovascular disease) ICD-10-CM: I11.9 ICD-9-CM: 402.90  Unknown Yes Overview Signed 5/15/2015 12:55 PM by Kel Mercer  
  LV EF 55%, moderate LVH (echo 2010) HLD (hyperlipidemia) ICD-10-CM: E78.5 ICD-9-CM: 272.4  Unknown Yes Diabetes mellitus ([facility] ICD-10-CM: E11.9 ICD-9-CM: 250.00  Unknown Yes Plan:  
 
IV fluids, Strict I/Os, and repeat CMP in AM.  Follow Creatinine. Consider consulting Nephrologist if AM labs do not show significant improvement of POOJA with IV fluids. Continue home medications for HLD and BPH. Per EHR, Patient does not tolerate CPAP for PHUONG. POC Glucose checks qACHS with Corrective Insulin. DVT chemoprophylaxis is achieved by continuing home Apixaban. Patient does not appear to be on Anti-Platelet Therapy or a Beta-Blocker, despite previous MIs.  
 
Signed By: Joellen Alamo, DO April 24, 2021

## 2021-04-26 NOTE — PROGRESS NOTES
Problem: Self Care Deficits Care Plan (Adult) Goal: *Acute Goals and Plan of Care (Insert Text) Description: Occupational Therapy Goals Initiated 4/26/2021 within 7 day(s). 1.  Patient will perform grooming/functional activity sitting EOB for 4-7 minutes with supervision/set-up, F+ balance. 2.  Patient will perform upper body dressing with modified independence. 3.  Patient will perform lower body dressing with minimal assistance/contact guard assist. 
4.  Patient will perform toilet transfers with minimal assistance . 5. Patient will perform all aspects of toileting with minimal assistance/contact guard assist. 
6.  Patient will participate in upper extremity therapeutic exercise/activities with supervision/set-up for 8 minutes. 7.  Patient will utilize energy conservation techniques during functional activities with verbal cues. Prior Level of Function: Patient reports he was independent with self-care and used a RW for functional mobility PTA. Outcome: Progressing Towards Goal 
  
OCCUPATIONAL THERAPY EVALUATION Patient: Severiano Jewett Sr. (78 y.o. male) Date: 4/26/2021 Primary Diagnosis: Acute renal failure (ARF) ([facility]) [N17.9] Stage 3 acute kidney injury ([facility] [address] [N17.9] Precautions: Fall, Skin ASSESSMENT : 
Patient cleared to participate in OT evaluation by RN. Upon entering the room, the patient was supine in bed, alert, and agreeable to participate in OT evaluation. Patient was seen with PT to maximize patient safety, participation, and functional mobility in preparation for self-care tasks. Patient educated on the role of OT, evaluation process, and safety during this admission with patient verbalizing understanding. Based on the objective data described below, the patient presents with decreased strength, decreased independence, decreased activity tolerance, decreased functional balance, and decreased functional mobility, which impedes pt performance in basic self-care/ADL tasks. Patient would benefit from skilled OT to restore PLOF and maximize function. Patient will benefit from skilled intervention to address the above impairments. Patient's rehabilitation potential is considered to be Fair Factors which may influence rehabilitation potential include:  
[]             None noted [x]             Mental ability/status [x]             Medical condition [x]             Home/family situation and support systems [x]             Safety awareness []             Pain tolerance/management 
[]             Other: PLAN : 
Recommendations and Planned Interventions:  
[x]               Self Care Training                  [x]      Therapeutic Activities [x]               Functional Mobility Training   []      Cognitive Retraining 
[x]               Therapeutic Exercises           [x]      Endurance Activities [x]               Balance Training                    [x]      Neuromuscular Re-Education []               Visual/Perceptual Training     [x]      Home Safety Training 
[x]               Patient Education                   [x]      Family Training/Education []               Other (comment): Frequency/Duration: Patient will be followed by occupational therapy 1-2 times per day/4-7 days per week to address goals. Discharge Recommendations: [place] Further Equipment Recommendations for Discharge: bedside commode; patient has all other DME SUBJECTIVE:  
Patient stated I get worse everytime I come to the hospital OBJECTIVE DATA SUMMARY:  
 
Past Medical History:  
Diagnosis Date Abnormal EKG Bone pain BPH (benign prostatic hyperplasia) Bradycardia Chest pain, unspecified R/O CAD 3/10 mild fixed inferior defect Diabetes mellitus ([facility] [address] denies 9/30/19 Echocardiogram 04/02/2010 [facility]:  EF 56%. No RWMA. Mod conc LVH. DDfx. RVSP 22 mmHg. HCVD (hypertensive cardiovascular disease)  Hematuria, unspecified History of silent myocardial infarction Hypercholesteremia Hyperlipidemia Hypertension Hypertension Ill-defined condition UTI Kidney stones Leg pain, right   
 normal exercise JOAQUIN (fall 2014) Lower extremity arterial testing 10/07/2014 No arterial disease at rest bilaterally. R JOAQUIN 1.27.  L JOAQUIN 1.35. No significant drop in JOAQUIN w/exercise c/w normal perfusion. Lower urinary tract symptoms (LUTS) Muscular weakness Nuclear cardiac imaging test 12/02/2011 Likely normal.  Fixed inferior basal and mid inferior defect likely artifact. No ischemia. EF 60%. No WMA. Other specified cardiac dysrhythmias(427.89) Bradycardia and pauses on holter Swelling of limb Unspecified sleep apnea   
 does not use cpap machine Past Surgical History:  
Procedure Laterality Date BIOPSY  3/25/99 COLONOSCOPY N/A 10/7/2019 COLONOSCOPY with bx's performed by Jeannette Ramos MD at [address] [place] [address] Street REPLACEMENT  12/6/11 HX UROLOGICAL  01/06/2012 [facility], Dr. Gayla Rodríguez, Transurethral resection of prostate, button prostatectomy HX UROLOGICAL  1/29/2015 [facility], Dr. Dorys Ho, ESWL  
 [place] COLONOSCOPY FLX DX W/COLLJ [address] WHEN PFRMD    
 AR TOTAL HIP ARTHROPLASTY Left 2014 Dr. Jose Petty Barriers to Learning/Limitations: None Compensate with: visual, verbal, tactile, kinesthetic cues/model Home Situation:  
Home Situation Home Environment: Private residence # Steps to Enter: 2 One/Two Story Residence: One story Living Alone: Yes Support Systems: Family member(s) Patient Expects to be Discharged to[de-identified] Private residence Current DME Used/Available at Home: Renan Roberts, Wheelchair []  Right hand dominant   [x]  Left hand dominant Cognitive/Behavioral Status: 
Neurologic State: Alert Orientation Level: Oriented X4 Cognition: Follows commands Safety/Judgement: Fall prevention Skin: Intact Edema: None noted Vision/Perceptual:   
    
    
    
  
    
Acuity: Within Defined Limits Coordination: BUE Coordination: Within functional limits Fine Motor Skills-Upper: Left Intact; Right Intact Gross Motor Skills-Upper: Left Intact; Right Intact Balance: 
Sitting: Intact; With support Standing: Impaired; With support Standing - Static: Fair Standing - Dynamic : Poor Strength: BUE Strength: Generally decreased, functional 
  
  
  
  
 
Tone & Sensation: BUE Tone: Normal 
Sensation: Intact Range of Motion: BUE 
 
AROM: Within functional limits PROM: Generally decreased, functional(B knee limited extension) Functional Mobility and Transfers for ADLs: 
Bed Mobility: 
Supine to Sit: Minimum assistance Sit to Supine: Stand-by assistance Scooting: Stand-by assistance(up in bed in supine and along EOB in sitting) Transfers: 
Sit to Stand: Moderate assistance Stand to Sit: Minimum assistance ADL Assessment:  
Feeding: Setup Oral Facial Hygiene/Grooming: Setup Bathing: Moderate assistance(standing) Upper Body Dressing: Stand-by assistance Lower Body Dressing: Moderate assistance(standing) Toileting: Moderate assistance(standing) ADL Intervention: 
  
 
  
 
  
 
  
 
Upper Body Dressing Assistance Dressing Assistance: Stand-by assistance Shirt simulation with hospital gown: Stand-by assistance Lower Body Dressing Assistance Dressing Assistance: Contact guard assistance Socks: Contact guard assistance Leg Crossed Method Used: Yes Position Performed: Seated edge of bed(F balance, vcs needed) Cues: Verbal cues provided Cognitive Retraining Safety/Judgement: Fall prevention Pain: 
Pain level pre-treatment: 0/10 Pain level post-treatment: 0/10 Pain Intervention(s): Medication (see MAR); Response to intervention: Nurse notified, See doc flow Activity Tolerance:  
Fair Please refer to the flowsheet for vital signs taken during this treatment. After treatment:  
[] Patient left in no apparent distress sitting up in chair 
[x] Patient left in no apparent distress in bed 
[x] Call bell left within reach [x] Nursing notified 
[] Caregiver present [x] Bed alarm activated COMMUNICATION/EDUCATION:  
[x] Role of Occupational Therapy in the acute care setting 
[x] Home safety education was provided and the patient/caregiver indicated understanding. [x] Patient/family have participated as able in goal setting and plan of care. [x] Patient/family agree to work toward stated goals and plan of care. [] Patient understands intent and goals of therapy, but is neutral about his/her participation. [] Patient is unable to participate in goal setting and plan of care. Thank you for this referral. 
Alber Clements OTR/L Time Calculation: 23 mins Eval Complexity: History: MEDIUM Complexity : Expanded review of history including physical, cognitive and psychosocial  history ; Examination: HIGH Complexity : 5 or more performance deficits relating to physical, cognitive , or psychosocial skils that result in activity limitations and / or participation restrictions; Decision Making:MEDIUM Complexity : Patient may present with comorbidities that affect occupational performnce. Miniml to moderate modification of tasks or assistance (eg, physical or verbal ) with assesment(s) is necessary to enable patient to complete evaluation

## 2021-04-26 NOTE — PROGRESS NOTES
Problem: Falls - Risk of 
Goal: *Absence of Falls Description: Document Burrell Canavan Fall Risk and appropriate interventions in the flowsheet. Outcome: Resolved/Met Note: Fall Risk Interventions: 
  
 
  
 
Medication Interventions: Patient to call before getting OOB, Teach patient to arise slowly Elimination Interventions: Call light in reach, Toileting schedule/hourly rounds, Patient to call for help with toileting needs History of Falls Interventions: Door open when patient unattended, Investigate reason for fall, Room close to nurse's station Problem: Patient Education: Go to Patient Education Activity Goal: Patient/Family Education Outcome: Resolved/Met Problem: Pressure Injury - Risk of 
Goal: *Prevention of pressure injury Description: Document Luca Scale and appropriate interventions in the flowsheet. Outcome: Resolved/Met Note: Pressure Injury Interventions: 
Sensory Interventions: Assess changes in LOC, Check visual cues for pain Moisture Interventions: Absorbent underpads, Internal/External urinary devices Activity Interventions: PT/OT evaluation, Pressure redistribution bed/mattress(bed type), Increase time out of bed Mobility Interventions: PT/OT evaluation, Pressure redistribution bed/mattress (bed type) Nutrition Interventions: Document food/fluid/supplement intake Friction and Shear Interventions: Foam dressings/transparent film/skin sealants, Apply protective barrier, creams and emollients, Minimize layers, Lift sheet Problem: Patient Education: Go to Patient Education Activity Goal: Patient/Family Education Outcome: Resolved/Met Problem: Patient Education: Go to Patient Education Activity Goal: Patient/Family Education Outcome: Resolved/Met Problem: Patient Education: Go to Patient Education Activity Goal: Patient/Family Education Outcome: Resolved/Met Problem: Impaired Skin Integrity/Pressure Injury Treatment Goal: *Improvement of Existing Pressure Injury Outcome: Resolved/Met Goal: *Prevention of pressure injury Description: Document Luca Scale and appropriate interventions in the flowsheet. Outcome: Resolved/Met Problem: Patient Education: Go to Patient Education Activity Goal: Patient/Family Education Outcome: Resolved/Met Problem: Patient Education: Go to Patient Education Activity Goal: Patient/Family Education Outcome: Resolved/Met Problem: Patient Education: Go to Patient Education Activity Goal: Patient/Family Education Outcome: Resolved/Met Problem: Nutrition Deficit Goal: *Optimize nutritional status Outcome: Resolved/Met

## 2021-04-26 NOTE — PROGRESS NOTES
Spoke with patient and patient's son via phone, they want the patient to go to [facility] and rehab at discharge. Confirmed with [employer] rep that [facility] has bed availability today and can accept patient. Per Leandra Fragoso, patient has used 20 of his SNF days but, his secondary [address] will cover 10 additional days. Notified patient's son of coverage which he is okay with. Notified MD [facility] that patient has bed availability. Will continue to follow up. Larry Horn RN, BSN Care Management [phone]

## 2021-04-26 NOTE — PROGRESS NOTES
Reason for Admission:  Acute renal failure (ARF) ([facility] [N17.9] Stage 3 acute kidney injury ([facility] [N17.9] RUR Score:    19% Plan for utilizing home health:    none Likelihood of Readmission:   Moderate Do you (patient/family) have any concerns for transition/discharge?  no 
 
Transition of Care Plan:    
 
Initial assessment completed with patient and patient's son. Cognitive status of patient: oriented to time, place, person and situation. Face sheet information confirmed:  yes. The patient designates son to participate in his discharge plan and to receive any needed information. This patient was staying in [address] [place] [address] and rehab but, lives with his son. Patient is not able to navigate steps as needed. Prior to hospitalization, patient was considered to be independent with ADLs/IADLS : no . If not independent,  patient needs assist with : dressing, bathing, food preparation, cooking, toileting and grooming Patient has a current ACP document on file: no 
 
 
Healthcare Decision Maker:  
 
Click here to complete [address] including selection of the Healthcare Decision Maker Relationship (ie \"Primary\") The patient will need medical transport upon discharge. The patient has no medical equipment available in the home. Patient is not currently active with home health. Patient has stayed in a skilled nursing facility or rehab. Was  stay within last 60 days : yes. This patient is on dialysis :No 
 
List of available SNF agencies were provided and reviewed with the patient prior to discharge. Freedom of choice signed: yes, for [facility] and rehab. Currently, the discharge plan is SNF. The patient states that he can obtain his medications from the pharmacy, and take his medications as directed. Patient's current insurance is medicare and blue cross Care Management Interventions PCP Verified by CM: Yes Mode of Transport at Discharge: BLS Transition of Care Consult (CM Consult): SNF Partner SNF: No 
Reason Why Partner SNF Not Chosen: Location Discharge Durable Medical Equipment: No 
Physical Therapy Consult: Yes Occupational Therapy Consult: Yes Speech Therapy Consult: No 
Current Support Network: [address] [place] [address] Confirm Follow Up Transport: Other (see comment)(medical transport) The Patient and/or Patient Representative was Provided with a Choice of Provider and Agrees with the Discharge Plan?: Yes Name of the Patient Representative Who was Provided with a Choice of Provider and Agrees with the Discharge Plan: Brenda Leventhal [place] of Choice List was Provided with Basic Dialogue that Supports the Patient's Individualized Plan of Care/Goals, Treatment Preferences and Shares the Quality Data Associated with the Providers?: Yes Discharge Location Discharge Placement: Skilled nursing facility Mandy Cabrera RN, BSN Care Management [phone]

## 2021-04-26 NOTE — PROGRESS NOTES
Problem: Mobility Impaired (Adult and Pediatric) Goal: *Acute Goals and Plan of Care (Insert Text) Description: Physical Therapy Goals Initiated 4/26/2021 and to be accomplished within 7 day(s) 1. Patient will move from supine to sit and sit to supine , scoot up and down, and roll side to side in bed with modified independence. 2.  Patient will transfer from bed to chair and chair to bed with minimal assistance using the least restrictive device. 3.  Patient will perform sit to stand with contact guard assist. 
4.  Patient will ambulate with min A for 5 feet with the least restrictive device. PLOF: pt lives alone in a 1  with 2 MEHUL, has a Rw/wc and son checks on him often but was increasingly having hard time with mobility PTA Outcome: Progressing Towards Goal 
 PHYSICAL THERAPY EVALUATION Patient: Juan Cruz Sr. (78 y.o. male) Date: 4/26/2021 Primary Diagnosis: Acute renal failure (ARF) ([facility]) [N17.9] Stage 3 acute kidney injury ([facility] [address] [N17.9] Precautions: WBAT 
PLOF: see above ASSESSMENT : 
Based on the objective data described below, the patient presents with generalized weakness, decreased activity tolerance, impaired balance, impaired strength, decreased knee ROM. Pt is received in bed in NAD and agreeable, seen with OT to maximize functional mobility and safety. Per chart review pt was having increasingly amount of difficulty with mobility at home as he lives alone. Upon assessment, pt has B knee contractures with inability to full extend both knees. Pt requires min A to achieve sitting EOB with mod A to stand to RW however 2/2 knee ROM is unable to achieve full upright posture in standing and thus stands less than 30\". If pt continues to have difficulty with standing and then ambulating, may consider transitioning to sliding board transfers to promote OOB activity. At end of session, pt is able to scoot laterally along EOB and then return to supine with SBA.  Pt is positioned for comfort with all needs met. Will continue to follow in the acute setting, and recommend rehab upon discharge, nurse notified or progress this date. Patient will benefit from skilled intervention to address the above impairments. Patient's rehabilitation potential is considered to be Good Factors which may influence rehabilitation potential include:  
[]         None noted 
[]         Mental ability/status [x]         Medical condition 
[x]         Home/family situation and support systems 
[]         Safety awareness 
[]         Pain tolerance/management 
[]         Other: PLAN : 
Recommendations and Planned Interventions:  
[x]           Bed Mobility Training             [x]    Neuromuscular Re-Education 
[x]           Transfer Training                   []    Orthotic/Prosthetic Training 
[x]           Gait Training                          []    Modalities [x]           Therapeutic Exercises           []    Edema Management/Control 
[x]           Therapeutic Activities            [x]    Family Training/Education 
[x]           Patient Education 
[]           Other (comment): Frequency/Duration: Patient will be followed by physical therapy 1-2 times per day/4-7 days per week to address goals. Discharge Recommendations: Rehab Further Equipment Recommendations for Discharge: TBD SUBJECTIVE:  
Patient stated im going to live with my son.  OBJECTIVE DATA SUMMARY:  
 
Past Medical History:  
Diagnosis Date Abnormal EKG Bone pain BPH (benign prostatic hyperplasia) Bradycardia Chest pain, unspecified R/O CAD 3/10 mild fixed inferior defect Diabetes mellitus ([facility] [address] denies 9/30/19 Echocardiogram 04/02/2010 [facility]:  EF 56%. No RWMA. Mod conc LVH. DDfx. RVSP 22 mmHg. HCVD (hypertensive cardiovascular disease) Hematuria, unspecified History of silent myocardial infarction Hypercholesteremia Hyperlipidemia Hypertension Hypertension Ill-defined condition UTI Kidney stones Leg pain, right   
 normal exercise JOAQUIN (fall 2014) Lower extremity arterial testing 10/07/2014 No arterial disease at rest bilaterally. R JOAQUIN 1.27.  L JOAQUIN 1.35. No significant drop in JOAQUIN w/exercise c/w normal perfusion. Lower urinary tract symptoms (LUTS) Muscular weakness Nuclear cardiac imaging test 12/02/2011 Likely normal.  Fixed inferior basal and mid inferior defect likely artifact. No ischemia. EF 60%. No WMA. Other specified cardiac dysrhythmias(427.89) Bradycardia and pauses on holter Swelling of limb Unspecified sleep apnea   
 does not use cpap machine Past Surgical History:  
Procedure Laterality Date BIOPSY  3/25/99 COLONOSCOPY N/A 10/7/2019 COLONOSCOPY with bx's performed by Jessica Knight MD at [address] [place] [address] REPLACEMENT  12/6/11 HX UROLOGICAL  01/06/2012 [facility], Dr. Pravin Gomez, Transurethral resection of prostate, button prostatectomy HX UROLOGICAL  1/29/2015 [facility], Dr. Lv Mcqueen, ESWL  
 [place] COLONOSCOPY FLX DX W/COLLJ [address] WHEN PFRMD    
 UT TOTAL HIP ARTHROPLASTY Left 2014 Dr. Luis Armando Reddy Barriers to Learning/Limitations: yes;  physical 
Compensate with: Visual Cues, Verbal Cues, and Tactile Cues Home Situation: 
Home Situation Home Environment: Private residence # Steps to Enter: 2 One/Two Story Residence: One story Living Alone: Yes Support Systems: Family member(s) Patient Expects to be Discharged to[de-identified] Private residence Current DME Used/Available at Home: [place], Wheelchair Critical Behavior: 
Neurologic State: Alert Orientation Level: Oriented to person;Oriented to place Cognition: Follows commands Safety/Judgement: Fall prevention Psychosocial 
Patient Behaviors: Calm; Cooperative Strength:   
Strength: Generally decreased, functional 
 
  
Tone & Sensation:  
Tone: Normal 
 Sensation: Intact Range Of Motion: 
AROM: Generally decreased, functional(B knee limited extension) PROM: Generally decreased, functional(B knee limited extension) Functional Mobility: 
Bed Mobility: 
  
Supine to Sit: Minimum assistance Sit to Supine: Stand-by assistance Scooting: Stand-by assistance(up in bed in supine and along EOB in sitting) Transfers: 
Sit to Stand: Moderate assistance Stand to Sit: Contact guard assistance Balance:  
Sitting: Intact; With support Standing: Impaired; With support Standing - Static: Fair Standing - Dynamic : Poor Pain: 
Pain level pre-treatment: 0/10 Pain level post-treatment: 0/10 Pain Intervention(s) : Medication (see MAR); Rest, Ice, Repositioning Response to intervention: Nurse notified Activity Tolerance:  
Good Please refer to the flowsheet for vital signs taken during this treatment. After treatment:  
[]         Patient left in no apparent distress sitting up in chair 
[x]         Patient left in no apparent distress in bed 
[x]         Call bell left within reach [x]         Nursing notified 
[]         Caregiver present 
[]         Bed alarm activated 
[]         SCDs applied COMMUNICATION/EDUCATION:  
[x]         Role of Physical Therapy in the acute care setting. [x]         Fall prevention education was provided and the patient/caregiver indicated understanding. [x]         Patient/family have participated as able in goal setting and plan of care. [x]         Patient/family agree to work toward stated goals and plan of care. []         Patient understands intent and goals of therapy, but is neutral about his/her participation. []         Patient is unable to participate in goal setting/plan of care: ongoing with therapy staff. 
[]         Other: Thank you for this referral. 
Real Golder Time Calculation: 23 mins Eval Complexity: History: MEDIUM  Complexity : 1-2 comorbidities / personal factors will impact the outcome/ POC Exam:MEDIUM Complexity : 3 Standardized tests and measures addressing body structure, function, activity limitation and / or participation in recreation  Presentation: MEDIUM Complexity : Evolving with changing characteristics  Clinical Decision Making:Medium Complexity    Overall Complexity:MEDIUM

## 2021-04-26 NOTE — PROGRESS NOTES
Physician Progress Note Amanda Morse 
CSN #:                  [id number] :                       1940 ADMIT DATE:       2021 8:27 PM 
[address] [place] DATE: 
RESPONDING 
PROVIDER #:        Best Villar MD 
 
 
 
 
QUERY TEXT: 
 
Dear hospitalist: 
 
Pt admitted with dehydration. Noted documentation of pressure ulcer in nursing flow sheet on 2021. If possible, please document in progress notes and discharge summary: The medical record reflects the following: 
Risk Factors: dehydration, diabetes mellitus Clinical Indicators: 
Kranthi Murrell was discharged home from a [address] approximately 3 days ago and has been sitting around his home since that time, unable to ambulate and care for himself. Patient reports that he was unable to eat or drink for the last 3 days\"  per Dr. Alexandra Ding, H&P, 2021. Stage 2 pressure ulcer on gluteal fold and stage 2 pressure ulcer on right buttock per Everlina Schlatter, RN, nursing flowsheet, 2021 @ 0845. Treatment: foam dressing Thank you, TERESA Grande RN, [facility] Office:  [phone] Options provided: -- Pressure ulcer in gluteal fold and right buttock not clinically significant 
-- Stage 2 pressure ulcer in gluteal fold and stage 2 pressure ulcer on right buttock is clinically significant 
-- Other - I will add my own diagnosis -- Disagree - Not applicable / Not valid -- Disagree - Clinically unable to determine / Unknown 
-- Refer to Clinical Documentation Reviewer PROVIDER RESPONSE TEXT: 
 
This patient has clinically significant stage 2 pressure ulcer in gluteal fold and stage 2 pressure ulcer on right buttock. Query created by:  Melissa Page on 2021 1:37 PM 
 
 
Electronically signed by:  Best Villar MD 2021 3:16 PM

## 2021-04-26 NOTE — PROGRESS NOTES
Comprehensive Nutrition Assessment Type and Reason for Visit: Initial, Wound Nutrition Recommendations/Plan:  
- Continue diabetic diet, monitor and encourage po intake as tolerated. - Add supplements: Ensure Enlive, BID 
- IVF per MD. 
 
Nutrition Assessment:  Admitted with POOJA likely 2/2 poor intake and dehydration, improving on IVF. Reports good appetite with fair meal intake. Tolerating diet & agreeable to supplement, stage 2 pressure injury noted. Malnutrition Assessment: 
Malnutrition Status: At risk for malnutrition (specify)(wounds & deconditioning) Nutrition History and Allergies: PMHx- HLD, HTN, metabolic encephalopathy, UTI, COVID and DM. Presented to ED with deconditioning. Reports he lives home alone, eats 2 good meals/day with a lunchtime snack (reports his son delivers meals for him). Denies changes in wt hx with UBW of 220#. Wt 4/26/21 of 196# using bed scale. NKFA. Estimated Daily Nutrient Needs: 
Energy (kcal): 6996-5108; Weight Used for Energy Requirements: Current(89 kg) Protein (g): 107-134; Weight Used for Protein Requirements: Current(1.2-1.5) Fluid (ml/day): 2929-0968; Method Used for Fluid Requirements: 1 ml/kcal 
 
Nutrition Related Findings:  BM 4/23. Has dentures. NS at 75 mL/hr. BG levels stable WNL. Wounds:   
Pressure injury, Stage II    
 
Current Nutrition Therapies: DIET DIABETIC CONSISTENT CARB Regular Anthropometric Measures: 
· Height:  5' 11\" (180.3 cm) · Current Body Wt:  88.9 kg (196 lb) · Admission Body Wt:  196 lb · Usual Body Wt:  100.2 kg (221 lb) · Ideal Body Wt:  172 lbs:  114 % · BMI Category: Overweight (BMI 25.0-29. 9) Nutrition Diagnosis:  
· Increased nutrient needs related to increased demand for energy/nutrients as evidenced by wounds Nutrition Interventions:  
Food and/or Nutrient Delivery: IV fluid delivery, Start oral nutrition supplement, Continue current diet Nutrition Education and Counseling: Education not indicated Coordination of Nutrition Care: Continue to monitor while inpatient Goals: 
PO nutrition intake will meet >75% of patient estimated nutritional needs within the next 7 days. Nutrition Monitoring and Evaluation:  
Behavioral-Environmental Outcomes: None identified Food/Nutrient Intake Outcomes: Food and nutrient intake, Supplement intake, IVF intake Physical Signs/Symptoms Outcomes: Biochemical data, Meal time behavior, Nutrition focused physical findings, Skin Discharge Planning:   
Continue current diet, Continue oral nutrition supplement Electronically signed by Sasha Melendez RD on 4/26/2021 at 2:49 PM 
 
Contact: [phone]

## 2021-04-26 NOTE — WOUND CARE
Physical Exam 
Musculoskeletal:  
     Legs: 
 
 
  
Focused assessment  Alert and oriented, sitting up in bed, legs noted to be contracted at knees. Upon turning patient to inspect buttock, patient incontinent of urine. He reports that he is sometimes able to use the urinal in time, but not always. His scrotum, posteriorly is noted to be excoriated due to moisture associated skin damage. For this, Primofit is applied and attached to wall suction at 60mmhg continuous. Underpads changed as well. 1. POA right hip healed pressure injury. Fully epithelialized, pigmentation spotty. 2. POA sacrum pressure injury, stage 2.4cm x 1cm x 0.1cm. Pale pink base with some yellow. Minimal serosanguinous drainage. 3. POA friction injury to left buttock, posterior. 2cm x 2cm x 0.1cm. Pale pink base with macerated edges likely due to moisture associated skin damage. Minimal drainage, serosanguinous. Topical treatment protocol in place. Silicone dressing to right hip and sacrum. Change every 3 days and prn soilage. Float heels using pillow. Turn [id number], limit sacral positioning. Care discussed with primary nurse, Teresita Velazquez. Care turned over to nursing staff at this time. Kalie Barcenas RN, BSN, [address] [place] [address],[address]

## 2021-04-26 NOTE — PROGRESS NOTES
TRANSFER - OUT REPORT: 
 
Verbal report given to Norberto Klein RN (name) on [address]  being transferred to [facility]) for routine progression of care Report consisted of patients Situation, Background, Assessment and  
Recommendations(SBAR). Information from the following report(s) SBAR, Kardex, Intake/Output, MAR, Recent Results and Med Rec Status was reviewed with the receiving nurse. Lines:    
 
Opportunity for questions and clarification was provided.

## 2021-04-26 NOTE — DISCHARGE SUMMARY
Discharge Summary Patient: Nini Butler Sr. MRN: [id number]  CSN: [id number] YOB: 1940  Age: 78 y.o. Sex: male DOA: 4/24/2021 LOS:  LOS: 2 days   Discharge Date:   
 
Admission Diagnoses: Acute renal failure (ARF) ([facility] [N17.9] Stage 3 [facility] [N17.9] Discharge Diagnoses:   
POOJA Stage II sacral decub History of hypertensioncurrently controlled without any medications History of hyperlipidemia History of BPH History of DVT on anticoagulation Recent admission for rhabdomyolysis Discharge Condition: Stable Consults: None PHYSICAL EXAM 
Visit Vitals BP (!) 93/55 (BP 1 Location: Left upper arm) Pulse 71 Temp 97.9 °F (36.6 °C) Resp 18 Ht 5' 11\" (1.803 m) Wt 88.9 kg (196 lb) SpO2 98% BMI 27.34 kg/m² General: Alert, cooperative, no acute distress HEENT: PERRLA, EOMI. Anicteric sclerae. Lungs:  CTA Bilaterally. No Wheezing/Rhonchi/Rales. Heart:  Regular rate and Rhythm. Abdomen: Soft, Non distended, Non tender. + Bowel sounds. Extremities: No edema/ cyanosis/ clubbing Neurologic:  AA oriented X 3. Moves all extremities. HPI: 
Nini Butler Sr. is a 78 y.o. -American male who presents to [facility] ER with complaint of Hip Pain. Patient states that he was discharged home from a [address] approximately 3 days ago and has been sitting around his home since that time, unable to ambulate and care for himself. Patient reports that he was unable to eat or drink for the last 3 days, but was able to ambulate before his previous hospitalization. Patient endorses that he has had contractures for some time, but has been able to ambulate even with the contractures before his most recent hospitalization. Patient denies flank pain. Patient reports reduced urine output, reduced oral intake, and inability to ambulate.   Patient states that he has a home Aide that comes one day a week, but the [address] [place] [address] had not visited him since discharged from [address] [place] [address]. Patient states that he will have PT/OT at home starting next week. Patient affirms that no one lives with him at this time. Patient denies fevers, chills, nausea, vomiting, diarrhea, dysuria, chest pain, abdominal pain, and cough. 
  
In [facility] ER, Patient is noted to have Creatinine 3.54 (Baseline Creatinine 0.8).   
Patient is admitted to [address] [place] [address] (Non-Covid-19 Cohort) for management of Acute Kidney Injury Stage 3 likely 2°/2 Dehydration. Hospital Course:  
69-year-old -American male presents to the emergency room secondary to generalized weakness. Patient was recently admitted to the hospital for rhabdomyolysis and discharged to skilled nursing facility. Per the son who brought him to the ER patient has not been doing much at home and has been laying in bed. He has been very weak so son brought him to the ER for further evaluation ER evaluationpatient noted to have POOJA, stage II sacral decub, hypotension. Patient treated with IVF and his creatinine improved. His blood pressure medications were held. History of type 2 diabetesmost recent A1c was 5.4, patient's Metformin has been stopped given his normal A1c as well as current POOJA. Patient was seen by PT and OT and recommended rehab. Patient is currently being discharged to skilled nursing facility and is advised to closely follow-up with his primary care physician in 2 weeks. I discussed the discharge plan with son who is okay with him going to a skilled nursing facility at this time. Imaging: XR Results (most recent): 
Results from [facility] MARTINEZ  CHLOE Encounter encounter on 03/28/21 XR CHEST SNGL V  
 Narrative EXAM:  XR CHEST SNGL V 
 
INDICATION:   Altered mental status COMPARISON: 8/18/2020. FINDINGS: 
Hypoinflation and elevation of the right hemidiaphragm. Stable mildly enlarged 
cardiac silhouette. No pneumothorax, pleural effusion or consolidation.  Likely 
atelectasis in the right base. Intact osseous structures. Impression Hypoinflation and likely right basilar atelectasis. No definite acute 
cardiopulmonary abnormality. CT Results (most recent): 
Results from JUANY MARIE Encounter encounter on 03/28/21 CTA HEAD NECK W CONT Narrative EXAM: CTA HEAD NECK W CONT 
 
CLINICAL INDICATIONS: stroke evaluation TECHNIQUE: Helical CT scan of the brain and neck were performed at 1.25 mm 
intervals during rapid IV bolus contrast administration. These data were 
reconstructed at .625 mm intervals for vascular analysis. The data was also 
reviewed at 2.5 mm intervals for accompanying soft tissue analysis. 3D post 
processed images, including surface shaded displays, were produced for this exam 
on independent console, permanently archived and interpreted. All CT scans at this facility are performed using dose optimization technique as 
appropriate to a performed exam, to include automated exposure control, 
adjustment of the MA and/or kV according to patient size (including appropriate 
matching for site-specific examinations) or use of  iterative reconstruction 
technique. CONTRAST: 100 cc Isovue 370 COMPARISON: Head CT March 28 CTA SOFT TISSUE ANALYSIS: 
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 Bones: Spondylotic changes are identified in the cervical spine there is 
osteophytic projection arising at the C5-6 level into the central canal there 
may be a degree of central canal stenosis present here see image 49 series 3. CTA NECK VASCULAR ANALYSIS:  
 
Aortic arch: Left sided with typical configuration. Tortuosity of great vessels 
identified reflecting atherosclerosis there is a bovine arch, type I 
configuration Innominate: Patent Right Subclavian: Patent Left Subclavian: Patent Right carotid: 
-CCA: Patent 
-ECA: Patent 
-ICA: Patent. Estimated less than 10%% stenosis of proximal ICA by NASCET 
criteria. Left carotid: 
-CCA: Patent 
-ECA: Patent 
-ICA: Patent Estimated less than 10%% stenosis of proximal ICA by NASCET 
criteria. Right vertebral: Patent codominant Left vertebral: Patent CTA BRAIN VASCULAR ANALYSIS:  
 
Right anterior circulation: 
-ICA: Patent 
-LUC: Patent  
-MCA: Patent Left anterior circulation: 
-ICA: Patent 
-LUC: Patent  
-MCA: Patent 
 
-ACOM: Unremarkable 
-PCOM: Not well seen Posterior circulation: 
-RVA: Patent 
-LVA: Patent 
-Basilar: Patent 
-Right PCA: Patent 
-Left PCA: Patent Major dural venous sinuses: Unremarkable Impression 1. There is atherosclerotic changes of the carotid bifurcations but no 
significant stenosis seen Heavy atherosclerotic changes in the intradural fourth segment of the vertebral 
arteries bilaterally reconstruction images demonstrate no flow limitations 
identified to suggest severe stenosis Calcified pleural plaques right lung apex suggest asbestosis Wet read was provided 1700 hours March 28 there is no disagreement with the wet 
read, final read Procedures:  
None Discharge Medications:    
Current Discharge Medication List  
  
CONTINUE these medications which have CHANGED Details  
apixaban (Eliquis) 5 mg tablet Take 1 Tab by mouth two (2) times a day. Qty: 60 Tab, Refills: 0 CONTINUE these medications which have NOT CHANGED Details  
tamsulosin (FLOMAX) 0.4 mg capsule Take 0.4 mg by mouth. tolterodine ER (DETROL-LA) 2 mg ER capsule Take 1 Cap by mouth daily. Qty: 30 Cap, Refills: 12  
  
pravastatin (PRAVACHOL) 40 mg tablet Take 40 mg by mouth nightly. diclofenac (VOLTAREN) 1 % gel Apply 2 g to affected area four (4) times daily as needed for Pain.  Gel should be measured and applied using the supplied dosing card. Apply dose (2 gm or 4 gm) to each location. If treatment site is the hands, patients should wait at least one (1) hour to wash their hands. APPLY TO bilateral hands/forearms 
Qty: 100 g, Refills: 0  
  
polyethylene glycol (MIRALAX) 17 gram packet Take 17 g by mouth. raNITIdine (ZANTAC) 150 mg tablet Take 150 mg by mouth two (2) times a day. STOP taking these medications  
  
 lisinopril (PRINIVIL, ZESTRIL) 30 mg tablet Comments:  
Reason for Stopping:   
   
 metFORMIN (GLUCOPHAGE) 500 mg tablet Comments:  
Reason for Stopping:   
   
  
  
Current Facility-Administered Medications:  
  pravastatin (PRAVACHOL) tablet 40 mg, 40 mg, Oral, QHS, Derrick Sanchez, , 40 mg at 04/25/21 2144   tamsulosin (FLOMAX) capsule 0.4 mg, 0.4 mg, Oral, DAILY, Derrick Sanchez, , 0.4 mg at 04/26/21 6453   sodium chloride (NS) flush 5-40 mL, 5-40 mL, IntraVENous, Q8H, Derrick Sanchez, , 10 mL at 04/25/21 2150   sodium chloride (NS) flush 5-40 mL, 5-40 mL, IntraVENous, PRN, Derrick Sanchez,  
  acetaminophen (TYLENOL) tablet 650 mg, 650 mg, Oral, Q6H PRN **OR** acetaminophen (TYLENOL) suppository 650 mg, 650 mg, Rectal, Q6H PRN, Derrick Sanchez DO 
  ondansetron (ZOFRAN) injection 4 mg, 4 mg, IntraVENous, Q4H PRN, Derrick Sanchez DO 
  albuterol-ipratropium (DUO-NEB) 2.5 MG-0.5 MG/3 ML, 3 mL, Nebulization, Q6H PRN, Derrick Sanchez DO 
  docusate sodium (COLACE) capsule 100 mg, 100 mg, Oral, BID PRN, Derrick Sanchez,  
  melatonin tablet 12 mg, 12 mg, Oral, QHS PRN, Derrick Sanchez,  
  pantoprazole (PROTONIX) tablet 40 mg, 40 mg, Oral, DAILY PRN, Derrick Sanchez DO 
  0.9% sodium chloride infusion, 75 mL/hr, IntraVENous, CONTINUOUS, Derrick Sanchez DO, Last Rate: 75 mL/hr at 04/25/21 1348, 75 mL/hr at 04/25/21 1348   insulin lispro (HUMALOG) injection, , SubCUTAneous, AC&HS, Derrick Sanchez DO, Stopped at 04/25/21 0730 
  glucose chewable tablet 16 g, 4 Tab, Oral, PRN, Clark Sensor, DO 
  glucagon (GLUCAGEN) injection 1 mg, 1 mg, IntraMUSCular, PRN, Derrick Sanchez, DO 
  dextrose (D50W) injection syrg 12.5-25 g, 25-50 mL, IntraVENous, PRN, Grisel Sanchez, DO 
  apixaban (ELIQUIS) tablet 2.5 mg, 2.5 mg, Oral, BID, Derrick Sanchez, DO, 2.5 mg at 04/26/21 5538 · It is important that you take the medication exactly as they are prescribed. · Keep your medication in the bottles provided by the pharmacist and keep a list of the medication names, dosages, and times to be taken in your wallet. · Do not take other medications without consulting your doctor. Discharge To: SNF Minutes spent on discharge: 40 minutes spent coordinating this discharge (review instructions/follow-up, prescriptions, preparing report for sign off) Olivia Park MD 
4/26/2021 3:17 PM

## 2021-04-26 NOTE — PROGRESS NOTES
Transition of Care Plan to SNF/Rehab 
 
SNF/Rehab Transition: 
Patient has been accepted to [facility] and rehab and meets criteria for admission. Patient will  [facility] and expected to leave at 7pm. 
 
Communication to Patient/Family: Met with patient and spoke with patient's son and they are agreeable to the transition plan. Communication to SNF/Rehab: 
Bedside RN, Mikaela Norton, has been notified of the transition plan to the facility and to call report (phone number [phone] Discharge information has been updated on the AVS. And communicated to facility via [facility], or CC link. SNF/Rehab Transition: PCP/Specialist: facility MD 
 
Nursing Please include all hard scripts for controlled substances, med rec and dc summary, and AVS in packet. Reviewed and confirmed with facility representative, Luis Alberto Cantu  at [facility] they can manage the patient care needs for the following:  
 
Alem Goff with (X) only those applicable: 
 
Medication: 
[x]  Medications will be available at the facility []  IV Antibiotics []  Controlled Substance - hard copy to be sent with patient  
[]  Weekly Labs Documents: 
[] Hard RX  Number sent  
[] MAR 
[] Kardex [] AVS 
[] Wound care note [x]Transfer Summary/Discharge Summary Equipment: 
[]  CPAP/BiPAP []  Wound Vacuum 
[]  Ku or Urinary Device 
[]  PICC/Central Line 
[]  Nebulizer 
[]  Ventilator Treatment: 
[]Isolation (for MRSA, VRE, etc.) []Surgical Drain Management []Tracheostomy Care 
[]Dressing Changes []Dialysis with transportation and chair time  Center Mode of tranpsort []PEG Care []Oxygen []Daily Weights for Heart Failure Dietary: 
[]Any diet limitations []Tube Feedings []Total Parenteral Management (TPN) Eligible for Medicaid Long Term Services and Supports Yes: 
[] Eligible for medical assistance or will become eligible within 180 days and LTSS completed.   
[] Provider/Patient and/or support system has requested screening. 
[] LTSS copy provided to patient or responsible party,. 
[] LTSS unavailable at discharge will send once processed to SNF provider. 
[] LTSS  unavailable at discharged mailed to patient [x] LTSS performed by [employer] on 4/1/21 with tracking number [id number] No:  
[] Private pay and is not financially eligible for Medicaid within the next 180 days. [] Reside out-of-state. [] A residents of a state owned/operated facility that is licensed 
by [address] [facility] or [facility] 
[] Enrollment in [facility] hospice services 
[] [address] [place] [address] 
[] Patient /Family declines to have screening completed or provide financial information for screening Financial Resources: 
Medicaid   
[] Initiated and application pending  
[] Full coverage Advanced Care Plan: 
[]Surrogate Decision Maker of Care 
[]POA [x]Communicated Code Status/ sent full code Other Migel Patient RN, BSN Care Management [phone]

## 2021-06-06 PROBLEM — R31.9 PAINLESS HEMATURIA: Status: ACTIVE | Noted: 2021-01-01

## 2021-06-06 NOTE — ED TRIAGE NOTES
Pt arrived via EMS for reported blood in urine. Per EMS patient comes from home and has a alonso cath that is changed every three weeks. Cath changed 2 weeks ago. Per EMS family stated they noted a little blood in patient urinary cath yesterday and today an increased amount of blood. Pt denies any pain and states he is on blood thinners.

## 2021-06-06 NOTE — ED PROVIDER NOTES
HPI  
53-year-old -American male with history of BPH status post TURP in 2012, diabetes, hypertension, hyperlipidemia presents with painless hematuria for 1 day Eliquis for DVT. Patient gets his Ku changed every 3 weeks, last change was 2 weeks ago. Patient denies dysuria, suprapubic pain, flank pain, chest pain, shortness of breath, syncope, fatigue, dizziness/lightheadedness, nausea/vomiting, fever/chills. Patient reportedly has a Ku because he is incontinent. Follows with [facility] [place], Dr. Donte Rudd, last seen 5/21/2021 for overactive bladder, urinary incontinence. Ku changed every 3 weeks by home health. Past Medical History:  
Diagnosis Date  Abnormal EKG  Bone pain  BPH (benign prostatic hyperplasia)  Bradycardia  Chest pain, unspecified R/O CAD 3/10 mild fixed inferior defect  Diabetes mellitus ([facility] denies 9/30/19  Echocardiogram 04/02/[facility]:  EF 56%. No RWMA. Mod conc LVH. DDfx. RVSP 22 mmHg.  HCVD (hypertensive cardiovascular disease)  Hematuria, unspecified  History of silent myocardial infarction  Hypercholesteremia  Hyperlipidemia  Hypertension  Hypertension  Ill-defined condition UTI  Kidney stones  Leg pain, right   
 normal exercise JOAQUIN (fall 2014)  Lower extremity arterial testing 10/07/2014 No arterial disease at rest bilaterally. R JOAQUIN 1.27.  L JOAQUIN 1.35. No significant drop in JOAQUIN w/exercise c/w normal perfusion.  Lower urinary tract symptoms (LUTS)  Muscular weakness  Nuclear cardiac imaging test 12/02/2011 Likely normal.  Fixed inferior basal and mid inferior defect likely artifact. No ischemia. EF 60%. No WMA.  Other specified cardiac dysrhythmias(427.89) Bradycardia and pauses on holter  Swelling of limb  Unspecified sleep apnea   
 does not use cpap machine Past Surgical History:  
Procedure Laterality Date  BIOPSY  3/25/99  
 COLONOSCOPY N/A 10/7/2019 COLONOSCOPY with bx's performed by Marya Amaya MD at [address] HX HIP REPLACEMENT  12/6/11  HX UROLOGICAL  01/06/2012 [facility], Dr. Adelita Clifton, Transurethral resection of prostate, button prostatectomy  HX UROLOGICAL  1/29/2015 [facility], Dr. Jacky Ortiz, ESWL  
 [place] COLONOSCOPY FLX DX W/COLLJ SPEC WHEN PFRMD    
 [place] TOTAL HIP ARTHROPLASTY Left 2014 Dr. Nancy Robb Family History:  
Problem Relation Age of Onset  Coronary Artery Disease Father  Coronary Artery Disease Brother History of PTCA  Heart Disease Neg Hx Social History Socioeconomic History  Marital status:  Spouse name: Not on file  Number of children: Not on file  Years of education: Not on file  Highest education level: Not on file Occupational History  Not on file Tobacco Use  Smoking status: Never Smoker  Smokeless tobacco: Never Used Substance and Sexual Activity  Alcohol use: No  
 Drug use: No  
 Sexual activity: Not on file Other Topics Concern  Not on file Social History Narrative  Not on file Social Determinants of Health Financial Resource Strain:  Difficulty of Paying Living Expenses:   
Food Insecurity:  Worried About [address] in the Last Year:   
[address] in the Last Year:   
Transportation Needs:   
 Lack of Transportation (Medical):  Lack of Transportation (Non-Medical): Physical Activity:   
 Days of Exercise per Week:  Minutes of Exercise per Session:   
Stress:  Feeling of Stress :   
Social Connections:  Frequency of Communication with Friends and Family:  Frequency of Social Gatherings with Friends and Family:  Attends Christian Services:  Active Member of Clubs or Organizations:  Attends Club or Organization Meetings:  Marital Status: Intimate Partner Violence:  Fear of Current or Ex-Partner:  Emotionally Abused:   
 Physically Abused:   
 Sexually Abused: ALLERGIES: Patient has no known allergies. Review of Systems Constitutional: Negative for activity change, chills, fatigue, fever and unexpected weight change. HENT: Negative for congestion, sinus pressure and sore throat. Eyes: Negative for photophobia, redness and visual disturbance. Respiratory: Negative for chest tightness, shortness of breath and wheezing. Cardiovascular: Negative for chest pain, palpitations and leg swelling. Gastrointestinal: Negative for abdominal pain, blood in stool, constipation, diarrhea, nausea and vomiting. Endocrine: Negative for polydipsia, polyphagia and polyuria. Genitourinary: Positive for hematuria. Negative for decreased urine volume, difficulty urinating, dysuria, flank pain, frequency, penile pain, penile swelling, scrotal swelling, testicular pain and urgency. Musculoskeletal: Negative for arthralgias, back pain, gait problem, myalgias, neck pain and neck stiffness. Skin: Negative for pallor and rash. Allergic/Immunologic: Negative for immunocompromised state. Neurological: Negative for dizziness, tremors, seizures, syncope, weakness, light-headedness and headaches. Hematological: Bruises/bleeds easily (on eliquis for DVT). Vitals:  
 06/06/21 1058 BP: 104/63 Pulse: 63 Resp: 20 Temp: 98.3 °F (36.8 °C) SpO2: 99% Physical Exam 
Vitals and nursing note reviewed. Constitutional:   
   General: He is not in acute distress. Appearance: Normal appearance. He is normal weight. He is not ill-appearing. HENT:  
   Head: Normocephalic and atraumatic. Nose: Nose normal.  
   Mouth/Throat:  
   Mouth: Mucous membranes are moist.  
   Pharynx: Oropharynx is clear. Eyes:  
   Extraocular Movements: Extraocular movements intact.   
   Conjunctiva/sclera: Conjunctivae normal.  
   Pupils: Pupils are equal, round, and reactive to light. Cardiovascular:  
   Rate and Rhythm: Regular rhythm. Bradycardia present. Heart sounds: Murmur (3/6 systolic flow murmur) heard. Pulmonary:  
   Effort: Pulmonary effort is normal.  
   Breath sounds: Normal breath sounds. No wheezing. Abdominal:  
   General: Abdomen is flat. Bowel sounds are normal.  
   Palpations: Abdomen is soft. Tenderness: There is no abdominal tenderness. There is no right CVA tenderness or left CVA tenderness. Genitourinary: 
   Penis: Normal.   
   Testes: Normal.  
Musculoskeletal:     
   General: No swelling or tenderness. Normal range of motion. Cervical back: Normal range of motion and neck supple. Right lower leg: No edema. Left lower leg: No edema. Skin: 
   General: Skin is warm and dry. Capillary Refill: Capillary refill takes less than 2 seconds. Findings: No bruising or rash. Neurological:  
   General: No focal deficit present. Mental Status: He is alert and oriented to person, place, and time. Mental status is at baseline. Cranial Nerves: No cranial nerve deficit. Sensory: No sensory deficit. Motor: No weakness. Coordination: Coordination normal.  
   Comments: Chronically dysarthric Psychiatric:     
   Mood and Affect: Mood normal.     
   Behavior: Behavior normal.     
   Thought Content: Thought content normal.  
 
  
 
MDM 
  
78-year-old -American male history of BPH status post TURP (2012), on Eliquis for remote DVT presents with painless hematuria for 1 day. Patient denies flank pain, fevers chills, suprapubic tenderness, dysuria. Afebrile, soft blood pressure of 104/63 (baseline), known bradycardia heart rate 63, other vital signs within normal limits Physical exam unremarkable. Heart with 3/6 systolic flow murmur. Lungs clear to auscultation bilaterally, abdomen soft, nontender, nondistended. No suprapubic tenderness or flank pain, no CVA tenderness. Pulses 2+ in all 4 extremities. CBC, hemoglobin 11.8 (11.3, April 2021 baseline), no leukocytosis BMP 
PT/INR 15.8 wnl, INR 1.3 - therapeutic PTT 35.8, within normal limits UA - grossly bloody, notable for +nitrites, large LE (patient notably just finished a course of bactrim 3 days ago) UCX -pending BG 85 Changed Ku catheter in the ED as it is usually changed by home health nurse, different antibiotics for UTI, and discharge. Son at bedside, and patient updated, amenable to treatment plan with appropriate ED return precautions. Patient prescribed Keflex for 7 days.  
 
Ginette Romero MD/MPH 
EM PGY-1 
06/06/21 11:36 AM

## 2021-06-06 NOTE — DISCHARGE INSTRUCTIONS
You have been seen for painless bloody urine in your Ku catheter. Your hemoglobin is stable and safe, your measurement of blood thinner function is also normal.  You were found to have a residual UTI. We have given you antibiotics, please follow the directions on the package insert. Follow-up with your primary care doctor for further management of your symptoms and return to the ED if you notice blood clots, jas blood in your catheter feel dizzy/lightheaded, chest pain, shortness of breath, fever/chills, nausea/vomiting, pain on urination or defecation.

## 2021-06-09 NOTE — PROGRESS NOTES
[facility] [address] [facility] 
[phone] fax Physical Therapy Discharge Summary Patient name: Merry Puente Sr. Start of Care: 3/2/2021 Referral source: Correne Bamberger, MD : 1940 Medical/Treatment Diagnosis: Left hip pain [M25.552] Payor: BLUE CROSS / Plan: [facility] [employer] / Product Type: PPO /  Onset Date: Prior Hospitalization: see medical history Provider#: [id number] Medications: Verified on Patient Summary List   
Comorbidities: HBP, Arthritis Prior Level of Function:Lives Alone. Independent. Visits from Start of Care: 6    Missed Visits: 1 Reporting Period : 3/2/21 to 3/22/21 Summary of Care: 
Goal: Pt will be compliant with a HEP to improve function. Status at last note/certification: New goal-established at evaluation Status at discharge: MET Goal: Pt will increase FOTO score by 13 pts to improve function. Status at last note/certification: New VRHQ-PRGV=35 Status at discharge: Unable to formally assess Goal: Pt will increase left hip strength to 4+/5 to ease with ADL's. Status at last note/certification: New goal-grossly 4/5 Status at discharge: Unable to formally assess Goal: Pt will perform sit to stand x10 with demonstration of safety and mechanics to ease with transfers. Status at last note/certification: New OFCK-8M sit to stand score:40 sec Status at discharge: NOT MET-able to stand from 21 in plinth height with cues for foot placement and anterior trunk lean x7 Goal: Pt will navigate an obstacle course w/o LOB to improve dynamic ambulation for short household distances. Status at last note/certification: New goal 
Status at discharge: Unable to formally assess ASSESSMENT/RECOMMENDATIONS: Patient presented to therapy for 6 5 additional visits of skilled outpatient PT following initial evaluation and did not return.  Unable to formally assess progress towards goals. Patient demonstrated improving activity tolerance. He required cues for posture in seated and standing as well as cues to limit foot drag and increase step length with ambulation with rollator. He continues to have difficulty with sit to stand transfers without use of UEs and requires cues for anterior trunk lean/forward weight shift. Patient reported he was to be given a machine to assist with reducing LE edema and was to be fitted for custom shoes at second to last PT visit. At this time patient is appropriate for discharge from skilled outpatient PT.  
 
[x]Discontinue therapy: []Patient has reached or is progressing toward set goals [x]Patient is non-compliant or has abdicated 
    []Due to lack of appreciable progress towards set goals [place], [place] 6/9/2021 4:44 PM

## 2021-06-22 NOTE — ED PROVIDER NOTES
EMERGENCY DEPARTMENT HISTORY AND PHYSICAL EXAM    Date: 6/22/2021  Patient Name: Brian Armstrong.    History of Presenting Illness     Chief Complaint   Patient presents with    Blood in Urine          History Provided By: Patient and Patient's son    Chief Complaint: blood in catheter  Duration: 2-3 Days  Timing:  Acute  Location: bladder  Quality: n/a  Severity: Moderate  Modifying Factors: none  Associated Symptoms: denies any other associated signs or symptoms      Additional History (Context): Brian Retana is a 78 y.o. male with past medical history of UTI, BPH, hypertension, hypercholesterolemia, and chronic indwelling alonso catheter  who presents with painless hematuria x 2-3 days. Pt was previously seen 06/06/2021 for similar symptoms and received Keflex. Alonso catheter was changed per patient's son. Pt reports symptoms were better after antibiotic course but has progressively worsened. Pt reports burning sensation with passing urine that keeps him awake at night. Patient has not tried any medications to relieve symptoms. Reports mild tenderness  near genitalia. Denies fever, headache, nausea, vomiting, abdominal pain or tenderness, changes in bladder habits. No other complaints reported. PCP: Jacky Mckinney MD    Current Outpatient Medications   Medication Sig Dispense Refill    trimethoprim-sulfamethoxazole (Bactrim DS) 160-800 mg per tablet Take 1 Tablet by mouth two (2) times a day for 10 days. 20 Tablet 0    memantine (NAMENDA) 5 mg tablet Take  by mouth daily.  multivitamin (ONE A DAY) tablet Take 1 Tablet by mouth daily.  apixaban (Eliquis) 5 mg tablet Take 1 Tab by mouth two (2) times a day. 60 Tab 0    diclofenac (VOLTAREN) 1 % gel Apply 2 g to affected area four (4) times daily as needed for Pain. Gel should be measured and applied using the supplied dosing card. Apply dose (2 gm or 4 gm) to each location.    If treatment site is the hands, patients should wait at least one (1) hour to wash their hands. APPLY TO bilateral hands/forearms (Patient not taking: Reported on 5/21/2021) 100 g 0    tamsulosin (FLOMAX) 0.4 mg capsule Take 0.4 mg by mouth.  polyethylene glycol (MIRALAX) 17 gram packet Take 17 g by mouth. (Patient not taking: Reported on 5/21/2021)      tolterodine ER (DETROL-LA) 2 mg ER capsule Take 1 Cap by mouth daily. 30 Cap 12    raNITIdine (ZANTAC) 150 mg tablet Take 150 mg by mouth two (2) times a day. (Patient not taking: Reported on 5/21/2021)      pravastatin (PRAVACHOL) 40 mg tablet Take 40 mg by mouth nightly. Past History     Past Medical History:  Past Medical History:   Diagnosis Date    Abnormal EKG     Bone pain     BPH (benign prostatic hyperplasia)     Bradycardia     Chest pain, unspecified     R/O CAD 3/10 mild fixed inferior defect    Diabetes mellitus ([address]     denies 9/30/19    Echocardiogram 04/02/2010    [facility]:  EF 56%. No RWMA. Mod conc LVH. DDfx. RVSP 22 mmHg.  HCVD (hypertensive cardiovascular disease)     Hematuria, unspecified     History of silent myocardial infarction     Hypercholesteremia     Hyperlipidemia     Hypertension     Hypertension     Ill-defined condition     UTI    Kidney stones     Leg pain, right     normal exercise JOAQUIN (fall 2014)    Lower extremity arterial testing 10/07/2014    No arterial disease at rest bilaterally. R JOAQUIN 1.27.  L JOAQUIN 1.35. No significant drop in JOAQUIN w/exercise c/w normal perfusion.  Lower urinary tract symptoms (LUTS)     Muscular weakness     Nuclear cardiac imaging test 12/02/2011    Likely normal.  Fixed inferior basal and mid inferior defect likely artifact. No ischemia. EF 60%. No WMA.       Other specified cardiac dysrhythmias(427.89)     Bradycardia and pauses on holter    Swelling of limb     Unspecified sleep apnea     does not use cpap machine       Past Surgical History:  Past Surgical History:   Procedure Laterality Date  BIOPSY  3/25/99    COLONOSCOPY N/A 10/7/2019    COLONOSCOPY with bx's performed by Eliot Barnhart MD at [address] HX HIP REPLACEMENT  12/6/11    HX UROLOGICAL  01/06/2012    [facility], Dr. Chris Yee, Transurethral resection of prostate, button prostatectomy     HX UROLOGICAL  1/29/2015    [facility], Dr. Oni Diez, ESWL    OH COLONOSCOPY FLX DX Sherolyn Bevel Left 2014    Dr. Makenzie Guzman       Family History:  Family History   Problem Relation Age of Onset    Coronary Artery Disease Father     Coronary Artery Disease Brother         History of PTCA    Heart Disease Neg Hx        Social History:  Social History     Tobacco Use    Smoking status: Never Smoker    Smokeless tobacco: Never Used   Substance Use Topics    Alcohol use: No    Drug use: No       Allergies:  No Known Allergies      Review of Systems   Review of Systems   Constitutional: Negative for chills, diaphoresis, fatigue and fever. Respiratory: Negative for chest tightness and shortness of breath. Cardiovascular: Negative for chest pain and palpitations. Gastrointestinal: Negative for abdominal pain and nausea. Genitourinary: Positive for hematuria. Negative for difficulty urinating, dysuria, scrotal swelling, testicular pain and urgency. Neurological: Negative for dizziness and headaches. Psychiatric/Behavioral: Negative for agitation, confusion and hallucinations. All other systems reviewed and are negative. All Other Systems Negative  Physical Exam     Vitals:    06/22/21 1630   BP: 100/63   Pulse: 89   Resp: 16   Temp: 98.1 °F (36.7 °C)   SpO2: 100%   Weight: 86.2 kg (190 lb)   Height: 5' 11\" (1.803 m)     Physical Exam  Vitals reviewed. Constitutional:       Appearance: Normal appearance. HENT:      Head: Normocephalic and atraumatic. Eyes:      Extraocular Movements: Extraocular movements intact.       Conjunctiva/sclera: Conjunctivae normal. Pupils: Pupils are equal, round, and reactive to light. Cardiovascular:      Rate and Rhythm: Normal rate and regular rhythm. Pulses: Normal pulses. Heart sounds: Normal heart sounds. Pulmonary:      Effort: Pulmonary effort is normal.      Breath sounds: Normal breath sounds. Abdominal:      General: Abdomen is flat. There is no distension. Palpations: Abdomen is soft. There is no mass. Tenderness: There is no abdominal tenderness. There is no guarding or rebound. Genitourinary:     Comments: Ku catheter in place, draining bloody urine. Small amount of purulence noted around the urethral meatus. Musculoskeletal:      Comments: Slightly contracted, this is his baseline   Skin:     General: Skin is warm and dry. Neurological:      General: No focal deficit present. Mental Status: He is alert and oriented to person, place, and time. Mental status is at baseline. Psychiatric:         Mood and Affect: Mood normal.         Behavior: Behavior normal.         Thought Content: Thought content normal.         Judgment: Judgment normal.                Diagnostic Study Results     Labs -     Recent Results (from the past 12 hour(s))   CBC WITH AUTOMATED DIFF    Collection Time: 06/22/21  4:43 PM   Result Value Ref Range    WBC 8.2 4.6 - 13.2 K/uL    RBC 3.95 (L) 4.35 - 5.65 M/uL    HGB 11.6 (L) 13.0 - 16.0 g/dL    HCT 34.6 (L) 36.0 - 48.0 %    MCV 87.6 74.0 - 97.0 FL    MCH 29.4 24.0 - 34.0 PG    MCHC 33.5 31.0 - 37.0 g/dL    RDW 14.3 11.6 - 14.5 %    PLATELET 768 044 - 130 K/uL    MPV 8.5 (L) 9.2 - 11.8 FL    NEUTROPHILS 65 40 - 73 %    LYMPHOCYTES 22 21 - 52 %    MONOCYTES 11 (H) 3 - 10 %    EOSINOPHILS 2 0 - 5 %    BASOPHILS 0 0 - 2 %    ABS. NEUTROPHILS 5.3 1.8 - 8.0 K/UL    ABS. LYMPHOCYTES 1.8 0.9 - 3.6 K/UL    ABS. MONOCYTES 0.9 0.05 - 1.2 K/UL    ABS. EOSINOPHILS 0.2 0.0 - 0.4 K/UL    ABS.  BASOPHILS 0.0 0.0 - 0.1 K/UL    DF AUTOMATED     METABOLIC PANEL, COMPREHENSIVE Collection Time: 06/22/21  4:43 PM   Result Value Ref Range    Sodium 138 136 - 145 mmol/L    Potassium 3.3 (L) 3.5 - 5.5 mmol/L    Chloride 102 100 - 111 mmol/L    CO2 29 21 - 32 mmol/L    Anion gap 7 3.0 - 18 mmol/L    Glucose 76 74 - 99 mg/dL    BUN 19 (H) 7.0 - 18 MG/DL    Creatinine 0.89 0.6 - 1.3 MG/DL    BUN/Creatinine ratio 21 (H) 12 - 20      GFR est AA >60 >60 ml/min/1.73m2    GFR est non-AA >60 >60 ml/min/1.73m2    Calcium 9.0 8.5 - 10.1 MG/DL    Bilirubin, total 0.7 0.2 - 1.0 MG/DL    ALT (SGPT) 12 (L) 16 - 61 U/L    AST (SGOT) 7 (L) 10 - 38 U/L    Alk. phosphatase 63 45 - 117 U/L    Protein, total 7.7 6.4 - 8.2 g/dL    Albumin 3.0 (L) 3.4 - 5.0 g/dL    Globulin 4.7 (H) 2.0 - 4.0 g/dL    A-G Ratio 0.6 (L) 0.8 - 1.7     PROTHROMBIN TIME + INR    Collection Time: 06/22/21  4:43 PM   Result Value Ref Range    Prothrombin time 16.4 (H) 11.5 - 15.2 sec    INR 1.4 (H) 0.8 - 1.2     URINALYSIS W/ RFLX MICROSCOPIC    Collection Time: 06/22/21  8:44 PM   Result Value Ref Range    Color RED      Appearance CLOUDY      Specific gravity >1.030 1.003 - 1.040    pH (UA) 7.0      Protein >300 mg/dL    Glucose Negative mg/dL    Ketone TRACE mg/dL    Bilirubin SMALL      Blood LARGE      Urobilinogen 1.0 EU/dL    Nitrites Negative      Leukocyte Esterase SMALL     URINE MICROSCOPIC ONLY    Collection Time: 06/22/21  8:44 PM   Result Value Ref Range    WBC 4 to 10 0 - 5 /hpf    RBC TOO NUMEROUS TO COUNT 0 - 5 /hpf    Epithelial cells FEW 0 - 5 /lpf    Bacteria FEW (A) NEG /hpf    Other:        Macroscopic performed on spun urine due to gross blood  or mucus       Radiologic Studies -   No orders to display     CT Results  (Last 48 hours)    None        CXR Results  (Last 48 hours)    None            Medical Decision Making   I am the first provider for this patient. I reviewed the vital signs, available nursing notes, past medical history, past surgical history, family history and social history.     Vital Signs-Reviewed the patient's vital signs. Records Reviewed: Cynthia Farmer PA-C     Procedures:  Procedures    Provider Notes (Medical Decision Making): Impression:  UTI, catheter associated    Labs: Hemoglobin 11.6, hematocrit 34.6, INR 1.4, PT 16.4, K 3.3. UA consistent with UTI, however not as significant as prior. Pt was given keflex 2 weeks ago. Has also been treated with bactrim in the past. Will plan to d/c with bactrim and pcp/urology follow-up. Cynthia Farmer PA-C     Dictation disclaimer:  Please note that this dictation was completed with [employer]ee[employer], the [employer] voice recognition software. Quite often unanticipated grammatical, syntax, homophones, and other interpretive errors are inadvertently transcribed by the computer software. Please disregard these errors. Please excuse any errors that have escaped final proofreading. MED RECONCILIATION:  No current facility-administered medications for this encounter. Current Outpatient Medications   Medication Sig    trimethoprim-sulfamethoxazole (Bactrim DS) 160-800 mg per tablet Take 1 Tablet by mouth two (2) times a day for 10 days.  memantine (NAMENDA) 5 mg tablet Take  by mouth daily.  multivitamin (ONE A DAY) tablet Take 1 Tablet by mouth daily.  apixaban (Eliquis) 5 mg tablet Take 1 Tab by mouth two (2) times a day.  diclofenac (VOLTAREN) 1 % gel Apply 2 g to affected area four (4) times daily as needed for Pain. Gel should be measured and applied using the supplied dosing card. Apply dose (2 gm or 4 gm) to each location. If treatment site is the hands, patients should wait at least one (1) hour to wash their hands. APPLY TO bilateral hands/forearms (Patient not taking: Reported on 5/21/2021)    tamsulosin (FLOMAX) 0.4 mg capsule Take 0.4 mg by mouth.  polyethylene glycol (MIRALAX) 17 gram packet Take 17 g by mouth.  (Patient not taking: Reported on 5/21/2021)    tolterodine ER (DETROL-LA) 2 mg ER capsule Take 1 Cap by mouth daily.  raNITIdine (ZANTAC) 150 mg tablet Take 150 mg by mouth two (2) times a day. (Patient not taking: Reported on 5/21/2021)    pravastatin (PRAVACHOL) 40 mg tablet Take 40 mg by mouth nightly. Disposition:  D/c    DISCHARGE NOTE:   Patient is stable for discharge at this time. I have discussed all the findings from today's work up with the patient, including lab results and imaging. I have answered all questions. Rx for bactrim given. Rest and close follow-up with the PCP recommended this week. Return to the ED immediately for any new or worsening symptoms. Cynthia Farmer PA-C     Follow-up Information     Follow up With Specialties Details Why Contact Info    Dee Shaffer MD Internal Medicine In 1 week  [address] [place] [address] 61      [facility] EMERGENCY DEPT Emergency Medicine  As needed, If symptoms worsen [address] [facility] [address] [phone]          Current Discharge Medication List      CONTINUE these medications which have CHANGED    Details   trimethoprim-sulfamethoxazole (Bactrim DS) 160-800 mg per tablet Take 1 Tablet by mouth two (2) times a day for 10 days. Qty: 20 Tablet, Refills: 0  Start date: 6/22/2021, End date: 7/2/2021                 Diagnosis     Clinical Impression:   1.  Acute cystitis with hematuria

## 2021-06-22 NOTE — ED TRIAGE NOTES
Patient arrived to triage from home presenting with blood in urine. Patient arrived with a alonso. Pt has been experiencing blood in urine on and off for a while.

## 2021-06-23 NOTE — DISCHARGE INSTRUCTIONS
[employer] Activation    Thank you for requesting access to [employer]. Please follow the instructions below to securely access and download your online medical record. [employer] allows you to send messages to your doctor, view your test results, renew your prescriptions, schedule appointments, and more. How Do I Sign Up? In your internet browser, go to [email or web address]  Click on the First Time User? Click Here link in the Sign In box. You will be redirect to the New Member Sign Up page. Enter your [employer] Access Code exactly as it appears below. You will not need to use this code after youve completed the sign-up process. If you do not sign up before the expiration date, you must request a new code. [employer] Access Code: [unfilled] (This is the date your [employer] access code will )    Enter the last four digits of your Social Security Number (xxxx) and Date of Birth (mm/dd/yyyy) as indicated and click Submit. You will be taken to the next sign-up page. Create a [employer] ID. This will be your [employer] login ID and cannot be changed, so think of one that is secure and easy to remember. Create a [employer] password. You can change your password at any time. Enter your Password Reset Question and Answer. This can be used at a later time if you forget your password. Enter your e-mail address. You will receive e-mail notification when new information is available in [address] Click Sign Up. You can now view and download portions of your medical record. Click the [place] link to download a portable copy of your medical information. Additional Information    If you have questions, please visit the Frequently Asked Questions section of the [employer] website at [email or web address] Remember, [employer] is NOT to be used for urgent needs. For medical emergencies, dial 911.

## 2021-06-23 NOTE — ED NOTES
The Provider has reviewed discharge instructions with the patient. The patient verbalized understanding.

## 2021-11-16 NOTE — ED NOTES
Assumed care of pt. Rest foot when possible.  Elevate foot while sitting.  Ibuprofen if needed.  No active play for the next 5 to 7 days.  Return if worse.    Please call now to arrange your follow-up as we discussed. Your diagnosis was made based on you presentation today and the information available.  Symptoms often change over time. If you are not improving as expected, please see your doctor or return here sooner that we discussed. If you develop symptoms that concern you, please return right away or go to the Emergency Room promptly.     Patient Education     Foot Bruise (Child)  Your child has a bruise (contusion) on their foot. It’s when small blood vessels break open and leak some blood into the nearby area. Bruise symptoms often include black-and-blue color, swelling, and pain. It may take several hours for a deep bruise to show up. Bruises are often minor injuries.   A foot can easily be hit or bumped and lead to a bruise. A child can trip and fall and can bruise a foot. Or a child may drop something on a foot. Bruises are also common in  babies after blood is drawn from the heel.   Bruises like these are first treated using RICE. This stands for rest, ice, compression, and elevation. A cold compress is put on the area. The foot may need to be protected with a brace or elastic cloth wrap. Elevating the foot above the heart can help reduce swelling. Medicine can also help ease swelling and pain. If the injury is severe, your child may need an X-ray to check for broken bones.   It may be painful for your child to move the injured foot. You’ll need to limit how much your child uses the foot for a few days. An older child may need crutches or a larger shoe for a short period of time. Your child can use the foot normally again when he or she is feeling better.   A bruise may take several weeks to go away. Swelling should get better in a few days.  Home care  Follow these guidelines when caring for your child at  home:  · Have your child rest the foot.  · Your child’s healthcare provider may advise prescription or over-the-counter pain medicines to reduce pain and swelling. Follow all instructions for giving these to your child.  · Use cool compresses or cold packs to help reduce swelling and pain. A cool compress is a clean cloth that’s damp with cold water. Use this on a baby or toddler. A cold pack is a gel pouch that is put in the freezer to chill. It’s then wrapped in a clean, thin, dry cloth before use. Cold packs are for older children. Place a cool compress or a cold pack on the bruised area for up to 20 minutes every 1 to 2 hours. Repeat this every hour while your child is awake. Continue for 1 or 2 days, or as instructed.  · If the foot has an elastic cloth wrap or a brace, follow all instructions for caring for these.  · Elevate your child's foot to help ease swelling.  ? For children 1 year and older:  When sitting or lying down, have your child elevate the foot above the level of his or her heart as often as possible. Pillows can be used while sitting or sleeping to elevate his or her bruised foot.     ? For babies younger than 12 months:  A baby who is awake and observed can be placed on his or her non-injured side with the bruised foot elevated. If your baby falls asleep, move him or her to a flat, firm surface. Never use pillows for sleep or put your baby to sleep on his or her stomach or side. Babies younger than 12 months should sleep on a flat surface on their back. Don't use car seats, strollers, swings, baby carriers, or baby slings for sleep. If your baby falls asleep in one of these, move him or her to a flat, firm surface as soon as you can.  · Once you have stopped using cool compresses or cold packs on the area, start using warm compresses. A warm compress is a clean, thin cloth that’s damp with warm water. Apply this to the area for 10 minutes, several times a day. Make sure to touch the warm  compress to your own skin first to be sure it will not scald or burn.  · Follow any other instructions you were given.  · Keep in mind that bruising may take several weeks to go away.  Prevention  Have your child wear sturdy shoes that protect his or her feet.  Follow-up care  Follow up with your child’s healthcare provider.  Special note to parents  Healthcare providers are trained to see injuries such as this in young children as a sign of possible abuse. You may be asked questions about how your child was injured. Healthcare providers are required by law to ask you these questions. This is done to protect your child. Please try to be patient.   When to seek medical advice  Call your child's healthcare provider right away if any of these occur:  · Bruising gets worse  · Pain or swelling doesn't get better, or gets worse  · Your child has new bruises and you don’t know what caused them  · The bruise does not heal within a few weeks  · Your child can’t walk or move the injured foot  · Your child has a fever  Din[place] last reviewed this educational content on 8/1/2019 © 2000-2021 [employer]. All rights reserved. This information is not intended as a substitute for professional medical care. Always follow your healthcare professional's instructions.

## 2022-08-01 NOTE — ED PROVIDER NOTES
EMERGENCY DEPARTMENT HISTORY AND PHYSICAL EXAM 
 
Date: 8/10/2020 Patient Name: Gt Louis Sr. 
 
History of Presenting Illness Chief Complaint Patient presents with  Fatigue  Cough  Fever  Sore Throat History Provided By: Patient Chief complaint: This is an 78year old male with a complex past medial history who presented to the ED with chief complaint of fevers, cough, fatigue, sore throat and known COVID positive contact. Symptoms on going X 4 days. No other associated symptoms. No chest pain/ chest discomfort. No SOB. No n/v/d/c. No abd pain. No pelvic pain. No flak pain. No peripheral edema. PCP: Lynette Trinidad MD 
 
Current Outpatient Medications Medication Sig Dispense Refill  tamsulosin (FLOMAX) 0.4 mg capsule Take 0.4 mg by mouth.  polyethylene glycol (MIRALAX) 17 gram packet Take 17 g by mouth.  oxyCODONE-acetaminophen (PERCOCET) 5-325 mg per tablet Take 1 Tab by mouth.  tolterodine ER (DETROL-LA) 2 mg ER capsule Take 1 Cap by mouth daily. 30 Cap 12  
 raNITIdine (ZANTAC) 150 mg tablet Take 150 mg by mouth two (2) times a day.  metoprolol succinate (TOPROL-XL) 100 mg XL tablet TAKE ONE TABLET BY MOUTH ONCE DAILY 30 Tab 5  
 lisinopril (PRINIVIL, ZESTRIL) 30 mg tablet Take 30 mg by mouth daily.  pravastatin (PRAVACHOL) 40 mg tablet Take 40 mg by mouth nightly.  metFORMIN (GLUCOPHAGE) 500 mg tablet Take 500 mg by mouth two (2) times daily (with meals). Past History Past Medical History: 
Past Medical History:  
Diagnosis Date  Abnormal EKG  Bone pain  BPH (benign prostatic hyperplasia)  Bradycardia  Chest pain, unspecified R/O CAD 3/10 mild fixed inferior defect  Diabetes mellitus ([facility] [address] denies 9/30/19  Echocardiogram [facility]  EF 56%. No RWMA. Mod conc LVH. DDfx. RVSP 22 mmHg.  HCVD (hypertensive cardiovascular disease)  Hematuria, unspecified  History of silent myocardial infarction  Hypercholesteremia  Hyperlipidemia  Hypertension  Hypertension  Ill-defined condition UTI  Kidney stones  Leg pain, right   
 normal exercise JOAQUIN (fall 2014)  Lower extremity arterial testing 10/07/2014 No arterial disease at rest bilaterally. R JOAQUIN 1.27.  L JOAQUIN 1.35. No significant drop in JOAQUIN w/exercise c/w normal perfusion.  Lower urinary tract symptoms (LUTS)  Muscular weakness  Nuclear cardiac imaging test 12/02/2011 Likely normal.  Fixed inferior basal and mid inferior defect likely artifact. No ischemia. EF 60%. No WMA.  Other specified cardiac dysrhythmias(427.89) Bradycardia and pauses on holter  Swelling of limb  Unspecified sleep apnea   
 does not use cpap machine Past Surgical History: 
Past Surgical History:  
Procedure Laterality Date  BIOPSY  3/25/99  
 COLONOSCOPY N/A 10/7/2019 COLONOSCOPY with bx's performed by Shawn Quick MD at [facility] HX HIP REPLACEMENT  12/6/11  HX UROLOGICAL  01/06/2012 [facility], Dr. Pippa Regalado, Transurethral resection of prostate, button prostatectomy  HX UROLOGICAL  1/29/2015 [facility], Dr. Joan Mckay, ESWL  
 ND COLONOSCOPY FLX DX W/COLLJ [address] WHEN PFRMD    
 TOTAL HIP ARTHROPLASTY Left 2014 Dr. Erin Toro Family History: 
Family History Problem Relation Age of Onset  Coronary Artery Disease Father  Coronary Artery Disease Brother History of PTCA  Heart Disease Neg Hx Social History: 
Social History Tobacco Use  Smoking status: Never Smoker  Smokeless tobacco: Never Used Substance Use Topics  Alcohol use: No  
 Drug use: No  
 
 
Allergies: 
No Known Allergies Review of Systems Review of Systems Constitutional: Positive for fatigue and fever. Negative for chills and diaphoresis. HENT: Positive for sore throat. Negative for congestion, ear pain, mouth sores, sinus pressure and sneezing. Eyes: Negative for photophobia, pain and visual disturbance. Respiratory: Positive for cough. Negative for chest tightness, shortness of breath and wheezing. Cardiovascular: Negative for chest pain, palpitations and leg swelling. Gastrointestinal: Negative for abdominal pain, diarrhea, nausea and vomiting. Genitourinary: Negative for dysuria, flank pain and hematuria. Musculoskeletal: Negative for back pain, gait problem, neck pain and neck stiffness. Skin: Negative for rash and wound. Neurological: Negative for dizziness, weakness, light-headedness and headaches. Physical Exam  
 
Visit Vitals /61 (BP 1 Location: Left arm, BP Patient Position: At rest;Sitting) Pulse 74 Temp 98.6 °F (37 °C) Resp 20 SpO2 96% Physical Exam 
Vitals signs and nursing note reviewed. Constitutional:   
   General: He is not in acute distress. Appearance: Normal appearance. He is not ill-appearing, toxic-appearing or diaphoretic. HENT:  
   Head: Normocephalic and atraumatic. Mouth/Throat:  
   Pharynx: Oropharynx is clear. No pharyngeal swelling, posterior oropharyngeal erythema or uvula swelling. Neck: Musculoskeletal: Normal range of motion and neck supple. No neck rigidity or muscular tenderness. Cardiovascular:  
   Rate and Rhythm: Normal rate and regular rhythm. Pulses: Normal pulses. Heart sounds: Normal heart sounds. No murmur. Pulmonary:  
   Effort: Pulmonary effort is normal. No respiratory distress. Breath sounds: Normal breath sounds. No wheezing or rales. Abdominal:  
   General: Abdomen is flat. Bowel sounds are normal. There is no distension. Palpations: Abdomen is soft. Tenderness: There is no abdominal tenderness. There is no right CVA tenderness or left CVA tenderness. Musculoskeletal: Normal range of motion. Right lower leg: No edema. Left lower leg: No edema. Skin: 
   General: Skin is warm and dry. Capillary Refill: Capillary refill takes less than 2 seconds. Neurological:  
   General: No focal deficit present. Mental Status: He is alert and oriented to person, place, and time. Psychiatric:     
   Behavior: Behavior normal.  
 
 
 
 
Diagnostic Study Results Labs - Recent Results (from the past 12 hour(s)) CBC WITH AUTOMATED DIFF Collection Time: 08/10/20  2:35 PM  
Result Value Ref Range WBC 4.4 (L) 4.6 - 13.2 K/uL  
 RBC 5.05 4.70 - 5.50 M/uL  
 HGB 14.9 13.0 - 16.0 g/dL HCT 45.2 36.0 - 48.0 % MCV 89.5 74.0 - 97.0 FL  
 MCH 29.5 24.0 - 34.0 PG  
 MCHC 33.0 31.0 - 37.0 g/dL  
 RDW 13.7 11.6 - 14.5 % PLATELET 725 (L) 082 - 420 K/uL MPV 10.3 9.2 - 11.8 FL  
 NEUTROPHILS 65 40 - 73 % LYMPHOCYTES 16 (L) 21 - 52 % MONOCYTES 19 (H) 3 - 10 % EOSINOPHILS 0 0 - 5 % BASOPHILS 0 0 - 2 %  
 ABS. NEUTROPHILS 2.9 1.8 - 8.0 K/UL  
 ABS. LYMPHOCYTES 0.7 (L) 0.9 - 3.6 K/UL  
 ABS. MONOCYTES 0.8 0.05 - 1.2 K/UL  
 ABS. EOSINOPHILS 0.0 0.0 - 0.4 K/UL  
 ABS. BASOPHILS 0.0 0.0 - 0.1 K/UL  
 DF AUTOMATED METABOLIC PANEL, COMPREHENSIVE Collection Time: 08/10/20  2:35 PM  
Result Value Ref Range Sodium 138 136 - 145 mmol/L Potassium 3.8 3.5 - 5.5 mmol/L Chloride 104 100 - 111 mmol/L  
 CO2 26 21 - 32 mmol/L Anion gap 8 3.0 - 18 mmol/L Glucose 79 74 - 99 mg/dL BUN 24 (H) 7.0 - 18 MG/DL Creatinine 1.54 (H) 0.6 - 1.3 MG/DL  
 BUN/Creatinine ratio 16 12 - 20 GFR est AA 53 (L) >60 ml/min/1.73m2 GFR est non-AA 43 (L) >60 ml/min/1.73m2 Calcium 8.4 (L) 8.5 - 10.1 MG/DL Bilirubin, total 1.7 (H) 0.2 - 1.0 MG/DL  
 ALT (SGPT) 18 16 - 61 U/L  
 AST (SGOT) 25 10 - 38 U/L Alk. phosphatase 39 (L) 45 - 117 U/L Protein, total 8.1 6.4 - 8.2 g/dL Albumin 3.7 3.4 - 5.0 g/dL Globulin 4.4 (H) 2.0 - 4.0 g/dL A-G Ratio 0.8 0.8 - 1.7 POC LACTIC ACID Collection Time: 08/10/20  2:50 PM  
Result Value Ref Range Lactic Acid (POC) 1.50 0.40 - 2.00 mmol/L  
EKG, 12 LEAD, INITIAL Collection Time: 08/10/20  2:57 PM  
Result Value Ref Range Ventricular Rate 71 BPM  
 Atrial Rate 71 BPM  
 P-R Interval 194 ms QRS Duration 100 ms Q-T Interval 402 ms QTC Calculation (Bezet) 436 ms Calculated P Axis -5 degrees Calculated R Axis -47 degrees Calculated T Axis 43 degrees Diagnosis Normal sinus rhythm Left anterior fascicular block Minimal voltage criteria for LVH, may be normal variant Abnormal ECG When compared with ECG of 07-OCT-2019 08:23, 
premature atrial complexes are no longer present IL interval has decreased Criteria for Inferior infarct are no longer present Radiologic Studies -  
XR CHEST PA LAT Final Result IMPRESSION:  
  
No acute cardiopulmonary process. Thank you for your referral.  
  
 
 
 
Medical Decision Making I am the first provider for this patient. I reviewed the vital signs, available nursing notes, past medical history, past surgical history, family history and social history. Vital Signs-Reviewed the patient's vital signs. Records Reviewed: Nursing Notes and Old Medical Records (Time of Review: 10:41 PM) 
 
ED Course: Progress Notes, Reevaluation, and Consults: 
 
 
Provider Notes (Medical Decision Making): Temp in triage 101. 4. he is alert and oriented X 3. No confusion. NAD. Lungs clear. No work of breathing. No use of accessory muscles. Abdomin benign. No rebound, no guarding. No cva tenderness. No peripheral edema. Cbc unremarkable, cmp with crt. 1.54 (baseline 1.3) BUN 24. CXR with no acute pathology, ECG with no acute ischemic changes. He received tylenol and gentle IV hydration. Follow up temp 98.6 Given is age, lab findings, positive COVID contact and disposition- pros and cons for observation discussed with Qian Allison at [phone]. Shared decision making: patient meets low criteria for observation however if admitted he would be on the COVID unit with other COVID positive patients. Explained to patient and son that this is their decision to make however at this time, risk with admission to [facility] unit may not out weight benefits given his is not hypoxic, without leukocytosis, without SOB or chest pain, without n/v or inability to tolerate oral intake. Both son and patient agree to go home, continue to monitor symptoms at home. Should symptoms progress, should he develop any chest pain, Sob or should he be unable to keep up oral intake, son is to return patient to ED immediately. Otherwise self quarantine X 14 days. Extensive review of return precautions discussed with patient prior to discharge. Discussed importance of PCP follow up / reassessment and need to return to ED immediately should symptoms worsen. Diagnosis Clinical Impression: 1. Suspected COVID-19 virus infection Disposition: home Follow-up Information Follow up With Specialties Details Why Contact Info [facility] EMERGENCY DEPT Emergency Medicine  If symptoms worsen [address] [id number] 
[phone] Aaliyah Howard MD Internal Medicine In 2 days SUSPECTED COVID  [address] [address] 
[phone] CONTINUE these medications which have NOT CHANGED Details  
tamsulosin (FLOMAX) 0.4 mg capsule Take 0.4 mg by mouth., Historical Med  
  
polyethylene glycol (MIRALAX) 17 gram packet Take 17 g by mouth., Historical Med  
  
oxyCODONE-acetaminophen (PERCOCET) 5-325 mg per tablet Take 1 Tab by mouth., Historical Med  
  
tolterodine ER (DETROL-LA) 2 mg ER capsule Take 1 Cap by mouth daily. , Print, Disp-30 Cap, R-12  
  
raNITIdine (ZANTAC) 150 mg tablet Take 150 mg by mouth two (2) times a day., Historical Med  
  
metoprolol succinate (TOPROL-XL) 100 mg XL tablet TAKE ONE TABLET BY MOUTH ONCE DAILY, Normal, Disp-30 Tab, R-5  
  
lisinopril (PRINIVIL, ZESTRIL) 30 mg tablet Take 30 mg by mouth daily. , Historical Med  
  
pravastatin (PRAVACHOL) 40 mg tablet Take 40 mg by mouth nightly., Historical Med  
  
metFORMIN (GLUCOPHAGE) 500 mg tablet Take 500 mg by mouth two (2) times daily (with meals). Historical Med, 500 mg Dictation disclaimer:  Please note that this dictation was completed with [employer], the [employer] voice recognition software. Quite often unanticipated grammatical, syntax, homophones, and other interpretive errors are inadvertently transcribed by the computer software. Please disregard these errors. Please excuse any errors that have escaped final proofreading. Performing Laboratory: 0 Expected Date Of Service: 08/01/2022 Billing Type: Third-Party Bill Bill For Surgical Tray: no

## 2024-05-28 NOTE — LETTER
11/2/2017 4:04 PM 
 
Mr. Merlin Cai. 
[address] [address] To Whom It May Concern: 
 
Merlin Cai is currently under the care of [facility]. Patient was evaluated in our office today. He sustained lumbar strain injury related to [facility] on 8/25/17. Patient has improved with physical therapy and as needed Ibuprofen. I anticipate he will continue to improve. I will see him back on am as needed basis. Sincerely, David Burger MD 
 
                                
 
 Zee or Jazmin: Are one of you able to enter in the order to repeat alkaline phosphatase lab to his pre-op labs on 5/31/24?

## (undated) DEVICE — SYRINGE MED 20ML STD CLR PLAS LUERLOCK TIP N CTRL DISP

## (undated) DEVICE — FLEX ADVANTAGE 3000CC: Brand: FLEX ADVANTAGE

## (undated) DEVICE — GOWN ISOL IMPERV UNIV, DISP, OPEN BACK, BLUE --

## (undated) DEVICE — SYR 10ML LUER LOK 1/5ML GRAD --

## (undated) DEVICE — SOLUTION IRRIG 1000ML H2O STRL BLT

## (undated) DEVICE — SYRINGE MED 25GA 3ML L5/8IN SUBQ PLAS W/ DETACH NDL SFTY

## (undated) DEVICE — (D)GLOVE EXAM LG NITRL NS -- DISC BY MFR NO SUB

## (undated) DEVICE — MEDI-VAC SUCTION HIGH CAPACITY: Brand: CARDINAL HEALTH

## (undated) DEVICE — CANNULA ORIG TL CLR W FOAM CUSHIONS AND 14FT SUPL TB 3 CHN

## (undated) DEVICE — ENDOSCOPY PUMP TUBING/ CAP SET: Brand: ERBE

## (undated) DEVICE — GAUZE SPONGES,16 PLY: Brand: CURITY

## (undated) DEVICE — MEDI-VAC NON-CONDUCTIVE SUCTION TUBING: Brand: CARDINAL HEALTH

## (undated) DEVICE — FORCEPS BX L240CM JAW DIA2.8MM L CAP W/ NDL MIC MESH TOOTH

## (undated) DEVICE — AIRLIFE™ NASAL OXYGEN CANNULA CURVED, NONFLARED TIP WITH 14 FOOT (4.3 M) CRUSH-RESISTANT TUBING, OVER-THE-EAR STYLE: Brand: AIRLIFE™

## (undated) DEVICE — SYR 50ML SLIP TIP NSAF LF STRL --

## (undated) DEVICE — FLUFF AND POLYMER UNDERPAD,EXTRA HEAVY: Brand: WINGS

## (undated) DEVICE — CATHETER SUCT TR FL TIP 14FR W/ O CTRL